# Patient Record
Sex: MALE | Race: WHITE | Employment: OTHER | URBAN - METROPOLITAN AREA
[De-identification: names, ages, dates, MRNs, and addresses within clinical notes are randomized per-mention and may not be internally consistent; named-entity substitution may affect disease eponyms.]

---

## 2017-02-08 ENCOUNTER — TELEPHONE (OUTPATIENT)
Dept: CARDIOLOGY CLINIC | Age: 67
End: 2017-02-08

## 2017-02-13 ENCOUNTER — TELEPHONE (OUTPATIENT)
Dept: CARDIOLOGY CLINIC | Age: 67
End: 2017-02-13

## 2017-02-13 DIAGNOSIS — I10 ESSENTIAL HYPERTENSION: ICD-10-CM

## 2017-02-13 RX ORDER — LISINOPRIL 10 MG/1
10 TABLET ORAL DAILY
Qty: 30 TABLET | Refills: 5 | Status: SHIPPED | OUTPATIENT
Start: 2017-02-13 | End: 2018-02-26 | Stop reason: SDUPTHER

## 2017-02-27 DIAGNOSIS — I48.91 ATRIAL FIBRILLATION, UNSPECIFIED TYPE (HCC): Primary | ICD-10-CM

## 2017-03-01 ENCOUNTER — TELEPHONE (OUTPATIENT)
Dept: CARDIOLOGY CLINIC | Age: 67
End: 2017-03-01

## 2017-03-01 ENCOUNTER — HOSPITAL ENCOUNTER (OUTPATIENT)
Dept: OTHER | Age: 67
Discharge: HOME | End: 2017-03-01
Attending: NURSE PRACTITIONER | Admitting: NURSE PRACTITIONER

## 2017-03-01 LAB
BASOPHILS ABSOLUTE: 0.1 K/UL (ref 0–0.2)
BASOPHILS RELATIVE PERCENT: 1 %
EOSINOPHILS ABSOLUTE: 0.1 K/UL (ref 0–0.6)
EOSINOPHILS RELATIVE PERCENT: 1.9 %
HCT VFR BLD CALC: 40.8 % (ref 40.5–52.5)
HEMOGLOBIN: 13.7 G/DL (ref 13.5–17.5)
LYMPHOCYTES ABSOLUTE: 2.2 K/UL (ref 1–5.1)
LYMPHOCYTES RELATIVE PERCENT: 36 %
MCH RBC QN AUTO: 29 PG (ref 26–34)
MCHC RBC AUTO-ENTMCNC: 33.5 G/DL (ref 31–36)
MCV RBC AUTO: 86.7 FL (ref 80–100)
MONOCYTES ABSOLUTE: 0.5 K/UL (ref 0–1.3)
MONOCYTES RELATIVE PERCENT: 8.9 %
NEUTROPHILS ABSOLUTE: 3.2 K/UL (ref 1.7–7.7)
NEUTROPHILS RELATIVE PERCENT: 52.2 %
PDW BLD-RTO: 13.1 % (ref 12.4–15.4)
PLATELET # BLD: 203 K/UL (ref 135–450)
PMV BLD AUTO: 8.2 FL (ref 5–10.5)
RBC # BLD: 4.71 M/UL (ref 4.2–5.9)
WBC # BLD: 6.1 K/UL (ref 4–11)

## 2017-04-10 ENCOUNTER — OFFICE VISIT (OUTPATIENT)
Dept: CARDIOLOGY CLINIC | Age: 67
End: 2017-04-10

## 2017-04-10 VITALS
DIASTOLIC BLOOD PRESSURE: 58 MMHG | WEIGHT: 203 LBS | HEART RATE: 52 BPM | BODY MASS INDEX: 27.5 KG/M2 | SYSTOLIC BLOOD PRESSURE: 108 MMHG | HEIGHT: 72 IN

## 2017-04-10 DIAGNOSIS — I47.1 PAROXYSMAL ATRIAL TACHYCARDIA (HCC): ICD-10-CM

## 2017-04-10 DIAGNOSIS — I48.0 PAROXYSMAL ATRIAL FIBRILLATION (HCC): Primary | ICD-10-CM

## 2017-04-10 PROCEDURE — 99214 OFFICE O/P EST MOD 30 MIN: CPT | Performed by: INTERNAL MEDICINE

## 2017-04-10 PROCEDURE — 93000 ELECTROCARDIOGRAM COMPLETE: CPT | Performed by: INTERNAL MEDICINE

## 2017-04-10 RX ORDER — WARFARIN SODIUM 5 MG/1
5 TABLET ORAL DAILY
Qty: 30 TABLET | Refills: 2 | Status: SHIPPED | OUTPATIENT
Start: 2017-04-10 | End: 2017-08-24 | Stop reason: SDUPTHER

## 2017-04-11 ENCOUNTER — TELEPHONE (OUTPATIENT)
Dept: PHARMACY | Facility: CLINIC | Age: 67
End: 2017-04-11

## 2017-04-20 ENCOUNTER — TELEPHONE (OUTPATIENT)
Dept: PHARMACY | Facility: CLINIC | Age: 67
End: 2017-04-20

## 2017-04-21 ENCOUNTER — TELEPHONE (OUTPATIENT)
Dept: PHARMACY | Facility: CLINIC | Age: 67
End: 2017-04-21

## 2017-05-22 ENCOUNTER — ANTI-COAG VISIT (OUTPATIENT)
Dept: PHARMACY | Facility: CLINIC | Age: 67
End: 2017-05-22

## 2017-05-22 DIAGNOSIS — I48.0 PAROXYSMAL ATRIAL FIBRILLATION (HCC): ICD-10-CM

## 2017-05-22 LAB — INR BLD: 2.5

## 2017-05-30 ENCOUNTER — ANTI-COAG VISIT (OUTPATIENT)
Dept: PHARMACY | Facility: CLINIC | Age: 67
End: 2017-05-30

## 2017-05-30 LAB — INR BLD: 1.7

## 2017-06-13 ENCOUNTER — ANTI-COAG VISIT (OUTPATIENT)
Dept: PHARMACY | Facility: CLINIC | Age: 67
End: 2017-06-13

## 2017-06-13 LAB — INR BLD: 1.6

## 2017-07-03 ENCOUNTER — ANTI-COAG VISIT (OUTPATIENT)
Dept: PHARMACY | Facility: CLINIC | Age: 67
End: 2017-07-03

## 2017-07-03 LAB — INR BLD: 1.6

## 2017-07-20 ENCOUNTER — ANTI-COAG VISIT (OUTPATIENT)
Dept: PHARMACY | Facility: CLINIC | Age: 67
End: 2017-07-20

## 2017-07-20 LAB — INR BLD: 3

## 2017-08-22 ENCOUNTER — ANTI-COAG VISIT (OUTPATIENT)
Dept: PHARMACY | Facility: CLINIC | Age: 67
End: 2017-08-22

## 2017-08-22 DIAGNOSIS — I48.0 PAROXYSMAL ATRIAL FIBRILLATION (HCC): ICD-10-CM

## 2017-08-22 LAB — INR BLD: 2.4

## 2017-09-19 ENCOUNTER — HOSPITAL ENCOUNTER (OUTPATIENT)
Dept: OTHER | Age: 67
Discharge: HOME | End: 2017-10-31
Attending: INTERNAL MEDICINE | Admitting: INTERNAL MEDICINE

## 2017-09-19 ENCOUNTER — ANTI-COAG VISIT (OUTPATIENT)
Dept: PHARMACY | Facility: CLINIC | Age: 67
End: 2017-09-19

## 2017-09-19 DIAGNOSIS — I48.0 PAROXYSMAL ATRIAL FIBRILLATION (HCC): ICD-10-CM

## 2017-09-19 LAB — INR BLD: 3.1

## 2017-10-17 ENCOUNTER — ANTI-COAG VISIT (OUTPATIENT)
Dept: PHARMACY | Facility: CLINIC | Age: 67
End: 2017-10-17

## 2017-10-17 LAB — INR BLD: 2.7

## 2017-11-01 ENCOUNTER — HOSPITAL ENCOUNTER (OUTPATIENT)
Dept: OTHER | Age: 67
Discharge: HOME | End: 2017-11-30
Attending: INTERNAL MEDICINE | Admitting: INTERNAL MEDICINE

## 2017-11-06 ENCOUNTER — HOSPITAL ENCOUNTER (OUTPATIENT)
Dept: OTHER | Age: 67
Discharge: HOME | End: 2017-11-06

## 2017-11-06 LAB
A/G RATIO: 1.2 (ref 1.1–2.2)
ALBUMIN SERPL-MCNC: 4.2 G/DL (ref 3.4–5)
ALP BLD-CCNC: 87 U/L (ref 40–129)
ALT SERPL-CCNC: 28 U/L (ref 10–40)
ANION GAP SERPL CALCULATED.3IONS-SCNC: 12 MMOL/L (ref 3–16)
AST SERPL-CCNC: 26 U/L (ref 15–37)
BILIRUB SERPL-MCNC: 0.4 MG/DL (ref 0–1)
BUN BLDV-MCNC: 24 MG/DL (ref 7–20)
CALCIUM SERPL-MCNC: 9.1 MG/DL (ref 8.3–10.6)
CHLORIDE BLD-SCNC: 104 MMOL/L (ref 99–110)
CO2: 25 MMOL/L (ref 21–32)
CREAT SERPL-MCNC: 1 MG/DL (ref 0.8–1.3)
GFR AFRICAN AMERICAN: >60
GFR NON-AFRICAN AMERICAN: >60
GLOBULIN: 3.4 G/DL
GLUCOSE BLD-MCNC: 143 MG/DL (ref 70–99)
POTASSIUM SERPL-SCNC: 4.7 MMOL/L (ref 3.5–5.1)
PRO-BNP: 119 PG/ML (ref 0–124)
SODIUM BLD-SCNC: 141 MMOL/L (ref 136–145)
TOTAL PROTEIN: 7.6 G/DL (ref 6.4–8.2)

## 2017-11-07 LAB — TSH REFLEX: 1.64 UIU/ML (ref 0.27–4.2)

## 2017-11-14 ENCOUNTER — ANTI-COAG VISIT (OUTPATIENT)
Dept: PHARMACY | Facility: CLINIC | Age: 67
End: 2017-11-14

## 2017-11-14 LAB — INR BLD: 2.3

## 2017-12-01 ENCOUNTER — HOSPITAL ENCOUNTER (OUTPATIENT)
Dept: OTHER | Age: 67
Discharge: HOME | End: 2017-12-31
Attending: INTERNAL MEDICINE | Admitting: INTERNAL MEDICINE

## 2017-12-26 ENCOUNTER — ANTI-COAG VISIT (OUTPATIENT)
Dept: PHARMACY | Facility: CLINIC | Age: 67
End: 2017-12-26

## 2017-12-26 LAB — INR BLD: 2.8

## 2017-12-26 NOTE — PROGRESS NOTES
Mr. Makeda Jackson is here for management of anticoagulation for A-fib. PMH also significant for Chronic back pain; DJD; Fibromyalgia; and Hypertension. He presents today w/out complaint. Pt verifies dosing regimen as listed above. Pt denies s/s bleeding/bruising/swelling/SOB. No BRBPR. No melena. Denies missed doses. Reviewed pt medication list  No changes in RX/OTCs/Herbal medications. Reviewed dietary concerns  Occasional EToH use. Denies tobacco use. Patient is getting lots of bills from the appointments. Will allow 6 weeks due to cost and since patient has been stable. Told him to look into NOAC or having MD manage warfarin--both may be cheaper options. INR 2.8 is within acceptable therapeutic range of 2-3. Recommend to continue 5  mg daily, except 2.5 mg Sun/Wed. Patient has 5 mg tablets. Will continue to monitor and check INR in 6 weeks, due to copay. Dosing reminder card given with phone number, appointment date and time.    Return to clinic: 2/6 @ 2:45pm

## 2018-01-01 ENCOUNTER — HOSPITAL ENCOUNTER (OUTPATIENT)
Dept: OTHER | Age: 68
Discharge: HOME | End: 2018-01-31
Attending: INTERNAL MEDICINE | Admitting: INTERNAL MEDICINE

## 2018-02-01 ENCOUNTER — HOSPITAL ENCOUNTER (OUTPATIENT)
Dept: OTHER | Age: 68
Discharge: HOME | End: 2018-02-28
Attending: INTERNAL MEDICINE | Admitting: INTERNAL MEDICINE

## 2018-02-13 ENCOUNTER — ANTI-COAG VISIT (OUTPATIENT)
Dept: PHARMACY | Facility: CLINIC | Age: 68
End: 2018-02-13

## 2018-02-13 LAB — INR BLD: 2.5

## 2018-02-13 NOTE — PROGRESS NOTES
Mr. Maria Del Rosario Bruno is here for management of anticoagulation for A-fib. PMH also significant for Chronic back pain; DJD; Fibromyalgia; and Hypertension. He presents today w/out complaint. Pt verifies dosing regimen as listed above. Pt denies s/s bleeding/bruising/swelling/SOB. No BRBPR. No melena. Denies missed doses. Reviewed pt medication list  No changes in RX/OTCs/Herbal medications. Reviewed dietary concerns  Occasional EToH use. Denies tobacco use. Patient is getting lots of bills from the appointments. Will allow 6 weeks due to cost and since patient has been stable. Told him to look into NOAC or having MD manage warfarin--both may be cheaper options. INR 2.5 is within acceptable therapeutic range of 2-3. Recommend to continue 5  mg daily, except 2.5 mg Sun/Wed. Patient has 5 mg tablets. Will continue to monitor and check INR in 6 weeks, due to copay. Dosing reminder card given with phone number, appointment date and time.    Return to clinic: 3/27 @ 2:45pm

## 2018-02-27 ENCOUNTER — TELEPHONE (OUTPATIENT)
Dept: CARDIOLOGY CLINIC | Age: 68
End: 2018-02-27

## 2018-03-01 ENCOUNTER — HOSPITAL ENCOUNTER (OUTPATIENT)
Dept: OTHER | Age: 68
Discharge: HOME | End: 2018-03-31
Attending: INTERNAL MEDICINE | Admitting: INTERNAL MEDICINE

## 2018-03-27 ENCOUNTER — ANTI-COAG VISIT (OUTPATIENT)
Dept: PHARMACY | Facility: CLINIC | Age: 68
End: 2018-03-27

## 2018-03-27 LAB — INR BLD: 2.7

## 2018-04-01 ENCOUNTER — HOSPITAL ENCOUNTER (OUTPATIENT)
Dept: OTHER | Age: 68
Discharge: HOME | End: 2018-04-30
Attending: INTERNAL MEDICINE | Admitting: INTERNAL MEDICINE

## 2018-04-16 ENCOUNTER — OFFICE VISIT (OUTPATIENT)
Dept: CARDIOLOGY CLINIC | Age: 68
End: 2018-04-16

## 2018-04-16 VITALS
WEIGHT: 218 LBS | OXYGEN SATURATION: 98 % | BODY MASS INDEX: 29.57 KG/M2 | HEART RATE: 55 BPM | SYSTOLIC BLOOD PRESSURE: 124 MMHG | DIASTOLIC BLOOD PRESSURE: 62 MMHG

## 2018-04-16 DIAGNOSIS — I10 ESSENTIAL HYPERTENSION: ICD-10-CM

## 2018-04-16 DIAGNOSIS — I48.0 PAROXYSMAL ATRIAL FIBRILLATION (HCC): Primary | ICD-10-CM

## 2018-04-16 DIAGNOSIS — R00.1 SINUS BRADYCARDIA: ICD-10-CM

## 2018-04-16 DIAGNOSIS — I44.0 FIRST DEGREE ATRIOVENTRICULAR BLOCK: ICD-10-CM

## 2018-04-16 PROCEDURE — 93000 ELECTROCARDIOGRAM COMPLETE: CPT | Performed by: NURSE PRACTITIONER

## 2018-04-16 PROCEDURE — 99214 OFFICE O/P EST MOD 30 MIN: CPT | Performed by: NURSE PRACTITIONER

## 2018-04-17 ENCOUNTER — HOSPITAL ENCOUNTER (OUTPATIENT)
Dept: OTHER | Age: 68
Discharge: HOME | End: 2018-04-17
Attending: NURSE PRACTITIONER | Admitting: NURSE PRACTITIONER

## 2018-04-17 DIAGNOSIS — I48.0 PAROXYSMAL ATRIAL FIBRILLATION (HCC): ICD-10-CM

## 2018-04-17 LAB
ANION GAP SERPL CALCULATED.3IONS-SCNC: 13 MMOL/L (ref 3–16)
BASOPHILS ABSOLUTE: 0.1 K/UL (ref 0–0.2)
BASOPHILS RELATIVE PERCENT: 1 %
BUN BLDV-MCNC: 19 MG/DL (ref 7–20)
CALCIUM SERPL-MCNC: 8.8 MG/DL (ref 8.3–10.6)
CHLORIDE BLD-SCNC: 101 MMOL/L (ref 99–110)
CO2: 26 MMOL/L (ref 21–32)
CREAT SERPL-MCNC: 0.9 MG/DL (ref 0.8–1.3)
EOSINOPHILS ABSOLUTE: 0.1 K/UL (ref 0–0.6)
EOSINOPHILS RELATIVE PERCENT: 1.7 %
GFR AFRICAN AMERICAN: >60
GFR NON-AFRICAN AMERICAN: >60
GLUCOSE BLD-MCNC: 142 MG/DL (ref 70–99)
HCT VFR BLD CALC: 41.6 % (ref 40.5–52.5)
HEMOGLOBIN: 14.1 G/DL (ref 13.5–17.5)
LYMPHOCYTES ABSOLUTE: 2 K/UL (ref 1–5.1)
LYMPHOCYTES RELATIVE PERCENT: 32.5 %
MCH RBC QN AUTO: 29.8 PG (ref 26–34)
MCHC RBC AUTO-ENTMCNC: 33.9 G/DL (ref 31–36)
MCV RBC AUTO: 88.1 FL (ref 80–100)
MONOCYTES ABSOLUTE: 0.4 K/UL (ref 0–1.3)
MONOCYTES RELATIVE PERCENT: 6.8 %
NEUTROPHILS ABSOLUTE: 3.6 K/UL (ref 1.7–7.7)
NEUTROPHILS RELATIVE PERCENT: 58 %
PDW BLD-RTO: 13.4 % (ref 12.4–15.4)
PLATELET # BLD: 232 K/UL (ref 135–450)
PMV BLD AUTO: 8.7 FL (ref 5–10.5)
POTASSIUM SERPL-SCNC: 4.3 MMOL/L (ref 3.5–5.1)
RBC # BLD: 4.72 M/UL (ref 4.2–5.9)
SODIUM BLD-SCNC: 140 MMOL/L (ref 136–145)
WBC # BLD: 6.2 K/UL (ref 4–11)

## 2018-04-20 RX ORDER — FUROSEMIDE 20 MG/1
20 TABLET ORAL DAILY
Qty: 30 TABLET | Refills: 5 | Status: SHIPPED | OUTPATIENT
Start: 2018-04-20 | End: 2020-10-22 | Stop reason: ALTCHOICE

## 2018-05-01 ENCOUNTER — HOSPITAL ENCOUNTER (OUTPATIENT)
Dept: OTHER | Age: 68
Discharge: HOME | End: 2018-05-31
Attending: INTERNAL MEDICINE | Admitting: INTERNAL MEDICINE

## 2018-05-15 ENCOUNTER — ANTI-COAG VISIT (OUTPATIENT)
Dept: PHARMACY | Facility: CLINIC | Age: 68
End: 2018-05-15

## 2018-05-15 DIAGNOSIS — I48.0 PAROXYSMAL ATRIAL FIBRILLATION (HCC): ICD-10-CM

## 2018-05-15 LAB — INR BLD: 2.4

## 2018-06-01 ENCOUNTER — HOSPITAL ENCOUNTER (OUTPATIENT)
Dept: OTHER | Age: 68
Discharge: HOME | End: 2018-06-30
Attending: INTERNAL MEDICINE | Admitting: INTERNAL MEDICINE

## 2018-06-08 RX ORDER — WARFARIN SODIUM 5 MG/1
TABLET ORAL
Qty: 30 TABLET | Refills: 0 | Status: SHIPPED | OUTPATIENT
Start: 2018-06-08 | End: 2018-07-20 | Stop reason: SDUPTHER

## 2018-06-28 ENCOUNTER — ANTI-COAG VISIT (OUTPATIENT)
Dept: PHARMACY | Facility: CLINIC | Age: 68
End: 2018-06-28

## 2018-06-28 DIAGNOSIS — I48.0 PAROXYSMAL ATRIAL FIBRILLATION (HCC): ICD-10-CM

## 2018-06-28 LAB — INR BLD: 3.4

## 2018-07-01 ENCOUNTER — HOSPITAL ENCOUNTER (OUTPATIENT)
Dept: OTHER | Age: 68
Discharge: HOME | End: 2018-07-01
Attending: INTERNAL MEDICINE | Admitting: INTERNAL MEDICINE

## 2018-08-16 ENCOUNTER — TELEPHONE (OUTPATIENT)
Dept: PHARMACY | Age: 68
End: 2018-08-16

## 2018-08-21 ENCOUNTER — TELEPHONE (OUTPATIENT)
Dept: CARDIOLOGY CLINIC | Age: 68
End: 2018-08-21

## 2018-08-21 NOTE — TELEPHONE ENCOUNTER
Pt called asking if he can change from Warfarin to Eliquis. Pt can be reached at [phone]. If he can switch pt prefers to use [id number] - Jess Tsai, [employer] [address] [phone] - [phone].

## 2018-08-22 NOTE — TELEPHONE ENCOUNTER
Spoke with the patient and gave him these instructions. He will get checked at [facility] and then we can proceed with new RX for Eliquis.

## 2018-08-23 ENCOUNTER — ANTI-COAG VISIT (OUTPATIENT)
Dept: PHARMACY | Age: 68
End: 2018-08-23
Payer: MEDICARE

## 2018-08-23 ENCOUNTER — TELEPHONE (OUTPATIENT)
Dept: CARDIOLOGY CLINIC | Age: 68
End: 2018-08-23

## 2018-08-23 DIAGNOSIS — I48.0 PAROXYSMAL ATRIAL FIBRILLATION (HCC): Primary | ICD-10-CM

## 2018-08-23 DIAGNOSIS — I48.0 PAROXYSMAL ATRIAL FIBRILLATION (HCC): ICD-10-CM

## 2018-08-23 LAB — INTERNATIONAL NORMALIZATION RATIO, POC: 1.5

## 2018-08-23 PROCEDURE — 99211 OFF/OP EST MAY X REQ PHY/QHP: CPT | Performed by: PHARMACIST

## 2018-08-23 PROCEDURE — 85610 PROTHROMBIN TIME: CPT | Performed by: PHARMACIST

## 2018-08-23 NOTE — TELEPHONE ENCOUNTER
Willy Leger reports INR was 1.5 today. Pt was waiting for INR below 2.0 to switch to Eliquis. Pt would like to make that switch and  new med today.

## 2018-08-23 NOTE — TELEPHONE ENCOUNTER
Spoke to Josue Fairchild at the coumadin clinic and pt. Informed Eliquis has been pended for sign off by the provider.  YOKASTA

## 2018-08-24 ENCOUNTER — TELEPHONE (OUTPATIENT)
Dept: CARDIOLOGY CLINIC | Age: 68
End: 2018-08-24

## 2018-08-24 DIAGNOSIS — I48.0 PAROXYSMAL ATRIAL FIBRILLATION (HCC): ICD-10-CM

## 2018-08-24 NOTE — TELEPHONE ENCOUNTER
This was sent to [facility] on 8/23 and they confirmed receipt:    -E-Prescribing Status: Receipt confirmed by pharmacy (8/23/2018  3:53 PM EDT)

## 2019-01-23 ENCOUNTER — APPOINTMENT (OUTPATIENT)
Dept: GENERAL RADIOLOGY | Age: 69
End: 2019-01-23
Payer: MEDICARE

## 2019-01-23 ENCOUNTER — APPOINTMENT (OUTPATIENT)
Dept: CT IMAGING | Age: 69
End: 2019-01-23
Payer: MEDICARE

## 2019-01-23 ENCOUNTER — HOSPITAL ENCOUNTER (EMERGENCY)
Age: 69
Discharge: HOME | End: 2019-01-23
Payer: MEDICARE

## 2019-01-23 VITALS
HEIGHT: 72 IN | OXYGEN SATURATION: 95 % | DIASTOLIC BLOOD PRESSURE: 70 MMHG | TEMPERATURE: 98.4 F | WEIGHT: 210 LBS | BODY MASS INDEX: 28.44 KG/M2 | SYSTOLIC BLOOD PRESSURE: 158 MMHG | RESPIRATION RATE: 14 BRPM | HEART RATE: 66 BPM

## 2019-01-23 DIAGNOSIS — S80.11XA CONTUSION OF RIGHT LOWER EXTREMITY, INITIAL ENCOUNTER: ICD-10-CM

## 2019-01-23 DIAGNOSIS — M54.50 LUMBAR PAIN: ICD-10-CM
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
## 2019-01-23 PROCEDURE — 73502 X-RAY EXAM HIP UNI 2-3 VIEWS: CPT

## 2019-01-23 PROCEDURE — 99284 EMERGENCY DEPT VISIT MOD MDM: CPT

## 2019-01-23 PROCEDURE — 72100 X-RAY EXAM L-S SPINE 2/3 VWS: CPT

## 2019-01-23 PROCEDURE — 73590 X-RAY EXAM OF LOWER LEG: CPT

## 2019-01-23 PROCEDURE — 70450 CT HEAD/BRAIN W/O DYE: CPT

## 2019-01-23 RX ORDER — METHOCARBAMOL 500 MG/1
500 TABLET, FILM COATED ORAL 3 TIMES DAILY
Qty: 10 TABLET | Refills: 0 | Status: SHIPPED | OUTPATIENT
Start: 2019-01-23 | End: 2019-01-27

## 2019-01-23 ASSESSMENT — PAIN DESCRIPTION - ORIENTATION: ORIENTATION: RIGHT

## 2019-01-23 ASSESSMENT — PAIN DESCRIPTION - DESCRIPTORS: DESCRIPTORS: THROBBING

## 2019-01-23 ASSESSMENT — ENCOUNTER SYMPTOMS
VOMITING: 0
BACK PAIN: 1
SHORTNESS OF BREATH: 0
ABDOMINAL PAIN: 0

## 2019-01-23 ASSESSMENT — PAIN DESCRIPTION - LOCATION: LOCATION: HIP;LEG

## 2019-01-23 ASSESSMENT — PAIN SCALES - GENERAL: PAINLEVEL_OUTOF10: 5

## 2019-01-23 ASSESSMENT — PAIN DESCRIPTION - PAIN TYPE: TYPE: ACUTE PAIN

## 2019-03-07 DIAGNOSIS — I48.0 PAROXYSMAL ATRIAL FIBRILLATION (HCC): Primary | ICD-10-CM

## 2019-03-07 DIAGNOSIS — I10 ESSENTIAL HYPERTENSION: ICD-10-CM

## 2019-03-11 RX ORDER — LISINOPRIL 10 MG/1
10 TABLET ORAL DAILY
Qty: 15 TABLET | Refills: 0 | Status: SHIPPED | OUTPATIENT
Start: 2019-03-11 | End: 2019-04-17 | Stop reason: DRUGHIGH

## 2019-04-15 ENCOUNTER — HOSPITAL ENCOUNTER (OUTPATIENT)
Age: 69
Discharge: HOME | End: 2019-04-15
Payer: MEDICARE

## 2019-04-15 DIAGNOSIS — Z79.899 MEDICATION MANAGEMENT: ICD-10-CM

## 2019-04-15 DIAGNOSIS — Z79.899 MEDICATION MANAGEMENT: Primary | ICD-10-CM

## 2019-04-15 LAB
ANION GAP SERPL CALCULATED.3IONS-SCNC: 10 MMOL/L (ref 3–16)
BUN BLDV-MCNC: 13 MG/DL (ref 7–20)
CALCIUM SERPL-MCNC: 8.8 MG/DL (ref 8.3–10.6)
CHLORIDE BLD-SCNC: 107 MMOL/L (ref 99–110)
CO2: 26 MMOL/L (ref 21–32)
CREAT SERPL-MCNC: 0.9 MG/DL (ref 0.8–1.3)
GFR AFRICAN AMERICAN: >60
GFR NON-AFRICAN AMERICAN: >60
GLUCOSE BLD-MCNC: 127 MG/DL (ref 70–99)
HCT VFR BLD CALC: 41 % (ref 40.5–52.5)
HEMOGLOBIN: 13.9 G/DL (ref 13.5–17.5)
MCH RBC QN AUTO: 29.6 PG (ref 26–34)
MCHC RBC AUTO-ENTMCNC: 33.7 G/DL (ref 31–36)
MCV RBC AUTO: 87.8 FL (ref 80–100)
PDW BLD-RTO: 13.6 % (ref 12.4–15.4)
PLATELET # BLD: 208 K/UL (ref 135–450)
PMV BLD AUTO: 8.1 FL (ref 5–10.5)
POTASSIUM SERPL-SCNC: 4 MMOL/L (ref 3.5–5.1)
RBC # BLD: 4.68 M/UL (ref 4.2–5.9)
SODIUM BLD-SCNC: 143 MMOL/L (ref 136–145)
WBC # BLD: 5.2 K/UL (ref 4–11)

## 2019-04-15 PROCEDURE — 36415 COLL VENOUS BLD VENIPUNCTURE: CPT

## 2019-04-15 PROCEDURE — 85027 COMPLETE CBC AUTOMATED: CPT

## 2019-04-15 PROCEDURE — 80048 BASIC METABOLIC PNL TOTAL CA: CPT

## 2019-04-16 ENCOUNTER — TELEPHONE (OUTPATIENT)
Dept: CARDIOLOGY CLINIC | Age: 69
End: 2019-04-16

## 2019-04-16 NOTE — TELEPHONE ENCOUNTER
----- Message from JEWEL Samuel CNP sent at 4/16/2019  8:58 AM EDT -----  Please inform the patient that his labs are normal.

## 2019-04-17 ENCOUNTER — OFFICE VISIT (OUTPATIENT)
Dept: CARDIOLOGY CLINIC | Age: 69
End: 2019-04-17
Payer: MEDICARE

## 2019-04-17 VITALS
BODY MASS INDEX: 29.53 KG/M2 | HEIGHT: 72 IN | SYSTOLIC BLOOD PRESSURE: 132 MMHG | OXYGEN SATURATION: 96 % | WEIGHT: 218 LBS | HEART RATE: 54 BPM | DIASTOLIC BLOOD PRESSURE: 64 MMHG

## 2019-04-17 DIAGNOSIS — I48.0 PAROXYSMAL ATRIAL FIBRILLATION (HCC): Primary | ICD-10-CM

## 2019-04-17 DIAGNOSIS — I44.0 FIRST DEGREE ATRIOVENTRICULAR BLOCK: ICD-10-CM

## 2019-04-17 DIAGNOSIS — R00.1 SINUS BRADYCARDIA: ICD-10-CM

## 2019-04-17 DIAGNOSIS — I10 ESSENTIAL HYPERTENSION: ICD-10-CM

## 2019-04-17 PROCEDURE — 93000 ELECTROCARDIOGRAM COMPLETE: CPT | Performed by: NURSE PRACTITIONER

## 2019-04-17 PROCEDURE — 99214 OFFICE O/P EST MOD 30 MIN: CPT | Performed by: NURSE PRACTITIONER

## 2019-04-17 RX ORDER — LISINOPRIL 10 MG/1
5 TABLET ORAL DAILY
Qty: 15 TABLET | Refills: 0 | Status: SHIPPED
Start: 2019-04-17 | End: 2019-06-24

## 2019-04-17 NOTE — PROGRESS NOTES
[facility]   Electrophysiology  Note            Date:  April 17, 2019  Patient name: Sánchez Felix  YOB: 1950    Primary Care physician: No primary care provider on file. HISTORY OF PRESENT ILLNESS: Sánchez Felix is a 77 y.o. male with a history of lone versus paroxysmal atrial fibrillation, sinus bradycardia, first degree AV block, HTN, and chronic back pain. He was admitted in 12/2015 with chest pressure. Was in AF and was started on Eliquis. A stress test and echo were normal in 12/2015. He has remained in sinus rhythm at subsequent visits. Admitted to taking someone else's diuretic due to swelling in 2018. Today he is being seen for atrial fibrillation. His EKG shows sinus bradycardia with a first degree AV block with a HR of 54. He quit taking Eliquis due to cost. Is not taking Lasix due to working long hours/frequent urination. Complains of unchanged lower extremity swelling. Denies chest pain, palpitations, shortness of breath, and dizziness. Past Medical History:   has a past medical history of Abnormal ultrasound of carotid artery, Back pain, Chronic back pain, DJD (degenerative joint disease), Fibromyalgia, Hypertension, Kidney stones, and Paroxysmal atrial tachycardia ([facility] [address] Past Surgical History:   has a past surgical history that includes back surgery; Upper gastrointestinal endoscopy (12/16/2011); Upper gastrointestinal endoscopy (12/16/11); and Lithotripsy. Home Medications:    Prior to Admission medications    Medication Sig Start Date End Date Taking? Authorizing Provider   lisinopril (PRINIVIL;ZESTRIL) 10 MG tablet Take 0.5 tablets by mouth daily 4/17/19  Yes JEWEL Mcallister - CNP   Buprenorphine HCl-Naloxone HCl (SUBOXONE SL) Place under the tongue 2 times daily Indications: Patient unaware of home dose.    Yes Historical Provider, MD   apixaban (ELIQUIS) 5 MG TABS tablet Take 1 tablet by mouth 2 times daily 8/23/18   JEWEL Mcallister - CNP   furosemide (LASIX) 20 MG tablet Take 1 tablet by mouth daily 4/20/18   Carolyn Alvarez, APRN - CNP       Allergies:  Amitriptyline; Citalopram hydrobromide; Naproxen; Other; Paroxetine; Pcn [penicillins]; and Penicillins    Social History:   reports that he quit smoking about 10 years ago. His smoking use included cigarettes. He has never used smokeless tobacco. He reports that he does not drink alcohol or use drugs. Family History: family history includes Heart Attack (age of onset: 61) in his mother; Heart Attack (age of onset: 80) in his father. Review of Systems   Constitutional: Negative. HENT: Negative. Eyes: Negative. Respiratory: Negative. Cardiovascular: see HPI  Gastrointestinal: Negative. Genitourinary: Negative. Musculoskeletal: + chronic back pain  Skin: Negative. Neurological: Negative. Hematological: Negative. Psychiatric/Behavioral: Negative     Physical Examination:    Vital signs:   /64   Pulse 54   Ht 6' (1.829 m)   Wt 218 lb (98.9 kg)   SpO2 96%   BMI 29.57 kg/m²      Constitutional and General Appearance: alert, cooperative, no distress and appears stated age  HEENT: PERRL, no cervical lymphadenopathy. No masses palpable. Normal oral mucosa  Respiratory:  · Normal excursion and expansion without use of accessory muscles  · Resp Auscultation: Normal breath sounds without dullness or wheezing  Cardiovascular:  · The apical impulse is not displaced  · Heart tones are crisp and normal. Regular S1 and S2.  · Jugular venous pulsation Normal  · The carotid upstroke is normal in amplitude and contour without delay or bruit  · Peripheral pulses are symmetrical and full  Abdomen:  · No masses or tenderness  · Bowel sounds present  Extremities:  ·  No cyanosis or clubbing  ·  1+ lower extremity swelling  · Skin: Warm and dry  Neurological:  · Alert and oriented.   · Moves all extremities well  · No abnormalities of mood, affect, memory, mentation, or

## 2019-06-21 DIAGNOSIS — I10 ESSENTIAL HYPERTENSION: ICD-10-CM

## 2019-06-24 RX ORDER — LISINOPRIL 5 MG/1
5 TABLET ORAL DAILY
Qty: 30 TABLET | Refills: 11 | Status: SHIPPED | OUTPATIENT
Start: 2019-06-24 | End: 2020-10-22 | Stop reason: ALTCHOICE

## 2019-06-24 NOTE — TELEPHONE ENCOUNTER
4/17/19 NPBB  Plan:   1. Continue lisinopril and Lasix  2. Patient with symptomatic lone versus paroxysmal atrial fibrillation. No known recurrence since 2015. Discussed annual stroke risk of 2.2%. He can't afford DOAC and stated he can't afford to have Coumadin monitored. Recommended 30 day event monitor. Patient is unwilling to wear monitor due to cost and occupation. At the end of the visit, he stated he will try to get Eliquis and will ask for samples if unable to afford. 3. Conduction disease discussed  4.  Follow up in 6 months

## 2019-06-24 NOTE — TELEPHONE ENCOUNTER
Patient notified that a lower dose was called into the pharmacy and he wouldn't need to cut the pill in half.

## 2019-06-24 NOTE — TELEPHONE ENCOUNTER
Please clarify dose. Last OV stated he was taking 0.5 tab of the 10mg. If correct, will send in 5mg tabs.

## 2019-10-21 ENCOUNTER — OFFICE VISIT (OUTPATIENT)
Dept: CARDIOLOGY CLINIC | Age: 69
End: 2019-10-21
Payer: MEDICARE

## 2019-10-21 VITALS
HEIGHT: 72 IN | WEIGHT: 213 LBS | HEART RATE: 54 BPM | OXYGEN SATURATION: 96 % | BODY MASS INDEX: 28.85 KG/M2 | SYSTOLIC BLOOD PRESSURE: 136 MMHG | DIASTOLIC BLOOD PRESSURE: 72 MMHG

## 2019-10-21 DIAGNOSIS — I48.0 PAROXYSMAL ATRIAL FIBRILLATION (HCC): Primary | ICD-10-CM

## 2019-10-21 DIAGNOSIS — I10 ESSENTIAL HYPERTENSION: ICD-10-CM

## 2019-10-21 DIAGNOSIS — I44.0 FIRST DEGREE ATRIOVENTRICULAR BLOCK: ICD-10-CM

## 2019-10-21 PROCEDURE — 99214 OFFICE O/P EST MOD 30 MIN: CPT | Performed by: NURSE PRACTITIONER

## 2019-10-21 PROCEDURE — 93000 ELECTROCARDIOGRAM COMPLETE: CPT | Performed by: NURSE PRACTITIONER

## 2020-03-24 RX ORDER — APIXABAN 5 MG/1
TABLET, FILM COATED ORAL
Qty: 180 TABLET | Refills: 0 | Status: SHIPPED | OUTPATIENT
Start: 2020-03-24 | End: 2021-01-25

## 2020-10-14 ENCOUNTER — TELEPHONE (OUTPATIENT)
Dept: CARDIOLOGY CLINIC | Age: 70
End: 2020-10-14

## 2020-10-21 NOTE — PROGRESS NOTES
/   [facility]   Electrophysiology  Note            Date:  October 22, 2020  Patient name: Lolly Yost  YOB: 1950    Primary Care physician: No primary care provider on file. HISTORY OF PRESENT ILLNESS: Lolly Yost is a 65 y.o. male with a history of lone versus paroxysmal atrial fibrillation, sinus bradycardia, first degree AV block, HTN, and chronic back pain. He was admitted in 12/2015 with chest pressure. Was in AF and was started on Eliquis. Stress test and echo were normal in 12/2015. He has remained in sinus rhythm at subsequent visits. Admitted to taking someone else's diuretic due to swelling in 2018. At his visit in 4/2019, he had stopped Eliquis due to cost and declined Coumadin and event monitor. Today he is being seen for atrial fibrillation. EKG shows sinus with a first degree AV block and HR of 61. He complains of rare palpitations and chronic back pain. Denies chest pain, shortness of breath, and dizziness. Stopped taking all medications 6 months ago due to lack of follow up. Past Medical History:   has a past medical history of Abnormal ultrasound of carotid artery, Back pain, Chronic back pain, DJD (degenerative joint disease), Fibromyalgia, Hypertension, Kidney stones, and Paroxysmal atrial tachycardia ([facility] [address] Past Surgical History:   has a past surgical history that includes back surgery; Upper gastrointestinal endoscopy (12/16/2011); Upper gastrointestinal endoscopy (12/16/11); and Lithotripsy. Home Medications:    Prior to Admission medications    Medication Sig Start Date End Date Taking? Authorizing Provider   ELIQUIS 5 MG TABS tablet TAKE 1 TABLET BY MOUTH TWICE DAILY 3/24/20  Yes JEWEL Rodriguez - CNP   Buprenorphine HCl-Naloxone HCl (SUBOXONE SL) Place under the tongue 2 times daily Indications: Patient unaware of home dose.    Yes Historical Provider, MD   lisinopril (PRINIVIL;ZESTRIL) 5 MG tablet Take 1 tablet by mouth daily  Patient not taking: Reported on 10/22/2020 6/24/19   JEWEL Goodwin CNP   furosemide (LASIX) 20 MG tablet Take 1 tablet by mouth daily  Patient not taking: Reported on 10/22/2020 4/20/18   JEWEL Goodwin CNP       Allergies:  Amitriptyline; Citalopram hydrobromide; Naproxen; Other; Paroxetine; Pcn [penicillins]; and Penicillins    Social History:   reports that he quit smoking about 11 years ago. His smoking use included cigarettes. He has never used smokeless tobacco. He reports that he does not drink alcohol or use drugs. Family History: family history includes Heart Attack (age of onset: 61) in his mother; Heart Attack (age of onset: 80) in his father. Review of Systems   Constitutional: Negative. HENT: Negative. Eyes: Negative. Respiratory: Negative. Cardiovascular: see HPI  Gastrointestinal: Negative. Genitourinary: Negative. Musculoskeletal: + chronic back pain  Skin: Negative. Neurological: Negative. Hematological: Negative. Psychiatric/Behavioral: Negative     Physical Examination:    Vital signs:   /65   Pulse 66   Ht 6' 1\" (1.854 m)   Wt 211 lb (95.7 kg)   SpO2 98%   BMI 27.84 kg/m²      Constitutional and General Appearance: alert, cooperative, no distress and appears stated age  HEENT: PERRL, no cervical lymphadenopathy. No masses palpable.  Normal oral mucosa  Respiratory:  · Normal excursion and expansion without use of accessory muscles  · Resp Auscultation: Normal breath sounds without dullness or wheezing  Cardiovascular:  · The apical impulse is not displaced  · Heart tones are crisp and normal. Regular S1 and S2.  · Jugular venous pulsation Normal  · The carotid upstroke is normal in amplitude and contour without delay or bruit  · Peripheral pulses are symmetrical and full  Abdomen:  · No masses or tenderness  · Bowel sounds present  Extremities:  ·  No cyanosis or clubbing  ·  Trace lower extremity swelling   · Skin: Warm and

## 2020-10-22 ENCOUNTER — OFFICE VISIT (OUTPATIENT)
Dept: CARDIOLOGY CLINIC | Age: 70
End: 2020-10-22
Payer: MEDICARE

## 2020-10-22 VITALS
HEART RATE: 66 BPM | WEIGHT: 211 LBS | DIASTOLIC BLOOD PRESSURE: 65 MMHG | HEIGHT: 73 IN | SYSTOLIC BLOOD PRESSURE: 128 MMHG | BODY MASS INDEX: 27.96 KG/M2 | OXYGEN SATURATION: 98 %

## 2020-10-22 PROCEDURE — 93000 ELECTROCARDIOGRAM COMPLETE: CPT | Performed by: NURSE PRACTITIONER

## 2020-10-22 PROCEDURE — 99214 OFFICE O/P EST MOD 30 MIN: CPT | Performed by: NURSE PRACTITIONER

## 2020-10-22 NOTE — PATIENT INSTRUCTIONS
Blood tests for Eliquis are due   When blood tests are complete, call me and I will send a refill in [phone]  Follow up in one year

## 2021-01-23 ENCOUNTER — APPOINTMENT (OUTPATIENT)
Dept: GENERAL RADIOLOGY | Age: 71
End: 2021-01-23
Payer: MEDICARE

## 2021-01-23 ENCOUNTER — HOSPITAL ENCOUNTER (OUTPATIENT)
Age: 71
Setting detail: OBSERVATION
Discharge: HOME | End: 2021-01-25
Attending: EMERGENCY MEDICINE | Admitting: INTERNAL MEDICINE
Payer: MEDICARE

## 2021-01-23 DIAGNOSIS — R11.2 INTRACTABLE NAUSEA AND VOMITING: Primary | ICD-10-CM

## 2021-01-23 LAB
A/G RATIO: 1.3 (ref 1.1–2.2)
ALBUMIN SERPL-MCNC: 4.6 G/DL (ref 3.4–5)
ALP BLD-CCNC: 136 U/L (ref 40–129)
ALT SERPL-CCNC: 24 U/L (ref 10–40)
ANION GAP SERPL CALCULATED.3IONS-SCNC: 16 MMOL/L (ref 3–16)
AST SERPL-CCNC: 22 U/L (ref 15–37)
BASE EXCESS VENOUS: 0.2 MMOL/L (ref -3–3)
BASOPHILS ABSOLUTE: 0 K/UL (ref 0–0.2)
BASOPHILS RELATIVE PERCENT: 0.2 %
BILIRUB SERPL-MCNC: 1.2 MG/DL (ref 0–1)
BUN BLDV-MCNC: 20 MG/DL (ref 7–20)
CALCIUM SERPL-MCNC: 9.6 MG/DL (ref 8.3–10.6)
CARBOXYHEMOGLOBIN: 1.9 % (ref 0–1.5)
CHLORIDE BLD-SCNC: 93 MMOL/L (ref 99–110)
CO2: 24 MMOL/L (ref 21–32)
CREAT SERPL-MCNC: 1.1 MG/DL (ref 0.8–1.3)
EKG ATRIAL RATE: 58 BPM
EKG DIAGNOSIS: NORMAL
EKG P AXIS: -1 DEGREES
EKG P-R INTERVAL: 206 MS
EKG Q-T INTERVAL: 452 MS
EKG QRS DURATION: 84 MS
EKG QTC CALCULATION (BAZETT): 443 MS
EKG R AXIS: -61 DEGREES
EKG T AXIS: 57 DEGREES
EKG VENTRICULAR RATE: 58 BPM
EOSINOPHILS ABSOLUTE: 0 K/UL (ref 0–0.6)
EOSINOPHILS RELATIVE PERCENT: 0 %
GFR AFRICAN AMERICAN: >60
GFR NON-AFRICAN AMERICAN: >60
GLOBULIN: 3.5 G/DL
GLUCOSE BLD-MCNC: 204 MG/DL (ref 70–99)
GLUCOSE BLD-MCNC: 262 MG/DL (ref 70–99)
HCO3 VENOUS: 25.3 MMOL/L (ref 23–29)
HCT VFR BLD CALC: 48.6 % (ref 40.5–52.5)
HEMOGLOBIN: 16.5 G/DL (ref 13.5–17.5)
LIPASE: 14 U/L (ref 13–60)
LYMPHOCYTES ABSOLUTE: 0.9 K/UL (ref 1–5.1)
LYMPHOCYTES RELATIVE PERCENT: 7.1 %
MCH RBC QN AUTO: 29.5 PG (ref 26–34)
MCHC RBC AUTO-ENTMCNC: 33.9 G/DL (ref 31–36)
MCV RBC AUTO: 87 FL (ref 80–100)
METHEMOGLOBIN VENOUS: 0.3 %
MONOCYTES ABSOLUTE: 0.6 K/UL (ref 0–1.3)
MONOCYTES RELATIVE PERCENT: 4.7 %
NEUTROPHILS ABSOLUTE: 11.4 K/UL (ref 1.7–7.7)
NEUTROPHILS RELATIVE PERCENT: 88 %
O2 CONTENT, VEN: 11 VOL %
O2 SAT, VEN: 39 %
O2 THERAPY: ABNORMAL
PCO2, VEN: 42.1 MMHG (ref 40–50)
PDW BLD-RTO: 12.6 % (ref 12.4–15.4)
PERFORMED ON: ABNORMAL
PH VENOUS: 7.4 (ref 7.35–7.45)
PLATELET # BLD: 292 K/UL (ref 135–450)
PMV BLD AUTO: 8.8 FL (ref 5–10.5)
PO2, VEN: 22.5 MMHG (ref 25–40)
POTASSIUM REFLEX MAGNESIUM: 3.8 MMOL/L (ref 3.5–5.1)
PRO-BNP: 887 PG/ML (ref 0–124)
PROCALCITONIN: 0.06 NG/ML (ref 0–0.15)
RBC # BLD: 5.59 M/UL (ref 4.2–5.9)
SARS-COV-2, NAAT: NOT DETECTED
SODIUM BLD-SCNC: 133 MMOL/L (ref 136–145)
TCO2 CALC VENOUS: 27 MMOL/L
TOTAL PROTEIN: 8.1 G/DL (ref 6.4–8.2)
TROPONIN: <0.01 NG/ML
WBC # BLD: 13 K/UL (ref 4–11)

## 2021-01-23 PROCEDURE — 83690 ASSAY OF LIPASE: CPT

## 2021-01-23 PROCEDURE — U0003 INFECTIOUS AGENT DETECTION BY NUCLEIC ACID (DNA OR RNA); SEVERE ACUTE RESPIRATORY SYNDROME CORONAVIRUS 2 (SARS-COV-2) (CORONAVIRUS DISEASE [COVID-19]), AMPLIFIED PROBE TECHNIQUE, MAKING USE OF HIGH THROUGHPUT TECHNOLOGIES AS DESCRIBED BY CMS-2020-01-R: HCPCS

## 2021-01-23 PROCEDURE — 96375 TX/PRO/DX INJ NEW DRUG ADDON: CPT

## 2021-01-23 PROCEDURE — 80053 COMPREHEN METABOLIC PANEL: CPT

## 2021-01-23 PROCEDURE — 96374 THER/PROPH/DIAG INJ IV PUSH: CPT

## 2021-01-23 PROCEDURE — 1200000000 HC SEMI PRIVATE

## 2021-01-23 PROCEDURE — G0378 HOSPITAL OBSERVATION PER HR: HCPCS

## 2021-01-23 PROCEDURE — 99283 EMERGENCY DEPT VISIT LOW MDM: CPT

## 2021-01-23 PROCEDURE — U0002 COVID-19 LAB TEST NON-CDC: HCPCS

## 2021-01-23 PROCEDURE — 6360000002 HC RX W HCPCS: Performed by: PHYSICIAN ASSISTANT

## 2021-01-23 PROCEDURE — 82803 BLOOD GASES ANY COMBINATION: CPT

## 2021-01-23 PROCEDURE — 93005 ELECTROCARDIOGRAM TRACING: CPT | Performed by: EMERGENCY MEDICINE

## 2021-01-23 PROCEDURE — 84145 PROCALCITONIN (PCT): CPT

## 2021-01-23 PROCEDURE — 2580000003 HC RX 258: Performed by: INTERNAL MEDICINE

## 2021-01-23 PROCEDURE — 2580000003 HC RX 258: Performed by: PHYSICIAN ASSISTANT

## 2021-01-23 PROCEDURE — 6370000000 HC RX 637 (ALT 250 FOR IP): Performed by: INTERNAL MEDICINE

## 2021-01-23 PROCEDURE — 83880 ASSAY OF NATRIURETIC PEPTIDE: CPT

## 2021-01-23 PROCEDURE — 93010 ELECTROCARDIOGRAM REPORT: CPT | Performed by: INTERNAL MEDICINE

## 2021-01-23 PROCEDURE — 84484 ASSAY OF TROPONIN QUANT: CPT

## 2021-01-23 PROCEDURE — 36415 COLL VENOUS BLD VENIPUNCTURE: CPT

## 2021-01-23 PROCEDURE — 83036 HEMOGLOBIN GLYCOSYLATED A1C: CPT

## 2021-01-23 PROCEDURE — 71045 X-RAY EXAM CHEST 1 VIEW: CPT

## 2021-01-23 PROCEDURE — 96376 TX/PRO/DX INJ SAME DRUG ADON: CPT

## 2021-01-23 PROCEDURE — 85025 COMPLETE CBC W/AUTO DIFF WBC: CPT

## 2021-01-23 RX ORDER — 0.9 % SODIUM CHLORIDE 0.9 %
1000 INTRAVENOUS SOLUTION INTRAVENOUS ONCE
Status: COMPLETED | OUTPATIENT
Start: 2021-01-23 | End: 2021-01-23

## 2021-01-23 RX ORDER — DIPHENHYDRAMINE HYDROCHLORIDE 50 MG/ML
12.5 INJECTION INTRAMUSCULAR; INTRAVENOUS ONCE
Status: COMPLETED | OUTPATIENT
Start: 2021-01-23 | End: 2021-01-23

## 2021-01-23 RX ORDER — PROCHLORPERAZINE EDISYLATE 5 MG/ML
10 INJECTION INTRAMUSCULAR; INTRAVENOUS EVERY 6 HOURS PRN
Status: DISCONTINUED | OUTPATIENT
Start: 2021-01-23 | End: 2021-01-23

## 2021-01-23 RX ORDER — ONDANSETRON 2 MG/ML
4 INJECTION INTRAMUSCULAR; INTRAVENOUS EVERY 6 HOURS PRN
Status: DISCONTINUED | OUTPATIENT
Start: 2021-01-23 | End: 2021-01-25 | Stop reason: HOSPADM

## 2021-01-23 RX ORDER — SODIUM CHLORIDE 0.9 % (FLUSH) 0.9 %
10 SYRINGE (ML) INJECTION PRN
Status: DISCONTINUED | OUTPATIENT
Start: 2021-01-23 | End: 2021-01-25 | Stop reason: HOSPADM

## 2021-01-23 RX ORDER — NICOTINE POLACRILEX 4 MG
15 LOZENGE BUCCAL PRN
Status: DISCONTINUED | OUTPATIENT
Start: 2021-01-23 | End: 2021-01-25 | Stop reason: HOSPADM

## 2021-01-23 RX ORDER — DEXTROSE MONOHYDRATE 25 G/50ML
12.5 INJECTION, SOLUTION INTRAVENOUS PRN
Status: DISCONTINUED | OUTPATIENT
Start: 2021-01-23 | End: 2021-01-25 | Stop reason: HOSPADM

## 2021-01-23 RX ORDER — PROMETHAZINE HYDROCHLORIDE 25 MG/1
12.5 TABLET ORAL EVERY 6 HOURS PRN
Status: DISCONTINUED | OUTPATIENT
Start: 2021-01-23 | End: 2021-01-25 | Stop reason: HOSPADM

## 2021-01-23 RX ORDER — ACETAMINOPHEN 650 MG/1
650 SUPPOSITORY RECTAL EVERY 6 HOURS PRN
Status: DISCONTINUED | OUTPATIENT
Start: 2021-01-23 | End: 2021-01-25 | Stop reason: HOSPADM

## 2021-01-23 RX ORDER — DIPHENHYDRAMINE HYDROCHLORIDE 50 MG/ML
12.5 INJECTION INTRAMUSCULAR; INTRAVENOUS ONCE
Status: DISCONTINUED | OUTPATIENT
Start: 2021-01-23 | End: 2021-01-23

## 2021-01-23 RX ORDER — SODIUM CHLORIDE 9 MG/ML
INJECTION, SOLUTION INTRAVENOUS CONTINUOUS
Status: DISCONTINUED | OUTPATIENT
Start: 2021-01-23 | End: 2021-01-25 | Stop reason: HOSPADM

## 2021-01-23 RX ORDER — SODIUM CHLORIDE 0.9 % (FLUSH) 0.9 %
10 SYRINGE (ML) INJECTION EVERY 12 HOURS SCHEDULED
Status: DISCONTINUED | OUTPATIENT
Start: 2021-01-23 | End: 2021-01-25 | Stop reason: HOSPADM

## 2021-01-23 RX ORDER — PROCHLORPERAZINE EDISYLATE 5 MG/ML
10 INJECTION INTRAMUSCULAR; INTRAVENOUS ONCE
Status: COMPLETED | OUTPATIENT
Start: 2021-01-23 | End: 2021-01-23

## 2021-01-23 RX ORDER — ACETAMINOPHEN 325 MG/1
650 TABLET ORAL EVERY 6 HOURS PRN
Status: DISCONTINUED | OUTPATIENT
Start: 2021-01-23 | End: 2021-01-25 | Stop reason: HOSPADM

## 2021-01-23 RX ORDER — DEXTROSE MONOHYDRATE 50 MG/ML
100 INJECTION, SOLUTION INTRAVENOUS PRN
Status: DISCONTINUED | OUTPATIENT
Start: 2021-01-23 | End: 2021-01-25 | Stop reason: HOSPADM

## 2021-01-23 RX ORDER — POLYETHYLENE GLYCOL 3350 17 G/17G
17 POWDER, FOR SOLUTION ORAL DAILY PRN
Status: DISCONTINUED | OUTPATIENT
Start: 2021-01-23 | End: 2021-01-25 | Stop reason: HOSPADM

## 2021-01-23 RX ORDER — ONDANSETRON 2 MG/ML
4 INJECTION INTRAMUSCULAR; INTRAVENOUS ONCE
Status: COMPLETED | OUTPATIENT
Start: 2021-01-23 | End: 2021-01-23

## 2021-01-23 RX ADMIN — SODIUM CHLORIDE: 9 INJECTION, SOLUTION INTRAVENOUS at 21:07

## 2021-01-23 RX ADMIN — SODIUM CHLORIDE 1000 ML: 9 INJECTION, SOLUTION INTRAVENOUS at 11:50

## 2021-01-23 RX ADMIN — DIPHENHYDRAMINE HYDROCHLORIDE 12.5 MG: 50 INJECTION, SOLUTION INTRAMUSCULAR; INTRAVENOUS at 15:16

## 2021-01-23 RX ADMIN — ONDANSETRON HYDROCHLORIDE 4 MG: 2 INJECTION, SOLUTION INTRAMUSCULAR; INTRAVENOUS at 11:48

## 2021-01-23 RX ADMIN — PROCHLORPERAZINE EDISYLATE 10 MG: 5 INJECTION INTRAMUSCULAR; INTRAVENOUS at 15:17

## 2021-01-23 RX ADMIN — ONDANSETRON HYDROCHLORIDE 4 MG: 2 INJECTION, SOLUTION INTRAMUSCULAR; INTRAVENOUS at 13:51

## 2021-01-23 RX ADMIN — INSULIN LISPRO 1 UNITS: 100 INJECTION, SOLUTION INTRAVENOUS; SUBCUTANEOUS at 21:08

## 2021-01-23 RX ADMIN — PROMETHAZINE HYDROCHLORIDE 12.5 MG: 25 TABLET ORAL at 21:07

## 2021-01-23 RX ADMIN — Medication 10 ML: at 21:07

## 2021-01-23 RX ADMIN — APIXABAN 5 MG: 5 TABLET, FILM COATED ORAL at 21:07

## 2021-01-23 ASSESSMENT — ENCOUNTER SYMPTOMS
SHORTNESS OF BREATH: 1
NAUSEA: 1
VOMITING: 1

## 2021-01-23 ASSESSMENT — PAIN - FUNCTIONAL ASSESSMENT: PAIN_FUNCTIONAL_ASSESSMENT: 0-10

## 2021-01-23 NOTE — ED NOTES
Ambulated the patient in the room pts SpO2 remained 97% on room air pt did have labored breathing with ambulation .      Leno Werner RN  01/23/21 8199

## 2021-01-23 NOTE — ED NOTES
Pt states that he is still not feeling well.  Resting with eyes closed     Grzegorz Ruiz RN  01/23/21 [id number]

## 2021-01-23 NOTE — ED PROVIDER NOTES
[address] ED  EMERGENCY DEPARTMENT ENCOUNTER        Pt Name: Mendel Maltese  MRN: [id number]  [place] 1950  Date of evaluation: 1/23/2021  Provider: Edd Najera PA-C  PCP: No primary care provider on file. I have seen and evaluated this patient with my supervising physician Caren Marcos MD.    [facility]       Chief Complaint   Patient presents with    Shortness of Breath     since friday. productive whte/brown/black cough. tightness in chest    Nausea     vomiting/nausea for the past 1.5 days       HISTORY OF PRESENT ILLNESS   (Location, Timing/Onset, Context/Setting, Quality, Duration, Modifying Factors, Severity, Associated Signs and Symptoms)  Note limiting factors. Mendel Maltese is a 65 y.o. male with a past medical history of hypertension, history of A. Fib on Eliquis, fibromyalgia brought in today by private vehicle for complaints of a productive cough with brown/white/black sputum. He reports feeling short of breath and chest tightness. He also reports nonbilious nonbloody nausea and vomiting over the past day and a half. He has been exposed to COVID-19. Onset of symptoms over the past 2 days. Duration symptoms have been persistent since onset. He states he cannot even keep any water down at this time. Context includes concern for possible COVID-19. No aggravating complaints. No alleviating complaints. He has not tried anything at home for symptomatic relief. Nothing seems to make symptoms better or worse at this time. He otherwise denies any other concerns. Nursing Notes were all reviewed and agreed with or any disagreements were addressed in the HPI. REVIEW OF SYSTEMS    (2-9 systems for level 4, 10 or more for level 5)     Review of Systems   Constitutional: Negative. HENT: Negative. Respiratory: Positive for shortness of breath. Cardiovascular: Positive for chest pain. Gastrointestinal: Positive for nausea and vomiting. Genitourinary: Negative. Musculoskeletal: Negative. Skin: Negative. Neurological: Negative. Positives and Pertinent negatives as per HPI. Except as noted above in the ROS, all other systems were reviewed and negative. PAST MEDICAL HISTORY     Past Medical History:   Diagnosis Date    Abnormal ultrasound of carotid artery     CTA was normal    Back pain     epidual injectiion for back       Chronic back pain     MRI  Normal    DJD (degenerative joint disease)     Rt. Hip    Fibromyalgia     Post Traumatic/Myofascial Pain    Hypertension     Kidney stones         Paroxysmal atrial tachycardia ([facility] [address]          SURGICAL HISTORY     Past Surgical History:   Procedure Laterality Date    BACK SURGERY      LITHOTRIPSY      UPPER GASTROINTESTINAL ENDOSCOPY  2011    UPPER GASTROINTESTINAL ENDOSCOPY  11         CURRENTMEDICATIONS       Previous Medications    ELIQUIS 5 MG TABS TABLET    TAKE 1 TABLET BY MOUTH TWICE DAILY         ALLERGIES     Amitriptyline, Citalopram hydrobromide, Naproxen, Other, Paroxetine, Pcn [penicillins], and Penicillins    FAMILYHISTORY       Family History   Problem Relation Age of Onset    Heart Attack Father 80    Heart Attack Mother 61          SOCIAL HISTORY       Social History     Tobacco Use    Smoking status: Former Smoker     Types: Cigarettes     Quit date: 2008     Years since quittin.1    Smokeless tobacco: Never Used   Substance Use Topics    Alcohol use: No    Drug use: No       SCREENINGS             PHYSICAL EXAM    (up to 7 for level 4, 8 or more for level 5)     ED Triage Vitals [21 1134]   BP Temp Temp Source Pulse Resp SpO2 Height Weight   (!) 167/99 98.1 °F (36.7 °C) Oral 68 20 100 % 6' (1.829 m) 220 lb (99.8 kg)       Physical Exam  Vitals signs and nursing note reviewed. Constitutional:       General: He is awake. He is not in acute distress. Appearance: He is ill-appearing. He is not toxic-appearing or diaphoretic. HENT:      Head: Normocephalic and atraumatic. Nose: Nose normal.   Eyes:      General:         Right eye: No discharge. Left eye: No discharge. Neck:      Musculoskeletal: Normal range of motion and neck supple. Cardiovascular:      Rate and Rhythm: Normal rate and regular rhythm. Pulses:           Radial pulses are 2+ on the right side and 2+ on the left side. Heart sounds: Normal heart sounds. No murmur. No gallop. Pulmonary:      Effort: Pulmonary effort is normal. No respiratory distress. Breath sounds: Normal breath sounds. No decreased breath sounds, wheezing, rhonchi or rales. Chest:      Chest wall: No tenderness. Abdominal:      General: Abdomen is flat. Bowel sounds are normal.      Palpations: Abdomen is soft. Tenderness: There is no abdominal tenderness. There is no guarding or rebound. Musculoskeletal: Normal range of motion. General: No deformity. Right lower leg: No edema. Left lower leg: No edema. Skin:     General: Skin is warm and dry. Neurological:      General: No focal deficit present. Mental Status: He is alert and oriented to person, place, and time. GCS: GCS eye subscore is 4. GCS verbal subscore is 5. GCS motor subscore is 6. Psychiatric:         Behavior: Behavior normal. Behavior is cooperative.          DIAGNOSTIC RESULTS   LABS:    Labs Reviewed   CBC WITH AUTO DIFFERENTIAL - Abnormal; Notable for the following components:       Result Value    WBC 13.0 (*)     Neutrophils Absolute 11.4 (*)     Lymphocytes Absolute 0.9 (*)     All other components within normal limits    Narrative:     Performed at:  [facility]) - [place] [address],  [place], [facility]   Phone [phone]   COMPREHENSIVE METABOLIC PANEL W/ REFLEX TO MG FOR LOW K - Abnormal; Notable for the following components: Sodium 133 (*)     Chloride 93 (*)     Glucose 262 (*)     Total Bilirubin 1.2 (*)     Alkaline Phosphatase 136 (*)     All other components within normal limits    Narrative:     Performed at:  [facility]  [address],  [place], [place]   Phone [phone]   BRAIN NATRIURETIC PEPTIDE - Abnormal; Notable for the following components:    Pro- (*)     All other components within normal limits    Narrative:     Performed at:  [facility]  [address],  [place], [place]   Phone [phone]   BLOOD GAS, VENOUS - Abnormal; Notable for the following components:    pO2, Jose 22.5 (*)     Carboxyhemoglobin 1.9 (*)     All other components within normal limits    Narrative:     Performed at:  [facility]  [address],  [place], [place]   Phone [phone]   TROPONIN    Narrative:     Performed at:  [facility]  [address],  [place], [employer]   Phone [phone]   LIPASE    Narrative:     Performed at:  [facility]  [address],  [place], [place] [employer]   Phone [phone]   PROCALCITONIN    Narrative:     Performed at:  [facility]  [address],  [place], [place]   Phone [phone]    Narrative:     Performed at:  [facility] - [facility]  [address],  [place], [place] [employer]   Phone [phone]       All other labs were within normal range or not returned as of this dictation. EKG: All EKG's are interpreted by the Emergency Department Physician in the absence of a cardiologist.  Please see their note for interpretation of EKG.       RADIOLOGY: Non-plain film images such as CT, Ultrasound and MRI are read by the radiologist. Plain radiographic images are visualized and preliminarily interpreted by the ED Provider with the below findings:        Interpretation per the Radiologist below, if available at the time of this note:    XR CHEST PORTABLE   Final Result   Questionable small left pleural effusion with mild ground-glass attenuation   at the left base. Underlying edema or developing pneumonitis may be   considered clinically. No results found. PROCEDURES   Unless otherwise noted below, none     Procedures    CRITICAL CARE TIME   N/A    CONSULTS:  IP CONSULT TO HOSPITALIST      EMERGENCY DEPARTMENT COURSE and DIFFERENTIAL DIAGNOSIS/MDM:   Vitals:    Vitals:    01/23/21 1301 01/23/21 1400 01/23/21 1510 01/23/21 1634   BP: (!) 164/66 (!) 168/75 (!) 171/77 (!) 167/81   Pulse: 73 59 68 66   Resp: 18 17 17 20   Temp:       TempSrc:       SpO2: 100% 100% 99% 100%   Weight:       Height:           Patient was given the following medications:  Medications   ondansetron (ZOFRAN) injection 4 mg (4 mg Intravenous Given 1/23/21 1148)   0.9 % sodium chloride bolus (0 mLs Intravenous Stopped 1/23/21 1333)   ondansetron (ZOFRAN) injection 4 mg (4 mg Intravenous Given 1/23/21 1351)   diphenhydrAMINE (BENADRYL) injection 12.5 mg (12.5 mg Intravenous Given 1/23/21 1516)   prochlorperazine (COMPAZINE) injection 10 mg (10 mg Intravenous Given 1/23/21 1517)           Patient brought in today by private vehicle for complaints of shortness of breath as well as chest tightness and nausea and vomiting over the past 2 days. On exam patient appears ill, afebrile alert and oriented breathing on room air satting at 100%. No acute respiratory distress. Patient seen by myself as well as my attending Dr. Donna Miles. Upon arrival patient was actively vomiting, patient given Zofran and fluids. EKG reviewed by my attending please see note for dictation. Chest x-ray reveals questionable small left pleural effusion with mild groundglass attenuation at the left base. Underlying edema and developing pneumonitis may be considered clinically. CBC reveals a leukocytosis of 13. Hemoglobin of 16.5. VBG unremarkable. Patient ambulated here in the ED and did not desat with ambulation. No acute electrolyte abnormalities. Kidney function unremarkable. Lipase of 14. BNP unremarkable. Troponin of less than 0.01. Procalcitonin of 0.06. Rapid COVID-19 test is negative. PCR Covid is pending. Plan at this time will be to p.o. challenge. Unable to tolerate p.o. challenge. We did give him another round of Zofran as well as Compazine and Benadryl and he still was unable to tolerate p.o. Plan will be to admit to the hospitalist.  My attending spoke to the hospitalist and patient was admitted. Patient stable at time of admission. FINAL IMPRESSION      1. Intractable nausea and vomiting          DISPOSITION/PLAN   DISPOSITION        PATIENT REFERREDTO:  No follow-up provider specified. DISCHARGE MEDICATIONS:  New Prescriptions    No medications on file       DISCONTINUED MEDICATIONS:  Discontinued Medications    BUPRENORPHINE HCL-NALOXONE HCL (SUBOXONE SL)    Place under the tongue 2 times daily Indications: Patient unaware of home dose.               (Please note that portions of this note were completed with a voice recognition program.  Efforts were made to edit the dictations but occasionally words are mis-transcribed.)    Josie Rosales PA-C (electronically signed)            Josie Rosales PA-C  01/23/21 6187

## 2021-01-23 NOTE — ED PROVIDER NOTES
[address] [facility] ED  [address] [place] [address]  Dept: [phone]  Loc: [phone]    Open Note Time:  1:14 PM EST  Chief Complaint   Patient presents with    Shortness of Breath     since friday. productive whte/brown/black cough. tightness in chest    Nausea     vomiting/nausea for the past 1.5 days       I independently performed a history and physical on Georgette Stewart. This is a very pleasant 65 y.o. male  who was evaluated in the emergency department for vomiting and in the setting of a COVID-19 infection    EKG  The Ekg interpreted by me shows  sinus bradycardia, rate=58 with a rate of 58  Axis is   Left axis deviation  QTc is  within an acceptable range  Intervals and Durations are unremarkable. ST Segments: no acute change and normal  Delta waves, Brugada Syndrome, and Short VA are not present. Prior EKG to compare with was available. No significant changes compared to prior EKG from October 22, 2020, of note patient had a heart rate of 61 and previous EKG    Unless otherwise stated in this report or unable to obtain because of the patient's clinical or mental status as evidenced by the medical record, this patient's positive and negative responses for review of systems, constitutional, psych, eyes, ENT, cardiovascular, respiratory, gastrointestinal, neurological, genitourinary, musculoskeletal, integument systems and systems related to the presenting problem are either stated in the preceding paragraph or were not pertinent or were negative for the symptoms and/or complaints related to the medical problem. I wore goggles, surgical mask, N95 mask and gloves when I evaluated the patient. Focused exam:  Body mass index is 29.84 kg/m². The physical exam reveals an alert and oriented patient who does not appear to be confused, ill-appearing, no abnormal heart sounds, no abnormal lung sounds, speaking comfortably in full sentences, skin is diaphoretic, mild diffuse abdominal pain    Brief ED course/MDM:     Procedures/interventions/images ordered for this visit  Orders Placed This Encounter   Procedures    XR CHEST PORTABLE    CBC auto differential    Comprehensive Metabolic Panel w/ Reflex to MG    Troponin    Brain Natriuretic Peptide    Blood gas, venous    COVID-19    COVID-19    COVID-19    COVID-19    Lipase    Procalcitonin    COVID-19    Ambulate with pulse ox    EKG 12 Lead       Medications ordered for this visit  Orders Placed This Encounter   Medications    ondansetron (ZOFRAN) injection 4 mg    0.9 % sodium chloride bolus       ED course notes for this visit       Patient seen and evaluated in room 13/13     RADIOLOGY  X-RAYS:  I have reviewed radiologic plain film image(s). ALL OTHER NON-PLAIN FILM IMAGES SUCH AS CT, ULTRASOUND AND MRI HAVE BEEN READ BY THE RADIOLOGIST. XR CHEST PORTABLE   Final Result   Questionable small left pleural effusion with mild ground-glass attenuation   at the left base. Underlying edema or developing pneumonitis may be   considered clinically. I spoke with Dr. Arminda Carcamo. We thoroughly discussed the history, physical exam, laboratory and imaging studies, as well as, emergency department course. Based upon that discussion, we've decided to admit Fco Lynch for further observation and evaluation of Keli Chopra [place]'s abdominal pain. As I have deemed necessary from their history, physical and studies, I have considered and evaluated Fco Lynch for the following diagnoses:  ACUTE APPENDICITIS, BOWEL OBSTRUCTION, CHOLECYSTITIS, DIVERTICULITIS, INCARCERATED HERNIA, PANCREATITIS, or PERFORATED BOWEL or ULCER.       FINAL IMPRESSION 1. Intractable nausea and vomiting        Vitals:  Blood pressure (!) 171/77, pulse 68, temperature 98.1 °F (36.7 °C), temperature source Oral, resp. rate 17, height 6' (1.829 m), weight 220 lb (99.8 kg), SpO2 99 %. Disposition  Patient understands that they are going to be admitted to the hospital.  There was shared decision making between myself as well as the patient and/or their surrogate and we are in agreement with admission. There is an opportunity for questions and all questions answered to the best of my ability and to the satisfaction of the patient and/or patient's family. Pt is in stable condition upon Admit to med/surg floor. All diagnostic, treatment, and disposition decisions were made by myself in conjunction with the MEL/resident. For further details of the patient's emergency department visit, please see MEL/resident documentation. Initial history and physical exam information was obtained by the MEL/resident, also dictated a record of this visit. I independently examined and evaluated this patient and participated in the diagnostic, treatment and disposition decisions. The note was completed using Dragon voice recognition transcription. Every effort was made to ensure accuracy; however, inadvertent transcription errors may be present despite my best efforts to edit errors.     MD Jem Gonsalez MD  01/23/21 [address] [place] [address] Avenue, MD  01/23/21 1453

## 2021-01-24 LAB
ANION GAP SERPL CALCULATED.3IONS-SCNC: 10 MMOL/L (ref 3–16)
BUN BLDV-MCNC: 21 MG/DL (ref 7–20)
CALCIUM SERPL-MCNC: 8.8 MG/DL (ref 8.3–10.6)
CHLORIDE BLD-SCNC: 100 MMOL/L (ref 99–110)
CO2: 23 MMOL/L (ref 21–32)
CREAT SERPL-MCNC: 0.9 MG/DL (ref 0.8–1.3)
ESTIMATED AVERAGE GLUCOSE: 185.8 MG/DL
GFR AFRICAN AMERICAN: >60
GFR NON-AFRICAN AMERICAN: >60
GLUCOSE BLD-MCNC: 136 MG/DL (ref 70–99)
GLUCOSE BLD-MCNC: 145 MG/DL (ref 70–99)
GLUCOSE BLD-MCNC: 152 MG/DL (ref 70–99)
GLUCOSE BLD-MCNC: 189 MG/DL (ref 70–99)
GLUCOSE BLD-MCNC: 241 MG/DL (ref 70–99)
HBA1C MFR BLD: 8.1 %
PERFORMED ON: ABNORMAL
POTASSIUM REFLEX MAGNESIUM: 4.1 MMOL/L (ref 3.5–5.1)
SARS-COV-2: NOT DETECTED
SODIUM BLD-SCNC: 133 MMOL/L (ref 136–145)

## 2021-01-24 PROCEDURE — 2580000003 HC RX 258: Performed by: INTERNAL MEDICINE

## 2021-01-24 PROCEDURE — 96376 TX/PRO/DX INJ SAME DRUG ADON: CPT

## 2021-01-24 PROCEDURE — 6360000002 HC RX W HCPCS: Performed by: INTERNAL MEDICINE

## 2021-01-24 PROCEDURE — 80048 BASIC METABOLIC PNL TOTAL CA: CPT

## 2021-01-24 PROCEDURE — 6370000000 HC RX 637 (ALT 250 FOR IP): Performed by: INTERNAL MEDICINE

## 2021-01-24 PROCEDURE — 1200000000 HC SEMI PRIVATE

## 2021-01-24 PROCEDURE — 99222 1ST HOSP IP/OBS MODERATE 55: CPT | Performed by: INTERNAL MEDICINE

## 2021-01-24 PROCEDURE — 36415 COLL VENOUS BLD VENIPUNCTURE: CPT

## 2021-01-24 PROCEDURE — G0378 HOSPITAL OBSERVATION PER HR: HCPCS

## 2021-01-24 RX ORDER — HYDRALAZINE HYDROCHLORIDE 20 MG/ML
5 INJECTION INTRAMUSCULAR; INTRAVENOUS EVERY 6 HOURS PRN
Status: DISCONTINUED | OUTPATIENT
Start: 2021-01-24 | End: 2021-01-25 | Stop reason: HOSPADM

## 2021-01-24 RX ADMIN — ONDANSETRON HYDROCHLORIDE 4 MG: 2 INJECTION, SOLUTION INTRAMUSCULAR; INTRAVENOUS at 22:59

## 2021-01-24 RX ADMIN — Medication 10 ML: at 22:59

## 2021-01-24 RX ADMIN — Medication 10 ML: at 08:35

## 2021-01-24 RX ADMIN — SODIUM CHLORIDE: 9 INJECTION, SOLUTION INTRAVENOUS at 22:58

## 2021-01-24 RX ADMIN — ONDANSETRON HYDROCHLORIDE 4 MG: 2 INJECTION, SOLUTION INTRAMUSCULAR; INTRAVENOUS at 12:02

## 2021-01-24 RX ADMIN — METFORMIN HYDROCHLORIDE 500 MG: 500 TABLET ORAL at 16:39

## 2021-01-24 RX ADMIN — PROMETHAZINE HYDROCHLORIDE 12.5 MG: 25 TABLET ORAL at 02:11

## 2021-01-24 RX ADMIN — APIXABAN 5 MG: 5 TABLET, FILM COATED ORAL at 08:36

## 2021-01-24 RX ADMIN — PROMETHAZINE HYDROCHLORIDE 12.5 MG: 25 TABLET ORAL at 16:39

## 2021-01-24 RX ADMIN — PROMETHAZINE HYDROCHLORIDE 12.5 MG: 25 TABLET ORAL at 08:35

## 2021-01-24 RX ADMIN — APIXABAN 5 MG: 5 TABLET, FILM COATED ORAL at 22:59

## 2021-01-24 ASSESSMENT — PAIN SCALES - GENERAL: PAINLEVEL_OUTOF10: 0

## 2021-01-24 NOTE — PROGRESS NOTES
Pt slightly febrile; hypertensive. Pt is alert and oriented x 4 with no history of falls. Assessment completed as charted. Bed is in lowest position with 2/4 bed rails raised, wheels locked and call light within reach - patient  verbalizes understanding to call out for assistance. No further requests at this time. Will continue to monitor.      Vitals:    01/23/21 1906   BP: (!) 185/73   Pulse: 72   Resp: 18   Temp: 100.4 °F (38 °C)   SpO2: 98%

## 2021-01-24 NOTE — CARE COORDINATION
Case Management Assessment  Initial Evaluation      Patient Name: Tiburcio Garcia  YOB: 1950  Diagnosis: Nausea & vomiting [R11.2]  Date / Time: 1/23/2021 11:19 AM    Admission status/Date:1/23/2021 inpt  Chart Reviewed: Yes      Patient Interviewed: Yes   Family Interviewed:  No      Hospitalization in the last 30 days:  No      Health Care Decision Maker :     (CM - must 1st enter selection under Navigator - emergency contact- Health Care Decision Maker Relationship and pick relationship)   Who do you trust or have selected to make healthcare decisions for you      Met with: pt  Interview conducted  (bedside/phone):telephone    Current PCP:   [facility] on DC instructions and pt is aware    Financial  BCBS Medicare    ADLS  Support Systems/Care Needs:    Transportation: self    Meal Preparation: self    Housing  Living Arrangements: lives in [address] [facility]  Steps: 3  Intent for return to present living arrangements: Yes  Identified Issues:     [address] [place] [address] with [address] : No Agency:(Services)     Passport/Waiver : No  :                      Phone Number:    Passport/Waiver Services:           Durable Medical Equiptment   DME Provider: none  Equipment: none  Walker___Cane___RTS___ BSC___Shower Chair___Hospital Bed___W/C____Other________  02 at ____Liter(s)---wears(frequency)_______ HHN ___ CPAP___ BiPap___   N/A____      Home O2 Use :  No    If No for home O2---if presently on O2 during hospitalization:  No  if yes CM to follow for potential DC O2 need    Community Service Affiliation  Dialysis:  No    · Agency:  · Location:  · Dialysis Schedule:  · Phone:   · Fax:     Other Community Services: (ex:PT/OT,Mental Health,Wound Clinic, [address] [place] [address] Alberto Leonard)

## 2021-01-24 NOTE — H&P
Hospital Medicine History & Physical      PCP: No primary care provider on file. Date of Admission: 1/23/2021    Date of Service: Pt seen/examined on 1/24/2021     Chief Complaint:    Chief Complaint   Patient presents with    Shortness of Breath     since friday. productive whte/brown/black cough. tightness in chest    Nausea     vomiting/nausea for the past 1.5 days         History Of Present Illness: The patient is a 65 y.o. male with a PMH of HTN, Chronic Pain Syndrome and hx of PAF on Eliquis who presented to [facility] ED with complaint of  Nausea and vomiting since 3 days. Reports sudden onset of symptoms and progressively worse. No diarrhea or abd pain. No fever or chills. Unsure if he ate something bad that made him worse. No hx of PUD of GERD  Does not take any meds except for Eliquis. Does not have PCP  Denies any black stools or blood in vomiting. No recent weight loss or weight gain . No sob or recent exposure to covid    Workup showed hyperglycemia , severe emesis. Failed food challenge in er and was recommended for admission       Past Medical History:        Diagnosis Date    Abnormal ultrasound of carotid artery     CTA was normal    Back pain     epidual injectiion for back  1997     Chronic back pain     MRI 1998 Normal    DJD (degenerative joint disease)     Rt. Hip    Fibromyalgia     Post Traumatic/Myofascial Pain    Hypertension     Kidney stones     2002    Paroxysmal atrial tachycardia [facility])        Past Surgical History:        Procedure Laterality Date    BACK SURGERY      LITHOTRIPSY      UPPER GASTROINTESTINAL ENDOSCOPY  12/16/2011    UPPER GASTROINTESTINAL ENDOSCOPY  12/16/11       Medications Prior to Admission:    Prior to Admission medications    Medication Sig Start Date End Date Taking?  Authorizing Provider   ELIQUIS 5 MG TABS tablet TAKE 1 TABLET BY MOUTH TWICE DAILY 3/24/20  Yes JEWEL Barba - CNP LIPASE 14.0   BILITOT 1.2*   ALKPHOS 136*     CARDIAC ENZYMES  Recent Labs     01/23/21  1140   TROPONINI <0.01       U/A:    Lab Results   Component Value Date    COLORU YELLOW 12/15/2011    WBCUA None seen 12/15/2011    RBCUA 3-5 12/15/2011    MUCUS 2+ 12/15/2011    CLARITYU CLEAR 12/15/2011    SPECGRAV >=1.030 12/15/2011    LEUKOCYTESUR NEGATIVE 12/15/2011    BLOODU MODERATE 12/15/2011    GLUCOSEU NEGATIVE 12/15/2011    AMORPHOUS 2+ 12/15/2011         EKG:  I have reviewed the EKG with the following interpretation:   1/23/2021  Sinus bradycardia rate of 58  Left anterior fascicular block  Abnormal ECG  When compared with ECG of 09-DEC-2015 07:48,  Sinus rhythm has replaced Atrial fibrillation    RADIOLOGY    XR CHEST PORTABLE   Final Result   Questionable small left pleural effusion with mild ground-glass attenuation   at the left base. Underlying edema or developing pneumonitis may be   considered clinically. ASSESSMENT/PLAN:    Nausea and Vomiting    - etiology of emesis unclear - either viral infection or food poisoning  - improved quickly with supportive care. No imaging done in ER  - he does not need any imaging today as he is improved.  Can consider CT abd if recurs in future  - given IVF and antiemetics    - covid neg  - resume diet and dc home today     Hyperglycemia  - likely new onset DM  -  on admission, not in DKA  - SSI for now, A1c pending  - start on oral meds at dc  - need glucometer and supplies at dc  - need to establish new PCP        Leukocytosis  - mild 13  - likely reactive  - mgmt as above    HTN  - uncontrolled  - does not appear to be taking any medications  - add PRN Hydralazine  - need outpt monitoring    PAF  - NSR on admission  - cont Eliquis    Chronic Back Pain  - cont Suboxone    DVT Prophylaxis: Eliquis  Diet: DIET CLEAR LIQUID;  Code Status: Full Code      Radha Boykin MD 1/24/2021 11:57 AM

## 2021-01-24 NOTE — PLAN OF CARE
Problem: Infection:  Goal: Will remain free from infection  Description: Will remain free from infection  Outcome: Ongoing     Problem: Daily Care:  Goal: Daily care needs are met  Description: Daily care needs are met  Outcome: Ongoing     Problem: Isolation Precautions - Risk of Spread of Infection  Goal: Prevent transmission of infection  Outcome: Ongoing     Problem: Nutrition Deficits  Goal: Optimize nutritional status  Outcome: Ongoing

## 2021-01-24 NOTE — PROGRESS NOTES
Looked into Pt chart per Primary nurse request to find out about COVID status. Looks to have a PCR pending, Rapid was negative.

## 2021-01-25 VITALS
WEIGHT: 201.5 LBS | DIASTOLIC BLOOD PRESSURE: 71 MMHG | RESPIRATION RATE: 16 BRPM | SYSTOLIC BLOOD PRESSURE: 145 MMHG | OXYGEN SATURATION: 95 % | HEIGHT: 72 IN | TEMPERATURE: 98.8 F | HEART RATE: 54 BPM | BODY MASS INDEX: 27.29 KG/M2

## 2021-01-25 DIAGNOSIS — I48.0 PAROXYSMAL ATRIAL FIBRILLATION (HCC): ICD-10-CM

## 2021-01-25 LAB
GLUCOSE BLD-MCNC: 131 MG/DL (ref 70–99)
GLUCOSE BLD-MCNC: 137 MG/DL (ref 70–99)
PERFORMED ON: ABNORMAL
PERFORMED ON: ABNORMAL

## 2021-01-25 PROCEDURE — G0378 HOSPITAL OBSERVATION PER HR: HCPCS

## 2021-01-25 PROCEDURE — 96376 TX/PRO/DX INJ SAME DRUG ADON: CPT

## 2021-01-25 PROCEDURE — 99238 HOSP IP/OBS DSCHRG MGMT 30/<: CPT | Performed by: INTERNAL MEDICINE

## 2021-01-25 PROCEDURE — 6360000002 HC RX W HCPCS: Performed by: INTERNAL MEDICINE

## 2021-01-25 PROCEDURE — 6370000000 HC RX 637 (ALT 250 FOR IP): Performed by: INTERNAL MEDICINE

## 2021-01-25 PROCEDURE — 2580000003 HC RX 258: Performed by: INTERNAL MEDICINE

## 2021-01-25 RX ORDER — ONDANSETRON 4 MG/1
4 TABLET, ORALLY DISINTEGRATING ORAL EVERY 8 HOURS PRN
Qty: 12 TABLET | Refills: 0 | Status: SHIPPED | OUTPATIENT
Start: 2021-01-25 | End: 2021-01-26 | Stop reason: SDUPTHER

## 2021-01-25 RX ORDER — APIXABAN 5 MG/1
TABLET, FILM COATED ORAL
Qty: 180 TABLET | Refills: 1 | Status: SHIPPED | OUTPATIENT
Start: 2021-01-25 | End: 2021-05-14 | Stop reason: SDUPTHER

## 2021-01-25 RX ADMIN — ONDANSETRON HYDROCHLORIDE 4 MG: 2 INJECTION, SOLUTION INTRAMUSCULAR; INTRAVENOUS at 05:26

## 2021-01-25 RX ADMIN — APIXABAN 5 MG: 5 TABLET, FILM COATED ORAL at 08:27

## 2021-01-25 RX ADMIN — SODIUM CHLORIDE: 9 INJECTION, SOLUTION INTRAVENOUS at 08:27

## 2021-01-25 RX ADMIN — METFORMIN HYDROCHLORIDE 500 MG: 500 TABLET ORAL at 08:27

## 2021-01-25 RX ADMIN — ONDANSETRON HYDROCHLORIDE 4 MG: 2 INJECTION, SOLUTION INTRAMUSCULAR; INTRAVENOUS at 12:00

## 2021-01-25 NOTE — PROGRESS NOTES
Shift assessment complete. See flow sheet. Patient complains of continuing nausea. Zofran given with scheduled medications. Insulin held, POCT 136. Respirations easy and even. Pt states no additional needs. Call light and bedside table in reach. Will follow.

## 2021-01-25 NOTE — DISCHARGE SUMMARY
Name:  Coy Payne  Room:  6384/7337-09  MRN:    [id number]    Discharge Summary      This discharge summary is in conjunction with a complete physical exam done on the day of discharge. Discharging Physician: Dr. Fiona Oliveraves: 1/23/2021  Discharge:  1/25/2021    HPI:  The patient is a 65 y.o. male with a PMH of HTN, Chronic Pain Syndrome and hx of PAF on Eliquis who presented to [facility] ED with complaint of  Nausea and vomiting since 3 days. Reports sudden onset of symptoms and progressively worse. No diarrhea or abd pain. No fever or chills. Unsure if he ate something bad that made him worse. No hx of PUD of GERD  Does not take any meds except for Eliquis. Does not have PCP  Denies any black stools or blood in vomiting. No recent weight loss or weight gain . No sob or recent exposure to covid     Workup showed hyperglycemia , severe emesis. Failed food challenge in er and was recommended for admission      Diagnoses this Admission and Hospital Course   Nausea and Vomiting     - etiology of emesis unclear - either viral infection or food poisoning  - improved quickly with supportive care. No imaging done in ER  - he does not need any imaging today as he is improved.  Can consider CT abd if recurs in future  - given IVF and antiemetics    - covid neg  - resumed diet and dc home today      Hyperglycemia  - likely new onset DM  -  on admission, not in DKA  - SSI for now, A1c 8.1  - started on oral meds at dc  - need glucometer and supplies at dc  - need to establish new PCP     Leukocytosis  - mild 13  - likely reactive  - mgmt as above     HTN  - uncontrolled  - does not appear to be taking any medications  - added PRN Hydralazine  - need outpt monitoring     PAF  - NSR on admission  - cont'd Eliquis     Chronic Back Pain  - cont'd Suboxone     DVT Prophylaxis: Eliquis    Procedures (Please Review Full Report for Details)  N/A    Consults    N/A      Physical Exam at Discharge: BP (!) 145/71   Pulse 54   Temp 98.8 °F (37.1 °C) (Oral)   Resp 16   Ht 6' (1.829 m)   Wt 201 lb 8 oz (91.4 kg)   SpO2 95%   BMI 27.33 kg/m²     General: elderly male,  Awake, alert and oriented. Appears to be not in any distress  Mucous Membranes:  Pink , anicteric  Neck: No JVD, no carotid bruit, no thyromegaly  Chest:  Clear to auscultation bilaterally, no added sounds  Cardiovascular:  RRR S1S2 heard, no murmurs or gallops  Abdomen:  Soft, undistended, non tender, no organomegaly, BS present  Extremities: No edema or cyanosis. Distal pulses well felt  Neurological : grossly normal    CBC:   Recent Labs     01/23/21  1140   WBC 13.0*   HGB 16.5   HCT 48.6   MCV 87.0        BMP:   Recent Labs     01/23/21  1140 01/24/21  0600   * 133*   K 3.8 4.1   CL 93* 100   CO2 24 23   BUN 20 21*   CREATININE 1.1 0.9     LIVER PROFILE:   Recent Labs     01/23/21  1140   AST 22   ALT 24   LIPASE 14.0   BILITOT 1.2*   ALKPHOS 136*       CARDIAC ENZYMES  Recent Labs     01/23/21  1140   TROPONINI <0.01       CULTURES  COVID: not detected    RADIOLOGY  XR CHEST PORTABLE   Final Result   Questionable small left pleural effusion with mild ground-glass attenuation   at the left base. Underlying edema or developing pneumonitis may be   considered clinically.                Discharge Medications     Medication List      START taking these medications    metFORMIN 500 MG tablet  Commonly known as: GLUCOPHAGE  Take 1 tablet by mouth 2 times daily (with meals)     ondansetron 4 MG disintegrating tablet  Commonly known as: Zofran ODT  Take 1 tablet by mouth every 8 hours as needed for Nausea or Vomiting        CONTINUE taking these medications    Eliquis 5 MG Tabs tablet  Generic drug: apixaban  TAKE 1 TABLET BY MOUTH TWICE DAILY        STOP taking these medications    SUBOXONE SL           Where to Get Your Medications These medications were sent to [facility], [address], [address]    Phone: [phone]   · metFORMIN 500 MG tablet  · ondansetron 4 MG disintegrating tablet           Discharged in stable condition to home. Follow Up: Follow up with PCP. JOSE Demarco.

## 2021-01-25 NOTE — CARE COORDINATION
INTERDISCIPLINARY PLAN OF CARE CONFERENCE    Date/Time: 2021 10:17 AM  Completed by: Molly Calabrese, Case Management      Patient Name:  Zoe Ingram  YOB: 1950  Admitting Diagnosis: Nausea & vomiting [R11.2]     Admit Date/Time:  2021 11:19 AM    Chart reviewed. Interdisciplinary team contacted or reviewed plan related to patient progress and discharge plans. Disciplines included Case Management, Nursing, and Dietitian. Current Status: IP 2021  PT/OT recommendation for discharge plan of care: NA    Expected D/C Disposition:  Home  Confirmed plan with patient     Discharge Plan Comments:  IPTA. Plans to return home. Denies needs excepts states that he will need a refill on his eliquis sent to [facility] in [facility]. Home O2 in place on admit: No 95% RA      DISCHARGE ORDER  Date/Time 2021 11:30 AM  Completed by: Molly Calabrese Case Management    Patient Name: Zoe Ingram      : 1950  Admitting Diagnosis: Nausea & vomiting [R11.2]      Admit order Date and Status: IP 2021  (verify MD's last order for status of admission)      Noted discharge order. If applicable PT/OT recommendation at Discharge: NA  DME recommendation by PT/OT: NA  Confirmed discharge plan   With patient (A&O) will drive self home. Discharge Plan: DC home. No DCP needs. RX e-scribed to [facility] in [facility]. Reviewed chart. Role of discharge planner explained and patient verbalized understanding. Discharge order is noted. Has Home O2 in place on admit:  No  Informed of need to bring portable home O2 tank on day of discharge for nursing to connect prior to leaving:   Not Indicated  Verbalized agreement/Understanding:   Not Indicated  Pt is being d/c'd to home today. Pt's O2 sats are 95% on RA. Discharge timeout done with [facility]. All discharge needs and concerns addressed.

## 2021-01-25 NOTE — PROGRESS NOTES
Prescriptions and discharge instructions given. Pt verbalized understanding/ denies questions/ needs at this time. IV catheter removed with no complications. Transport called to transport pt to vehicle for discharge home.

## 2021-01-25 NOTE — PROGRESS NOTES
Report given to [facility], RN.   Care transferred at this time

## 2021-01-25 NOTE — PROGRESS NOTES
Pt a/o. Am assessment completed see flow sheet. Pt denies any pain or other needs at this time. Sitting up eating breakfast, states nausea \"comes and goes. \" Call light within reach. Will continue to monitor.

## 2021-01-26 ENCOUNTER — OFFICE VISIT (OUTPATIENT)
Dept: INTERNAL MEDICINE CLINIC | Age: 71
End: 2021-01-26

## 2021-01-26 VITALS
TEMPERATURE: 96.7 F | OXYGEN SATURATION: 99 % | HEART RATE: 60 BPM | HEIGHT: 72 IN | SYSTOLIC BLOOD PRESSURE: 167 MMHG | DIASTOLIC BLOOD PRESSURE: 80 MMHG | WEIGHT: 198 LBS | BODY MASS INDEX: 26.82 KG/M2

## 2021-01-26 DIAGNOSIS — R10.13 DYSPEPSIA: Primary | ICD-10-CM

## 2021-01-26 DIAGNOSIS — K43.9 VENTRAL HERNIA WITHOUT OBSTRUCTION OR GANGRENE: ICD-10-CM

## 2021-01-26 DIAGNOSIS — R11.2 INTRACTABLE VOMITING WITH NAUSEA, UNSPECIFIED VOMITING TYPE: ICD-10-CM

## 2021-01-26 DIAGNOSIS — I48.0 PAROXYSMAL ATRIAL FIBRILLATION (HCC): ICD-10-CM

## 2021-01-26 DIAGNOSIS — I10 ESSENTIAL HYPERTENSION: ICD-10-CM

## 2021-01-26 PROCEDURE — 99204 OFFICE O/P NEW MOD 45 MIN: CPT | Performed by: INTERNAL MEDICINE

## 2021-01-26 RX ORDER — GLUCOSAMINE HCL/CHONDROITIN SU 500-400 MG
1 CAPSULE ORAL
COMMUNITY
End: 2021-01-26 | Stop reason: SDUPTHER

## 2021-01-26 RX ORDER — LANCETS
1 EACH MISCELLANEOUS ONCE
Qty: 100 EACH | Refills: 1 | Status: SHIPPED | OUTPATIENT
Start: 2021-01-26 | End: 2022-11-04 | Stop reason: ALTCHOICE

## 2021-01-26 RX ORDER — ONDANSETRON 4 MG/1
4 TABLET, ORALLY DISINTEGRATING ORAL EVERY 8 HOURS PRN
Qty: 30 TABLET | Refills: 1 | Status: SHIPPED | OUTPATIENT
Start: 2021-01-26 | End: 2021-03-26 | Stop reason: ALTCHOICE

## 2021-01-26 RX ORDER — PEN NEEDLE, DIABETIC 31 GX5/16"
1 NEEDLE, DISPOSABLE MISCELLANEOUS ONCE
COMMUNITY
Start: 2021-01-26 | End: 2021-01-26 | Stop reason: SDUPTHER

## 2021-01-26 RX ORDER — LISINOPRIL 20 MG/1
20 TABLET ORAL DAILY
Qty: 30 TABLET | Refills: 5 | Status: SHIPPED | OUTPATIENT
Start: 2021-01-26 | End: 2021-05-14 | Stop reason: SDUPTHER

## 2021-01-26 RX ORDER — LANCETS
1 EACH MISCELLANEOUS ONCE
COMMUNITY
Start: 2021-01-26 | End: 2021-01-26 | Stop reason: SDUPTHER

## 2021-01-26 RX ORDER — GLUCOSAMINE HCL/CHONDROITIN SU 500-400 MG
1 CAPSULE ORAL ONCE
Qty: 50 STRIP | Refills: 1 | Status: SHIPPED | OUTPATIENT
Start: 2021-01-26 | End: 2021-02-02 | Stop reason: DRUGHIGH

## 2021-01-26 RX ORDER — PEN NEEDLE, DIABETIC 31 GX5/16"
1 NEEDLE, DISPOSABLE MISCELLANEOUS ONCE
Qty: 100 EACH | Refills: 1 | Status: SHIPPED | OUTPATIENT
Start: 2021-01-26 | End: 2022-11-04 | Stop reason: ALTCHOICE

## 2021-01-26 RX ORDER — PANTOPRAZOLE SODIUM 40 MG/1
40 TABLET, DELAYED RELEASE ORAL
Qty: 30 TABLET | Refills: 5 | Status: SHIPPED | OUTPATIENT
Start: 2021-01-26 | End: 2021-05-14 | Stop reason: SDUPTHER

## 2021-01-26 NOTE — LETTER
[address] [place] [address]  Phone: [phone]  Fax: [phone]    Guru Musa MD           January 29, 2021     Patient: Caroline Boggs   YOB: 1950   Date of Visit: 1/26/2021       To Whom It May Concern: It is my medical opinion that Caroline Boggs May return to work with no restrictions at this time. If you have any questions or concerns, please don't hesitate to call.     Sincerely,        Guru Musa MD DISPLAY PLAN FREE TEXT

## 2021-01-26 NOTE — PROGRESS NOTES
SUBJECTIVE:   New patient, hospital follow up. Hospitalized 1/23 - 1/25 for three day history of nausea and vomiting. Etiology of emesis unclear. Diagnosed with diabetes with hemoglobin A1c 8.1%. Prescribed metformin at discharge, took one this morning with subsequent dyspepsia. History of paroxysmal atrial fibrillation for which he has been on Eliquis (prescribed by [facility] cardiology). Sinus rhythm in the hospital.  He has been on Suboxone for chronic back pain and has been off for a while and now off, last taken four days ago. Plans to continue off Suboxone. He was not seen by gastroenterology. No primary care physician for several years. Only seen by cardiology (last seen 10/22/20 by Mimi Grajeda). MEDICATIONS:  ondansetron (ZOFRAN ODT) 4 MG tablet Take 1 tablet by mouth every 8 hours as needed    metFORMIN (GLUCOPHAGE) 500 MG tablet Take 1 tablet by mouth 2 times daily (with meals)   ELIQUIS 5 MG TABS tablet TAKE 1 TABLET BY MOUTH TWICE DAILY         Lab Results   Component Value Date    LABA1C 8.1 01/23/2021     Lab Results   Component Value Date    WBC 13.0 (H) 01/23/2021    HGB 16.5 01/23/2021     01/23/2021    MCV 87.0 01/23/2021     Lab Results   Component Value Date     (L) 01/24/2021    K 4.1 01/24/2021     01/24/2021    CO2 23 01/24/2021    BUN 21 (H) 01/24/2021    CREATININE 0.9 01/24/2021    GLUCOSE 189 (H) 01/24/2021       OBJECTIVE:    BP (!) 167/80   Pulse 60   Temp 96.7 °F (35.9 °C)   Ht 6' (1.829 m)   Wt 198 lb (89.8 kg)   SpO2 99%   BMI 26.85 kg/m²   HEENT:  Oropharynx clear, no lymphadenopathy,   LUNGS:  Clear and without wheezes  HEART:  Irregular rhythm, no appreciable murmur  ABD:  Benign, active bowel sounds, ventral hernia noted, easily reducible  EXT:  No edema, pulses palpable  NEURO:  No focal neurologic deficits noted. ASSESSMENT / PLAN:   1.  Dyspepsia with subsequent nausea with vomiting:  START taking pantoprazole (Protonix) 40 mg tablet every morning. 2. Type 2 Diabetes:   Continue taking metformin but cut in HALF and take 250 mg (half tablet) twice per day with food. Check morning (fasting) glucose level daily and write down and bring to clinic. 3. Paroxysmal atrial fibrillation:   Continue taking apixaban (Eliquis) 5 mg twice per day. 4. Hypertension:  Blood pressure is elevated today at 167/80 (goal less than 130/80). START back on lisinopril 10 mg (half 20 mg tablet) every morning. 5. Ventral abdominal hernia:  We may refer to general surgery for evaluation.         Follow-up in two weeks

## 2021-02-02 ENCOUNTER — TELEPHONE (OUTPATIENT)
Dept: INTERNAL MEDICINE CLINIC | Age: 71
End: 2021-02-02

## 2021-02-02 RX ORDER — GLUCOSAMINE HCL/CHONDROITIN SU 500-400 MG
1 CAPSULE ORAL ONCE
COMMUNITY
Start: 2021-01-26 | End: 2021-02-02 | Stop reason: SDUPTHER

## 2021-02-02 RX ORDER — GLUCOSAMINE HCL/CHONDROITIN SU 500-400 MG
1 CAPSULE ORAL ONCE
Qty: 100 STRIP | Refills: 1 | Status: SHIPPED | OUTPATIENT
Start: 2021-02-02 | End: 2022-11-04 | Stop reason: ALTCHOICE

## 2021-02-02 RX ORDER — GLUCOSAMINE HCL/CHONDROITIN SU 500-400 MG
1 CAPSULE ORAL ONCE
Qty: 1 STRIP | Refills: 0 | Status: SHIPPED | OUTPATIENT
Start: 2021-02-02 | End: 2021-02-02

## 2021-02-02 NOTE — TELEPHONE ENCOUNTER
ANTONI   Pharm called to state Pt was needing more test strips then ordered due to testing 3 times a day. I did approve for the strips to be increased to 100 per box with 1 refill and test once a day only which I also called and explained to pt why he only needed to test in the morning before his first meal . Pt understood.

## 2021-02-09 ENCOUNTER — OFFICE VISIT (OUTPATIENT)
Dept: INTERNAL MEDICINE CLINIC | Age: 71
End: 2021-02-09

## 2021-02-09 VITALS
HEIGHT: 72 IN | WEIGHT: 200 LBS | SYSTOLIC BLOOD PRESSURE: 132 MMHG | HEART RATE: 72 BPM | OXYGEN SATURATION: 97 % | DIASTOLIC BLOOD PRESSURE: 70 MMHG | BODY MASS INDEX: 27.09 KG/M2

## 2021-02-09 DIAGNOSIS — I48.0 PAROXYSMAL ATRIAL FIBRILLATION (HCC): ICD-10-CM

## 2021-02-09 DIAGNOSIS — K43.9 VENTRAL HERNIA WITHOUT OBSTRUCTION OR GANGRENE: Primary | ICD-10-CM

## 2021-02-09 DIAGNOSIS — R10.13 DYSPEPSIA: ICD-10-CM

## 2021-02-09 DIAGNOSIS — I10 ESSENTIAL HYPERTENSION: ICD-10-CM

## 2021-02-09 DIAGNOSIS — E11.9 TYPE 2 DIABETES MELLITUS WITHOUT COMPLICATION, WITHOUT LONG-TERM CURRENT USE OF INSULIN (HCC): ICD-10-CM

## 2021-02-09 PROCEDURE — 3052F HG A1C>EQUAL 8.0%<EQUAL 9.0%: CPT | Performed by: INTERNAL MEDICINE

## 2021-02-09 PROCEDURE — 99214 OFFICE O/P EST MOD 30 MIN: CPT | Performed by: INTERNAL MEDICINE

## 2021-02-09 NOTE — PATIENT INSTRUCTIONS
1. Type 2 Diabetes:  Continue taking metformin 500 mg (whole tablet) twice per day with food. Morning (fasting) glucose levels are much improved. We will recheck the hemoglobin A1c in three months. 2. Paroxysmal atrial fibrillation:   Continue taking apixaban (Eliquis) 5 mg twice per day. Rate controlled due to first degree AV block. Last echocardiogram was in December 2015 and   3. Hypertension:  Blood pressure decreased from 167/80 to 132/70 (goal less than 130/80). Continue taking lisinopril 10 mg (half 20 mg tablet) every morning. 4. Dyspepsia with subsequent nausea with vomiting:  Continue taking pantoprazole (Protonix) 40 mg tablet every morning. 5. Ventral abdominal hernia:  We may refer to general surgery for evaluation.    6. Opiate use disorder:  Weaning buprenorphine-naloxone (Suboxone) as prescribed       Follow-up in six weeks or as needed

## 2021-02-09 NOTE — PROGRESS NOTES
SUBJECTIVE:   Follow up from 1/26 (new patient) for diabetes and hypertension. Chest \"tightness\" for past two days and feels like he has a cold. No cough, fever, or wheezing. History of present illness:  Hospitalized 1/23 - 1/25 (2020) for three day history of nausea and vomiting. Etiology of emesis unclear. Diagnosed with diabetes with hemoglobin A1c 8.1%. Prescribed metformin at discharge, took one this morning with subsequent dyspepsia. History of paroxysmal atrial fibrillation for which he has been on Eliquis (prescribed by [facility] cardiology). Sinus rhythm in the hospital.  He has been on Suboxone for chronic back pain and has been off for a while and now off, last taken four days ago. Plans to continue off Suboxone. He was not seen by gastroenterology. No primary care physician for several years. Only seen by cardiology (last seen 10/22/20 by Alan Curtis). He was prescribed opiate for back pain in 2005 years ago which resulted in opiate use disorder.      MEDICATIONS:  Buprenorphine HCl-Naloxone HCl (SUBOXONE SL) Place under the tongue (take as directed)   pantoprazole (PROTONIX) 40 MG tablet Take 1 tablet by mouth every morning (before breakfast)   lisinopril (PRINIVIL;ZESTRIL) 20 MG tablet Taking 10 mg (half tablet) mouth daily    metFORMIN (GLUCOPHAGE) 500 MG tablet Take 1 tablet by mouth 2 times daily (with meals)   ELIQUIS 5 MG TABS tablet TAKE 1 TABLET BY MOUTH TWICE DAILY       Lab Results   Component Value Date    LABA1C 8.1 01/23/2021     Lab Results   Component Value Date    WBC 13.0 (H) 01/23/2021    HGB 16.5 01/23/2021     01/23/2021    MCV 87.0 01/23/2021     Lab Results   Component Value Date     (L) 01/24/2021    K 4.1 01/24/2021     01/24/2021    CO2 23 01/24/2021    BUN 21 (H) 01/24/2021    CREATININE 0.9 01/24/2021    GLUCOSE 189 (H) 01/24/2021       OBJECTIVE: /70   Pulse 72   Ht 6' (1.829 m)   Wt 200 lb (90.7 kg)   SpO2 97%   BMI 27.12 kg/m²   HEENT:  Oropharynx clear, no lymphadenopathy,   LUNGS:  Clear and without wheezes  HEART:  Regular rhythm, no appreciable murmur  ABD:  Benign, active bowel sounds  EXT:  No edema, pulses palpable  NEURO:  No focal neurologic deficits noted. ASSESSMENT / PLAN:   1. Type 2 Diabetes (HgbA1c 8.1%): Continue taking metformin 500 mg (whole tablet) twice per day with food. Morning (fasting) glucose levels are much improved. We will recheck the hemoglobin A1c in three months. 2. Paroxysmal atrial fibrillation:   Continue taking apixaban (Eliquis) 5 mg twice per day. Rate controlled due to first degree AV block. Last echocardiogram was in December 2015 and   3. Hypertension:  Blood pressure decreased from 167/80 to 132/70 (goal less than 130/80). Continue taking lisinopril 10 mg (half 20 mg tablet) every morning. 4. Dyspepsia with subsequent nausea with vomiting:  Continue taking pantoprazole (Protonix) 40 mg tablet every morning. 5. Ventral abdominal hernia:  We may refer to general surgery for evaluation.    6. Opiate use disorder:  Weaning buprenorphine-naloxone (Suboxone) as prescribed       Follow-up in six weeks or as needed

## 2021-03-26 ENCOUNTER — OFFICE VISIT (OUTPATIENT)
Dept: INTERNAL MEDICINE CLINIC | Age: 71
End: 2021-03-26

## 2021-03-26 VITALS
BODY MASS INDEX: 37.89 KG/M2 | WEIGHT: 193 LBS | TEMPERATURE: 97.1 F | HEIGHT: 60 IN | DIASTOLIC BLOOD PRESSURE: 71 MMHG | OXYGEN SATURATION: 97 % | HEART RATE: 65 BPM | SYSTOLIC BLOOD PRESSURE: 121 MMHG

## 2021-03-26 DIAGNOSIS — I48.0 PAROXYSMAL ATRIAL FIBRILLATION (HCC): ICD-10-CM

## 2021-03-26 DIAGNOSIS — K58.0 IRRITABLE BOWEL SYNDROME WITH DIARRHEA: ICD-10-CM

## 2021-03-26 DIAGNOSIS — E11.9 TYPE 2 DIABETES MELLITUS WITHOUT COMPLICATION, WITHOUT LONG-TERM CURRENT USE OF INSULIN (HCC): ICD-10-CM

## 2021-03-26 DIAGNOSIS — R10.13 DYSPEPSIA: ICD-10-CM

## 2021-03-26 DIAGNOSIS — K43.9 VENTRAL HERNIA WITHOUT OBSTRUCTION OR GANGRENE: Primary | ICD-10-CM

## 2021-03-26 PROCEDURE — 99214 OFFICE O/P EST MOD 30 MIN: CPT | Performed by: INTERNAL MEDICINE

## 2021-03-26 PROCEDURE — 3052F HG A1C>EQUAL 8.0%<EQUAL 9.0%: CPT | Performed by: INTERNAL MEDICINE

## 2021-03-26 RX ORDER — DICYCLOMINE HYDROCHLORIDE 10 MG/1
10 CAPSULE ORAL 2 TIMES DAILY
Qty: 60 CAPSULE | Refills: 2 | Status: SHIPPED | OUTPATIENT
Start: 2021-03-26 | End: 2021-07-06

## 2021-03-26 NOTE — PROGRESS NOTES
SUBJECTIVE:  Follow up from 1/26 for diabetes. GI issues with metformin and stopped taking three weeks ago with morning glucose levels above 200. Improved when he restarted metformin but notes gastric spasms with diarrhea when he takes metformin. GERD improved with pantoprazole. Chief Complaint   Patient presents with    Diabetes     Pt states he is having some GI issues with the Metformin, Fasting BS are running under or around 120's    Hypertension     taking medication as directed    Gastroesophageal Reflux     taking medication as directed     History of present illness:  Hospitalized 1/23 - 1/25 for three day history of nausea and vomiting. Etiology of emesis unclear. Diagnosed with diabetes with hemoglobin A1c 8.1%. Prescribed metformin at discharge, took one this morning with subsequent dyspepsia. History of paroxysmal atrial fibrillation for which he has been on Eliquis (prescribed by [address] [place] [address] cardiology). Sinus rhythm in the hospital.  He has been on Suboxone for chronic back pain and has been off for a while and now off, last taken four days ago. Plans to continue off Suboxone. He was not seen by gastroenterology. No primary care physician for several years. Only seen by cardiology (last seen 10/22/20 by Sayda Orellana).       MEDICATIONS:  Buprenorphine HCl-Naloxone HCl (SUBOXONE SL) Place under the tongue   pantoprazole (PROTONIX) 40 MG tablet Take 1 tablet by mouth every morning (before breakfast)   lisinopril (PRINIVIL;ZESTRIL) 20 MG tablet Take 1 tablet by mouth daily (Patient taking differently: Take 10 mg by mouth daily )   metFORMIN (GLUCOPHAGE) 500 MG tablet Take 1 tablet by mouth 2 times daily (with meals)   ELIQUIS 5 MG TABS tablet TAKE 1 TABLET BY MOUTH TWICE DAILY         Lab Results   Component Value Date    LABA1C 8.1 01/23/2021     Lab Results   Component Value Date    WBC 13.0 (H) 01/23/2021    HGB 16.5 01/23/2021     01/23/2021    MCV 87.0 01/23/2021     Lab Results   Component Value Date     (L) 01/24/2021    K 4.1 01/24/2021     01/24/2021    CO2 23 01/24/2021    BUN 21 (H) 01/24/2021    CREATININE 0.9 01/24/2021    GLUCOSE 189 (H) 01/24/2021       OBJECTIVE:    /71   Pulse 65   Temp 97.1 °F (36.2 °C)   Ht (!) 6\" (0.152 m)   Wt 193 lb (87.5 kg)   SpO2 97%   BMI 3769.27 kg/m²   HEENT:  Oropharynx clear, no lymphadenopathy,   LUNGS:  Clear and without wheezes  HEART:  Regular rhythm, no appreciable murmur  ABD:  Benign, active bowel sounds  EXT:  No edema, pulses palpable  NEURO:  No focal neurologic deficits noted. ASSESSMENT / PLAN:   1. Dyspepsia with subsequent nausea with vomiting:  Continue taking pantoprazole (Protonix) 40 mg tablet every morning. 2. Type 2 Diabetes:   Continue taking metformin 500 mg (whole tablet) twice per with food. START taking dicyclomine (Bentyl) 10 mg capsule with the metformin twice per day to avoid or reduce gastric spasms. Check morning (fasting) glucose level daily and write down and bring to clinic with goal less than 140. We will check the hemoglobin A1c (three month average blood sugar) after three months on regular dose of metformin. Hemoglobin A1c was 8.1% (Jan 23) with goal less than 7.0%. 3. Paroxysmal atrial fibrillation:   Continue taking apixaban (Eliquis) 5 mg twice per day. 4. Hypertension:  Blood pressure is good today today at 121/71 (goal less than 130/80). Continue taking lisinopril 10 mg (half 20 mg tablet) every morning. 5. Ventral abdominal hernia:  Refer to general surgery for evaluation. Follow-up in six weeks, review morning glucose levels. Possible pre-op for hernia repair.

## 2021-03-26 NOTE — PATIENT INSTRUCTIONS
1. Dyspepsia with subsequent nausea with vomiting:  Continue taking pantoprazole (Protonix) 40 mg tablet every morning. 2. Type 2 Diabetes:   Continue taking metformin 500 mg (whole tablet) twice per with food. START taking dicyclomine (Bentyl) 10 mg capsule with the metformin twice per day to avoid or reduce gastric spasms. Check morning (fasting) glucose level daily and write down and bring to clinic with goal less than 140. We will check the hemoglobin A1c (three month average blood sugar) after three months on regular dose of metformin. Hemoglobin A1c was 8.1% (Jan 23) with goal less than 7.0%. 3. Paroxysmal atrial fibrillation:   Continue taking apixaban (Eliquis) 5 mg twice per day. 4. Hypertension:  Blood pressure is good today today at 121/71 (goal less than 130/80). Continue taking lisinopril 10 mg (half 20 mg tablet) every morning. 5. Ventral abdominal hernia:  Refer to general surgery for evaluation. Follow-up in six weeks, review morning glucose levels. Possible pre-op for hernia repair.

## 2021-04-07 ENCOUNTER — INITIAL CONSULT (OUTPATIENT)
Dept: SURGERY | Age: 71
End: 2021-04-07
Payer: MEDICARE

## 2021-04-07 VITALS
WEIGHT: 193.2 LBS | BODY MASS INDEX: 26.17 KG/M2 | TEMPERATURE: 97.8 F | HEART RATE: 66 BPM | HEIGHT: 72 IN | DIASTOLIC BLOOD PRESSURE: 75 MMHG | SYSTOLIC BLOOD PRESSURE: 133 MMHG

## 2021-04-07 DIAGNOSIS — M62.08 DIASTASIS RECTI: ICD-10-CM

## 2021-04-07 DIAGNOSIS — K43.9 VENTRAL HERNIA WITHOUT OBSTRUCTION OR GANGRENE: Primary | ICD-10-CM

## 2021-04-07 PROCEDURE — 99203 OFFICE O/P NEW LOW 30 MIN: CPT | Performed by: SURGERY

## 2021-04-07 RX ORDER — SODIUM CHLORIDE 0.9 % (FLUSH) 0.9 %
5-40 SYRINGE (ML) INJECTION EVERY 12 HOURS SCHEDULED
Status: CANCELLED | OUTPATIENT
Start: 2021-04-07

## 2021-04-07 RX ORDER — HEPARIN SODIUM 5000 [USP'U]/ML
5000 INJECTION, SOLUTION INTRAVENOUS; SUBCUTANEOUS ONCE
Status: CANCELLED | OUTPATIENT
Start: 2021-04-07

## 2021-04-07 RX ORDER — SODIUM CHLORIDE 0.9 % (FLUSH) 0.9 %
5-40 SYRINGE (ML) INJECTION PRN
Status: CANCELLED | OUTPATIENT
Start: 2021-04-07

## 2021-04-07 RX ORDER — SODIUM CHLORIDE 9 MG/ML
25 INJECTION, SOLUTION INTRAVENOUS PRN
Status: CANCELLED | OUTPATIENT
Start: 2021-04-07

## 2021-04-08 DIAGNOSIS — Z01.818 PRE-OP TESTING: Primary | ICD-10-CM

## 2021-04-08 PROBLEM — K43.9 VENTRAL HERNIA: Status: ACTIVE | Noted: 2021-04-08

## 2021-04-12 ENCOUNTER — TELEPHONE (OUTPATIENT)
Dept: SURGERY | Age: 71
End: 2021-04-12

## 2021-04-12 NOTE — TELEPHONE ENCOUNTER
We discussed [facility] papers and he wants to be off for full 6 weeks for surgery.

## 2021-04-14 ENCOUNTER — HOSPITAL ENCOUNTER (OUTPATIENT)
Age: 71
Discharge: HOME | End: 2021-04-14
Payer: MEDICARE

## 2021-04-15 ENCOUNTER — HOSPITAL ENCOUNTER (OUTPATIENT)
Age: 71
Discharge: HOME | End: 2021-04-15
Payer: MEDICARE

## 2021-04-15 PROCEDURE — U0005 INFEC AGEN DETEC AMPLI PROBE: HCPCS

## 2021-04-15 PROCEDURE — U0003 INFECTIOUS AGENT DETECTION BY NUCLEIC ACID (DNA OR RNA); SEVERE ACUTE RESPIRATORY SYNDROME CORONAVIRUS 2 (SARS-COV-2) (CORONAVIRUS DISEASE [COVID-19]), AMPLIFIED PROBE TECHNIQUE, MAKING USE OF HIGH THROUGHPUT TECHNOLOGIES AS DESCRIBED BY CMS-2020-01-R: HCPCS

## 2021-04-15 NOTE — PROGRESS NOTES
1.  Do not eat or drink anything after 12 midnight prior to surgery. This includes no water, chewing gum or mints. 2.  Take the following pills with a small sip of water on the morning of surgery . 3. Aspirin, Ibuprofen, Advil, Naproxen, Vitamin E and other Anti-inflammatory products should be stopped for 5 days before surgery or as directed by your physician. 4.  Check with your doctor regarding stopping Plavix, Coumadin, Lovenox, Fragmin or other blood thinners. 5.  Do not smoke and do not drink alcoholic beverages 24 hours prior to surgery. This includes NA Beer. 6.  You may brush your teeth and gargle the morning of surgery. DO NOT SWALLOW WATER.  7.  You MUST make arrangements for a responsible adult to take you home after your surgery. You will not be allowed to leave alone or drive yourself home. It is strongly suggested someone stay with you the first 24 hours. Your surgery will be cancelled if you do not have a ride home. 8.  A parent/legal guardian must accompany a child scheduled for surgery and plan to stay at the hospital until the child is discharged. Please do not bring other children with you. 9.  Please wear simple, loose fitting clothing to the hospital.  Reford Monica not bring valuables ( money, credit cards, checkbooks, etc.)  Do not wear any makeup (including no eye makeup) or nail polish on your fingers or toes. 10.  Do not wear any jewelry or piercing on the day of surgery. All body piercing jewelry must be removed. 11.  If you have dentures, they will be removed before going to the OR; we will provide you a container. If you wear contact lenses or glasses, they will be removed; please bring a case for them. 12.  Please see your family doctor/pediatrician for a history & physical and/or concerning medications. Bring any test results/reports from your physician's office the day of surgery. 15.  Remember to bring Blood Bank Bracelet to the hospital on the day of surgery.   14.  If you have a Living Will and Durable Power of  for Healthcare, please bring in a copy. 13.  Notify your Surgeon if you develop any illness between now and surgery time; cough, cold, fever, sore throat, nausea, vomiting, etc.  Please notify your surgeon if you experience dizziness, shortness of breath or blurred vision between now and the time of your surgery. 16.  DO NOT shave your operative site 96 hours (4 days) prior to surgery. For face and neck surgery, men may use an electric razor 48 hours (2 days) prior to surgery. 17. Shower the night before surgery and the morning of surgery with  an antibacterial soap   or  Chlorhexidine gluconate (for total joint replacement). To provide excellent care, visitors will be limited to two in a room at any given time. Please no children under the age of 15 in the surgical department.

## 2021-04-16 ENCOUNTER — ANESTHESIA EVENT (OUTPATIENT)
Dept: OPERATING ROOM | Age: 71
End: 2021-04-16
Payer: MEDICARE

## 2021-04-16 LAB — SARS-COV-2, PCR: NOT DETECTED

## 2021-04-18 NOTE — PROGRESS NOTES
Fasting blood sugars 1 kit 0    ELIQUIS 5 MG TABS tablet TAKE 1 TABLET BY MOUTH TWICE DAILY 180 tablet 1    blood glucose monitor strips 1 strip by Other route once for 1 dose Test one time a day before morning meal 100 strip 1    Lancets Thin MISC 1 Units by Does not apply route once for 1 dose Use one lancet to test morning blood sugars 100 each 1    Alcohol Swabs (ALCOHOL PREP) PADS 1 Units by Does not apply route once for 1 dose Use to prep finger before blood sugar testing 100 each 1     No current facility-administered medications on file prior to visit.         Allergies   Allergen Reactions    Amitriptyline     Citalopram Hydrobromide     Naproxen     Other      No SSRI's    Paroxetine     Pcn [Penicillins]     Penicillins        Social History     Socioeconomic History    Marital status:      Spouse name: Not on file    Number of children: Not on file    Years of education: Not on file    Highest education level: Not on file   Occupational History    Not on file   Social Needs    Financial resource strain: Not on file    Food insecurity     Worry: Not on file     Inability: Not on file    Transportation needs     Medical: Not on file     Non-medical: Not on file   Tobacco Use    Smoking status: Former Smoker     Types: Cigarettes     Quit date: 2008     Years since quittin.3    Smokeless tobacco: Never Used   Substance and Sexual Activity    Alcohol use: No    Drug use: No    Sexual activity: Not Currently   Lifestyle    Physical activity     Days per week: Not on file     Minutes per session: Not on file    Stress: Not on file   Relationships    Social connections     Talks on phone: Not on file     Gets together: Not on file     Attends [employer]ism service: Not on file     Active member of club or organization: Not on file     Attends meetings of clubs or organizations: Not on file     Relationship status: Not on file    Intimate partner violence     Fear of current or ex partner: Not on file     Emotionally abused: Not on file     Physically abused: Not on file     Forced sexual activity: Not on file   Other Topics Concern    Not on file   Social History Narrative    ** Merged History Encounter **            Family History   Problem Relation Age of Onset    Heart Attack Father 80    Heart Attack Mother 61       ROS: He reports no complaints related to the eyes, ears , nose throat or mouth. He denies weight loss. No chest pain. No SOB. No urinary complaints. No musculoskeletal complaints. No skin rashes. No neurologic deficits. No bleeding tendencies. GI complaints include periumbilical pain and bulge. Physical Exam:  Vitals:    04/07/21 1509   BP: 133/75   Pulse: 66   Temp: 97.8 °F (36.6 °C)   196#    General:  Comfortable. No distress. Eyes:  No scleral icterus  Ears:  Normal  Nose:  Normal  Mouth:  Mucous membranes moist  Respiratory: Lungs CTA. No accessory muscle use. Heart:  Regular rhythm  Abdomen:  Soft. Non distended. Diastasis recti upper abdomen. Ventral hernia periumbilical area. Mild tenderness. Musculoskeletal:  No abnormal movements. ROM extremities normal.  Skin:  No rashes. Neurologic:  No focal deficits. Psychiatric:  AAA. O x 3.    Radiographic studies:  None        ASSESSMENT:  1. Ventral hernia without obstruction or gangrene    2. Diastasis recti            PLAN:  The diagnosis and recommended procedure were explained. Questions answered. Prepare for hernia surgery. He understands the upper abdomen bulge will not change. Greater than 50% visit spent counseling about diagnosis, treatment plan and expected post operative course.         [address] [facility]

## 2021-04-20 NOTE — PROGRESS NOTES
Spoke with patient aware of time change tomorrow aware his arrival time is 0600 on 4/21/2021. Keyla Solis

## 2021-04-21 ENCOUNTER — ANESTHESIA (OUTPATIENT)
Dept: OPERATING ROOM | Age: 71
End: 2021-04-21
Payer: MEDICARE

## 2021-04-21 ENCOUNTER — HOSPITAL ENCOUNTER (OUTPATIENT)
Age: 71
Setting detail: OUTPATIENT SURGERY
Discharge: HOME | End: 2021-04-21
Attending: SURGERY | Admitting: SURGERY
Payer: MEDICARE

## 2021-04-21 VITALS
HEIGHT: 72 IN | OXYGEN SATURATION: 93 % | WEIGHT: 196 LBS | TEMPERATURE: 98.2 F | SYSTOLIC BLOOD PRESSURE: 130 MMHG | DIASTOLIC BLOOD PRESSURE: 65 MMHG | HEART RATE: 56 BPM | RESPIRATION RATE: 10 BRPM | BODY MASS INDEX: 26.55 KG/M2

## 2021-04-21 VITALS
TEMPERATURE: 96.3 F | OXYGEN SATURATION: 100 % | SYSTOLIC BLOOD PRESSURE: 162 MMHG | RESPIRATION RATE: 20 BRPM | DIASTOLIC BLOOD PRESSURE: 86 MMHG

## 2021-04-21 DIAGNOSIS — K43.9 VENTRAL HERNIA WITHOUT OBSTRUCTION OR GANGRENE: Primary | ICD-10-CM

## 2021-04-21 LAB
ANION GAP SERPL CALCULATED.3IONS-SCNC: 9 MMOL/L (ref 3–16)
BUN BLDV-MCNC: 13 MG/DL (ref 7–20)
CALCIUM SERPL-MCNC: 9.1 MG/DL (ref 8.3–10.6)
CHLORIDE BLD-SCNC: 102 MMOL/L (ref 99–110)
CO2: 26 MMOL/L (ref 21–32)
CREAT SERPL-MCNC: 0.8 MG/DL (ref 0.8–1.3)
GFR AFRICAN AMERICAN: >60
GFR NON-AFRICAN AMERICAN: >60
GLUCOSE BLD-MCNC: 103 MG/DL (ref 70–99)
GLUCOSE BLD-MCNC: 105 MG/DL (ref 70–99)
HCT VFR BLD CALC: 40 % (ref 40.5–52.5)
HEMOGLOBIN: 13.7 G/DL (ref 13.5–17.5)
MCH RBC QN AUTO: 30.1 PG (ref 26–34)
MCHC RBC AUTO-ENTMCNC: 34.2 G/DL (ref 31–36)
MCV RBC AUTO: 88.1 FL (ref 80–100)
PDW BLD-RTO: 13.4 % (ref 12.4–15.4)
PERFORMED ON: ABNORMAL
PLATELET # BLD: 192 K/UL (ref 135–450)
PMV BLD AUTO: 8.3 FL (ref 5–10.5)
POTASSIUM REFLEX MAGNESIUM: 3.9 MMOL/L (ref 3.5–5.1)
POTASSIUM SERPL-SCNC: 3.9 MMOL/L (ref 3.5–5.1)
RBC # BLD: 4.54 M/UL (ref 4.2–5.9)
SODIUM BLD-SCNC: 137 MMOL/L (ref 136–145)
WBC # BLD: 4.8 K/UL (ref 4–11)

## 2021-04-21 PROCEDURE — 6360000002 HC RX W HCPCS: Performed by: SURGERY

## 2021-04-21 PROCEDURE — 7100000010 HC PHASE II RECOVERY - FIRST 15 MIN: Performed by: SURGERY

## 2021-04-21 PROCEDURE — 2580000003 HC RX 258: Performed by: ANESTHESIOLOGY

## 2021-04-21 PROCEDURE — C1713 ANCHOR/SCREW BN/BN,TIS/BN: HCPCS | Performed by: SURGERY

## 2021-04-21 PROCEDURE — 3600000004 HC SURGERY LEVEL 4 BASE: Performed by: SURGERY

## 2021-04-21 PROCEDURE — 6360000002 HC RX W HCPCS: Performed by: ANESTHESIOLOGY

## 2021-04-21 PROCEDURE — 80048 BASIC METABOLIC PNL TOTAL CA: CPT

## 2021-04-21 PROCEDURE — 6370000000 HC RX 637 (ALT 250 FOR IP): Performed by: ANESTHESIOLOGY

## 2021-04-21 PROCEDURE — 85027 COMPLETE CBC AUTOMATED: CPT

## 2021-04-21 PROCEDURE — 3700000001 HC ADD 15 MINUTES (ANESTHESIA): Performed by: SURGERY

## 2021-04-21 PROCEDURE — C1781 MESH (IMPLANTABLE): HCPCS | Performed by: SURGERY

## 2021-04-21 PROCEDURE — 7100000011 HC PHASE II RECOVERY - ADDTL 15 MIN: Performed by: SURGERY

## 2021-04-21 PROCEDURE — 2500000003 HC RX 250 WO HCPCS: Performed by: ANESTHESIOLOGY

## 2021-04-21 PROCEDURE — 3700000000 HC ANESTHESIA ATTENDED CARE: Performed by: SURGERY

## 2021-04-21 PROCEDURE — 2709999900 HC NON-CHARGEABLE SUPPLY: Performed by: SURGERY

## 2021-04-21 PROCEDURE — 7100000000 HC PACU RECOVERY - FIRST 15 MIN: Performed by: SURGERY

## 2021-04-21 PROCEDURE — 2500000003 HC RX 250 WO HCPCS: Performed by: NURSE ANESTHETIST, CERTIFIED REGISTERED

## 2021-04-21 PROCEDURE — 2500000003 HC RX 250 WO HCPCS: Performed by: SURGERY

## 2021-04-21 PROCEDURE — 36415 COLL VENOUS BLD VENIPUNCTURE: CPT

## 2021-04-21 PROCEDURE — 6360000002 HC RX W HCPCS: Performed by: NURSE ANESTHETIST, CERTIFIED REGISTERED

## 2021-04-21 PROCEDURE — 2720000010 HC SURG SUPPLY STERILE: Performed by: SURGERY

## 2021-04-21 PROCEDURE — 7100000001 HC PACU RECOVERY - ADDTL 15 MIN: Performed by: SURGERY

## 2021-04-21 PROCEDURE — 49652 PR LAP, VENTRAL HERNIA REPAIR,REDUCIBLE: CPT | Performed by: SURGERY

## 2021-04-21 PROCEDURE — 3600000014 HC SURGERY LEVEL 4 ADDTL 15MIN: Performed by: SURGERY

## 2021-04-21 DEVICE — MESH HERN DIA4.5IN CIR W/ ECHO PS POS SYS VENTRALIGHT ST: Type: IMPLANTABLE DEVICE | Site: ABDOMEN | Status: FUNCTIONAL

## 2021-04-21 DEVICE — SYSTEM PERM FIX L37CM 15 FAST CAPSUR: Type: IMPLANTABLE DEVICE | Site: ABDOMEN | Status: FUNCTIONAL

## 2021-04-21 DEVICE — DEVICE FIX L37CM PEEK SMOOTH CANN 30 ABSRB FAST FOR LAP: Type: IMPLANTABLE DEVICE | Site: ABDOMEN | Status: FUNCTIONAL

## 2021-04-21 RX ORDER — OXYCODONE HYDROCHLORIDE AND ACETAMINOPHEN 5; 325 MG/1; MG/1
2 TABLET ORAL PRN
Status: COMPLETED | OUTPATIENT
Start: 2021-04-21 | End: 2021-04-21

## 2021-04-21 RX ORDER — TRAMADOL HYDROCHLORIDE 50 MG/1
50 TABLET ORAL EVERY 8 HOURS PRN
Qty: 20 TABLET | Refills: 0 | Status: SHIPPED | OUTPATIENT
Start: 2021-04-21 | End: 2021-04-26

## 2021-04-21 RX ORDER — OXYCODONE HYDROCHLORIDE AND ACETAMINOPHEN 5; 325 MG/1; MG/1
1 TABLET ORAL PRN
Status: COMPLETED | OUTPATIENT
Start: 2021-04-21 | End: 2021-04-21

## 2021-04-21 RX ORDER — PROPOFOL 10 MG/ML
INJECTION, EMULSION INTRAVENOUS PRN
Status: DISCONTINUED | OUTPATIENT
Start: 2021-04-21 | End: 2021-04-21 | Stop reason: SDUPTHER

## 2021-04-21 RX ORDER — SODIUM CHLORIDE 0.9 % (FLUSH) 0.9 %
5-40 SYRINGE (ML) INJECTION PRN
Status: DISCONTINUED | OUTPATIENT
Start: 2021-04-21 | End: 2021-04-21 | Stop reason: HOSPADM

## 2021-04-21 RX ORDER — FENTANYL CITRATE 50 UG/ML
INJECTION, SOLUTION INTRAMUSCULAR; INTRAVENOUS PRN
Status: DISCONTINUED | OUTPATIENT
Start: 2021-04-21 | End: 2021-04-21 | Stop reason: SDUPTHER

## 2021-04-21 RX ORDER — MORPHINE SULFATE 10 MG/ML
1 INJECTION, SOLUTION INTRAMUSCULAR; INTRAVENOUS EVERY 5 MIN PRN
Status: DISCONTINUED | OUTPATIENT
Start: 2021-04-21 | End: 2021-04-21 | Stop reason: HOSPADM

## 2021-04-21 RX ORDER — MEPERIDINE HYDROCHLORIDE 25 MG/ML
12.5 INJECTION INTRAMUSCULAR; INTRAVENOUS; SUBCUTANEOUS EVERY 5 MIN PRN
Status: DISCONTINUED | OUTPATIENT
Start: 2021-04-21 | End: 2021-04-21 | Stop reason: HOSPADM

## 2021-04-21 RX ORDER — SODIUM CHLORIDE 9 MG/ML
25 INJECTION, SOLUTION INTRAVENOUS PRN
Status: DISCONTINUED | OUTPATIENT
Start: 2021-04-21 | End: 2021-04-21 | Stop reason: HOSPADM

## 2021-04-21 RX ORDER — SODIUM CHLORIDE 0.9 % (FLUSH) 0.9 %
10 SYRINGE (ML) INJECTION PRN
Status: DISCONTINUED | OUTPATIENT
Start: 2021-04-21 | End: 2021-04-21 | Stop reason: HOSPADM

## 2021-04-21 RX ORDER — KETOROLAC TROMETHAMINE 30 MG/ML
30 INJECTION, SOLUTION INTRAMUSCULAR; INTRAVENOUS ONCE
Status: COMPLETED | OUTPATIENT
Start: 2021-04-21 | End: 2021-04-21

## 2021-04-21 RX ORDER — SODIUM CHLORIDE 0.9 % (FLUSH) 0.9 %
10 SYRINGE (ML) INJECTION EVERY 12 HOURS SCHEDULED
Status: DISCONTINUED | OUTPATIENT
Start: 2021-04-21 | End: 2021-04-21 | Stop reason: HOSPADM

## 2021-04-21 RX ORDER — MORPHINE SULFATE 10 MG/ML
2 INJECTION, SOLUTION INTRAMUSCULAR; INTRAVENOUS EVERY 5 MIN PRN
Status: DISCONTINUED | OUTPATIENT
Start: 2021-04-21 | End: 2021-04-21 | Stop reason: HOSPADM

## 2021-04-21 RX ORDER — LIDOCAINE HYDROCHLORIDE 20 MG/ML
INJECTION, SOLUTION INFILTRATION; PERINEURAL PRN
Status: DISCONTINUED | OUTPATIENT
Start: 2021-04-21 | End: 2021-04-21 | Stop reason: SDUPTHER

## 2021-04-21 RX ORDER — HEPARIN SODIUM 5000 [USP'U]/ML
5000 INJECTION, SOLUTION INTRAVENOUS; SUBCUTANEOUS ONCE
Status: COMPLETED | OUTPATIENT
Start: 2021-04-21 | End: 2021-04-21

## 2021-04-21 RX ORDER — ROCURONIUM BROMIDE 10 MG/ML
INJECTION, SOLUTION INTRAVENOUS PRN
Status: DISCONTINUED | OUTPATIENT
Start: 2021-04-21 | End: 2021-04-21 | Stop reason: SDUPTHER

## 2021-04-21 RX ORDER — ONDANSETRON 2 MG/ML
4 INJECTION INTRAMUSCULAR; INTRAVENOUS
Status: DISCONTINUED | OUTPATIENT
Start: 2021-04-21 | End: 2021-04-21 | Stop reason: HOSPADM

## 2021-04-21 RX ORDER — BUPIVACAINE HYDROCHLORIDE 5 MG/ML
INJECTION, SOLUTION EPIDURAL; INTRACAUDAL PRN
Status: DISCONTINUED | OUTPATIENT
Start: 2021-04-21 | End: 2021-04-21 | Stop reason: ALTCHOICE

## 2021-04-21 RX ORDER — ONDANSETRON 2 MG/ML
INJECTION INTRAMUSCULAR; INTRAVENOUS PRN
Status: DISCONTINUED | OUTPATIENT
Start: 2021-04-21 | End: 2021-04-21 | Stop reason: SDUPTHER

## 2021-04-21 RX ORDER — SODIUM CHLORIDE 0.9 % (FLUSH) 0.9 %
5-40 SYRINGE (ML) INJECTION EVERY 12 HOURS SCHEDULED
Status: DISCONTINUED | OUTPATIENT
Start: 2021-04-21 | End: 2021-04-21 | Stop reason: HOSPADM

## 2021-04-21 RX ORDER — SODIUM CHLORIDE, SODIUM LACTATE, POTASSIUM CHLORIDE, CALCIUM CHLORIDE 600; 310; 30; 20 MG/100ML; MG/100ML; MG/100ML; MG/100ML
INJECTION, SOLUTION INTRAVENOUS CONTINUOUS
Status: DISCONTINUED | OUTPATIENT
Start: 2021-04-21 | End: 2021-04-21 | Stop reason: HOSPADM

## 2021-04-21 RX ADMIN — FAMOTIDINE 20 MG: 10 INJECTION, SOLUTION INTRAVENOUS at 07:01

## 2021-04-21 RX ADMIN — OXYCODONE HYDROCHLORIDE AND ACETAMINOPHEN 1 TABLET: 5; 325 TABLET ORAL at 09:53

## 2021-04-21 RX ADMIN — HEPARIN SODIUM 5000 UNITS: 5000 INJECTION INTRAVENOUS; SUBCUTANEOUS at 07:10

## 2021-04-21 RX ADMIN — KETOROLAC TROMETHAMINE 30 MG: 30 INJECTION, SOLUTION INTRAMUSCULAR at 09:29

## 2021-04-21 RX ADMIN — ROCURONIUM BROMIDE 50 MG: 10 INJECTION, SOLUTION INTRAVENOUS at 08:06

## 2021-04-21 RX ADMIN — ONDANSETRON 4 MG: 2 INJECTION, SOLUTION INTRAMUSCULAR; INTRAVENOUS at 08:06

## 2021-04-21 RX ADMIN — FENTANYL CITRATE 100 MCG: 50 INJECTION INTRAMUSCULAR; INTRAVENOUS at 08:06

## 2021-04-21 RX ADMIN — HYDROMORPHONE HYDROCHLORIDE 0.5 MG: 1 INJECTION, SOLUTION INTRAMUSCULAR; INTRAVENOUS; SUBCUTANEOUS at 08:58

## 2021-04-21 RX ADMIN — SODIUM CHLORIDE, POTASSIUM CHLORIDE, SODIUM LACTATE AND CALCIUM CHLORIDE: 600; 310; 30; 20 INJECTION, SOLUTION INTRAVENOUS at 08:02

## 2021-04-21 RX ADMIN — SODIUM CHLORIDE, POTASSIUM CHLORIDE, SODIUM LACTATE AND CALCIUM CHLORIDE: 600; 310; 30; 20 INJECTION, SOLUTION INTRAVENOUS at 07:02

## 2021-04-21 RX ADMIN — PROPOFOL 200 MG: 10 INJECTION, EMULSION INTRAVENOUS at 08:06

## 2021-04-21 RX ADMIN — Medication 1500 MG: at 07:35

## 2021-04-21 RX ADMIN — HYDROMORPHONE HYDROCHLORIDE 0.5 MG: 1 INJECTION, SOLUTION INTRAMUSCULAR; INTRAVENOUS; SUBCUTANEOUS at 09:13

## 2021-04-21 RX ADMIN — SUGAMMADEX 200 MG: 100 INJECTION, SOLUTION INTRAVENOUS at 08:32

## 2021-04-21 RX ADMIN — LIDOCAINE HYDROCHLORIDE 100 MG: 20 INJECTION, SOLUTION INFILTRATION; PERINEURAL at 08:06

## 2021-04-21 ASSESSMENT — PULMONARY FUNCTION TESTS
PIF_VALUE: 0
PIF_VALUE: 15
PIF_VALUE: 20
PIF_VALUE: 23
PIF_VALUE: 22
PIF_VALUE: 1
PIF_VALUE: 22
PIF_VALUE: 4
PIF_VALUE: 15
PIF_VALUE: 21
PIF_VALUE: 2
PIF_VALUE: 14
PIF_VALUE: 14
PIF_VALUE: 22
PIF_VALUE: 22
PIF_VALUE: 4
PIF_VALUE: 0
PIF_VALUE: 2
PIF_VALUE: 24
PIF_VALUE: 16
PIF_VALUE: 0
PIF_VALUE: 15
PIF_VALUE: 23
PIF_VALUE: 1
PIF_VALUE: 22
PIF_VALUE: 2
PIF_VALUE: 22
PIF_VALUE: 15

## 2021-04-21 ASSESSMENT — PAIN SCALES - GENERAL
PAINLEVEL_OUTOF10: 9
PAINLEVEL_OUTOF10: 6
PAINLEVEL_OUTOF10: 7

## 2021-04-21 ASSESSMENT — PAIN DESCRIPTION - LOCATION
LOCATION: ABDOMEN
LOCATION: ABDOMEN

## 2021-04-21 ASSESSMENT — LIFESTYLE VARIABLES: SMOKING_STATUS: 0

## 2021-04-21 ASSESSMENT — PAIN DESCRIPTION - PAIN TYPE
TYPE: SURGICAL PAIN
TYPE: SURGICAL PAIN

## 2021-04-21 ASSESSMENT — PAIN - FUNCTIONAL ASSESSMENT
PAIN_FUNCTIONAL_ASSESSMENT: 0-10

## 2021-04-21 ASSESSMENT — ENCOUNTER SYMPTOMS: SHORTNESS OF BREATH: 0

## 2021-04-21 NOTE — PROGRESS NOTES
Arrived from OR awakens easily and respirations unlabored. Abdomen soft and dressings times five dry and intact. Report received from Gilson Santoyo RN. Ice pack applied to abdomen and vy hugger applied.

## 2021-04-21 NOTE — BRIEF OP NOTE
Brief Postoperative Note      Patient: Yovani Ligth  YOB: 1950  MRN: [id number]    Date of Procedure: 4/21/2021    Pre-Op Diagnosis: VENTRAL HERNIA    Post-Op Diagnosis: Same       Procedure(s):  LAPAROSCOPIC VENTRAL HERNIA REPAIR WITH MESH    Surgeon(s):  Cris Bell MD    Assistant:  Surgical Assistant: Jim Saleh    Anesthesia: General    Estimated Blood Loss (mL): Minimal    Complications: None    Specimens:   * No specimens in log *    Implants:  Implant Name Type Inv. Item Serial No.  Lot No. LRB No. Used Action   MESH HERMELINDO DIA4. 5IN CIR W/ ECHO PS POS SYS VENTRALIGHT ST  MESH HERMELINDO DIA4. 5IN CIR W/ ECHO PS POS SYS VENTRALIGHT ST  BARD DAVOL-WD [id number] N/A 1 Implanted   DEVICE FIX L37CM PEEK SMOOTH TEAGAN 30 ABSRB FAST FOR LAP  DEVICE FIX L37CM PEEK SMOOTH TEAGAN 30 ABSRB FAST FOR LAP  BARD DAV[employer]-WD [id number] N/A 1 Implanted   SYSTEM PERM FIX L37CM 15 FAST CAPSUR  SYSTEM PERM FIX L37CM 15 FAST CAPSUR  BARD DAVOL-WD [id number] N/A 1 Implanted         Drains: * No LDAs found *    Findings: As above    Electronically signed by Herminia Scott MD on 4/21/2021 at 8:46 AM

## 2021-04-21 NOTE — ANESTHESIA PRE PROCEDURE
Department of Anesthesiology  Preprocedure Note       Name:  Dioni Grand   Age:  65 y.o.  :  1950                                          MRN:  [id number]         Date:  2021      Surgeon: Taylor Liriano):  Haley Boo MD    Procedure: Procedure(s):  LAPAROSCOPIC VENTRAL HERNIA REPAIR WITH MESH    Medications prior to admission:   Prior to Admission medications    Medication Sig Start Date End Date Taking? Authorizing Provider   metFORMIN (GLUCOPHAGE) 500 MG tablet Take 1 tablet by mouth 2 times daily (with meals) 3/26/21  Yes Babara Romberg, MD   dicyclomine (BENTYL) 10 MG capsule Take 1 capsule by mouth 2 times daily With Metformin 3/26/21  Yes Babara Romberg, MD   Buprenorphine HCl-Naloxone HCl (SUBOXONE SL) Place under the tongue   Yes Historical Provider, MD   pantoprazole (PROTONIX) 40 MG tablet Take 1 tablet by mouth every morning (before breakfast) 21  Yes Babara Romberg, MD   lisinopril (PRINIVIL;ZESTRIL) 20 MG tablet Take 1 tablet by mouth daily  Patient taking differently: Take 10 mg by mouth daily  21  Yes Babara Romberg, MD   Blood Glucose Monitoring Suppl (D-CARE GLUCOMETER) w/Device KIT 1 kit by Does not apply route every morning (before breakfast) Test blood sugars before any food intake in the morning.  Fasting blood sugars 21  Yes Babara Romberg, MD   blood glucose monitor strips 1 strip by Other route once for 1 dose Test one time a day before morning meal 21  Babara Romberg, MD   Lancets Thin MISC 1 Units by Does not apply route once for 1 dose Use one lancet to test morning blood sugars 21  Babara Romberg, MD   Alcohol Swabs (ALCOHOL PREP) PADS 1 Units by Does not apply route once for 1 dose Use to prep finger before blood sugar testing 21  Babara Romberg, MD   ELIQUIS 5 MG TABS tablet TAKE 1 TABLET BY MOUTH TWICE DAILY 21   Sharon Avila, APRN - CNP       Current medications: Current Facility-Administered Medications   Medication Dose Route Frequency Provider Last Rate Last Admin    lactated ringers infusion   Intravenous Continuous Solo Villegas MD        sodium chloride flush 0.9 % injection 10 mL  10 mL Intravenous 2 times per day Solo Villegas MD        sodium chloride flush 0.9 % injection 10 mL  10 mL Intravenous PRN Solo Villegas MD        0.9 % sodium chloride infusion  25 mL Intravenous PRN Solo Villegas MD        famotidine (PEPCID) injection 20 mg  20 mg Intravenous Once Solo Villegas MD        0.9 % sodium chloride infusion  25 mL Intravenous PRN Karen Lee MD        sodium chloride flush 0.9 % injection 5-40 mL  5-40 mL Intravenous 2 times per day Karen Lee MD        sodium chloride flush 0.9 % injection 5-40 mL  5-40 mL Intravenous PRN Karen Lee MD        heparin (porcine) injection 5,000 Units  5,000 Units Subcutaneous Once Karen Lee MD        vancomycin 1500 mg in dextrose 5% 300 mL IVPB  1,500 mg Intravenous Once Karen Lee MD           Allergies:     Allergies   Allergen Reactions    Amitriptyline     Citalopram Hydrobromide     Naproxen     Other      No SSRI's    Paroxetine     Pcn [Penicillins]     Penicillins        Problem List:    Patient Active Problem List   Diagnosis Code    Fibromyalgia M79.7    Chronic back pain M54.9, G89.29    DJD (degenerative joint disease) M19.90    ED (erectile dysfunction) N52.9    Duodenal ulcer K26.9    Intractable nausea and vomiting R11.2    Esophageal candidiasis (HCC) B37.81    Lumbago M54.5    PAF (paroxysmal atrial fibrillation) ([facility]) I48.0    Pleuritic chest pain R07.81    Essential hypertension I10    First degree atrioventricular block I44.0    Sinus bradycardia R00.1    Elevated BP without diagnosis of hypertension R03.0    Hyperglycemia R73.9    Type 2 diabetes mellitus without 01/23/2021    RDW 12.6 01/23/2021     01/23/2021       CMP:   Lab Results   Component Value Date     01/24/2021    K 4.1 01/24/2021     01/24/2021    CO2 23 01/24/2021    BUN 21 01/24/2021    CREATININE 0.9 01/24/2021    GFRAA >60 01/24/2021    GFRAA >60 02/12/2012    AGRATIO 1.3 01/23/2021    LABGLOM >60 01/24/2021    GLUCOSE 189 01/24/2021    PROT 8.1 01/23/2021    PROT 7.0 02/12/2012    CALCIUM 8.8 01/24/2021    BILITOT 1.2 01/23/2021    ALKPHOS 136 01/23/2021    AST 22 01/23/2021    ALT 24 01/23/2021       POC Tests:   Recent Labs     04/21/21  0650   POCGLU 103*       Coags:   Lab Results   Component Value Date    PROTIME 11.7 12/07/2015    INR 1.5 08/23/2018    INR 3.4 06/28/2018    APTT 25.2 02/12/2012       HCG (If Applicable): No results found for: PREGTESTUR, PREGSERUM, HCG, HCGQUANT     ABGs: No results found for: PHART, PO2ART, DGW9WZK, NOV4ZZA, BEART, N7ZAIVLB     Type & Screen (If Applicable):  No results found for: LABABO, LABRH    Drug/Infectious Status (If Applicable):  No results found for: HIV, HEPCAB    COVID-19 Screening (If Applicable):   Lab Results   Component Value Date    COVID19 Not Detected 04/15/2021           Anesthesia Evaluation  Patient summary reviewed no history of anesthetic complications:   Airway: Mallampati: II  TM distance: >3 FB   Neck ROM: limited  Mouth opening: > = 3 FB Dental:    (+) edentulous      Pulmonary:Negative Pulmonary ROS breath sounds clear to auscultation      (-) COPD, asthma, shortness of breath, recent URI, sleep apnea and not a current smoker          Patient did not smoke on day of surgery.                  Cardiovascular:    (+) hypertension:, dysrhythmias: atrial fibrillation,     (-) pacemaker, past MI, CAD, CABG/stent,  angina and  CHF    ECG reviewed  Rhythm: regular  Rate: normal           Beta Blocker:  Not on Beta Blocker         Neuro/Psych:   (+) neuromuscular disease (fibromyalgia):,    (-) seizures, TIA and CVA GI/Hepatic/Renal:   (+) PUD, renal disease: kidney stones,      (-) liver disease, bowel prep and no morbid obesity       Endo/Other:    (+) DiabetesType II DM, , blood dyscrasia (off eliquis 4 d): arthritis:., no malignancy/cancer. (-) hypothyroidism, hyperthyroidism, no malignancy/cancer               Abdominal:           Vascular: negative vascular ROS. Anesthesia Plan      general     ASA 3       Induction: intravenous. MIPS: Postoperative opioids intended and Prophylactic antiemetics administered. Anesthetic plan and risks discussed with patient. Plan discussed with CRNA. This pre-anesthesia assessment may be used as a history and physical.    DOS STAFF ADDENDUM:    Pt seen and examined, chart reviewed (including anesthesia, drug and allergy history). No interval changes to history and physical examination. Anesthetic plan, risks, benefits, alternatives, and personnel involved discussed with patient. Patient verbalized an understanding and agrees to proceed.       Hudson Ruvalcaba MD  April 21, 2021  7:03 AM      Hudson Ruvalcaba MD   4/21/2021

## 2021-04-21 NOTE — PROGRESS NOTES
Detailed verbal discharge instructions given to patients friend via telephone. Verbalized understanding.   Patient sipping coffee

## 2021-04-21 NOTE — PROGRESS NOTES
Patient is now a Phase II patient. Abdomen soft and dressings times five dry and intact. Abdominal binder intact. States pain is easing 6/10. Sipping hot tea

## 2021-04-21 NOTE — ANESTHESIA POSTPROCEDURE EVALUATION
Department of Anesthesiology  Postprocedure Note    Patient: Shubham Quesada  MRN: [id number]  YOB: 1950  Date of evaluation: 4/21/2021  Time:  10:21 AM     Procedure Summary     Date: 04/21/21 Room / Location: [place] [id number] / [facility]    Anesthesia Start: [address] Anesthesia Stop: 4170    Procedure: [address] (N/A ) Diagnosis: (VENTRAL HERNIA)    Surgeons: Lizzy Villavicencio MD Responsible Provider: Severo Espinosa MD    Anesthesia Type: general ASA Status: 3          Anesthesia Type: general    Jennifer Phase I: Jennifer Score: 10    Jennifer Phase II: Jennifer Score: 10    Last vitals: Reviewed and per EMR flowsheets.      Vitals:    04/21/21 0917 04/21/21 0932 04/21/21 0942 04/21/21 0946   BP: (!) 145/75 135/66 135/66 (!) 141/66   Pulse: 59 50 58 52   Resp: 11 15 14 10   Temp:   98.2 °F (36.8 °C)    TempSrc:   Temporal    SpO2: 94% 96% 98% 93%   Weight:       Height:         Anesthesia Post Evaluation    Patient location during evaluation: bedside  Patient participation: complete - patient participated  Level of consciousness: awake and alert  Airway patency: patent  Nausea & Vomiting: no nausea  Complications: no  Cardiovascular status: hemodynamically stable  Respiratory status: acceptable  Hydration status: euvolemic

## 2021-04-21 NOTE — H&P
[facility]      The H&P was reviewed, the patient was examined, and no change has occurred in the patient's condition since the H&P was completed. The indications for the procedure were reviewed, and any questions were answered. I updated the progress note from 4/7/2021 which is the H&P.     Vitals:    04/21/21 0641   BP: 131/68   Pulse: 50   Resp: 16   Temp: 98.2 °F (36.8 °C)   SpO2: 97%

## 2021-04-22 NOTE — OP NOTE
tacks were placed in the midportion of the mesh. The balloon was deflated and removed. We had excellent affixation and  overlap. I deflated the abdomen and removed the trocars. The fascia at  the 12 mm port site was reapproximated with 0 Vicryl suture. Local  anesthetic was infiltrated. 4-0 Vicryl was used to reapproximate the  skin at all the incisions. Benzoin and Steri-Strip dressing were  placed. DISPOSITION:  The patient tolerated procedure without any acute  complication.         Nessa Espinal MD    D: 04/21/2021 18:04:27       T: 04/21/2021 18:11:49     [email or web address]  Job#: [id number]     Doc#: [id number]    CC:

## 2021-05-05 ENCOUNTER — OFFICE VISIT (OUTPATIENT)
Dept: SURGERY | Age: 71
End: 2021-05-05

## 2021-05-05 VITALS
DIASTOLIC BLOOD PRESSURE: 74 MMHG | HEART RATE: 69 BPM | TEMPERATURE: 98.2 F | SYSTOLIC BLOOD PRESSURE: 133 MMHG | BODY MASS INDEX: 25.73 KG/M2 | HEIGHT: 72 IN | WEIGHT: 190 LBS

## 2021-05-05 DIAGNOSIS — Z09 SURGICAL FOLLOW-UP CARE: Primary | ICD-10-CM

## 2021-05-05 PROCEDURE — 99024 POSTOP FOLLOW-UP VISIT: CPT | Performed by: SURGERY

## 2021-05-14 ENCOUNTER — OFFICE VISIT (OUTPATIENT)
Dept: INTERNAL MEDICINE CLINIC | Age: 71
End: 2021-05-14

## 2021-05-14 VITALS
SYSTOLIC BLOOD PRESSURE: 108 MMHG | BODY MASS INDEX: 26.28 KG/M2 | HEIGHT: 72 IN | DIASTOLIC BLOOD PRESSURE: 62 MMHG | WEIGHT: 194 LBS | OXYGEN SATURATION: 98 % | HEART RATE: 52 BPM

## 2021-05-14 DIAGNOSIS — I48.0 PAROXYSMAL ATRIAL FIBRILLATION (HCC): ICD-10-CM

## 2021-05-14 DIAGNOSIS — K58.0 IRRITABLE BOWEL SYNDROME WITH DIARRHEA: ICD-10-CM

## 2021-05-14 DIAGNOSIS — R10.13 DYSPEPSIA: ICD-10-CM

## 2021-05-14 DIAGNOSIS — I10 ESSENTIAL HYPERTENSION: ICD-10-CM

## 2021-05-14 DIAGNOSIS — E11.9 TYPE 2 DIABETES MELLITUS WITHOUT COMPLICATION, WITHOUT LONG-TERM CURRENT USE OF INSULIN (HCC): Primary | ICD-10-CM

## 2021-05-14 PROCEDURE — 99214 OFFICE O/P EST MOD 30 MIN: CPT | Performed by: INTERNAL MEDICINE

## 2021-05-14 PROCEDURE — 3052F HG A1C>EQUAL 8.0%<EQUAL 9.0%: CPT | Performed by: INTERNAL MEDICINE

## 2021-05-14 RX ORDER — PANTOPRAZOLE SODIUM 40 MG/1
40 TABLET, DELAYED RELEASE ORAL
Qty: 30 TABLET | Refills: 5 | Status: SHIPPED | OUTPATIENT
Start: 2021-05-14 | End: 2022-01-28 | Stop reason: SDUPTHER

## 2021-05-14 RX ORDER — LISINOPRIL 20 MG/1
10 TABLET ORAL DAILY
Qty: 30 TABLET | Refills: 5 | Status: SHIPPED | OUTPATIENT
Start: 2021-05-14 | End: 2022-10-18 | Stop reason: SDUPTHER

## 2021-05-14 NOTE — PROGRESS NOTES
SUBJECTIVE:  Follow up from 3/26, s/p ventral hernia repair (4/12, Dr. Marie Decker). Still has some lower abdominal pain, worse after bowel movement or urination. Metformin induced diarrhea has resolved. Morning glucose levels mostly below 120. Watching diet better.       History of present illness:  Hospitalized 1/23 - 1/25 for three day history of nausea and vomiting.  Etiology of emesis unclear.  Diagnosed with diabetes with hemoglobin A1c 8.1%.  Prescribed metformin at discharge, took one this morning with subsequent dyspepsia.  History of paroxysmal atrial fibrillation for which he has been on Eliquis (prescribed by [facility] cardiology).  Sinus rhythm in the hospital. [facility] has been on Suboxone for chronic back pain and has been off for a while and now off, last taken four days ago. Harsha Sat to continue off Ramírez Davidjolie was not seen by gastroenterology. [facility]. Monisha Mcbride seen by cardiology (last seen 10/22/20 by Bo Casanova).      MEDICATIONS:  metFORMIN (GLUCOPHAGE) 500 MG tablet Take 1 tablet by mouth 2 times daily (with meals)   dicyclomine (BENTYL) 10 MG capsule Take 1 capsule by mouth 2 times daily With Metformin   Buprenorphine HCl-Naloxone HCl (SUBOXONE SL) Place under the tongue   pantoprazole (PROTONIX) 40 MG tablet Take 1 tablet by mouth every morning (before breakfast)   lisinopril (PRINIVIL;ZESTRIL) 20 MG tablet Take 0.5 tablet by mouth daily    ELIQUIS 5 MG TABS tablet TAKE 1 TABLET BY MOUTH TWICE DAILY       Lab Results   Component Value Date    LABA1C 8.1 01/23/2021     Lab Results   Component Value Date    WBC 4.8 04/21/2021    HGB 13.7 04/21/2021     04/21/2021    MCV 88.1 04/21/2021     Lab Results   Component Value Date     04/21/2021    K 3.9 04/21/2021    K 3.9 04/21/2021     04/21/2021    CO2 26 04/21/2021    BUN 13 04/21/2021    CREATININE 0.8 04/21/2021    GLUCOSE 105 (H) 04/21/2021       OBJECTIVE:    /62   Pulse 52   Ht 6' (1.829 m)   Wt 194 lb (88 kg)   SpO2 98%   BMI 26.31 kg/m²   HEENT:  Oropharynx clear, no lymphadenopathy,   LUNGS:  Clear and without wheezes  HEART:  Regular rhythm, no appreciable murmur  ABD:  Benign, active bowel sounds  EXT:  No edema, pulses palpable  NEURO:  No focal neurologic deficits noted. ASSESSMENT / PLAN:   1. Dyspepsia with subsequent nausea with vomiting:  Continue taking pantoprazole (Protonix) 40 mg tablet every morning before breakfast.    2. Type 2 Diabetes:   Continue taking metformin 500 mg twice per with food and dicyclomine (Bentyl) 10 mg capsule with the metformin. 3. Paroxysmal atrial fibrillation:   Continue taking apixaban (Eliquis) 5 mg twice per day.      4. Hypertension:  Blood pressure is good today today at 108/62 (goal less than 130/80).  Continue taking lisinopril 10 mg (half 20 mg tablet) every morning.    5. Ventral abdominal hernia repair:  Take dicyclomine (Bentyl) 10 mg in the morning and another 10 mg at bedtime. 6. Medication for Opioid Use Disorder:  Continues on current dose of Suboxone.         Follow-up in two weeks after seen by General Surgery (Dr. Aleksandra Stearns)

## 2021-05-14 NOTE — PATIENT INSTRUCTIONS
1. Dyspepsia with subsequent nausea with vomiting:  Continue taking pantoprazole (Protonix) 40 mg tablet every morning.    2. Type 2 Diabetes:   Continue taking metformin 500 mg twice per with food and dicyclomine (Bentyl) 10 mg capsule with the metformin. 3. Paroxysmal atrial fibrillation:   Continue taking apixaban (Eliquis) 5 mg twice per day.      4. Hypertension:  Blood pressure is good today today at 108/62 (goal less than 130/80).  Continue taking lisinopril 10 mg (half 20 mg tablet) every morning.    5. Ventral abdominal hernia repair:  Take dicyclomine (Bentyl) 10 mg in the morning and another 10 mg at bedtime.     6. Medication for Opioid Use Disorder:        Follow-up in two weeks after seen by General Surgery (Dr. Mary Lynch)

## 2021-06-25 ENCOUNTER — OFFICE VISIT (OUTPATIENT)
Dept: INTERNAL MEDICINE CLINIC | Age: 71
End: 2021-06-25

## 2021-06-25 VITALS
WEIGHT: 183 LBS | HEART RATE: 56 BPM | OXYGEN SATURATION: 99 % | TEMPERATURE: 98.1 F | SYSTOLIC BLOOD PRESSURE: 137 MMHG | BODY MASS INDEX: 24.79 KG/M2 | HEIGHT: 72 IN | DIASTOLIC BLOOD PRESSURE: 69 MMHG

## 2021-06-25 DIAGNOSIS — E11.9 TYPE 2 DIABETES MELLITUS WITHOUT COMPLICATION, WITHOUT LONG-TERM CURRENT USE OF INSULIN (HCC): Primary | ICD-10-CM

## 2021-06-25 DIAGNOSIS — Z00.00 PHYSICAL EXAM: ICD-10-CM

## 2021-06-25 DIAGNOSIS — I48.0 PAROXYSMAL ATRIAL FIBRILLATION (HCC): ICD-10-CM

## 2021-06-25 DIAGNOSIS — I10 ESSENTIAL HYPERTENSION: ICD-10-CM

## 2021-06-25 DIAGNOSIS — R10.13 DYSPEPSIA: ICD-10-CM

## 2021-06-25 PROCEDURE — 99214 OFFICE O/P EST MOD 30 MIN: CPT | Performed by: INTERNAL MEDICINE

## 2021-06-25 PROCEDURE — 3052F HG A1C>EQUAL 8.0%<EQUAL 9.0%: CPT | Performed by: INTERNAL MEDICINE

## 2021-06-25 NOTE — PROGRESS NOTES
SUBJECTIVE:  Follow up from 5/14 for diabetes, hypertension, and atrial fibrillation. Morning glucose levels 110 or lower. History of present illness:  Hospitalized 1/23 - 1/25 for three day history of nausea and vomiting.  Etiology of emesis unclear.  Diagnosed with diabetes with hemoglobin A1c 8.1%.  Prescribed metformin at discharge, took one this morning with subsequent dyspepsia.  History of paroxysmal atrial fibrillation for which he has been on Eliquis (prescribed by [id number] [facility] cardiology).  Sinus rhythm in the hospital. Lien Lai has been on Suboxone for chronic back pain and has been off for a while and now off, last taken four days ago. Jonathan Hillman to continue off Tova Arvizu was not seen by gastroenterology. Nicole Mojica primary care physician for several years. Michelle Franklin seen by cardiology (last seen 10/22/20 by August Alvarez).    Ventral hernia repair (4/12, Dr. Margot Abdi). Still has some lower abdominal pain, worse after bowel movement or urination.  Metformin induced diarrhea has resolved    MEDICATIONS:  lisinopril (PRINIVIL;ZESTRIL) 20 MG tablet Take 0.5 tablets by mouth daily   apixaban (ELIQUIS) 5 MG TABS tablet Take 1 tablet by mouth 2 times daily   pantoprazole (PROTONIX) 40 MG tablet Take 1 tablet by mouth every morning (before breakfast)   metFORMIN (GLUCOPHAGE) 500 MG tablet Take 1 tablet by mouth 2 times daily (with meals)   dicyclomine (BENTYL) 10 MG capsule Take 1 capsule by mouth 2 times daily With Metformin   Buprenorphine HCl-Naloxone HCl (SUBOXONE SL) Place under the tongue       Lab Results   Component Value Date    LABA1C 8.1 01/23/2021     Lab Results   Component Value Date    WBC 4.8 04/21/2021    HGB 13.7 04/21/2021     04/21/2021    MCV 88.1 04/21/2021     Lab Results   Component Value Date     04/21/2021    K 3.9 04/21/2021    K 3.9 04/21/2021     04/21/2021    CO2 26 04/21/2021    BUN 13 04/21/2021    CREATININE 0.8 04/21/2021    GLUCOSE 105 (H) 04/21/2021       OBJECTIVE: /69   Pulse 56   Temp 98.1 °F (36.7 °C)   Ht 6' (1.829 m)   Wt 183 lb (83 kg)   SpO2 99%   BMI 24.82 kg/m²   HEENT:  Oropharynx clear, no lymphadenopathy,   LUNGS:  Clear and without wheezes  HEART:  Regular rhythm, no appreciable murmur  ABD:  Benign, active bowel sounds  EXT:  No edema, pulses palpable  NEURO:  No focal neurologic deficits noted. ASSESSMENT / PLAN:   1. Type 2 Diabetes:   Continue taking metformin 500 mg twice per with food and dicyclomine (Bentyl) 10 mg capsule with the metformin.  We will check hemoglobin A1c and compare to 8.1% (Jan 2021) with goal less than 7.0%. 2. Paroxysmal atrial fibrillation:   Continue taking apixaban (Eliquis) 5 mg twice per day.    Heart rate is 56 today. 3. Hypertension:  Blood pressure is reasonable today at 137/69 (goal less than 130/80) before medication.  Continue taking lisinopril 10 mg (half 20 mg tablet) every morning.  We will check a urine microalbumin leel. 4. Dyspepsia with subsequent nausea with vomiting:  Continue taking pantoprazole (Protonix) 40 mg tablet every morning before breakfast.    5. Nocturia (frequent urination at night): We will check a PSA (prostate test) today. 6. Medication for Opioid Use Disorder:  Continues on current dose of Suboxone.         Follow-up in six weeks, will call with lab results  LABS:  Hemoglobin A1c, PSA, Urine microalbumin   Screening:  Plan for a colonoscopy screening

## 2021-06-25 NOTE — PATIENT INSTRUCTIONS
1. Type 2 Diabetes:   Continue taking metformin 500 mg twice per with food and dicyclomine (Bentyl) 10 mg capsule with the metformin.  We will check hemoglobin A1c and compare to 8.1% (Jan 2021) with goal less than 7.0%. 2. Paroxysmal atrial fibrillation:   Continue taking apixaban (Eliquis) 5 mg twice per day.    Heart rate is 56 today. 3. Hypertension:  Blood pressure is reasonable today at 137/69 (goal less than 130/80) before medication.  Continue taking lisinopril 10 mg (half 20 mg tablet) every morning.  We will check a urine microalbumin leel. 4. Dyspepsia with subsequent nausea with vomiting:  Continue taking pantoprazole (Protonix) 40 mg tablet every morning before breakfast.    5. Nocturia (frequent urination at night): We will check a PSA (prostate test) today. 6. Medication for Opioid Use Disorder:  Continues on current dose of Suboxone. Follow-up in six weeks, will call with lab results  LABS:  Hemoglobin A1c, PSA, Urine microalbumin   Screening:   We will plan for a colonoscopy screening

## 2021-07-06 RX ORDER — DICYCLOMINE HYDROCHLORIDE 10 MG/1
CAPSULE ORAL
Qty: 60 CAPSULE | Refills: 2 | Status: SHIPPED | OUTPATIENT
Start: 2021-07-06 | End: 2022-01-28 | Stop reason: SDUPTHER

## 2021-07-07 ENCOUNTER — HOSPITAL ENCOUNTER (OUTPATIENT)
Age: 71
Discharge: HOME | End: 2021-07-07
Payer: MEDICARE

## 2021-07-07 DIAGNOSIS — E11.9 TYPE 2 DIABETES MELLITUS WITHOUT COMPLICATION, WITHOUT LONG-TERM CURRENT USE OF INSULIN (HCC): ICD-10-CM

## 2021-07-07 DIAGNOSIS — Z00.00 PHYSICAL EXAM: ICD-10-CM

## 2021-07-07 PROCEDURE — G0103 PSA SCREENING: HCPCS

## 2021-07-07 PROCEDURE — 83036 HEMOGLOBIN GLYCOSYLATED A1C: CPT

## 2021-07-07 PROCEDURE — 36415 COLL VENOUS BLD VENIPUNCTURE: CPT

## 2021-07-07 PROCEDURE — 82570 ASSAY OF URINE CREATININE: CPT

## 2021-07-07 PROCEDURE — 82043 UR ALBUMIN QUANTITATIVE: CPT

## 2021-07-07 PROCEDURE — 84153 ASSAY OF PSA TOTAL: CPT

## 2021-07-08 LAB
CREATININE URINE: 132.4 MG/DL (ref 39–259)
ESTIMATED AVERAGE GLUCOSE: 125.5 MG/DL
HBA1C MFR BLD: 6 %
MICROALBUMIN UR-MCNC: 2.2 MG/DL
MICROALBUMIN/CREAT UR-RTO: 16.6 MG/G (ref 0–30)
PROSTATE SPECIFIC ANTIGEN: 0.35 NG/ML (ref 0–4)

## 2021-08-27 ENCOUNTER — OFFICE VISIT (OUTPATIENT)
Dept: INTERNAL MEDICINE CLINIC | Age: 71
End: 2021-08-27

## 2021-08-27 VITALS
WEIGHT: 181 LBS | HEART RATE: 55 BPM | SYSTOLIC BLOOD PRESSURE: 129 MMHG | DIASTOLIC BLOOD PRESSURE: 65 MMHG | HEIGHT: 72 IN | TEMPERATURE: 97.8 F | OXYGEN SATURATION: 100 % | BODY MASS INDEX: 24.52 KG/M2

## 2021-08-27 DIAGNOSIS — I48.0 PAROXYSMAL ATRIAL FIBRILLATION (HCC): ICD-10-CM

## 2021-08-27 DIAGNOSIS — I10 ESSENTIAL HYPERTENSION: ICD-10-CM

## 2021-08-27 DIAGNOSIS — R10.13 DYSPEPSIA: ICD-10-CM

## 2021-08-27 DIAGNOSIS — E11.9 TYPE 2 DIABETES MELLITUS WITHOUT COMPLICATION, WITHOUT LONG-TERM CURRENT USE OF INSULIN (HCC): Primary | ICD-10-CM

## 2021-08-27 DIAGNOSIS — F11.21 OPIOID USE DISORDER, MODERATE, IN SUSTAINED REMISSION (HCC): ICD-10-CM

## 2021-08-27 PROCEDURE — 99214 OFFICE O/P EST MOD 30 MIN: CPT | Performed by: INTERNAL MEDICINE

## 2021-08-27 NOTE — PATIENT INSTRUCTIONS
1. Type 2 Diabetes:   Continue taking metformin 500 mg twice per with food and dicyclomine (Bentyl) 10 mg capsule with the metformin.   Hemoglobin has decreased from 8.1% (Jan 2021) 6.0% (goal less than 6.5%). 2. Paroxysmal atrial fibrillation:   Continue taking apixaban (Eliquis) 5 mg twice per day.    Heart rate is 55 today. 3. Hypertension:  Blood pressure is reasonable today at 129/65 (goal less than 130/80) before medication.  Continue taking lisinopril 10 mg (half 20 mg tablet) every morning.   To evaluated kidney function with diabetes we check urine microalbumin which was slightly elevated at 2.2 (goal less than 2.0) and a normal ratio of microalbumin to creatinine of 16.6 (goal less than 20). 4. Dyspepsia with subsequent nausea with vomiting:  Continue taking pantoprazole (Protonix) 40 mg tablet every morning before breakfast.    5. Nocturia (frequent urination at night):  PSA (prostate test) is NORMAL at 0.35 ng/mL (normal less than 4.0).     6. Medication for Opioid Use Disorder and back pain:  Continues on current dose of Suboxone as prescribed by pain management.         Follow-up as needed or three months (by the end of the year)  Colon cancer screening recommended with kit Declines

## 2021-08-27 NOTE — PROGRESS NOTES
SUBJECTIVE:   Follow up from 6/25 for diabetes, hypertension, and atrial fibrillation. Morning glucose levels 110-120 per patient. Gastric reflux much better on pantoprazole. Recent chiropractic manipulation for right lower extremity sciatica with some improvement. History of present illness:  Hospitalized 1/23 - 1/25 for three day history of nausea and vomiting.  Etiology of emesis unclear.  Diagnosed with diabetes with hemoglobin A1c 8.1%.  Prescribed metformin at discharge, took one this morning with subsequent dyspepsia.  History of paroxysmal atrial fibrillation for which he has been on Eliquis (prescribed by [facility] cardiology).  Sinus rhythm in the hospital. Carly Kaufman has been on Suboxone for chronic back pain and has been off for a while and now off, last taken four days ago. Kirill Bearden to continue off Alonmike Laguerre was not seen by gastroenterology. Laura Delgado primary care physician for several years. Roxanne Carlos Alberto seen by cardiology (last seen 10/22/20 by Jermaine Stewart).    Ventral hernia repair (4/12, Dr. Melinda Gerard has some lower abdominal pain, worse after bowel movement or urination.  Metformin induced diarrhea has resolved    MEDICATIONS:  dicyclomine (BENTYL) 10 MG capsule TAKE ONE CAPSULE BY MOUTH TWICE DAILY WITH   lisinopril (PRINIVIL;ZESTRIL) 20 MG tablet Take 0.5 tablets by mouth daily   apixaban (ELIQUIS) 5 MG TABS tablet Take 1 tablet by mouth 2 times daily   pantoprazole (PROTONIX) 40 MG tablet Take 1 tablet by mouth every morning (before breakfast)   metFORMIN (GLUCOPHAGE) 500 MG tablet Take 1 tablet by mouth 2 times daily (with meals)   Buprenorphine HCl-Naloxone HCl (SUBOXONE SL) Place under the tongue       Lab Results   Component Value Date    LABA1C 6.0 07/07/2021     Lab Results   Component Value Date    WBC 4.8 04/21/2021    HGB 13.7 04/21/2021     04/21/2021    MCV 88.1 04/21/2021     Lab Results   Component Value Date     04/21/2021    K 3.9 04/21/2021    K 3.9 04/21/2021

## 2021-10-13 NOTE — PROGRESS NOTES
[facility]   Electrophysiology  Note            Date:  October 14, 2021  Patient name: Tiffanie Ramires  YOB: 1950    Primary Care physician: Miriam Peters MD     HISTORY OF PRESENT ILLNESS: Tiffanie Ramires is a 66 y.o. male with a history of lone versus paroxysmal atrial fibrillation, sinus bradycardia, first degree AV block, HTN, DM, and chronic back pain. He was admitted in 12/2015 with chest pressure. Was in AF and was started on Eliquis. Stress test and echo were normal in 12/2015. He has remained in sinus rhythm at subsequent visits. Admitted to taking someone else's diuretic due to swelling in 2018. At his visit in 4/2019, he had stopped Eliquis due to cost and declined Coumadin and event monitor. Today he is being seen for atrial fibrillation. EKG shows sinus jerson with a first degree AV block and HR of 47. He complains of occasional palpitations but denies chest pain, shortness of breath, and dizziness. Feels tired. 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 Past Medical History:   has a past medical history of Abnormal ultrasound of carotid artery, Back pain, Chronic back pain, Diabetes mellitus ([address] DJD (degenerative joint disease), Fibromyalgia, Hypertension, Kidney stones, and Paroxysmal atrial tachycardia ([address] Past Surgical History:   has a past surgical history that includes back surgery; Upper gastrointestinal endoscopy (12/16/2011); Upper gastrointestinal endoscopy (12/16/11); Lithotripsy; other surgical history (04/21/2021); and ventral hernia repair (N/A, 4/21/2021). Home Medications:    Prior to Admission medications    Medication Sig Start Date End Date Taking?  Authorizing Provider   dicyclomine (BENTYL) 10 MG capsule TAKE ONE CAPSULE BY MOUTH TWICE DAILY WITH METFORMIN 7/6/21  Yes Selene Rainey MD   lisinopril (PRINIVIL;ZESTRIL) 20 MG tablet Take 0.5 tablets by mouth daily 5/14/21  Yes Zahra Brennan MD   apixaban (ELIQUIS) 5 MG TABS tablet Take 1 tablet by mouth 2 times daily 5/14/21  Yes Kateryna Hazel MD   pantoprazole (PROTONIX) 40 MG tablet Take 1 tablet by mouth every morning (before breakfast) 5/14/21  Yes Kateryna Hazel MD   metFORMIN (GLUCOPHAGE) 500 MG tablet Take 1 tablet by mouth 2 times daily (with meals) 3/26/21  Yes Kateryna Hazel MD   blood glucose monitor strips 1 strip by Other route once for 1 dose Test one time a day before morning meal 2/2/21 10/14/21 Yes Kateryna Hazel MD   Buprenorphine HCl-Naloxone HCl (SUBOXONE SL) Place under the tongue   Yes Historical Provider, MD   Blood Glucose Monitoring Suppl (D-CARE GLUCOMETER) w/Device KIT 1 kit by Does not apply route every morning (before breakfast) Test blood sugars before any food intake in the morning. Fasting blood sugars 1/26/21  Yes Kateryna Hazel MD   Lancets Thin MISC 1 Units by Does not apply route once for 1 dose Use one lancet to test morning blood sugars 1/26/21 10/14/21 Yes Kateryna Hazel MD   Alcohol Swabs (ALCOHOL PREP) PADS 1 Units by Does not apply route once for 1 dose Use to prep finger before blood sugar testing 1/26/21 10/14/21 Yes Kateryna Hazel MD       Allergies:  Amitriptyline, Citalopram hydrobromide, Naproxen, Other, Paroxetine, Pcn [penicillins], and Penicillins    Social History:   reports that he quit smoking about 12 years ago. His smoking use included cigarettes. He has never used smokeless tobacco. He reports that he does not drink alcohol and does not use drugs. Family History: family history includes Heart Attack (age of onset: 61) in his mother; Heart Attack (age of onset: 80) in his father. Review of Systems   Constitutional: Negative. HENT: Negative. Eyes: Negative. Respiratory: Negative. Cardiovascular: see HPI  Gastrointestinal: Negative. Genitourinary: Negative. Musculoskeletal: + chronic back pain   Skin: Negative. Neurological: Negative. Hematological: Negative. Psychiatric/Behavioral: Negative     Physical Examination:    Vital signs:   /60   Pulse 54   Ht 6' (1.829 m)   Wt 193 lb 8 oz (87.8 kg)   SpO2 99%   BMI 26.24 kg/m²      Constitutional and General Appearance: alert, cooperative, no distress and appears stated age  HEENT: PERRL, no cervical lymphadenopathy. No masses palpable. Normal oral mucosa  Respiratory:  · Normal excursion and expansion without use of accessory muscles  · Resp Auscultation: Normal breath sounds without dullness or wheezing  Cardiovascular:  · The apical impulse is not displaced  · Heart tones are crisp and normal. Regular S1 and S2.  · Jugular venous pulsation Normal  · The carotid upstroke is normal in amplitude and contour without delay or bruit  · Peripheral pulses are symmetrical and full  Abdomen:  · No masses or tenderness  · Bowel sounds present  Extremities:  ·  No cyanosis or clubbing  ·  No lower extremity swelling   · Skin: Warm and dry  Neurological:  · Alert and oriented. · Moves all extremities well  · No abnormalities of mood, affect, memory, mentation, or behavior are noted      DATA:    ECG 10/14/2021:  SB with 1st degree AVB HR 47    ECHO 12/18/15:   Left ventricle size is normal.   There is moderate concentric left ventricular hypertrophy. Ejection fraction is visually estimated to be 55-60 %. No regional wall motion abnormalities are noted. Aortic valve appears mildly sclerotic but opens adequately. There is mild tricuspid regurgitation. Systolic pulmonary artery pressure (SPAP) is normal and estimated at 15 mmHg   (RA pressure 3 mmHg). No obvious masses, thrombi, or vegetations are noted. NM: 12/08/15  IMPRESSION: 1. No pharmcologically induced reversible perfusion defects to suggest ischemia. 2. Ejection fraction of 68%, normal. 3. No focal wall motion abnormality. Assessment:   1.  Symptomatic lone versus paroxysmal atrial fibrillation: stable   -no known recurrence since 12/2015   -[id number] score 3 (age, DM, and HTN)   2. HTN: controlled   3. Lower extremity swelling: chronic, stable  4. Sinus bradycardia: asymptomatic, stable    -patient is not on AV node blocking medications   5. First degree AV block: stable  6. Chronic back pain: on Suboxone  7. History of noncompliance: improved    Plan:   1. Continue Eliquis and lisinopril  2. Annual BMP and CBC (due 4/2022)  3. Use pulse oximeter to monitor heart rate at home and call if consistently less than 40 at rest  4. Follow up in 6 months with MD    Patient has asymptomatic sinus bradycardia that appears to be progressing. Will recommend plain GXT if he develops symptoms. He will monitor HR at home as recommended and follow up in 6 months unless indicated sooner.      MDM: JEWEL Rios-CNP  [facility]  [phone]

## 2021-10-14 ENCOUNTER — OFFICE VISIT (OUTPATIENT)
Dept: CARDIOLOGY CLINIC | Age: 71
End: 2021-10-14
Payer: MEDICARE

## 2021-10-14 VITALS
HEART RATE: 54 BPM | DIASTOLIC BLOOD PRESSURE: 60 MMHG | WEIGHT: 193.5 LBS | SYSTOLIC BLOOD PRESSURE: 102 MMHG | OXYGEN SATURATION: 99 % | BODY MASS INDEX: 26.21 KG/M2 | HEIGHT: 72 IN

## 2021-10-14 DIAGNOSIS — I44.0 FIRST DEGREE ATRIOVENTRICULAR BLOCK: ICD-10-CM

## 2021-10-14 DIAGNOSIS — R00.1 SINUS BRADYCARDIA: ICD-10-CM

## 2021-10-14 DIAGNOSIS — I10 ESSENTIAL HYPERTENSION: ICD-10-CM

## 2021-10-14 DIAGNOSIS — I48.0 PAF (PAROXYSMAL ATRIAL FIBRILLATION) (HCC): Primary | ICD-10-CM

## 2021-10-14 PROCEDURE — 93000 ELECTROCARDIOGRAM COMPLETE: CPT | Performed by: NURSE PRACTITIONER

## 2021-10-14 PROCEDURE — 99214 OFFICE O/P EST MOD 30 MIN: CPT | Performed by: NURSE PRACTITIONER

## 2021-10-14 NOTE — LETTER
Mao He  1950      [facility]   Electrophysiology  Note            Date:  October 14, 2021  Patient name: Mao He  YOB: 1950    Primary Care physician: Lucretia Gamble MD     HISTORY OF PRESENT ILLNESS: Mao He is a 66 y.o. male with a history of lone versus paroxysmal atrial fibrillation, sinus bradycardia, first degree AV block, HTN, DM, and chronic back pain. He was admitted in 12/2015 with chest pressure. Was in AF and was started on Eliquis. Stress test and echo were normal in 12/2015. He has remained in sinus rhythm at subsequent visits. Admitted to taking someone else's diuretic due to swelling in 2018. At his visit in 4/2019, he had stopped Eliquis due to cost and declined Coumadin and event monitor. Today he is being seen for atrial fibrillation. EKG shows sinus jerson with a first degree AV block and HR of 47. He complains of occasional palpitations but denies chest pain, shortness of breath, and dizziness. Feels tired. Works 10 hours per day in [employer]. Past Medical History:   has a past medical history of Abnormal ultrasound of carotid artery, Back pain, Chronic back pain, Diabetes mellitus ([address] DJD (degenerative joint disease), Fibromyalgia, Hypertension, Kidney stones, and Paroxysmal atrial tachycardia ([address] Past Surgical History:   has a past surgical history that includes back surgery; Upper gastrointestinal endoscopy (12/16/2011); Upper gastrointestinal endoscopy (12/16/11); Lithotripsy; other surgical history (04/21/2021); and ventral hernia repair (N/A, 4/21/2021). Home Medications:    Prior to Admission medications    Medication Sig Start Date End Date Taking?  Authorizing Provider   dicyclomine (BENTYL) 10 MG capsule TAKE ONE CAPSULE BY MOUTH TWICE DAILY WITH METFORMIN 7/6/21  Yes Estevan Mcelroy MD   lisinopril (PRINIVIL;ZESTRIL) 20 MG tablet Take 0.5 tablets by mouth daily 5/14/21  Yes Marly Vazquez MD   apixaban (ELIQUIS) 5 MG TABS tablet Take 1 tablet by mouth 2 times daily 5/14/21  Yes Nevin Garcia MD   pantoprazole (PROTONIX) 40 MG tablet Take 1 tablet by mouth every morning (before breakfast) 5/14/21  Yes Nevin Garcia MD   metFORMIN (GLUCOPHAGE) 500 MG tablet Take 1 tablet by mouth 2 times daily (with meals) 3/26/21  Yes Nevin Garcia MD   blood glucose monitor strips 1 strip by Other route once for 1 dose Test one time a day before morning meal 2/2/21 10/14/21 Yes Nevin Garcia MD   Buprenorphine HCl-Naloxone HCl (SUBOXONE SL) Place under the tongue   Yes Historical Provider, MD   Blood Glucose Monitoring Suppl ([employer] GLUCOMETER) w/Device KIT 1 kit by Does not apply route every morning (before breakfast) Test blood sugars before any food intake in the morning. Fasting blood sugars 1/26/21  Yes Nevin Garcia MD   Lancets Thin MISC 1 Units by Does not apply route once for 1 dose Use one lancet to test morning blood sugars 1/26/21 10/14/21 Yes Nevin Garcia MD   Alcohol Swabs (ALCOHOL PREP) PADS 1 Units by Does not apply route once for 1 dose Use to prep finger before blood sugar testing 1/26/21 10/14/21 Yes Nevin Garcia MD       Allergies:  Amitriptyline, Citalopram hydrobromide, Naproxen, Other, Paroxetine, Pcn [penicillins], and Penicillins    Social History:   reports that he quit smoking about 12 years ago. His smoking use included cigarettes. He has never used smokeless tobacco. He reports that he does not drink alcohol and does not use drugs. Family History: family history includes Heart Attack (age of onset: 61) in his mother; Heart Attack (age of onset: 80) in his father. Review of Systems   Constitutional: Negative. HENT: Negative. Eyes: Negative. Respiratory: Negative. Cardiovascular: see HPI  Gastrointestinal: Negative. Genitourinary: Negative. Musculoskeletal: + chronic back pain   Skin: Negative.     Neurological: Negative. Hematological: Negative. Psychiatric/Behavioral: Negative     Physical Examination:    Vital signs:   /60   Pulse 54   Ht 6' (1.829 m)   Wt 193 lb 8 oz (87.8 kg)   SpO2 99%   BMI 26.24 kg/m²      Constitutional and General Appearance: alert, cooperative, no distress and appears stated age  HEENT: PERRL, no cervical lymphadenopathy. No masses palpable. Normal oral mucosa  Respiratory:  · Normal excursion and expansion without use of accessory muscles  · Resp Auscultation: Normal breath sounds without dullness or wheezing  Cardiovascular:  · The apical impulse is not displaced  · Heart tones are crisp and normal. Regular S1 and S2.  · Jugular venous pulsation Normal  · The carotid upstroke is normal in amplitude and contour without delay or bruit  · Peripheral pulses are symmetrical and full  Abdomen:  · No masses or tenderness  · Bowel sounds present  Extremities:  ·  No cyanosis or clubbing  ·  No lower extremity swelling   · Skin: Warm and dry  Neurological:  · Alert and oriented. · Moves all extremities well  · No abnormalities of mood, affect, memory, mentation, or behavior are noted      DATA:    ECG 10/14/2021:  SB with 1st degree AVB HR 47    ECHO 12/18/15:   Left ventricle size is normal.   There is moderate concentric left ventricular hypertrophy. Ejection fraction is visually estimated to be 55-60 %. No regional wall motion abnormalities are noted. Aortic valve appears mildly sclerotic but opens adequately. There is mild tricuspid regurgitation. Systolic pulmonary artery pressure (SPAP) is normal and estimated at 15 mmHg   (RA pressure 3 mmHg). No obvious masses, thrombi, or vegetations are noted. NM: 12/08/15  IMPRESSION: 1. No pharmcologically induced reversible perfusion defects to suggest ischemia. 2. Ejection fraction of 68%, normal. 3. No focal wall motion abnormality. Assessment:   1.  Symptomatic lone versus paroxysmal atrial fibrillation: stable   -no known recurrence since 12/2015   -[id number] score 3 (age, DM, and HTN)   2. HTN: controlled   3. Lower extremity swelling: chronic, stable  4. Sinus bradycardia: asymptomatic, stable    -patient is not on AV node blocking medications   5. First degree AV block: stable  6. Chronic back pain: on Suboxone  7. History of noncompliance: improved    Plan:   1. Continue Eliquis and lisinopril  2. Annual BMP and CBC (due 4/2022)  3. Use pulse oximeter to monitor heart rate at home and call if consistently less than 40 at rest  4. Follow up in 6 months with MD    Patient has asymptomatic sinus bradycardia that appears to be progressing. Will recommend plain GXT if he develops symptoms. He will monitor HR at home as recommended and follow up in 6 months unless indicated sooner.      MDM: JEWEL Ramírez-CNP  [address]  [phone]

## 2022-01-28 ENCOUNTER — OFFICE VISIT (OUTPATIENT)
Dept: INTERNAL MEDICINE CLINIC | Age: 72
End: 2022-01-28

## 2022-01-28 VITALS
SYSTOLIC BLOOD PRESSURE: 126 MMHG | HEART RATE: 80 BPM | OXYGEN SATURATION: 98 % | TEMPERATURE: 96.9 F | DIASTOLIC BLOOD PRESSURE: 77 MMHG | HEIGHT: 72 IN | WEIGHT: 188 LBS | BODY MASS INDEX: 25.47 KG/M2

## 2022-01-28 DIAGNOSIS — R10.13 DYSPEPSIA: ICD-10-CM

## 2022-01-28 DIAGNOSIS — K58.0 IRRITABLE BOWEL SYNDROME WITH DIARRHEA: ICD-10-CM

## 2022-01-28 DIAGNOSIS — I48.0 PAROXYSMAL ATRIAL FIBRILLATION (HCC): ICD-10-CM

## 2022-01-28 DIAGNOSIS — E11.9 TYPE 2 DIABETES MELLITUS WITHOUT COMPLICATION, WITHOUT LONG-TERM CURRENT USE OF INSULIN (HCC): Primary | ICD-10-CM

## 2022-01-28 DIAGNOSIS — F11.21 OPIOID USE DISORDER, MODERATE, IN SUSTAINED REMISSION (HCC): ICD-10-CM

## 2022-01-28 DIAGNOSIS — I10 ESSENTIAL HYPERTENSION: ICD-10-CM

## 2022-01-28 PROCEDURE — 99214 OFFICE O/P EST MOD 30 MIN: CPT | Performed by: INTERNAL MEDICINE

## 2022-01-28 RX ORDER — DICYCLOMINE HYDROCHLORIDE 10 MG/1
CAPSULE ORAL
Qty: 60 CAPSULE | Refills: 5 | Status: ON HOLD | OUTPATIENT
Start: 2022-01-28 | End: 2022-09-17 | Stop reason: SDUPTHER

## 2022-01-28 RX ORDER — PANTOPRAZOLE SODIUM 40 MG/1
40 TABLET, DELAYED RELEASE ORAL
Qty: 30 TABLET | Refills: 5 | Status: SHIPPED | OUTPATIENT
Start: 2022-01-28 | End: 2022-06-15 | Stop reason: SDUPTHER

## 2022-01-28 NOTE — PATIENT INSTRUCTIONS
1. Type 2 Diabetes:  We will check hemoglobin A1c today. Continue taking metformin 500 mg ONCE per day in the morning with dicyclomine (Bentyl) 10 mg capsule with the metformin.     2. Paroxysmal atrial fibrillation:   Continue taking apixaban (Eliquis) 5 mg twice per day.    Heart rate is 80 today.     3. Hypertension:  Blood pressure is reasonable today at 126/77 (goal less than 130/80) before medication.  Continue taking lisinopril 10 mg (half 20 mg tablet) every morning.  Check metabolic panel today. 4. Dyspepsia with subsequent nausea with vomiting:  Continue taking pantoprazole (Protonix) 40 mg tablet every morning before breakfast.    5. Medication for Opioid Use Disorder and back pain:  Continues on current dose of Suboxone as prescribed by pain management.         Follow-up in four months  LABS: CBC, CMP, lipid panel, and hemoglobin A1c

## 2022-01-28 NOTE — PROGRESS NOTES
SUBJECTIVE:   Follow up from 8/27/21 for diabetes, hypertension and atrial fibrillation. Left leg and hip pain. Baseline atrial fibrillation and gastric reflux. Morning glucose levels 110-120 per patient. Gastric reflux much better on pantoprazole. Recent chiropractic manipulation for right lower extremity sciatica with some improvement.      History of present illness:  Hospitalized 1/23 - 1/25 / 2021 for three day history of nausea and vomiting.  Etiology of emesis unclear.  Diagnosed with diabetes with hemoglobin A1c 8.1%.  Prescribed metformin at discharge, took one this morning with subsequent dyspepsia.  History of paroxysmal atrial fibrillation for which he has been on Eliquis (prescribed by [id number] [facility] cardiology).  Sinus rhythm in the hospital. [facility] has been on Suboxone for chronic back pain and has been off for a while and now off, last taken four days ago. Chandana Bras to continue off Julia Orestes was not seen by gastroenterology. Magy Olsen primary care physician for several years. Jocelin Miramontes seen by cardiology (last seen 10/22/20 by Perfecto Reynolds).    Ventral hernia repair (4/12, Dr. Meir Phillips has some lower abdominal pain, worse after bowel movement or urination. Metformin induced diarrhea has resolved    MEDICATIONS:  metFORMIN (GLUCOPHAGE) 500 MG tablet Take tablet by mouth once per day.     dicyclomine (BENTYL) 10 MG capsule Taking 1 capsule with metformin   lisinopril (PRINIVIL;ZESTRIL) 20 MG tablet Take 0.5 tablets by mouth daily   apixaban (ELIQUIS) 5 MG TABS tablet Take 1 tablet by mouth 2 times daily   pantoprazole (PROTONIX) 40 MG tablet Take 1 tablet by mouth every morning (before breakfast)   Buprenorphine HCl-Naloxone HCl (Suboxone) Place under the tongue daily     Hemoglobin A1c (7/7/21) 6.0%      OBJECTIVE:    /77   Pulse 80   Temp 96.9 °F (36.1 °C)   Ht 6' (1.829 m)   Wt 188 lb (85.3 kg)   SpO2 98%   BMI 25.50 kg/m²   HEENT:  Oropharynx clear, no lymphadenopathy,   LUNGS:  Clear and without wheezes  HEART:  Regular rhythm, no appreciable murmur  ABD:  Benign, active bowel sounds  EXT:  No edema, pulses palpable  NEURO:  No focal neurologic deficits noted. ASSESSMENT / PLAN:   1. Type 2 Diabetes:  We will check hemoglobin A1c today. Continue taking metformin 500 mg ONCE per day in the morning with dicyclomine (Bentyl) 10 mg capsule with the metformin.     2. Paroxysmal atrial fibrillation:   Continue taking apixaban (Eliquis) 5 mg twice per day.    Heart rate is 80 today.     3. Hypertension:  Blood pressure is reasonable today at 126/77 (goal less than 130/80) before medication.  Continue taking lisinopril 10 mg (half 20 mg tablet) every morning.  Check metabolic panel today. 4. Dyspepsia with subsequent nausea with vomiting:  Continue taking pantoprazole (Protonix) 40 mg tablet every morning before breakfast.    5. Medication for Opioid Use Disorder and back pain:  Continues on current dose of Suboxone as prescribed by pain management.         Follow-up in four months  LABS: CBC, CMP, lipid panel, and hemoglobin A1c

## 2022-01-28 NOTE — PROGRESS NOTES
Pt states he is having pain and locking in his left hip when he lifts his leg. Otherwise patient thinks he's been feeling very good.

## 2022-02-01 ENCOUNTER — TELEPHONE (OUTPATIENT)
Dept: INTERNAL MEDICINE CLINIC | Age: 72
End: 2022-02-01

## 2022-02-01 NOTE — TELEPHONE ENCOUNTER
Called patient to schedule his 4 month follow up from [address] [place] [address]. KATIE on .

## 2022-06-10 ENCOUNTER — HOSPITAL ENCOUNTER (OUTPATIENT)
Age: 72
Discharge: HOME | End: 2022-06-10
Payer: MEDICARE

## 2022-06-10 DIAGNOSIS — E11.9 TYPE 2 DIABETES MELLITUS WITHOUT COMPLICATION, WITHOUT LONG-TERM CURRENT USE OF INSULIN (HCC): ICD-10-CM

## 2022-06-10 LAB
A/G RATIO: 1.4 (ref 1.1–2.2)
ALBUMIN SERPL-MCNC: 4.2 G/DL (ref 3.4–5)
ALP BLD-CCNC: 112 U/L (ref 40–129)
ALT SERPL-CCNC: 18 U/L (ref 10–40)
ANION GAP SERPL CALCULATED.3IONS-SCNC: 12 MMOL/L (ref 3–16)
AST SERPL-CCNC: 17 U/L (ref 15–37)
BASOPHILS ABSOLUTE: 0.1 K/UL (ref 0–0.2)
BASOPHILS RELATIVE PERCENT: 1.1 %
BILIRUB SERPL-MCNC: 0.4 MG/DL (ref 0–1)
BUN BLDV-MCNC: 17 MG/DL (ref 7–20)
CALCIUM SERPL-MCNC: 9 MG/DL (ref 8.3–10.6)
CHLORIDE BLD-SCNC: 107 MMOL/L (ref 99–110)
CO2: 23 MMOL/L (ref 21–32)
CREAT SERPL-MCNC: 1 MG/DL (ref 0.8–1.3)
EOSINOPHILS ABSOLUTE: 0.1 K/UL (ref 0–0.6)
EOSINOPHILS RELATIVE PERCENT: 2.1 %
GFR AFRICAN AMERICAN: >60
GFR NON-AFRICAN AMERICAN: >60
GLUCOSE BLD-MCNC: 180 MG/DL (ref 70–99)
HCT VFR BLD CALC: 38.1 % (ref 40.5–52.5)
HEMOGLOBIN: 13.4 G/DL (ref 13.5–17.5)
LYMPHOCYTES ABSOLUTE: 1.3 K/UL (ref 1–5.1)
LYMPHOCYTES RELATIVE PERCENT: 24.6 %
MCH RBC QN AUTO: 31.2 PG (ref 26–34)
MCHC RBC AUTO-ENTMCNC: 35.2 G/DL (ref 31–36)
MCV RBC AUTO: 88.5 FL (ref 80–100)
MONOCYTES ABSOLUTE: 0.4 K/UL (ref 0–1.3)
MONOCYTES RELATIVE PERCENT: 8.2 %
NEUTROPHILS ABSOLUTE: 3.4 K/UL (ref 1.7–7.7)
NEUTROPHILS RELATIVE PERCENT: 64 %
PDW BLD-RTO: 13.7 % (ref 12.4–15.4)
PLATELET # BLD: 206 K/UL (ref 135–450)
PMV BLD AUTO: 7.8 FL (ref 5–10.5)
POTASSIUM SERPL-SCNC: 4.8 MMOL/L (ref 3.5–5.1)
RBC # BLD: 4.3 M/UL (ref 4.2–5.9)
SODIUM BLD-SCNC: 142 MMOL/L (ref 136–145)
TOTAL PROTEIN: 7.3 G/DL (ref 6.4–8.2)
WBC # BLD: 5.4 K/UL (ref 4–11)

## 2022-06-10 PROCEDURE — 80053 COMPREHEN METABOLIC PANEL: CPT

## 2022-06-10 PROCEDURE — 85025 COMPLETE CBC W/AUTO DIFF WBC: CPT

## 2022-06-10 PROCEDURE — 83036 HEMOGLOBIN GLYCOSYLATED A1C: CPT

## 2022-06-10 PROCEDURE — 36415 COLL VENOUS BLD VENIPUNCTURE: CPT

## 2022-06-11 LAB
ESTIMATED AVERAGE GLUCOSE: 111.2 MG/DL
HBA1C MFR BLD: 5.5 %

## 2022-06-15 ENCOUNTER — OFFICE VISIT (OUTPATIENT)
Dept: INTERNAL MEDICINE CLINIC | Age: 72
End: 2022-06-15

## 2022-06-15 VITALS
HEIGHT: 72 IN | BODY MASS INDEX: 24.65 KG/M2 | HEART RATE: 50 BPM | TEMPERATURE: 97.5 F | DIASTOLIC BLOOD PRESSURE: 62 MMHG | WEIGHT: 182 LBS | OXYGEN SATURATION: 99 % | SYSTOLIC BLOOD PRESSURE: 130 MMHG

## 2022-06-15 DIAGNOSIS — I10 ESSENTIAL HYPERTENSION: ICD-10-CM

## 2022-06-15 DIAGNOSIS — K21.9 GASTRIC REFLUX: ICD-10-CM

## 2022-06-15 DIAGNOSIS — E11.9 TYPE 2 DIABETES MELLITUS WITHOUT COMPLICATION, WITHOUT LONG-TERM CURRENT USE OF INSULIN (HCC): Primary | ICD-10-CM

## 2022-06-15 DIAGNOSIS — I48.0 PAROXYSMAL ATRIAL FIBRILLATION (HCC): ICD-10-CM

## 2022-06-15 DIAGNOSIS — F11.21 OPIOID USE DISORDER, MODERATE, IN SUSTAINED REMISSION (HCC): ICD-10-CM

## 2022-06-15 PROCEDURE — 3044F HG A1C LEVEL LT 7.0%: CPT | Performed by: INTERNAL MEDICINE

## 2022-06-15 PROCEDURE — 99214 OFFICE O/P EST MOD 30 MIN: CPT | Performed by: INTERNAL MEDICINE

## 2022-06-15 PROCEDURE — 1123F ACP DISCUSS/DSCN MKR DOCD: CPT | Performed by: INTERNAL MEDICINE

## 2022-06-15 RX ORDER — PANTOPRAZOLE SODIUM 40 MG/1
40 TABLET, DELAYED RELEASE ORAL
Qty: 30 TABLET | Refills: 5 | Status: SHIPPED | OUTPATIENT
Start: 2022-06-15

## 2022-06-15 RX ORDER — DICYCLOMINE HYDROCHLORIDE 10 MG/1
CAPSULE ORAL
Qty: 60 CAPSULE | Refills: 5 | Status: CANCELLED | OUTPATIENT
Start: 2022-06-15

## 2022-06-15 NOTE — PATIENT INSTRUCTIONS
1. Type 2 Diabetes:  Hemoglobin A1c is 5.5% (goal less than 6.5%). You can STOP taking metformin and dicyclomine. Check morning (fasting) glucose levels with goal of less than 140.    2. Paroxysmal atrial fibrillation:   Continue taking apixaban (Eliquis) 5 mg twice per day.    Heart rate is 50 today.     3. Hypertension:  Blood pressure is reasonable today at 130/62 (goal less than 130/80) before medication.  Continue taking lisinopril 10 mg (half 20 mg tablet) every morning.  Check metabolic panel today.    4. Dyspepsia with subsequent nausea with vomiting:  Continue taking pantoprazole (Protonix) 40 mg tablet every morning before breakfast.    5. Medication for Opioid Use Disorder and back pain:  Continues on current dose of Suboxone as prescribed by pain management.        Follow-up in three months

## 2022-06-15 NOTE — PROGRESS NOTES
SUBJECTIVE:  Follow up from 1/28 for diabetes, hypertension and atrial fibrillation. Baseline atrial fibrillation and gastric reflux.  Morning glucose levels 110-120 per patient.   Gastric reflux much better on pantoprazole.      History of present illness:  Hospitalized 1/23 - 1/25 / 2021 for three day history of nausea and vomiting.  Etiology of emesis unclear.  Diagnosed with diabetes with hemoglobin A1c 8.1%.  Prescribed metformin at discharge, took one this morning with subsequent dyspepsia.  History of paroxysmal atrial fibrillation for which he has been on Eliquis (prescribed by [facility]).  Sinus rhythm in the hospital. Morelia Iglesias has been on Suboxone for chronic back pain and has been off for a while and now off, last taken four days ago. Greg  to continue off Vinay Cola was not seen by gastroenterology. Carlos Martinez primary care physician for several years. Connie Odell seen by cardiology (last seen 10/22/20 by Ed Galan).    Ventral hernia repair (4/12, Dr. Gabbi Dotyer has some lower abdominal pain, worse after bowel movement or urination.  Metformin induced diarrhea has resolved      MEDICATIONS:  pantoprazole (PROTONIX) 40 MG tablet Take 1 tablet by mouth every morning (before breakfast)   dicyclomine (BENTYL) 10 MG capsule TAKE ONE CAPSULE BY MOUTH TWICE DAILY WITH METFORMIN   lisinopril (PRINIVIL;ZESTRIL) 20 MG tablet Take 0.5 tablets by mouth daily   Buprenorphine HCl-Naloxone HCl (SUBOXONE SL) Place under the tongue   apixaban (ELIQUIS) 5 MG TABS tablet Take 1 tablet by mouth 2 times daily   metFORMIN (GLUCOPHAGE) 500 MG tablet TAKE 1 TABLET BY MOUTH TWICE DAILY WITH MEALS (Patient not taking: Reported on 6/15/2022)       Hemoglobin A1c (7/7/21) 6.0%), (6/10/22) 5.5%     LABS: 06/10/2022  Sodium 142    Potassium 4.8    Chloride 107    CO2 23    Anion Gap 12    Glucose 180 (H)    BUN 17    CREATININE 1.0    eGFR >60      Calcium 9.0    Total Protein 7.3    Albumin 4.2    Total Bilirubin 0.4 Alkaline Phosphatase 112    ALT 18    AST 17      WBC 5.4    Hemoglobin 13.4 (L)   MCV 88.5    Platelets 335          OBJECTIVE:    /62   Pulse 50   Temp 97.5 °F (36.4 °C)   Ht 6' (1.829 m)   Wt 182 lb (82.6 kg)   SpO2 99%   BMI 24.68 kg/m²   HEENT:  Oropharynx clear, no lymphadenopathy,   LUNGS:  Clear and without wheezes  HEART:  Regular rhythm, no appreciable murmur  ABD:  Benign, active bowel sounds  EXT:  No edema, pulses palpable  NEURO:  No focal neurologic deficits noted. ASSESSMENT / PLAN:   1. Type 2 Diabetes:  Hemoglobin A1c is 5.5% (goal less than 6.5%). You can STOP taking metformin and dicyclomine. Check morning (fasting) glucose levels with goal of less than 140.    2. Paroxysmal atrial fibrillation:   Continue taking apixaban (Eliquis) 5 mg twice per day.    Heart rate is 50 today.     3. Hypertension:  Blood pressure is reasonable today at 130/62 (goal less than 130/80) before medication.  Continue taking lisinopril 10 mg (half 20 mg tablet) every morning.  Check metabolic panel today.    4. Dyspepsia with subsequent nausea with vomiting:  Continue taking pantoprazole (Protonix) 40 mg tablet every morning before breakfast.    5. Medication for Opioid Use Disorder and back pain:  Continues on current dose of Suboxone as prescribed by pain management.        Follow-up in three months

## 2022-07-13 ENCOUNTER — TELEPHONE (OUTPATIENT)
Dept: INTERNAL MEDICINE CLINIC | Age: 72
End: 2022-07-13

## 2022-07-13 RX ORDER — LISINOPRIL 20 MG/1
10 TABLET ORAL DAILY
Qty: 30 TABLET | Refills: 5 | Status: CANCELLED | OUTPATIENT
Start: 2022-07-13

## 2022-09-14 ENCOUNTER — APPOINTMENT (OUTPATIENT)
Dept: CT IMAGING | Age: 72
DRG: 897 | End: 2022-09-14
Payer: MEDICARE

## 2022-09-14 ENCOUNTER — HOSPITAL ENCOUNTER (INPATIENT)
Age: 72
LOS: 3 days | Discharge: HOME | DRG: 897 | End: 2022-09-17
Attending: STUDENT IN AN ORGANIZED HEALTH CARE EDUCATION/TRAINING PROGRAM | Admitting: INTERNAL MEDICINE
Payer: MEDICARE

## 2022-09-14 ENCOUNTER — APPOINTMENT (OUTPATIENT)
Dept: GENERAL RADIOLOGY | Age: 72
DRG: 897 | End: 2022-09-14
Payer: MEDICARE

## 2022-09-14 DIAGNOSIS — N17.9 AKI (ACUTE KIDNEY INJURY) (HCC): ICD-10-CM

## 2022-09-14 DIAGNOSIS — E87.20 LACTIC ACIDOSIS: ICD-10-CM

## 2022-09-14 DIAGNOSIS — R65.10 SIRS (SYSTEMIC INFLAMMATORY RESPONSE SYNDROME) (HCC): ICD-10-CM

## 2022-09-14 DIAGNOSIS — R11.2 INTRACTABLE VOMITING WITH NAUSEA, UNSPECIFIED VOMITING TYPE: Primary | ICD-10-CM

## 2022-09-14 LAB
A/G RATIO: 1.4 (ref 1.1–2.2)
ALBUMIN SERPL-MCNC: 5.1 G/DL (ref 3.4–5)
ALP BLD-CCNC: 116 U/L (ref 40–129)
ALT SERPL-CCNC: 42 U/L (ref 10–40)
ANION GAP SERPL CALCULATED.3IONS-SCNC: 21 MMOL/L (ref 3–16)
AST SERPL-CCNC: 113 U/L (ref 15–37)
BACTERIA: ABNORMAL /HPF
BASE EXCESS VENOUS: 2.3 MMOL/L (ref -3–3)
BASOPHILS ABSOLUTE: 0 K/UL (ref 0–0.2)
BASOPHILS RELATIVE PERCENT: 0.1 %
BILIRUB SERPL-MCNC: 2.8 MG/DL (ref 0–1)
BILIRUBIN URINE: NEGATIVE
BLOOD, URINE: ABNORMAL
BUN BLDV-MCNC: 44 MG/DL (ref 7–20)
CALCIUM SERPL-MCNC: 10 MG/DL (ref 8.3–10.6)
CARBOXYHEMOGLOBIN: 2.3 % (ref 0–1.5)
CHLORIDE BLD-SCNC: 87 MMOL/L (ref 99–110)
CLARITY: CLEAR
CO2: 23 MMOL/L (ref 21–32)
COLOR: YELLOW
CREAT SERPL-MCNC: 1.4 MG/DL (ref 0.8–1.3)
EKG ATRIAL RATE: 81 BPM
EKG DIAGNOSIS: NORMAL
EKG Q-T INTERVAL: 426 MS
EKG QRS DURATION: 96 MS
EKG QTC CALCULATION (BAZETT): 509 MS
EKG R AXIS: -53 DEGREES
EKG T AXIS: 66 DEGREES
EKG VENTRICULAR RATE: 86 BPM
EOSINOPHILS ABSOLUTE: 0 K/UL (ref 0–0.6)
EOSINOPHILS RELATIVE PERCENT: 0 %
GFR AFRICAN AMERICAN: 60
GFR NON-AFRICAN AMERICAN: 50
GLUCOSE BLD-MCNC: 136 MG/DL (ref 70–99)
GLUCOSE URINE: NEGATIVE MG/DL
HCO3 VENOUS: 22.1 MMOL/L (ref 23–29)
HCT VFR BLD CALC: 44.5 % (ref 40.5–52.5)
HEMOGLOBIN: 15.7 G/DL (ref 13.5–17.5)
INFLUENZA A: NOT DETECTED
INFLUENZA B: NOT DETECTED
KETONES, URINE: 15 MG/DL
LACTIC ACID, SEPSIS: 3.2 MMOL/L (ref 0.4–1.9)
LACTIC ACID, SEPSIS: 4 MMOL/L (ref 0.4–1.9)
LEUKOCYTE ESTERASE, URINE: NEGATIVE
LIPASE: 22 U/L (ref 13–60)
LYMPHOCYTES ABSOLUTE: 1.9 K/UL (ref 1–5.1)
LYMPHOCYTES RELATIVE PERCENT: 13.8 %
MAGNESIUM: 1.6 MG/DL (ref 1.8–2.4)
MCH RBC QN AUTO: 29.8 PG (ref 26–34)
MCHC RBC AUTO-ENTMCNC: 35.2 G/DL (ref 31–36)
MCV RBC AUTO: 84.6 FL (ref 80–100)
METHEMOGLOBIN VENOUS: 0.3 %
MICROSCOPIC EXAMINATION: YES
MONOCYTES ABSOLUTE: 1 K/UL (ref 0–1.3)
MONOCYTES RELATIVE PERCENT: 7.6 %
NEUTROPHILS ABSOLUTE: 10.7 K/UL (ref 1.7–7.7)
NEUTROPHILS RELATIVE PERCENT: 78.5 %
NITRITE, URINE: NEGATIVE
O2 CONTENT, VEN: 16 VOL %
O2 SAT, VEN: 76 %
O2 THERAPY: ABNORMAL
PCO2, VEN: 23.9 MMHG (ref 40–50)
PDW BLD-RTO: 12.8 % (ref 12.4–15.4)
PH UA: 6.5 (ref 5–8)
PH VENOUS: 7.58 (ref 7.35–7.45)
PLATELET # BLD: 245 K/UL (ref 135–450)
PMV BLD AUTO: 8.1 FL (ref 5–10.5)
PO2, VEN: 33.2 MMHG (ref 25–40)
POTASSIUM REFLEX MAGNESIUM: 3.6 MMOL/L (ref 3.5–5.1)
PROTEIN UA: 30 MG/DL
RBC # BLD: 5.26 M/UL (ref 4.2–5.9)
RBC UA: ABNORMAL /HPF (ref 0–4)
SARS-COV-2 RNA, RT PCR: NOT DETECTED
SODIUM BLD-SCNC: 131 MMOL/L (ref 136–145)
SPECIFIC GRAVITY UA: 1.01 (ref 1–1.03)
TCO2 CALC VENOUS: 23 MMOL/L
TOTAL PROTEIN: 8.7 G/DL (ref 6.4–8.2)
TROPONIN: <0.01 NG/ML
URINE REFLEX TO CULTURE: ABNORMAL
URINE TYPE: ABNORMAL
UROBILINOGEN, URINE: 1 E.U./DL
WBC # BLD: 13.6 K/UL (ref 4–11)
WBC UA: ABNORMAL /HPF (ref 0–5)

## 2022-09-14 PROCEDURE — 80053 COMPREHEN METABOLIC PANEL: CPT

## 2022-09-14 PROCEDURE — 6360000002 HC RX W HCPCS: Performed by: PHYSICIAN ASSISTANT

## 2022-09-14 PROCEDURE — 2580000003 HC RX 258: Performed by: INTERNAL MEDICINE

## 2022-09-14 PROCEDURE — 6360000002 HC RX W HCPCS: Performed by: STUDENT IN AN ORGANIZED HEALTH CARE EDUCATION/TRAINING PROGRAM

## 2022-09-14 PROCEDURE — 93005 ELECTROCARDIOGRAM TRACING: CPT | Performed by: STUDENT IN AN ORGANIZED HEALTH CARE EDUCATION/TRAINING PROGRAM

## 2022-09-14 PROCEDURE — 83735 ASSAY OF MAGNESIUM: CPT

## 2022-09-14 PROCEDURE — 83690 ASSAY OF LIPASE: CPT

## 2022-09-14 PROCEDURE — 96375 TX/PRO/DX INJ NEW DRUG ADDON: CPT

## 2022-09-14 PROCEDURE — 6360000002 HC RX W HCPCS: Performed by: INTERNAL MEDICINE

## 2022-09-14 PROCEDURE — 82803 BLOOD GASES ANY COMBINATION: CPT

## 2022-09-14 PROCEDURE — 6360000004 HC RX CONTRAST MEDICATION: Performed by: PHYSICIAN ASSISTANT

## 2022-09-14 PROCEDURE — 99285 EMERGENCY DEPT VISIT HI MDM: CPT

## 2022-09-14 PROCEDURE — 87040 BLOOD CULTURE FOR BACTERIA: CPT

## 2022-09-14 PROCEDURE — 85025 COMPLETE CBC W/AUTO DIFF WBC: CPT

## 2022-09-14 PROCEDURE — 81001 URINALYSIS AUTO W/SCOPE: CPT

## 2022-09-14 PROCEDURE — 83605 ASSAY OF LACTIC ACID: CPT

## 2022-09-14 PROCEDURE — 2580000003 HC RX 258: Performed by: STUDENT IN AN ORGANIZED HEALTH CARE EDUCATION/TRAINING PROGRAM

## 2022-09-14 PROCEDURE — 96366 THER/PROPH/DIAG IV INF ADDON: CPT

## 2022-09-14 PROCEDURE — 1200000000 HC SEMI PRIVATE

## 2022-09-14 PROCEDURE — 84484 ASSAY OF TROPONIN QUANT: CPT

## 2022-09-14 PROCEDURE — 87636 SARSCOV2 & INF A&B AMP PRB: CPT

## 2022-09-14 PROCEDURE — 74177 CT ABD & PELVIS W/CONTRAST: CPT

## 2022-09-14 PROCEDURE — 96365 THER/PROPH/DIAG IV INF INIT: CPT

## 2022-09-14 PROCEDURE — 71045 X-RAY EXAM CHEST 1 VIEW: CPT

## 2022-09-14 PROCEDURE — 83036 HEMOGLOBIN GLYCOSYLATED A1C: CPT

## 2022-09-14 PROCEDURE — 36415 COLL VENOUS BLD VENIPUNCTURE: CPT

## 2022-09-14 PROCEDURE — 2580000003 HC RX 258: Performed by: PHYSICIAN ASSISTANT

## 2022-09-14 RX ORDER — SODIUM CHLORIDE 9 MG/ML
INJECTION, SOLUTION INTRAVENOUS PRN
Status: DISCONTINUED | OUTPATIENT
Start: 2022-09-14 | End: 2022-09-17 | Stop reason: HOSPADM

## 2022-09-14 RX ORDER — ONDANSETRON 2 MG/ML
4 INJECTION INTRAMUSCULAR; INTRAVENOUS ONCE
Status: COMPLETED | OUTPATIENT
Start: 2022-09-14 | End: 2022-09-14

## 2022-09-14 RX ORDER — POTASSIUM CHLORIDE 7.45 MG/ML
10 INJECTION INTRAVENOUS PRN
Status: DISCONTINUED | OUTPATIENT
Start: 2022-09-14 | End: 2022-09-17 | Stop reason: HOSPADM

## 2022-09-14 RX ORDER — DEXTROSE MONOHYDRATE 100 MG/ML
INJECTION, SOLUTION INTRAVENOUS CONTINUOUS PRN
Status: DISCONTINUED | OUTPATIENT
Start: 2022-09-14 | End: 2022-09-17 | Stop reason: HOSPADM

## 2022-09-14 RX ORDER — ACETAMINOPHEN 650 MG/1
650 SUPPOSITORY RECTAL EVERY 6 HOURS PRN
Status: DISCONTINUED | OUTPATIENT
Start: 2022-09-14 | End: 2022-09-17 | Stop reason: HOSPADM

## 2022-09-14 RX ORDER — SODIUM CHLORIDE 9 MG/ML
INJECTION, SOLUTION INTRAVENOUS CONTINUOUS
Status: DISCONTINUED | OUTPATIENT
Start: 2022-09-14 | End: 2022-09-16 | Stop reason: SDUPTHER

## 2022-09-14 RX ORDER — 0.9 % SODIUM CHLORIDE 0.9 %
1000 INTRAVENOUS SOLUTION INTRAVENOUS ONCE
Status: COMPLETED | OUTPATIENT
Start: 2022-09-14 | End: 2022-09-14

## 2022-09-14 RX ORDER — MAGNESIUM SULFATE 1 G/100ML
1000 INJECTION INTRAVENOUS PRN
Status: DISCONTINUED | OUTPATIENT
Start: 2022-09-14 | End: 2022-09-17 | Stop reason: HOSPADM

## 2022-09-14 RX ORDER — PROCHLORPERAZINE EDISYLATE 5 MG/ML
10 INJECTION INTRAMUSCULAR; INTRAVENOUS EVERY 6 HOURS PRN
Status: DISCONTINUED | OUTPATIENT
Start: 2022-09-14 | End: 2022-09-17 | Stop reason: HOSPADM

## 2022-09-14 RX ORDER — SODIUM CHLORIDE, SODIUM LACTATE, POTASSIUM CHLORIDE, AND CALCIUM CHLORIDE .6; .31; .03; .02 G/100ML; G/100ML; G/100ML; G/100ML
1721 INJECTION, SOLUTION INTRAVENOUS ONCE
Status: COMPLETED | OUTPATIENT
Start: 2022-09-14 | End: 2022-09-14

## 2022-09-14 RX ORDER — INSULIN LISPRO 100 [IU]/ML
0-4 INJECTION, SOLUTION INTRAVENOUS; SUBCUTANEOUS EVERY 4 HOURS
Status: DISCONTINUED | OUTPATIENT
Start: 2022-09-15 | End: 2022-09-17 | Stop reason: HOSPADM

## 2022-09-14 RX ORDER — SODIUM CHLORIDE 0.9 % (FLUSH) 0.9 %
5-40 SYRINGE (ML) INJECTION PRN
Status: DISCONTINUED | OUTPATIENT
Start: 2022-09-14 | End: 2022-09-17 | Stop reason: HOSPADM

## 2022-09-14 RX ORDER — MORPHINE SULFATE 2 MG/ML
2 INJECTION, SOLUTION INTRAMUSCULAR; INTRAVENOUS
Status: DISCONTINUED | OUTPATIENT
Start: 2022-09-14 | End: 2022-09-15

## 2022-09-14 RX ORDER — PANTOPRAZOLE SODIUM 40 MG/10ML
40 INJECTION, POWDER, LYOPHILIZED, FOR SOLUTION INTRAVENOUS DAILY
Status: DISCONTINUED | OUTPATIENT
Start: 2022-09-15 | End: 2022-09-17

## 2022-09-14 RX ORDER — METOCLOPRAMIDE HYDROCHLORIDE 5 MG/ML
5 INJECTION INTRAMUSCULAR; INTRAVENOUS ONCE
Status: COMPLETED | OUTPATIENT
Start: 2022-09-14 | End: 2022-09-14

## 2022-09-14 RX ORDER — SODIUM CHLORIDE 0.9 % (FLUSH) 0.9 %
5-40 SYRINGE (ML) INJECTION EVERY 12 HOURS SCHEDULED
Status: DISCONTINUED | OUTPATIENT
Start: 2022-09-14 | End: 2022-09-17 | Stop reason: HOSPADM

## 2022-09-14 RX ORDER — ACETAMINOPHEN 325 MG/1
650 TABLET ORAL EVERY 6 HOURS PRN
Status: DISCONTINUED | OUTPATIENT
Start: 2022-09-14 | End: 2022-09-17 | Stop reason: HOSPADM

## 2022-09-14 RX ORDER — POLYETHYLENE GLYCOL 3350 17 G/17G
17 POWDER, FOR SOLUTION ORAL DAILY PRN
Status: DISCONTINUED | OUTPATIENT
Start: 2022-09-14 | End: 2022-09-17 | Stop reason: HOSPADM

## 2022-09-14 RX ADMIN — SODIUM CHLORIDE, POTASSIUM CHLORIDE, SODIUM LACTATE AND CALCIUM CHLORIDE 1721 ML: 600; 310; 30; 20 INJECTION, SOLUTION INTRAVENOUS at 17:23

## 2022-09-14 RX ADMIN — SODIUM CHLORIDE 1000 ML: 9 INJECTION, SOLUTION INTRAVENOUS at 16:30

## 2022-09-14 RX ADMIN — IOPAMIDOL 75 ML: 755 INJECTION, SOLUTION INTRAVENOUS at 17:47

## 2022-09-14 RX ADMIN — MEROPENEM 1000 MG: 1 INJECTION, POWDER, FOR SOLUTION INTRAVENOUS at 17:22

## 2022-09-14 RX ADMIN — SODIUM CHLORIDE: 9 INJECTION, SOLUTION INTRAVENOUS at 23:27

## 2022-09-14 RX ADMIN — METOCLOPRAMIDE HYDROCHLORIDE 5 MG: 5 INJECTION INTRAMUSCULAR; INTRAVENOUS at 18:59

## 2022-09-14 RX ADMIN — PROCHLORPERAZINE EDISYLATE 10 MG: 5 INJECTION INTRAMUSCULAR; INTRAVENOUS at 23:24

## 2022-09-14 RX ADMIN — Medication 10 ML: at 23:31

## 2022-09-14 RX ADMIN — ONDANSETRON HYDROCHLORIDE 4 MG: 2 INJECTION, SOLUTION INTRAMUSCULAR; INTRAVENOUS at 16:31

## 2022-09-14 ASSESSMENT — ENCOUNTER SYMPTOMS
ABDOMINAL PAIN: 1
VOMITING: 1
COUGH: 1
SHORTNESS OF BREATH: 1
NAUSEA: 1
DIARRHEA: 0

## 2022-09-14 ASSESSMENT — PAIN - FUNCTIONAL ASSESSMENT: PAIN_FUNCTIONAL_ASSESSMENT: NONE - DENIES PAIN

## 2022-09-14 NOTE — ED NOTES
1927 perfect serve sent to Dr. Gregory Mo completed with call back from DR. Whitmore speaking with Hannah Andersen  09/14/22 1954

## 2022-09-14 NOTE — ED PROVIDER NOTES
I independently examined and evaluated Magda Sahu. I personally saw the patient and performed a substantive portion of the visit including all aspects of the medical decision making. I am the primary physician of record. In brief, Magda Sahu is a 74 y.o. male with a past medical history of intractable nausea and vomiting, fibromyalgia, duodenal ulcer, paroxysmal atrial fibrillation on Eliquis, hypertension, diabetes, who presents to the ED complaining of abdominal pain and vomiting. Patient reports abdominal pain for 2 days, lower quadrants, aching/cramping, moderate to severe. No palliative or provocative factors. He reports multiple episodes of nonbloody nonbilious emesis. He denies diarrhea or constipation but states he has not had a bowel movement in 3 to 4 days which is atypical for him. He denies dysuria, fever. REVIEW OF SYSTEMS  All systems reviewed, pertinent positives per HPI otherwise noted to be negative. Focused exam revealed   PHYSICAL EXAM  BP (!) 178/94   Pulse 71   Temp 98.4 °F (36.9 °C) (Oral)   Resp 19   Ht 6' (1.829 m)   Wt 200 lb (90.7 kg)   SpO2 100%   BMI 27.12 kg/m²    GENERAL APPEARANCE: Awake and alert. Cooperative. HENT: Normocephalic. Atraumatic. Mucous membranes are moist  NECK: Supple. Full range of motion of the neck without stiffness or pain. EYES: PERRL. EOM's grossly intact. HEART/CHEST: RRR. No murmurs. Chest wall is not tender to palpation. LUNGS: Respirations unlabored. CTAB. Good air exchange. Speaking comfortably in full sentences. ABDOMEN: Diffuse tenderness. Soft. Non-distended. No masses. No organomegaly. No guarding or rebound. MUSCULOSKELETAL: No extremity edema. Compartments soft. No deformity. No tenderness in the extremities. All extremities neurovascularly intact. SKIN: Warm and dry. No acute rashes. NEUROLOGICAL: Alert and oriented. No gross facial drooping. Strength 5/5, sensation intact.    PSYCHIATRIC: Normal mood and affect. ED course / MDM:   Overall patient presenting for abdominal pain and vomiting. Physical exam remarkable for diffuse abdominal tenderness. I personally saw the patient and performed a substantive portion of the visit including all aspects of the medical decision making. Differential diagnosis includes but is not limited to: Pancreatitis, DKA, cholecystitis, small bowel obstruction, colitis, gastroenteritis, hepatitis      Workup showed:    Imaging:  CT ABDOMEN PELVIS W IV CONTRAST Additional Contrast? None   Final Result   1. No acute findings in the abdomen or pelvis. 2. Punctate bilateral intrarenal calculi. 3. Small hiatal hernia. 4. Hepatic and bilateral renal cysts. XR CHEST PORTABLE   Final Result   No radiographic evidence of an acute cardiopulmonary process. ECG:  The Ekg interpreted by me shows  junctional rhythm with a rate of 86  Axis is   Left axis deviation  QTc is  prolonged to 509  Intervals and Durations are unremarkable. ST Segments: nonspecific changes  change from prior EKG dated 10/14/21, patient now is having PVCs       ED Course as of 09/14/22 2003   Wed Sep 14, 2022   1644 Is meeting sepsis criteria due to leukocytosis to 13.6 and tachypnea to 22. Patient's lactate significantly elevated at 4. Patient given larger fluid bolus. Suspect abdominal source given patient reporting abdominal pain and vomiting. He has an allergy to penicillins, he is not sure exactly how severe this allergy is but states that in the past he has passed out related to it. He has never received cephalosporins in our system. Will proceed with meropenem. [ER]   1707 Leukocytosis 13.6. No anemia or thrombocytopenia.  [ER]   1707 Blood gas shows alkalemia to 7.58, no hypercarbia [ER]   1707 Lipase within normal limits, low suspicion for pancreatitis [ER]   1708 Troponin within normal limits [ER]   1708 Hyponatremia 131, hypochloremia to 87, elevated anion gap to 21.  Only mildly elevated glucose at 136. Evidence of acute kidney injury with creatinine of 1.4. [ER]   1708 New transaminitis with AST of 113, ALT of 42 and bilirubin of 2.8. [ER]   1708 Chest x-ray shows no evidence of pneumonia, pneumothorax, pleural effusion, pulmonary [ER]   1739 COVID and flu swab negative [ER]   1740 Urinalysis shows moderate blood, no evidence of infection [ER]   1846 CT abd/pelvis: IMPRESSION:  1. No acute findings in the abdomen or pelvis. 2. Punctate bilateral intrarenal calculi. 3. Small hiatal hernia. 4. Hepatic and bilateral renal cysts. [ER]      ED Course User Index  [ER] Betty Brar MD     SEP-1 CORE MEASURE DATA      Sepsis Criteria   Severe Sepsis Criteria   Septic Shock Criteria     Must be confirmed or suspected to move forward with diagnosis of sepsis. Must meet 2:    [] Temperature > 100.9 F (38.3 C)        or < 96.8 F (36 C)  [] HR > 90  [x] RR > 20  [x] WBC > 12 or < 4 or 10% bands    AND:    [x] Infection Confirmed or        Suspected. OR:    [] Exclude from SEP-1 because:    [] No infection present or suspected  [] Does not have 2+ SIRS criteria but may have an incidental infection that requires treatment  [] May have sepsis, but does not meet criteria for severe sepsis or septic shock  [] Alternative explanation for abnormal labs and/or vitals (see MDM)  [] Viral etiology found or highly suspected (including COVID-19) without concomitant bacterial infection   Must meet 1:    [x] Lactate > 2       or   [] Signs of Organ Dysfunction:    - SBP < 90 or MAP < 65  - Altered mental status  - Creatinine > 2 or increased from      baseline  - Urine Output < 0.5 ml/kg/hr  - Bilirubin > 2  - INR > 1.5 (not anticoagulated)  - Platelets < 498,482  - Acute Respiratory Failure as     evidenced by new need for NIPPV     or mechanical ventilation        [] No criteria met for Severe Sepsis.    Must meet 1:    [] Lactate > 4        or   [] SBP < 90 or MAP < 65 for at least two readings in the first        hour after fluid bolus        administration      [] Vasopressors initiated (if hypotension persists after fluid resuscitation)                [x] No criteria met for Septic Shock. Patient Vitals for the past 6 hrs:   BP Temp Pulse Resp SpO2 Height Weight Weight Method Percent Weight Change   09/14/22 1511 (!) 150/77 -- 75 -- 99 % -- -- -- --   09/14/22 1515 (!) 150/77 98.4 °F (36.9 °C) 88 22 94 % 6' (1.829 m) 200 lb (90.7 kg) Stated 0   09/14/22 1618 (!) 178/94 -- 71 19 100 % -- -- -- --   09/14/22 1647 (!) 148/75 -- 70 21 98 % -- -- -- --   09/14/22 1702 138/88 -- 69 23 91 % -- -- -- --   09/14/22 1717 (!) 154/95 -- 73 23 100 % -- -- -- --   09/14/22 1830 105/70 -- 88 18 99 % -- -- -- --   09/14/22 1931 (!) 114/50 -- 99 -- 93 % -- -- -- --      Recent Labs     09/14/22  1618   WBC 13.6*   CREATININE 1.4*   BILITOT 2.8*            Sepsis Time Identified: 1644    Fluid Resuscitation Rational: at least 30mL/kg based on entered actual weight at time of triage      Infection Source: Abdominal    Repeat lactate level: pending    Reassessment Exam:   Not applicable. Patient does not have septic shock. On reassessment, patient continues to be nauseous. Due to prolonged QT, will give Reglan. Based on results of work-up, I am concerned for intractable vomiting with prolonged QT, mild YENY, patient meeting sirs criteria. At this time, do feel the patient requires admission for further work-up and management. Discussed the patient with hospital team.    I personally saw the patient and independently provided 20 minutes of non-concurrent critical care out of the total shared critical care time provided. CLINICAL IMPRESSION  1. Intractable vomiting with nausea, unspecified vomiting type    2. YENY (acute kidney injury) ([address]    3. Lactic acidosis    4.  SIRS (systemic inflammatory response syndrome) ([facility])        Blood pressure (!) 114/50, pulse 99, temperature 98.4 °F (36.9 °C), temperature source Oral, resp. rate 18, height 6' (1.829 m), weight 200 lb (90.7 kg), SpO2 93 %. Boni Frederick was admitted in stable condition. All diagnostic, treatment, and disposition decisions were made by myself in conjunction with the advanced practice provider. For all further details of the patient's emergency department visit, please see the advanced practice provider's documentation. Comment: Please note this report has been produced using speech recognition software and may contain errors related to that system including errors in grammar, punctuation, and spelling, as well as words and phrases that may be inappropriate. If there are any questions or concerns please feel free to contact the dictating provider for clarification.         Prashanth Ridley MD  09/14/22 2004

## 2022-09-14 NOTE — ED PROVIDER NOTES
[address]        Pt Name: Kirsten Quintana  MRN: [id number]  [place] 1950  Date of evaluation: 9/14/2022  Provider: Nicklas Lefort, PA-C  PCP: Guillermo Hough MD    Shared Visit or Autonomous Visit:  I have seen and evaluated this patient with my supervising physician Dr Chucho Ríos       Chief Complaint   Patient presents with    Emesis     Patient reported vomiting for 2 days, unable to keep medication down with shortness of breath. HISTORY OF PRESENT ILLNESS   (Location/Symptom, Timing/Onset, Context/Setting, Quality, Duration, Modifying Factors, Severity)  Note limiting factors. Kirsten Quintana is a 74 y.o. male presenting to the emergency department for evaluation of nausea vomiting lower abdominal pain unable to keep anything down states cannot keep his medications down also feels short of breath and with a cough symptoms for the past 2 days. Denies any chest pain. Has felt hot and cold no documented fevers. The history is provided by the patient. Nausea & Vomiting  Duration:  2 days  Timing:  Constant  Progression:  Unchanged  Chronicity:  New  Associated symptoms: abdominal pain, chills, cough and URI    Associated symptoms: no diarrhea and no fever    Risk factors: diabetes    Risk factors: no sick contacts      Nursing Notes were reviewed    REVIEW OF SYSTEMS    (2-9 systems for level 4, 10 or more for level 5)     Review of Systems   Constitutional:  Positive for chills and fatigue. Negative for fever. Respiratory:  Positive for cough and shortness of breath. Cardiovascular:  Negative for chest pain. Gastrointestinal:  Positive for abdominal pain, nausea and vomiting. Negative for diarrhea. Genitourinary:  Negative for dysuria. All other systems reviewed and are negative. Positives and Pertinent negatives as per HPI.        PAST MEDICAL HISTORY     Past Medical History:   Diagnosis Date Does not apply route once for 1 dose Use to prep finger before blood sugar testing  Qty: 100 each, Refills: 1      apixaban (ELIQUIS) 5 MG TABS tablet Take 1 tablet by mouth 2 times daily  Qty: 60 tablet, Refills: 0               ALLERGIES     Amitriptyline, Citalopram hydrobromide, Naproxen, Other, Paroxetine, Pcn [penicillins], and Penicillins    FAMILYHISTORY       Family History   Problem Relation Age of Onset    Heart Attack Father 80    Heart Attack Mother 61          SOCIAL HISTORY       Social History     Socioeconomic History    Marital status:    Tobacco Use    Smoking status: Former     Types: Cigarettes     Quit date: 2008     Years since quittin.7    Smokeless tobacco: Never   Substance and Sexual Activity    Alcohol use: No    Drug use: No    Sexual activity: Not Currently   Social History Narrative    ** Merged History Encounter **            SCREENINGS    Chase Coma Scale  Eye Opening: Spontaneous  Best Verbal Response: Oriented  Best Motor Response: Obeys commands  Ara Coma Scale Score: 15        PHYSICAL EXAM    (up to 7 for level 4, 8 or more for level 5)     ED Triage Vitals   BP Temp Temp Source Heart Rate Resp SpO2 Height Weight   22 1511 22 1515 22 1515 22 1511 22 1515 22 1511 22 1515 22 1515   (!) 150/77 98.4 °F (36.9 °C) Oral 75 22 99 % 6' (1.829 m) 200 lb (90.7 kg)       Physical Exam  Vitals and nursing note reviewed. Constitutional:       Appearance: He is well-developed. He is ill-appearing. HENT:      Head: Normocephalic and atraumatic. Mouth/Throat:      Mouth: Mucous membranes are moist.      Pharynx: Oropharynx is clear. No posterior oropharyngeal erythema. Eyes:      Conjunctiva/sclera: Conjunctivae normal.      Pupils: Pupils are equal, round, and reactive to light. Cardiovascular:      Rate and Rhythm: Normal rate and regular rhythm. Heart sounds: Normal heart sounds.    Pulmonary: Effort: Pulmonary effort is normal. No respiratory distress. Breath sounds: Normal breath sounds. No stridor. No wheezing or rales. Abdominal:      General: Bowel sounds are normal. There is no distension. Palpations: Abdomen is soft. Abdomen is not rigid. Tenderness: There is abdominal tenderness in the right lower quadrant and left lower quadrant. There is no guarding or rebound. Musculoskeletal:         General: Normal range of motion. Cervical back: Normal range of motion and neck supple. Skin:     General: Skin is warm and dry. Neurological:      Mental Status: He is alert and oriented to person, place, and time. GCS: GCS eye subscore is 4. GCS verbal subscore is 5. GCS motor subscore is 6. Cranial Nerves: No cranial nerve deficit. Sensory: No sensory deficit. Motor: No abnormal muscle tone. Coordination: Coordination normal.   Psychiatric:         Speech: Speech normal.         Behavior: Behavior normal. Behavior is cooperative. Thought Content:  Thought content normal.       DIAGNOSTIC RESULTS   LABS:    Labs Reviewed   CBC WITH AUTO DIFFERENTIAL - Abnormal; Notable for the following components:       Result Value    WBC 13.6 (*)     Neutrophils Absolute 10.7 (*)     All other components within normal limits   COMPREHENSIVE METABOLIC PANEL W/ REFLEX TO MG FOR LOW K - Abnormal; Notable for the following components:    Sodium 131 (*)     Chloride 87 (*)     Anion Gap 21 (*)     Glucose 136 (*)     BUN 44 (*)     Creatinine 1.4 (*)     GFR Non- 50 (*)     GFR  60 (*)     Total Protein 8.7 (*)     Albumin 5.1 (*)     Total Bilirubin 2.8 (*)     ALT 42 (*)      (*)     All other components within normal limits   LACTATE, SEPSIS - Abnormal; Notable for the following components:    Lactic Acid, Sepsis 4.0 (*)     All other components within normal limits    Narrative:     Sy Vazquez tel. [phone],  Chemistry results called to and read back by dr Felicia Vasquez, 09/14/2022 17:03,  by [address] - Abnormal; Notable for the following components:    Ketones, Urine 15 (*)     Blood, Urine MODERATE (*)     Protein, UA 30 (*)     All other components within normal limits   BLOOD GAS, VENOUS - Abnormal; Notable for the following components:    pH, Jose 7.583 (*)     pCO2, Jose 23.9 (*)     HCO3, Venous 22.1 (*)     Carboxyhemoglobin 2.3 (*)     All other components within normal limits   MICROSCOPIC URINALYSIS - Abnormal; Notable for the following components:    Bacteria, UA Rare (*)     All other components within normal limits   LACTATE, SEPSIS - Abnormal; Notable for the following components:    Lactic Acid, Sepsis 3.2 (*)     All other components within normal limits    Narrative:     ORDER WAS CANCELLED 09/14/2022 20:32, not drawn by ER.    MAGNESIUM - Abnormal; Notable for the following components:    Magnesium 1.60 (*)     All other components within normal limits   COVID-19 & INFLUENZA COMBO   CULTURE, BLOOD 1   CULTURE, BLOOD 2   TROPONIN   LIPASE   HEMOGLOBIN A1C   CBC WITH AUTO DIFFERENTIAL   COMPREHENSIVE METABOLIC PANEL W/ REFLEX TO MG FOR LOW K   POCT GLUCOSE   POCT GLUCOSE   POCT GLUCOSE   POCT GLUCOSE       Results for orders placed or performed during the hospital encounter of 09/14/22   COVID-19 & Influenza Combo    Specimen: Nasopharyngeal Swab   Result Value Ref Range    SARS-CoV-2 RNA, RT PCR NOT DETECTED NOT DETECTED    INFLUENZA A NOT DETECTED NOT DETECTED    INFLUENZA B NOT DETECTED NOT DETECTED   CBC with Auto Differential   Result Value Ref Range    WBC 13.6 (H) 4.0 - 11.0 K/uL    RBC 5.26 4.20 - 5.90 M/uL    Hemoglobin 15.7 13.5 - 17.5 g/dL    Hematocrit 44.5 40.5 - 52.5 %    MCV 84.6 80.0 - 100.0 fL    MCH 29.8 26.0 - 34.0 pg    MCHC 35.2 31.0 - 36.0 g/dL    RDW 12.8 12.4 - 15.4 %    Platelets 599 146 - 977 K/uL    MPV 8.1 5.0 - 10.5 fL    Neutrophils % 78.5 %    Lymphocytes % 13.8 %    Monocytes % 7.6 %    Eosinophils % 0.0 %    Basophils % 0.1 %    Neutrophils Absolute 10.7 (H) 1.7 - 7.7 K/uL    Lymphocytes Absolute 1.9 1.0 - 5.1 K/uL    Monocytes Absolute 1.0 0.0 - 1.3 K/uL    Eosinophils Absolute 0.0 0.0 - 0.6 K/uL    Basophils Absolute 0.0 0.0 - 0.2 K/uL   Comprehensive Metabolic Panel w/ Reflex to MG   Result Value Ref Range    Sodium 131 (L) 136 - 145 mmol/L    Potassium reflex Magnesium 3.6 3.5 - 5.1 mmol/L    Chloride 87 (L) 99 - 110 mmol/L    CO2 23 21 - 32 mmol/L    Anion Gap 21 (H) 3 - 16    Glucose 136 (H) 70 - 99 mg/dL    BUN 44 (H) 7 - 20 mg/dL    Creatinine 1.4 (H) 0.8 - 1.3 mg/dL    GFR Non-African American 50 (A) >60    GFR  60 (A) >60    Calcium 10.0 8.3 - 10.6 mg/dL    Total Protein 8.7 (H) 6.4 - 8.2 g/dL    Albumin 5.1 (H) 3.4 - 5.0 g/dL    Albumin/Globulin Ratio 1.4 1.1 - 2.2    Total Bilirubin 2.8 (H) 0.0 - 1.0 mg/dL    Alkaline Phosphatase 116 40 - 129 U/L    ALT 42 (H) 10 - 40 U/L     (H) 15 - 37 U/L   Lactate, Sepsis   Result Value Ref Range    Lactic Acid, Sepsis 4.0 (HH) 0.4 - 1.9 mmol/L   Troponin   Result Value Ref Range    Troponin <0.01 <0.01 ng/mL   Lipase   Result Value Ref Range    Lipase 22.0 13.0 - 60.0 U/L   Urinalysis with Reflex to Culture    Specimen: Urine, clean catch   Result Value Ref Range    Color, UA Yellow Straw/Yellow    Clarity, UA Clear Clear    Glucose, Ur Negative Negative mg/dL    Bilirubin Urine Negative Negative    Ketones, Urine 15 (A) Negative mg/dL    Specific Gravity, UA 1.015 1.005 - 1.030    Blood, Urine MODERATE (A) Negative    pH, UA 6.5 5.0 - 8.0    Protein, UA 30 (A) Negative mg/dL    Urobilinogen, Urine 1.0 <2.0 E.U./dL    Nitrite, Urine Negative Negative    Leukocyte Esterase, Urine Negative Negative    Microscopic Examination YES     Urine Type NotGiven     Urine Reflex to Culture Not Indicated    Blood gas, venous   Result Value Ref Range    pH, Jose 7.583 (H) 7.350 - 7.450    pCO2, Jose 23.9 (L) 40.0 - 50.0 mmHg    pO2, Jose 33.2 25.0 - 40.0 mmHg    HCO3, Venous 22.1 (L) 23.0 - 29.0 mmol/L    Base Excess, Jose 2.3 -3.0 - 3.0 mmol/L    O2 Sat, Jose 76 Not Established %    Carboxyhemoglobin 2.3 (H) 0.0 - 1.5 %    MetHgb, Jose 0.3 <1.5 %    TC02 (Calc), Jose 23 Not Established mmol/L    O2 Content, Jose 16 Not Established VOL %    O2 Therapy Unknown    Microscopic Urinalysis   Result Value Ref Range    WBC, UA 0-2 0 - 5 /HPF    RBC, UA 0-2 0 - 4 /HPF    Bacteria, UA Rare (A) None Seen /HPF   Lactate, Sepsis   Result Value Ref Range    Lactic Acid, Sepsis 3.2 (H) 0.4 - 1.9 mmol/L   Magnesium Lab   Result Value Ref Range    Magnesium 1.60 (L) 1.80 - 2.40 mg/dL   EKG 12 Lead   Result Value Ref Range    Ventricular Rate 86 BPM    Atrial Rate 81 BPM    QRS Duration 96 ms    Q-T Interval 426 ms    QTc Calculation (Bazett) 509 ms    R Axis -53 degrees    T Axis 66 degrees    Diagnosis       Baseline artifactNormal sinus rhythm with frequent Premature ventricular complexes , Fusion complexes , and PACLeft anterior fascicular blockAbnormal ECGWhen compared with ECG of 23-JAN-2021 11:53,Vent. rate has increased BY  28 BPMPremature ventricular complexes are now PresentConfirmed by Darwin Herring ([id number]) on 9/14/2022 6:12:37 PM         All other labs were not returned as of this dictation. EKG: All EKG's are interpreted by the Emergency Department Physician in the absence of a cardiologist.  Please see their note for interpretation of EKG. RADIOLOGY:   Non-plain film images such as CT, Ultrasound and MRI are read by the radiologist. Plain radiographic images are visualized andpreliminarily interpreted by the  ED Provider with the below findings:          Interpretation perthe Radiologist below, if available at the time of this note:    CT ABDOMEN PELVIS W IV CONTRAST Additional Contrast? None   Final Result   1. No acute findings in the abdomen or pelvis. 2. Punctate bilateral intrarenal calculi.    3. Small hiatal hernia. 4. Hepatic and bilateral renal cysts. XR CHEST PORTABLE   Final Result   No radiographic evidence of an acute cardiopulmonary process. CT ABDOMEN PELVIS W IV CONTRAST Additional Contrast? None    Result Date: 9/14/2022  EXAMINATION: CT OF THE ABDOMEN AND PELVIS WITH CONTRAST 9/14/2022 4:36 pm TECHNIQUE: CT of the abdomen and pelvis was performed with the administration of intravenous contrast. Multiplanar reformatted images are provided for review. Automated exposure control, iterative reconstruction, and/or weight based adjustment of the mA/kV was utilized to reduce the radiation dose to as low as reasonably achievable. COMPARISON: 12/15/2011. HISTORY: ORDERING SYSTEM PROVIDED HISTORY: lower abdominal pain, vomiting TECHNOLOGIST PROVIDED HISTORY: Additional Contrast?->None Reason for exam:->lower abdominal pain, vomiting Decision Support Exception - unselect if not a suspected or confirmed emergency medical condition->Emergency Medical Condition (MA) Reason for Exam: lower abd groin  pain FINDINGS: Lower Chest: The lung bases are clear other than a calcified granuloma in the right lower lobe. There is no pleural effusion. Organs: There is a 7 mm cyst in segment 4 of the liver inferiorly. The liver and gallbladder otherwise unremarkable. The spleen, adrenal glands and pancreas appear normal.  Bilateral renal cysts appear simple and are more prominent than before. The largest at the lower pole on the right measures up to 2.9 cm. The largest on the left are at the lower pole and measure up to 2.5 and 2.7 cm. There are punctate stones in each kidney. There is no ureteral stone or hydronephrosis. GI/Bowel: There is no bowel dilatation or bowel wall thickening. The appendix appears normal.  The stomach is unremarkable. There is a small hiatal hernia. There has been abdominal wall hernia repair in the interval. No recurrent hernia is noted. Pelvis:  The bladder and prostate gland are unremarkable. Peritoneum/Retroperitoneum: There are no enlarged lymph nodes. No fat stranding, free fluid, free air or focal fluid collection is identified. Bones/Soft Tissues: No fracture or destructive bone lesion is identified. 1. No acute findings in the abdomen or pelvis. 2. Punctate bilateral intrarenal calculi. 3. Small hiatal hernia. 4. Hepatic and bilateral renal cysts. XR CHEST PORTABLE    Result Date: 9/14/2022  EXAMINATION: ONE XRAY VIEW OF THE CHEST 9/14/2022 2:45 pm COMPARISON: 01/23/2021. HISTORY: ORDERING SYSTEM PROVIDED HISTORY: sob TECHNOLOGIST PROVIDED HISTORY: Reason for exam:->sob Reason for Exam: Emesis (Patient reported vomiting for 2 days, unable to keep medication down with shortness of breath.) FINDINGS: The lungs and costophrenic angles are clear. The cardiomediastinal silhouette and pulmonary vessels appear normal.     No radiographic evidence of an acute cardiopulmonary process. PROCEDURES   Unless otherwise noted below, none     Procedures    CRITICAL CARE TIME   Critical care provided for this patient of which 10 min were spend on critical care and decision making. 0 min spent on procedures. There was imminent failure of an organ system which required critical intervention to prevent clinically significant progression of life threatening deterioration of the patient's condition to the point of disability or death.       CONSULTS:  IP CONSULT TO HOSPITALIST      EMERGENCY DEPARTMENT COURSE and DIFFERENTIAL DIAGNOSIS/MDM:   Vitals:    Vitals:    09/14/22 2133 09/14/22 2202 09/14/22 2232 09/14/22 2300   BP: 107/62 (!) 157/84 118/60 (!) 156/83   Pulse: 90  96 90   Resp: 14  16 16   Temp:    97.7 °F (36.5 °C)   TempSrc:    Oral   SpO2:  98% 97% 99%   Weight:    181 lb 14.4 oz (82.5 kg)   Height:    6' (1.829 m)       Patient was given thefollowing medications:  Medications   apixaban (ELIQUIS) tablet 5 mg (5 mg Oral Not Given 9/14/22 2329)   pantoprazole (PROTONIX) injection 40 mg (has no administration in time range)   glucose chewable tablet 16 g (has no administration in time range)   dextrose bolus 10% 125 mL (has no administration in time range)     Or   dextrose bolus 10% 250 mL (has no administration in time range)   glucagon (rDNA) injection 1 mg (has no administration in time range)   dextrose 10 % infusion (has no administration in time range)   0.9 % sodium chloride infusion ( IntraVENous New Bag 9/14/22 2327)   sodium chloride flush 0.9 % injection 5-40 mL (10 mLs IntraVENous Given 9/14/22 2331)   sodium chloride flush 0.9 % injection 5-40 mL (has no administration in time range)   0.9 % sodium chloride infusion (has no administration in time range)   polyethylene glycol (GLYCOLAX) packet 17 g (has no administration in time range)   acetaminophen (TYLENOL) tablet 650 mg (has no administration in time range)     Or   acetaminophen (TYLENOL) suppository 650 mg (has no administration in time range)   prochlorperazine (COMPAZINE) injection 10 mg (10 mg IntraVENous Given 9/14/22 2324)   morphine (PF) injection 2 mg (has no administration in time range)   magnesium sulfate 1000 mg in dextrose 5% 100 mL IVPB (has no administration in time range)   potassium chloride 10 mEq/100 mL IVPB (Peripheral Line) (has no administration in time range)   insulin lispro (HUMALOG) injection vial 0-4 Units (has no administration in time range)   0.9 % sodium chloride bolus (0 mLs IntraVENous Stopped 9/14/22 1723)   ondansetron (ZOFRAN) injection 4 mg (4 mg IntraVENous Given 9/14/22 1631)   lactated ringers bolus (0 mLs IntraVENous Stopped 9/14/22 2105)   meropenem (MERREM) 1,000 mg in sodium chloride 0.9 % 100 mL IVPB (mini-bag) (0 mg IntraVENous Stopped 9/14/22 1857)   iopamidol (ISOVUE-370) 76 % injection 75 mL (75 mLs IntraVENous Given 9/14/22 1747)   metoclopramide (REGLAN) injection 5 mg (5 mg IntraVENous Given 9/14/22 1859)       Is this patient to be included in the SEP-1 Core Measure due to severe sepsis or septic shock? No   Exclusion criteria - the patient is NOT to be included for SEP-1 Core Measure due to: Infection is not suspected       ED course  Patient presented to the ER for evaluation of vomiting. Ill appearance. Tenderness across lower abdomen. Zofran and IV fluids ordered. Labs checked COVID-19 is negative. White count elevated 13.6. Sodium 131. Potassium 3.6. Chloride 87. Glucose 136. BUN elevated 44. Creatinine 1.4. ALT 42. . Lactic acid elevated at 4.0. Patient meets SIRS criteria, CT abdomen pelvis is pending empiric antibiotics were started for possible infection. troponin less than 0.01. Lipase 22. Urinalysis 0-2 WBC 0-2 RBC. CT scan abdomen pelvis resulted and no acute intra-abdominal finding. Patient given additional antiemetics. Plan for admission for intractable vomiting and YENY. FINAL IMPRESSION      1. Intractable vomiting with nausea, unspecified vomiting type    2. YENY (acute kidney injury) ([facility] [address]    3. Lactic acidosis    4. SIRS (systemic inflammatory response syndrome) ([facility] [address]          DISPOSITION/PLAN   DISPOSITION Admitted    PATIENT REFERREDTO:  No follow-up provider specified.     DISCHARGE MEDICATIONS:  Current Discharge Medication List          DISCONTINUED MEDICATIONS:  Current Discharge Medication List        STOP taking these medications       Buprenorphine HCl-Naloxone HCl (SUBOXONE SL) Comments:   Reason for Stopping:                      (Please note that portions ofthis note were completed with a voice recognition program.  Efforts were made to edit the dictations but occasionally words are mis-transcribed.)    Shaggy Timmons PA-C (electronically signed)            Dmitry Lazar PA-C  09/15/22 [id number]

## 2022-09-15 PROBLEM — R11.10 INTRACTABLE VOMITING: Status: ACTIVE | Noted: 2022-09-15

## 2022-09-15 PROBLEM — E87.20 LACTIC ACIDOSIS: Status: ACTIVE | Noted: 2022-09-15

## 2022-09-15 PROBLEM — R65.10 SIRS (SYSTEMIC INFLAMMATORY RESPONSE SYNDROME) (HCC): Status: ACTIVE | Noted: 2022-09-15

## 2022-09-15 LAB
A/G RATIO: 1.8 (ref 1.1–2.2)
ALBUMIN SERPL-MCNC: 4.3 G/DL (ref 3.4–5)
ALP BLD-CCNC: 94 U/L (ref 40–129)
ALT SERPL-CCNC: 44 U/L (ref 10–40)
ANION GAP SERPL CALCULATED.3IONS-SCNC: 18 MMOL/L (ref 3–16)
AST SERPL-CCNC: 102 U/L (ref 15–37)
BASOPHILS ABSOLUTE: 0 K/UL (ref 0–0.2)
BASOPHILS RELATIVE PERCENT: 0.3 %
BILIRUB SERPL-MCNC: 2.2 MG/DL (ref 0–1)
BUN BLDV-MCNC: 32 MG/DL (ref 7–20)
CALCIUM SERPL-MCNC: 8.8 MG/DL (ref 8.3–10.6)
CHLORIDE BLD-SCNC: 98 MMOL/L (ref 99–110)
CO2: 19 MMOL/L (ref 21–32)
CREAT SERPL-MCNC: 1 MG/DL (ref 0.8–1.3)
EOSINOPHILS ABSOLUTE: 0 K/UL (ref 0–0.6)
EOSINOPHILS RELATIVE PERCENT: 0 %
ESTIMATED AVERAGE GLUCOSE: 134.1 MG/DL
GFR AFRICAN AMERICAN: >60
GFR NON-AFRICAN AMERICAN: >60
GLUCOSE BLD-MCNC: 124 MG/DL (ref 70–99)
GLUCOSE BLD-MCNC: 131 MG/DL (ref 70–99)
GLUCOSE BLD-MCNC: 132 MG/DL (ref 70–99)
GLUCOSE BLD-MCNC: 136 MG/DL (ref 70–99)
GLUCOSE BLD-MCNC: 149 MG/DL (ref 70–99)
GLUCOSE BLD-MCNC: 150 MG/DL (ref 70–99)
HAV IGM SER IA-ACNC: NORMAL
HBA1C MFR BLD: 6.3 %
HCT VFR BLD CALC: 40.5 % (ref 40.5–52.5)
HEMOGLOBIN: 14 G/DL (ref 13.5–17.5)
HEPATITIS B CORE IGM ANTIBODY: NORMAL
HEPATITIS B SURFACE ANTIGEN INTERPRETATION: NORMAL
HEPATITIS C ANTIBODY INTERPRETATION: NORMAL
LACTIC ACID: 1.6 MMOL/L (ref 0.4–2)
LACTIC ACID: 1.6 MMOL/L (ref 0.4–2)
LYMPHOCYTES ABSOLUTE: 1 K/UL (ref 1–5.1)
LYMPHOCYTES RELATIVE PERCENT: 9.8 %
MAGNESIUM: 1.6 MG/DL (ref 1.8–2.4)
MCH RBC QN AUTO: 29.2 PG (ref 26–34)
MCHC RBC AUTO-ENTMCNC: 34.5 G/DL (ref 31–36)
MCV RBC AUTO: 84.5 FL (ref 80–100)
MONOCYTES ABSOLUTE: 0.9 K/UL (ref 0–1.3)
MONOCYTES RELATIVE PERCENT: 9 %
NEUTROPHILS ABSOLUTE: 8.2 K/UL (ref 1.7–7.7)
NEUTROPHILS RELATIVE PERCENT: 80.9 %
PDW BLD-RTO: 12.6 % (ref 12.4–15.4)
PERFORMED ON: ABNORMAL
PLATELET # BLD: 187 K/UL (ref 135–450)
PMV BLD AUTO: 8.7 FL (ref 5–10.5)
POTASSIUM REFLEX MAGNESIUM: 3.5 MMOL/L (ref 3.5–5.1)
RBC # BLD: 4.79 M/UL (ref 4.2–5.9)
SODIUM BLD-SCNC: 135 MMOL/L (ref 136–145)
TOTAL PROTEIN: 6.7 G/DL (ref 6.4–8.2)
WBC # BLD: 10.2 K/UL (ref 4–11)

## 2022-09-15 PROCEDURE — 99232 SBSQ HOSP IP/OBS MODERATE 35: CPT | Performed by: INTERNAL MEDICINE

## 2022-09-15 PROCEDURE — 2580000003 HC RX 258: Performed by: INTERNAL MEDICINE

## 2022-09-15 PROCEDURE — 83605 ASSAY OF LACTIC ACID: CPT

## 2022-09-15 PROCEDURE — 6360000002 HC RX W HCPCS: Performed by: INTERNAL MEDICINE

## 2022-09-15 PROCEDURE — 6370000000 HC RX 637 (ALT 250 FOR IP): Performed by: INTERNAL MEDICINE

## 2022-09-15 PROCEDURE — 36415 COLL VENOUS BLD VENIPUNCTURE: CPT

## 2022-09-15 PROCEDURE — 1200000000 HC SEMI PRIVATE

## 2022-09-15 PROCEDURE — C9113 INJ PANTOPRAZOLE SODIUM, VIA: HCPCS | Performed by: INTERNAL MEDICINE

## 2022-09-15 PROCEDURE — 83735 ASSAY OF MAGNESIUM: CPT

## 2022-09-15 PROCEDURE — 80053 COMPREHEN METABOLIC PANEL: CPT

## 2022-09-15 PROCEDURE — 80074 ACUTE HEPATITIS PANEL: CPT

## 2022-09-15 PROCEDURE — 85025 COMPLETE CBC W/AUTO DIFF WBC: CPT

## 2022-09-15 PROCEDURE — 51798 US URINE CAPACITY MEASURE: CPT

## 2022-09-15 RX ORDER — LANOLIN ALCOHOL/MO/W.PET/CERES
400 CREAM (GRAM) TOPICAL 2 TIMES DAILY
Status: DISCONTINUED | OUTPATIENT
Start: 2022-09-15 | End: 2022-09-15

## 2022-09-15 RX ORDER — CLONIDINE HYDROCHLORIDE 0.1 MG/1
0.1 TABLET ORAL 2 TIMES DAILY
Status: DISCONTINUED | OUTPATIENT
Start: 2022-09-15 | End: 2022-09-17 | Stop reason: HOSPADM

## 2022-09-15 RX ORDER — LOPERAMIDE HYDROCHLORIDE 2 MG/1
2 CAPSULE ORAL 4 TIMES DAILY PRN
Status: DISCONTINUED | OUTPATIENT
Start: 2022-09-15 | End: 2022-09-17 | Stop reason: HOSPADM

## 2022-09-15 RX ORDER — DICYCLOMINE HYDROCHLORIDE 10 MG/1
10 CAPSULE ORAL
Status: DISCONTINUED | OUTPATIENT
Start: 2022-09-15 | End: 2022-09-17 | Stop reason: HOSPADM

## 2022-09-15 RX ORDER — POLYETHYLENE GLYCOL 3350 17 G/17G
17 POWDER, FOR SOLUTION ORAL DAILY
Status: DISCONTINUED | OUTPATIENT
Start: 2022-09-15 | End: 2022-09-17 | Stop reason: HOSPADM

## 2022-09-15 RX ADMIN — PANTOPRAZOLE SODIUM 40 MG: 40 INJECTION, POWDER, FOR SOLUTION INTRAVENOUS at 08:19

## 2022-09-15 RX ADMIN — ACETAMINOPHEN 650 MG: 325 TABLET ORAL at 21:12

## 2022-09-15 RX ADMIN — APIXABAN 5 MG: 5 TABLET, FILM COATED ORAL at 08:19

## 2022-09-15 RX ADMIN — PROCHLORPERAZINE EDISYLATE 10 MG: 5 INJECTION INTRAMUSCULAR; INTRAVENOUS at 06:14

## 2022-09-15 RX ADMIN — MAGNESIUM SULFATE HEPTAHYDRATE 1000 MG: 1 INJECTION, SOLUTION INTRAVENOUS at 08:39

## 2022-09-15 RX ADMIN — POLYETHYLENE GLYCOL (3350) 17 G: 17 POWDER, FOR SOLUTION ORAL at 08:39

## 2022-09-15 RX ADMIN — MORPHINE SULFATE 2 MG: 2 INJECTION, SOLUTION INTRAMUSCULAR; INTRAVENOUS at 08:29

## 2022-09-15 RX ADMIN — APIXABAN 5 MG: 5 TABLET, FILM COATED ORAL at 21:12

## 2022-09-15 RX ADMIN — MAGNESIUM SULFATE HEPTAHYDRATE 1000 MG: 1 INJECTION, SOLUTION INTRAVENOUS at 09:48

## 2022-09-15 RX ADMIN — CLONIDINE HYDROCHLORIDE 0.1 MG: 0.1 TABLET ORAL at 21:12

## 2022-09-15 RX ADMIN — CLONIDINE HYDROCHLORIDE 0.1 MG: 0.1 TABLET ORAL at 12:00

## 2022-09-15 RX ADMIN — SODIUM CHLORIDE: 9 INJECTION, SOLUTION INTRAVENOUS at 15:03

## 2022-09-15 RX ADMIN — Medication 10 ML: at 08:22

## 2022-09-15 RX ADMIN — DICYCLOMINE HYDROCHLORIDE 10 MG: 10 CAPSULE ORAL at 15:01

## 2022-09-15 ASSESSMENT — PAIN DESCRIPTION - PAIN TYPE: TYPE: ACUTE PAIN

## 2022-09-15 ASSESSMENT — PAIN SCALES - GENERAL
PAINLEVEL_OUTOF10: 4
PAINLEVEL_OUTOF10: 7
PAINLEVEL_OUTOF10: 0

## 2022-09-15 ASSESSMENT — PAIN DESCRIPTION - DESCRIPTORS: DESCRIPTORS: ACHING

## 2022-09-15 ASSESSMENT — PAIN DESCRIPTION - LOCATION
LOCATION: ABDOMEN
LOCATION: ABDOMEN

## 2022-09-15 ASSESSMENT — PAIN SCALES - WONG BAKER: WONGBAKER_NUMERICALRESPONSE: 0

## 2022-09-15 ASSESSMENT — PAIN DESCRIPTION - ORIENTATION: ORIENTATION: LOWER

## 2022-09-15 NOTE — PLAN OF CARE
Problem: Discharge Planning  Goal: Discharge to home or other facility with appropriate resources  Outcome: Progressing     Problem: Safety - Adult  Goal: Free from fall injury  Outcome: Progressing  Flowsheets (Taken 9/15/2022 0913)  Free From Fall Injury:   Instruct family/caregiver on patient safety   Based on caregiver fall risk screen, instruct family/caregiver to ask for assistance with transferring infant if caregiver noted to have fall risk factors     Problem: Pain  Goal: Verbalizes/displays adequate comfort level or baseline comfort level  Outcome: Progressing     Problem: ABCDS Injury Assessment  Goal: Absence of physical injury  Outcome: Progressing     Problem: Chronic Conditions and Co-morbidities  Goal: Patient's chronic conditions and co-morbidity symptoms are monitored and maintained or improved  Outcome: Progressing

## 2022-09-15 NOTE — PROGRESS NOTES
Consult has been called to Dr. Dorothy Kaufman on.  Spoke with 9/15/22. 11:21 AM    Bishop Sanchez  9/15/2022

## 2022-09-15 NOTE — PROGRESS NOTES
Pt called out asking for something to help with nausea. States that he feels like he is going to start dry heaves again. PRN compazine given at this time, see MAR.  Pt denies any other needs

## 2022-09-15 NOTE — PROGRESS NOTES
Shift assessment complete; see flow sheet. Scheduled medications administered; See MAR. IV infusing without difficulty. Pt denies pain, c/o nausea and dry heaves at this time. Compazine given see MAR. Pt denies any needs at this time.  Pt educated on use of call light and to call out with needs, verbalized understanding, bed in low locked position for pt safety

## 2022-09-15 NOTE — H&P
Hospital Medicine History & Physical      PCP: Christa Ybarra MD    Date of Service: Pt seen/examined on 9/14/22 and admitted on 9/14/22 to Inpatient    Chief Complaint   Patient presents with    Emesis     Patient reported vomiting for 2 days, unable to keep medication down with shortness of breath. History Of Present Illness: The patient is a 74 y.o. male with PMH below, presents with N/V, abd pain, inability to tolerate PO, SOB, cough, chest, congestion, fatigue. Pt reports Sx started 2 days ago. He has been unable to tolerate PO since the onset including being unable to tolerate his PO Rx. He reports crampy lower abd pain. Exac by throwing up, no pally factors. He ahs had associated SOB, fatigue, URI Sx. He is not requiring O2. Past Medical History:        Diagnosis Date    Abnormal ultrasound of carotid artery     CTA was normal    Back pain     epidual injectiion for back  1997     Chronic back pain     MRI 1998 Normal    Diabetes mellitus ([address]     DJD (degenerative joint disease)     Rt. Hip    Fibromyalgia     Post Traumatic/Myofascial Pain    Hypertension     Kidney stones     2002    Paroxysmal atrial tachycardia [facility])        Past Surgical History:        Procedure Laterality Date    BACK SURGERY      LITHOTRIPSY      OTHER SURGICAL HISTORY  04/21/2021    laparoscopic ventral hernia repair with mesh    UPPER GASTROINTESTINAL ENDOSCOPY  12/16/2011    UPPER GASTROINTESTINAL ENDOSCOPY  12/16/11    VENTRAL HERNIA REPAIR N/A 4/21/2021    LAPAROSCOPIC VENTRAL HERNIA REPAIR WITH MESH performed by Will Medina MD at [facility]       Medications Prior to Admission:    Prior to Admission medications    Medication Sig Start Date End Date Taking?  Authorizing Provider   pantoprazole (PROTONIX) 40 MG tablet Take 1 tablet by mouth every morning (before breakfast) 6/15/22   Yifan Castle MD   dicyclomine (BENTYL) 10 MG capsule TAKE ONE CAPSULE BY MOUTH TWICE DAILY WITH METFORMIN 1/28/22   Zahra Brennan MD   metFORMIN (GLUCOPHAGE) 500 MG tablet TAKE 1 TABLET BY MOUTH TWICE DAILY WITH MEALS  Patient not taking: No sig reported 1/7/22   Zahra Brennan MD   lisinopril (PRINIVIL;ZESTRIL) 20 MG tablet Take 0.5 tablets by mouth daily 5/14/21   Zahra Brennan MD   blood glucose monitor strips 1 strip by Other route once for 1 dose Test one time a day before morning meal 2/2/21 10/14/21  Zahra Brennan MD   Buprenorphine HCl-Naloxone HCl (SUBOXONE SL) Place under the tongue    Historical Provider, MD   Blood Glucose Monitoring Suppl (D-CARE GLUCOMETER) w/Device KIT 1 kit by Does not apply route every morning (before breakfast) Test blood sugars before any food intake in the morning. Fasting blood sugars 1/26/21   Zahra Brenann MD   Lancets Thin MISC 1 Units by Does not apply route once for 1 dose Use one lancet to test morning blood sugars 1/26/21 10/14/21  Zahra Brennan MD   Alcohol Swabs (ALCOHOL PREP) PADS 1 Units by Does not apply route once for 1 dose Use to prep finger before blood sugar testing 1/26/21 10/14/21  Zahra Brennan MD   apixaban Sherolyn Stare) 5 MG TABS tablet Take 1 tablet by mouth 2 times daily 12/9/15 1/28/23  Neisha Jones MD       Allergies:  Amitriptyline, Citalopram hydrobromide, Naproxen, Other, Paroxetine, Pcn [penicillins], and Penicillins    Social History:    TOBACCO:   reports that he quit smoking about 13 years ago. His smoking use included cigarettes. He has never used smokeless tobacco.  ETOH:   reports no history of alcohol use. Family History:  Reviewed in detail and negative for DM, Early CAD, Cancer (except as below).  Positive as follows:        Problem Relation Age of Onset    Heart Attack Father 80    Heart Attack Mother 61       REVIEW OF SYSTEMS:   Pertinent positives/negatives as follows: N/V, abd pain, inability to tolerate PO, SOB, cough, chest, congestion, fatigue, and as discussed in HPI, otherwise a complete ROS performed and all other systems are negative. PHYSICAL EXAM PERFORMED:  BP (!) 114/50   Pulse 99   Temp 98.4 °F (36.9 °C) (Oral)   Resp 18   Ht 6' (1.829 m)   Wt 200 lb (90.7 kg)   SpO2 93%   BMI 27.12 kg/m²   GEN:  A&Ox3, appears unwell. HEENT:  NC/AT,EOMI, dry MM, no erythema/exudates or visible masses. CVS:  Normal S1,S2. Tachy  RRR. Without M/G/R.   LUNG:   CTA-B. No wheezes, rales or rhonchi. ABD:  Soft, ND, lower ttp, BS+ x4. Without G/R.  EXT: 2+ pulses, no c/c/e. Brisk cap refill. PSY:  Thought process intact, affect appropriate. ROSA:  CN III-XII grossly intact. Moves all 4 spontaneously. Sensory grossly intact. SKIN: No rash or lesions on visible skin. Chart review shows recent radiographs:  CT ABDOMEN PELVIS W IV CONTRAST Additional Contrast? None    Result Date: 9/14/2022  EXAMINATION: CT OF THE ABDOMEN AND PELVIS WITH CONTRAST 9/14/2022 4:36 pm TECHNIQUE: CT of the abdomen and pelvis was performed with the administration of intravenous contrast. Multiplanar reformatted images are provided for review. Automated exposure control, iterative reconstruction, and/or weight based adjustment of the mA/kV was utilized to reduce the radiation dose to as low as reasonably achievable. COMPARISON: 12/15/2011. HISTORY: ORDERING SYSTEM PROVIDED HISTORY: lower abdominal pain, vomiting TECHNOLOGIST PROVIDED HISTORY: Additional Contrast?->None Reason for exam:->lower abdominal pain, vomiting Decision Support Exception - unselect if not a suspected or confirmed emergency medical condition->Emergency Medical Condition (MA) Reason for Exam: lower abd groin  pain FINDINGS: Lower Chest: The lung bases are clear other than a calcified granuloma in the right lower lobe. There is no pleural effusion. Organs: There is a 7 mm cyst in segment 4 of the liver inferiorly. The liver and gallbladder otherwise unremarkable.   The spleen, adrenal glands and pancreas appear normal. Bilateral renal cysts appear simple and are more prominent than before. The largest at the lower pole on the right measures up to 2.9 cm. The largest on the left are at the lower pole and measure up to 2.5 and 2.7 cm. There are punctate stones in each kidney. There is no ureteral stone or hydronephrosis. GI/Bowel: There is no bowel dilatation or bowel wall thickening. The appendix appears normal.  The stomach is unremarkable. There is a small hiatal hernia. There has been abdominal wall hernia repair in the interval. No recurrent hernia is noted. Pelvis: The bladder and prostate gland are unremarkable. Peritoneum/Retroperitoneum: There are no enlarged lymph nodes. No fat stranding, free fluid, free air or focal fluid collection is identified. Bones/Soft Tissues: No fracture or destructive bone lesion is identified. 1. No acute findings in the abdomen or pelvis. 2. Punctate bilateral intrarenal calculi. 3. Small hiatal hernia. 4. Hepatic and bilateral renal cysts. XR CHEST PORTABLE    Result Date: 9/14/2022  EXAMINATION: ONE XRAY VIEW OF THE CHEST 9/14/2022 2:45 pm COMPARISON: 01/23/2021. HISTORY: ORDERING SYSTEM PROVIDED HISTORY: sob TECHNOLOGIST PROVIDED HISTORY: Reason for exam:->sob Reason for Exam: Emesis (Patient reported vomiting for 2 days, unable to keep medication down with shortness of breath.) FINDINGS: The lungs and costophrenic angles are clear. The cardiomediastinal silhouette and pulmonary vessels appear normal.     No radiographic evidence of an acute cardiopulmonary process.        EKG:    EKG 12 Lead [[id number]]    Collected: 09/14/22 1546    Updated: 09/14/22 1812     Ventricular Rate 86 BPM    Atrial Rate 81 BPM    QRS Duration 96 ms    Q-T Interval 426 ms    QTc Calculation (Bazett) 509 ms    R Axis -53 degrees    T Axis 66 degrees    Diagnosis --    Baseline artifactNormal sinus rhythm with frequent Premature ventricular complexes , Fusion complexes , and PACLeft anterior fascicular blockAbnormal ECGWhen compared with ECG of 23-JAN-2021 11:53,Vent. rate has increased BY  28 BPMPremature ventricular complexes are now PresentConfirmed by Stephani Can ([id number]) on 9/14/2022 6:12:37 PM        CBC:  Recent Labs     09/14/22  1618   WBC 13.6*   HGB 15.7   HCT 44.5         RENAL  Recent Labs     09/14/22  1618   *   K 3.6   CL 87*   CO2 23   BUN 44*   CREATININE 1.4*   GLUCOSE 136*     Hemoglobin a1c:  Lab Results   Component Value Date    LABA1C 5.5 06/10/2022    LABA1C 6.0 07/07/2021    LABA1C 8.1 01/23/2021     LFT'S:  Recent Labs     09/14/22  1618   *   ALT 42*   BILITOT 2.8*   ALKPHOS 116     CARDIAC ENZYMES:   Recent Labs     09/14/22  1618   TROPONINI <0.01     Lab Results   Component Value Date    PROBNP 887 (H) 01/23/2021    PROBNP 119 11/06/2017     LACTIC ACID:  Recent Labs     09/14/22  1618   LACTSEPSIS 4.0*     U/A:  Recent Labs     09/14/22  1618   LEUKOCYTESUR Negative   BACTERIA Rare*   WBCUA 0-2   COLORU Yellow   RBCUA 0-2   CLARITYU Clear   SPECGRAV 1.015   BLOODU MODERATE*   GLUCOSEU Negative       VBG:  Recent Labs     09/14/22  1618   PHVEN 7.583*   PCL2HWB 23.9*   LJP3SNV 22.1*   PO2VEN 33.2   [id number] 76        PHYSICIAN CERTIFICATION  I certify that Tristin Kenny is expected to be hospitalized for 2 midnights based on the following assessment and plan:    ASSESSMENT/PLAN:  YENY, Cr 1.4, baseline 0.8-1.0. IVF and repeat in am.  Avoid nephrotoxins. SIRS (RR, WBC; lactic). No clear source. Given Fracisco Barley in ED. Will hold off on additional ABX at this time. N/V, unable to tolerate PO.  IVF, PRN antiemetics. Defer to DD for GI c/s if needed. Abd pain, PRN low dose morphine. CT a/p w/ con did not show acute pathology. DM2, hold oral Rx, chk A1c, add Low SSI q4h. Lactic acidosis, 4.0. IVF and repeats ordered. Elevated WBC, 13.6. Follow. Hx PAF, currently sinus, cont home Eliquis.    HTN, cont home regimen except for ACE-I 2/2 YENY.  GERD, PPI converted to IV for now. Chronic back pain, Suboxone listed on Med Rec but I nor pharmacist are able to confirm this Rx in last 2 years. Prolonged QTc, 509 ms, avoid QT prolonging agents as able. DVT Prophylaxis: Eliquis  Diet: clears, CC  Code Status: Full Code   PT/OT Eval Status: Will order if needed and as patient condition allows  Dispo - Admit to inpatient     Yady Hinojosa MD    Thank you Juanito Green MD for the opportunity to be involved in this patient's care. If you have any questions or concerns please feel free to contact me via the [employer] at [phone]. This chart was generated using the [employer] dictation system. I created this record but it may contain dictation errors given the limitations of this technology.

## 2022-09-15 NOTE — PROGRESS NOTES
Progress Note    Admit Date:  9/14/2022    74 y.o. male presents with N/V, abd pain, inability to tolerate PO, SOB, cough, chest, congestion, fatigue. Pt reports Sx started 2 days ago. He has been unable to tolerate PO since the onset including being unable to tolerate his PO Rx. He reports crampy lower abd pain. Exac by throwing up,   He ahs had associated SOB, fatigue, URI Sx. He is not requiring O2. Subjective:  Mr. [place] Batch still very nauseous today. Abd pain persists. Unable to tolerate diet. Objective:   Patient Vitals for the past 4 hrs:   BP Temp Temp src Pulse SpO2   09/15/22 0800 (!) 142/75 97.7 °F (36.5 °C) Oral 89 98 %          Intake/Output Summary (Last 24 hours) at 9/15/2022 0821  Last data filed at 9/15/2022 0732  Gross per 24 hour   Intake 1781 ml   Output 400 ml   Net 1381 ml       Physical Exam:    Gen: Moderate distress. Alert. Ill-appearing  Eyes: PERRL. No sclera icterus. No conjunctival injection. ENT: No discharge. Pharynx clear. Neck: No JVD. Trachea midline. Resp: No accessory muscle use. No crackles. No wheezes. No rhonchi. CV: Regular rate. Regular rhythm. No murmur. No rub. No edema. Capillary Refill: Brisk,< 3 seconds   Peripheral Pulses: +2 palpable, equal bilaterally   GI: Significant TTP in bilateral lower quadrants. Non-distended. Normal bowel sounds. Skin: Warm and dry. No nodule on exposed extremities. No rash on exposed extremities. M/S: No cyanosis. No joint deformity. No clubbing. Neuro: Awake. Grossly nonfocal    Psych: Oriented x 3. No anxiety or agitation. Lizbeth Shah MD have reviewed the chart on [facility] and personally interviewed and examined patient, reviewed the data (labs and imaging) and after discussion with my PA formulated the plan. Agree with note with the following edits. HPI:     I reviewed the patient's Past Medical History, Past Surgical History, Medications, and Allergies.        Ongoing nausea and abd pain         General:  elderly male Awake, alert and oriented. Appears to be not in any distress  Mucous Membranes:  Pink , anicteric  Neck: No JVD, no carotid bruit, no thyromegaly  Chest:  Clear to auscultation bilaterally, no added sounds  Cardiovascular:  RRR S1S2 heard, no murmurs or gallops  Abdomen:  Soft, undistended, non tender, no organomegaly, BS present  Extremities: No edema or cyanosis. Distal pulses well felt  Neurological : grossly normal            Scheduled Meds:   apixaban  5 mg Oral BID    pantoprazole  40 mg IntraVENous Daily    sodium chloride flush  5-40 mL IntraVENous 2 times per day    insulin lispro  0-4 Units SubCUTAneous Q4H       Continuous Infusions:   dextrose      sodium chloride 75 mL/hr at 09/14/22 2327    sodium chloride         PRN Meds:  glucose, dextrose bolus **OR** dextrose bolus, glucagon (rDNA), dextrose, sodium chloride flush, sodium chloride, polyethylene glycol, acetaminophen **OR** acetaminophen, prochlorperazine, morphine, magnesium sulfate, potassium chloride      Data:  CBC:   Recent Labs     09/14/22  1618 09/15/22  0521   WBC 13.6* 10.2   HGB 15.7 14.0   HCT 44.5 40.5   MCV 84.6 84.5    187     BMP:   Recent Labs     09/14/22  1618 09/15/22  0521   * 135*   K 3.6 3.5   CL 87* 98*   CO2 23 19*   BUN 44* 32*   CREATININE 1.4* 1.0     LIVER PROFILE:   Recent Labs     09/14/22 1618 09/15/22  0521   * 102*   ALT 42* 44*   LIPASE 22.0  --    BILITOT 2.8* 2.2*   ALKPHOS 116 94       CULTURES  Covid and flu not detected  Blood cx x2 pending     RADIOLOGY  CT ABDOMEN PELVIS W IV CONTRAST Additional Contrast? None   Final Result   1. No acute findings in the abdomen or pelvis. 2. Punctate bilateral intrarenal calculi. 3. Small hiatal hernia. 4. Hepatic and bilateral renal cysts. XR CHEST PORTABLE   Final Result   No radiographic evidence of an acute cardiopulmonary process.               Assessment/Plan:        #Nausea and vomiting   # lower abd pain   -unable to tolerate PO.    - etiology likely drug withdrawal. Ran out of suboxone 3 days ago and started symptoms  CT abd neg   -IVF, PRN antiemetics. Stop narcotics  - start on bentyl. Imodium, prn clonidine  - recommend out pt rehab follow up for narcotic dependancy    #Transaminitis   #Hyperbilirubinemia  -No overt TTP in RUQ, ct abd with no gall stones or obstruction  -Check hep panel  -GI cs  - wbc high on adm, but now normal       #YENY- pre renal   -Cr 1.4, baseline 0.8-1.0.    -IVF   -Cr improved this am    -Avoid nephrotoxins. #SIRS (RR, WBC; lactic). -No clear source. -Given Merrem in ED.    -Will hold off on additional ABX at this time. #Lactic acidosis  - sec to emesis   -4.0. IVF and repeats ordered.   -3.2 today    #Constipation  -? Contributing to above  -states he has not had BM in 3 days, he is passing gas  -no large stool burden on CT abd  -Glycolax PRN    #Difficulty urinating  -no dysuria until after hospital admission  -Patient states he has had slow urinary stream. With lower abd pain will screen for incomplete voiding. #DM2  -hold oral Rx  -chk A1c  -add Low SSI q4h. #Hx PAF  -Currently sinus  -Cont home Eliquis. #GERD  -PPI    #Chronic back pain  - pt reports chronic opiate abuse and recently been on suboxone from streets      #Prolonged Qtc  -509 ms  -avoid QT prolonging agents as able. DVT Prophylaxis: Eliquis  Diet: ADULT DIET;  Clear Liquid; 4 carb choices (60 gm/meal)  Code Status: Full Code    Baljeet Ghosh PA-C  9/15/2022 8:45 AM      Agree with above  Changes made to note    Yazmin Guardado MD,9/15/2022 11:43 AM

## 2022-09-15 NOTE — CARE COORDINATION
Case Management Assessment  Initial Evaluation      Patient Name: Kavita Santos  YOB: 1950  Diagnosis: Lactic acidosis [E87.2]  SIRS (systemic inflammatory response syndrome) ([facility] [R65.10]  YENY (acute kidney injury) ([facility] [N17.9]  Intractable vomiting with nausea, unspecified vomiting type [R11.2]  Date / Time: 9/14/2022  3:08 PM    Admission status/Date:inpt  Chart Reviewed: Yes      Patient Interviewed: Yes   Family Interviewed:  No      Hospitalization in the last 30 days:  No      Health Care Decision Maker :     (CM - must 1st enter selection under Navigator - emergency contact- Health Care Decision Maker Relationship and pick relationship)   Who do you trust or have selected to make healthcare decisions for you      Met with: pt at bedside  Interview conducted  (bedside/phone):    Current PCP:   Vidya Lilly MD      Financial  Commercial  Precert required for SNF : Y, N          3 night stay required - Y, N    ADLS  Support Systems/Care Needs: Friends/Neighbors  Transportation: self    Meal Preparation: self    Housing  Living Arrangements: home alone  Steps: 2  Intent for return to present living arrangements: Yes  Identified Issues: no    Home Care Information  Active with Home Health Care : No Agency:(Services)  Type of Home Care Services: None  Passport/Waiver : No  :                      Phone Number:    Passport/Waiver Services: no          Durable Medical Equiptment   DME Provider: giovanna  Equipment:   Walker___Cane___RTS___ BSC___Shower Chair___Hospital Bed___W/C____Other________  02 at ____Liter(s)---wears(frequency)_______ HHN ___ CPAP___ BiPap___   N/A____      Home O2 Use :  No    If No for home O2---if presently on O2 during hospitalization:  No  if yes CM to follow for potential DC O2 need  Informed of need for care provider to bring portable home O2 tank on day of discharge for nursing to connect prior to leaving:   Not Indicated  Verbalized agreement/Understanding:   Not Indicated    Community Service Affiliation  Dialysis:  No    Agency:  Location:  Dialysis Schedule:  Phone:   Fax: Other Community Services: (ex:PT/OT,Mental Health,Wound Clinic, Cardio/Pul [address])    DISCHARGE PLAN: Explained Case Management role/services. Reviewed chart. Met with the pt at bedside. Pt from home alone and plan return. Pt states IPTA and neighbor helps with transportation. Pt states does not have anyone to list for emergency contacts at this time.  following

## 2022-09-15 NOTE — PROGRESS NOTES
"Subjective:    Patient ID:  Amish Grossman is a 69 y.o. male who presents for follow-up of Pacemaker Check      Atrial Fibrillation   Symptoms include palpitations. Symptoms are negative for chest pain, dizziness, shortness of breath, syncope and weakness. Past medical history includes atrial fibrillation.      69 y.o. M  CAD/MI (1993)/PCI (with stent placement in 2000 and 2004), stable  HTN on meds  HL on meds  CKD, stable  hypothyroid on meds  RV thrombus hx; on coumadin  VT (12/2014), resulting in ICD shock    Actively doing forestry work. Hunting. Does get some SHOOK when walking fast.  No CP. Feeling better since "R" added at prior visit. Leg swelling abated.    We did NOE/DCCV on 3/16 in hopes of starting tikosyn, but QTc was too long for that. Instead we started amiodarone. Pt says he felt better for a week, then felt down again. However he thinks that may have been multifactorial and isn't completely sure. Says he feels better now than pre-DCCV, and he's back in AF. ICD shows that NSR lasted 10 days. We repeated DCCV on 4/15. He's maintained NSR since then. Feels very well.  Hx of A noise. P waves ~4. Device shows few AMS episodes, <10 s (no egrams).    We did gen change 6/7/18. This was c/b VT intraop, requiring external rescue to NSR. Also, a breach in the RV lead's insulation was repaired at that time.  Pt c/o occasional palps that are reproduced with pacing in device clinic; PVARP increased today to prevent PMT.  Good device function.    My interpretation of today's ECG is A-pace, V-sense.    Review of Systems   Constitution: Negative. Negative for weakness and malaise/fatigue.   HENT: Negative.  Negative for ear pain and tinnitus.    Eyes: Negative.  Negative for blurred vision.   Cardiovascular: Positive for palpitations. Negative for chest pain, claudication, cyanosis, dyspnea on exertion, irregular heartbeat, leg swelling, near-syncope, orthopnea, paroxysmal nocturnal dyspnea and syncope.        Chest " Patient is not able to demonstrated the ability to move from a reclining position to an upright position within the recliner.  however patient is alert, oriented and able to provide informed consent pain with associated left arm pain   Respiratory: Negative.  Negative for shortness of breath.    Endocrine: Negative.  Negative for polyuria.   Hematologic/Lymphatic: Negative.  Does not bruise/bleed easily.   Skin: Negative.  Negative for rash.   Musculoskeletal: Negative.  Negative for joint pain and muscle weakness.   Gastrointestinal: Negative.  Negative for abdominal pain and change in bowel habit.   Genitourinary: Negative.  Negative for frequency.   Neurological: Positive for light-headedness and tremors (worsening, bilat hands, on intention.). Negative for dizziness.   Psychiatric/Behavioral: Negative.  Negative for depression. The patient is not nervous/anxious.    Allergic/Immunologic: Negative for environmental allergies.        Objective:    Physical Exam   Constitutional: He is oriented to person, place, and time. He appears well-developed and well-nourished.   HENT:   Head: Normocephalic and atraumatic.   Eyes: Conjunctivae, EOM and lids are normal. Pupils are equal, round, and reactive to light. No scleral icterus.   Neck: Normal range of motion. Neck supple. No JVD present. No tracheal deviation present. No thyromegaly present.   Cardiovascular: Normal rate, regular rhythm, normal heart sounds and intact distal pulses.  No extrasystoles are present. PMI is not displaced. Exam reveals no gallop and no friction rub.   No murmur heard.  Pulses:       Radial pulses are 2+ on the right side, and 2+ on the left side.   Pulmonary/Chest: Effort normal and breath sounds normal. No accessory muscle usage. No tachypnea. No respiratory distress. He has no wheezes. He has no rales.   Abdominal: Soft. Bowel sounds are normal. He exhibits no distension. There is no hepatosplenomegaly. There is no tenderness.   Musculoskeletal: Normal range of motion. He exhibits no edema.   Neurological: He is alert and oriented to person, place, and time. He has normal reflexes. He exhibits normal muscle tone.   Skin: Skin is  warm and dry. No rash noted.   Psychiatric: He has a normal mood and affect. His behavior is normal.   Nursing note and vitals reviewed.        Assessment:       1. Old myocardial infarction    2. S/P PTCA (percutaneous transluminal coronary angioplasty)    3. HTN (hypertension), benign    4. Automatic implantable cardioverter-defibrillator in situ    5. Ventricular tachyarrhythmia    6. PVT (paroxysmal ventricular tachycardia)    7. AICD at end of battery life    8. Anticoagulated on Coumadin    9. Chronic systolic congestive heart failure     10. Persistent atrial fibrillation         Plan:       Continue amio, which is maintaining NSR well.  Continue a/c.  Increase coreg 12.5 bid -> 25 bid    Return in 1 year with echo and amio tests, or earlier prn.

## 2022-09-15 NOTE — ED NOTES
Dr Romaine Carlos had requested additional info on pt report of taking suboxane due to not being able to find pharmacy that it has been filled at. After checking with pt, pt states it is not being currently prescribed to him but he still has some left from when it was prescribed to him and only takes in PRN for pain.      Paola Bermudez RN  09/14/22 [id number]

## 2022-09-15 NOTE — PROGRESS NOTES
Handoff report and transfer of care given at bedside to [facility]. Patient in stable condition, denies needs/concerns at this time. Call light within reach.

## 2022-09-15 NOTE — CONSULTS
Gastroenterology Consult Note    Patient:   Bryon White   :    1950   Facility:   [address]  Referring/PCP: Sage Collier MD  Date:     9/15/2022  Consultant:   Yovani Forbes PA-C      Chief Complaint   Patient presents with    Emesis     Patient reported vomiting for 2 days, unable to keep medication down with shortness of breath. History of Present illness     74year old male with a history of DM, GERD, chronic back pain, and PAF presented to the ED with nausea and vomiting. He admits to nausea and vomiting for the last 3 days. His last episode of emesis was yesterday. He has associated lower abdominal pain and decreased appetite. He lost 10-20 lbs over the last month. His last BM was 3 days ago. He normally passes 1x BM daily. He denies hematemesis, dysphagia, heartburn, cramping, bloating, melena, and rectal bleeding. He stopped Suboxone 3-4 days ago. He ran out of his Suboxone prescription about one month ago, and was getting Suboxone from his friend. He uses marijuana daily to help him relax. He has never had a colonoscopy. He denies family history of colon cancer/polyps. His last EGD in 2011 showed candida esophagitis and duodenal ulcers. CT abd/pelvis showed small HH and hepatic/renal cysts. Past Medical History:   Diagnosis Date    Abnormal ultrasound of carotid artery     CTA was normal    Back pain     epidual injectiion for back       Chronic back pain     MRI  Normal    Diabetes mellitus ([facility] [address]     DJD (degenerative joint disease)     Rt.  Hip    Fibromyalgia     Post Traumatic/Myofascial Pain    Hypertension     Kidney stones         Paroxysmal A-fib [facility])      Past Surgical History:   Procedure Laterality Date    BACK SURGERY      LITHOTRIPSY      OTHER SURGICAL HISTORY  2021    laparoscopic ventral hernia repair with mesh    UPPER GASTROINTESTINAL ENDOSCOPY  2011    UPPER GASTROINTESTINAL ENDOSCOPY  11 glucagon (rDNA), dextrose, sodium chloride flush, sodium chloride, polyethylene glycol, acetaminophen **OR** acetaminophen, prochlorperazine, magnesium sulfate, potassium chloride  Allergies: Allergies   Allergen Reactions    Amitriptyline     Citalopram Hydrobromide     Naproxen     Other      No SSRI's    Paroxetine     Pcn [Penicillins]     Penicillins        ROS:   Constitutional: negative for chills, fevers and sweats. Positive for fatigue. Eyes: negative for cataracts, icterus and redness  Ears, nose, mouth, throat, and face: negative for epistaxis, hearing loss and sore throat  Respiratory: negative for hemoptysis and sputum. Positive for cough. Cardiovascular: negative for chest pain, and lower extremity edema. Positive for dyspnea. Gastrointestinal: as per HPI  Genitourinary:negative for dysuria, frequency and hematuria  Neurological: negative for coordination problems, dizziness and gait problems  Behavioral/Psych: negative for anxiety, depression and mood swings    Physical Exam   BP (!) 142/75   Pulse 89   Temp 97.7 °F (36.5 °C) (Oral)   Resp 18   Ht 6' (1.829 m)   Wt 181 lb 14.4 oz (82.5 kg)   SpO2 98%   BMI 24.67 kg/m²       General appearance: alert, appears stated age, cooperative, and fatigued  Head: Normocephalic, without obvious abnormality, atraumatic  Eyes: conjunctivae/corneas clear. PERRL, EOM's intact. Fundi benign.   Neck: supple, symmetrical, trachea midline  Lungs: clear to auscultation bilaterally  Heart: regular rate and rhythm, S1, S2 normal, no murmur, click, rub or gallop  Abdomen: normal findings: no masses palpable and symmetric and abnormal findings:  distended, hyperactive bowel sounds, and tenderness mild in the entire abdomen  Extremities: extremities normal, atraumatic, no cyanosis or edema    Lab and Imaging Review   Labs:  CBC:   Recent Labs     09/14/22  1618 09/15/22  0521   WBC 13.6* 10.2   HGB 15.7 14.0   HCT 44.5 40.5   MCV 84.6 84.5    187     BMP: Recent Labs     09/14/22  1618 09/15/22  0521   * 135*   K 3.6 3.5   CL 87* 98*   CO2 23 19*   BUN 44* 32*   CREATININE 1.4* 1.0     LIVER PROFILE:   Recent Labs     09/14/22  1618 09/15/22  0521   * 102*   ALT 42* 44*   LIPASE 22.0  --    PROT 8.7* 6.7   BILITOT 2.8* 2.2*   ALKPHOS 116 94     PT/INR: No results for input(s): INR in the last 72 hours. Invalid input(s): PT      IMAGING:  CT ABDOMEN PELVIS W IV CONTRAST Additional Contrast? None   Final Result   1. No acute findings in the abdomen or pelvis. 2. Punctate bilateral intrarenal calculi. 3. Small hiatal hernia. 4. Hepatic and bilateral renal cysts. XR CHEST PORTABLE   Final Result   No radiographic evidence of an acute cardiopulmonary process. Attending Supervising [address] [place] [address] Attestation Statement  The patient is a 74 y.o. male. I have performed a history and physical examination of the patient. I discussed the case with my physician assistant Loistine Spatz PA-C    I reviewed the patient's Past Medical History, Past Surgical History, Medications, and Allergies. Physical Exam:  Vitals:    09/15/22 0829 09/15/22 1309 09/15/22 1311 09/15/22 1315   BP:  (!) 85/45 (!) 74/49 (!) 93/56   Pulse:  72  75   Resp: 18 14     Temp:  97.3 °F (36.3 °C)     TempSrc:  Oral     SpO2:  91%     Weight:       Height:           Physical Examination: General appearance - chronically ill appearing  Mental status - alert, oriented to person, place, and time  Eyes - sclera anicteric  Neck - supple, no significant adenopathy  Chest - not examined  Heart - normal rate and regular rhythm  Abdomen - no rebound tenderness noted  Extremities - no pedal edema noted       Assessment:     74year old male with a history of DM, GERD, chronic back pain, and PAF admitted with nausea, vomiting, and lower abdominal pain concerning for PUD, Suboxone withdrawal, vs hyperemesis cannabis.       Plan:   Continue supportive care  Monitor and document output  Monitor and replenish electrolytes  Continue Pantoprazole 40 mg qAM  Continue Dicyclomine TID  Bowel regimen with Miralax daily  Await hepatitis panel results   Check RUQ US   Continue low fat diet as tolerated  Ambulate/Up in bed or chair TID   marijuana abstinence   Will follow  Will consider EGD if not improved        Danielle Kirkland PA-C  12:17 PM 9/15/2022                      68-year-old male with history of hypertension, ABDs, atrial fibrillation, chronic back pain status post surgery, degenerative disc disease, Nephrolithiasis, fibromyalgia, substance abuse admitted with nausea, vomiting and abdominal pain in the setting of recent discontinuation of Suboxone therapy. Differential includes hyperemesis cannabis, Suboxone withdrawal, gastritis and peptic ulcer disease    Continue supportive care. Continue PPI daily and dicyclomine 3 times daily. Check liver serologies including hepatitis panel. Check right upper quadrant ultrasound. Counseled marijuana abstinence. We will consider outpatient EGD if symptoms persist.    Robin Calero MD          [phone]  Shari Sa

## 2022-09-15 NOTE — PROGRESS NOTES
4 Eyes Skin Assessment     The patient is being assess for   Admission    I agree that 2 RN's have performed a thorough Head to Toe Skin Assessment on the patient. ALL assessment sites listed below have been assessed. Areas assessed for pressure by both nurses:   [x]   Head, Face, and Ears   [x]   Shoulders, Back, and Chest, Abdomen  [x]   Arms, Elbows, and Hands   [x]   Coccyx, Sacrum, and Ischium  [x]   Legs, Feet, and Heels    No skin concerns at this time    Skin Assessed Under all Medical Devices by both nurses:  N/A               All Mepilex Borders were peeled back and area peeked at by both nurses:  No: N/A  Please list where Mepilex Borders are located:  N/A             **SHARE this note so that the co-signing nurse is able to place an eSignature**    Co-signer eSignature: Electronically signed by Tiffany Brenner RN on 9/15/22 at 8:23 AM EDT    Does the Patient have Skin Breakdown related to pressure?   No          [place] Prevention initiated:  Yes   Wound Care Orders initiated:  No      [facility] nurse consulted for Pressure Injury (Stage 3,4, Unstageable, DTI, NWPT, Complex wounds)and New or Established Ostomies:  No      Primary Nurse eSignature: Electronically signed by Jan Blackman RN on 9/15/22 at 7:42 AM EDT

## 2022-09-15 NOTE — FLOWSHEET NOTE
09/15/22 0800   Vital Signs   Temp 97.7 °F (36.5 °C)   Temp Source Oral   Heart Rate 89   Heart Rate Source Monitor   BP (!) 142/75   MAP (Calculated) 97.33   Level of Consciousness 0   Pain Assessment   Pain Assessment 0-10   Pain Level 7   Pain Location Abdomen   Pain Orientation Lower   Pain Descriptors Aching   Pain Type Acute pain   Opioid-Induced Sedation   POSS Score 1   Oxygen Therapy   SpO2 98 %   O2 Device None (Room air)     Patient complains of nausea and pain level 7 on scale 0-10 upper abdominal. Complains of constipation x 3 days. Abdomen tender soft, VSS. Restless. Unable to lie still in bed. Medicated as ordered for pain, and constipation. Mediated for nausea per off going nurse. Will continue to monitor, bed in low position, call bell and bedside table within reach.

## 2022-09-15 NOTE — ED NOTES
Report given to [facility]. Hugh Barnes transporting pt to floor via cot.      Malcom Sorensen RN  09/14/22 [id number]

## 2022-09-15 NOTE — ACP (ADVANCE CARE PLANNING)
Advance Care Planning     General Advance Care Planning (ACP) Conversation    Date of Conversation: 9/14/2022  Conducted with: Patient with Decision Making Capacity    Healthcare Decision Maker:  Pt declines. No healthcare decision makers have been documented. Click here to complete [address] including selection of the Healthcare Decision Maker Relationship (ie \"Primary\")      Content/Action Overview:    Pt remains Full Code.     Kellie Alarcon RN

## 2022-09-16 ENCOUNTER — APPOINTMENT (OUTPATIENT)
Dept: ULTRASOUND IMAGING | Age: 72
DRG: 897 | End: 2022-09-16
Payer: MEDICARE

## 2022-09-16 LAB
A/G RATIO: 1.5 (ref 1.1–2.2)
ALBUMIN SERPL-MCNC: 3.8 G/DL (ref 3.4–5)
ALP BLD-CCNC: 88 U/L (ref 40–129)
ALT SERPL-CCNC: 38 U/L (ref 10–40)
ANION GAP SERPL CALCULATED.3IONS-SCNC: 12 MMOL/L (ref 3–16)
AST SERPL-CCNC: 59 U/L (ref 15–37)
BILIRUB SERPL-MCNC: 1.8 MG/DL (ref 0–1)
BUN BLDV-MCNC: 24 MG/DL (ref 7–20)
CALCIUM SERPL-MCNC: 8.5 MG/DL (ref 8.3–10.6)
CHLORIDE BLD-SCNC: 103 MMOL/L (ref 99–110)
CO2: 22 MMOL/L (ref 21–32)
CREAT SERPL-MCNC: 0.9 MG/DL (ref 0.8–1.3)
FERRITIN: 703.7 NG/ML (ref 30–400)
FOLATE: 10.21 NG/ML (ref 4.78–24.2)
GFR AFRICAN AMERICAN: >60
GFR NON-AFRICAN AMERICAN: >60
GLUCOSE BLD-MCNC: 111 MG/DL (ref 70–99)
GLUCOSE BLD-MCNC: 113 MG/DL (ref 70–99)
GLUCOSE BLD-MCNC: 116 MG/DL (ref 70–99)
GLUCOSE BLD-MCNC: 118 MG/DL (ref 70–99)
GLUCOSE BLD-MCNC: 119 MG/DL (ref 70–99)
GLUCOSE BLD-MCNC: 121 MG/DL (ref 70–99)
GLUCOSE BLD-MCNC: 131 MG/DL (ref 70–99)
IRON SATURATION: 52 % (ref 20–50)
IRON: 103 UG/DL (ref 59–158)
LACTIC ACID: 1.2 MMOL/L (ref 0.4–2)
PERFORMED ON: ABNORMAL
POTASSIUM REFLEX MAGNESIUM: 3.6 MMOL/L (ref 3.5–5.1)
SODIUM BLD-SCNC: 137 MMOL/L (ref 136–145)
TOTAL IRON BINDING CAPACITY: 197 UG/DL (ref 260–445)
TOTAL PROTEIN: 6.3 G/DL (ref 6.4–8.2)
VITAMIN B-12: 369 PG/ML (ref 211–911)

## 2022-09-16 PROCEDURE — 2580000003 HC RX 258: Performed by: INTERNAL MEDICINE

## 2022-09-16 PROCEDURE — 83540 ASSAY OF IRON: CPT

## 2022-09-16 PROCEDURE — 82746 ASSAY OF FOLIC ACID SERUM: CPT

## 2022-09-16 PROCEDURE — 6370000000 HC RX 637 (ALT 250 FOR IP): Performed by: INTERNAL MEDICINE

## 2022-09-16 PROCEDURE — 99232 SBSQ HOSP IP/OBS MODERATE 35: CPT | Performed by: INTERNAL MEDICINE

## 2022-09-16 PROCEDURE — 36415 COLL VENOUS BLD VENIPUNCTURE: CPT

## 2022-09-16 PROCEDURE — 6370000000 HC RX 637 (ALT 250 FOR IP): Performed by: PHYSICIAN ASSISTANT

## 2022-09-16 PROCEDURE — C9113 INJ PANTOPRAZOLE SODIUM, VIA: HCPCS | Performed by: INTERNAL MEDICINE

## 2022-09-16 PROCEDURE — 6360000002 HC RX W HCPCS: Performed by: INTERNAL MEDICINE

## 2022-09-16 PROCEDURE — 83550 IRON BINDING TEST: CPT

## 2022-09-16 PROCEDURE — 82607 VITAMIN B-12: CPT

## 2022-09-16 PROCEDURE — 82105 ALPHA-FETOPROTEIN SERUM: CPT

## 2022-09-16 PROCEDURE — 80053 COMPREHEN METABOLIC PANEL: CPT

## 2022-09-16 PROCEDURE — 82103 ALPHA-1-ANTITRYPSIN TOTAL: CPT

## 2022-09-16 PROCEDURE — 82104 ALPHA-1-ANTITRYPSIN PHENO: CPT

## 2022-09-16 PROCEDURE — 82728 ASSAY OF FERRITIN: CPT

## 2022-09-16 PROCEDURE — 1200000000 HC SEMI PRIVATE

## 2022-09-16 PROCEDURE — 86038 ANTINUCLEAR ANTIBODIES: CPT

## 2022-09-16 PROCEDURE — 76705 ECHO EXAM OF ABDOMEN: CPT

## 2022-09-16 PROCEDURE — 86015 ACTIN ANTIBODY EACH: CPT

## 2022-09-16 RX ORDER — CALCIUM CARBONATE 200(500)MG
500 TABLET,CHEWABLE ORAL 3 TIMES DAILY PRN
Status: DISCONTINUED | OUTPATIENT
Start: 2022-09-16 | End: 2022-09-17 | Stop reason: HOSPADM

## 2022-09-16 RX ORDER — SODIUM CHLORIDE 9 MG/ML
INJECTION, SOLUTION INTRAVENOUS CONTINUOUS
Status: DISCONTINUED | OUTPATIENT
Start: 2022-09-16 | End: 2022-09-17

## 2022-09-16 RX ORDER — BISACODYL 10 MG
10 SUPPOSITORY, RECTAL RECTAL DAILY PRN
Status: DISCONTINUED | OUTPATIENT
Start: 2022-09-16 | End: 2022-09-17 | Stop reason: HOSPADM

## 2022-09-16 RX ADMIN — Medication 10 ML: at 09:07

## 2022-09-16 RX ADMIN — DICYCLOMINE HYDROCHLORIDE 10 MG: 10 CAPSULE ORAL at 12:08

## 2022-09-16 RX ADMIN — CLONIDINE HYDROCHLORIDE 0.1 MG: 0.1 TABLET ORAL at 21:12

## 2022-09-16 RX ADMIN — ACETAMINOPHEN 650 MG: 325 TABLET ORAL at 21:12

## 2022-09-16 RX ADMIN — PANTOPRAZOLE SODIUM 40 MG: 40 INJECTION, POWDER, FOR SOLUTION INTRAVENOUS at 09:07

## 2022-09-16 RX ADMIN — PROCHLORPERAZINE EDISYLATE 10 MG: 5 INJECTION INTRAMUSCULAR; INTRAVENOUS at 06:03

## 2022-09-16 RX ADMIN — POLYETHYLENE GLYCOL (3350) 17 G: 17 POWDER, FOR SOLUTION ORAL at 09:07

## 2022-09-16 RX ADMIN — DICYCLOMINE HYDROCHLORIDE 10 MG: 10 CAPSULE ORAL at 16:55

## 2022-09-16 RX ADMIN — PROCHLORPERAZINE EDISYLATE 10 MG: 5 INJECTION INTRAMUSCULAR; INTRAVENOUS at 21:12

## 2022-09-16 RX ADMIN — Medication 10 ML: at 21:13

## 2022-09-16 RX ADMIN — CLONIDINE HYDROCHLORIDE 0.1 MG: 0.1 TABLET ORAL at 09:07

## 2022-09-16 RX ADMIN — APIXABAN 5 MG: 5 TABLET, FILM COATED ORAL at 09:07

## 2022-09-16 RX ADMIN — SODIUM CHLORIDE: 9 INJECTION, SOLUTION INTRAVENOUS at 21:16

## 2022-09-16 RX ADMIN — DICYCLOMINE HYDROCHLORIDE 10 MG: 10 CAPSULE ORAL at 06:03

## 2022-09-16 RX ADMIN — CALCIUM CARBONATE 500 MG: 500 TABLET, CHEWABLE ORAL at 21:12

## 2022-09-16 RX ADMIN — APIXABAN 5 MG: 5 TABLET, FILM COATED ORAL at 21:12

## 2022-09-16 RX ADMIN — SODIUM CHLORIDE: 9 INJECTION, SOLUTION INTRAVENOUS at 13:09

## 2022-09-16 RX ADMIN — POLYETHYLENE GLYCOL (3350) 17 G: 17 POWDER, FOR SOLUTION ORAL at 17:01

## 2022-09-16 ASSESSMENT — PAIN SCALES - GENERAL
PAINLEVEL_OUTOF10: 8
PAINLEVEL_OUTOF10: 4
PAINLEVEL_OUTOF10: 8

## 2022-09-16 ASSESSMENT — PAIN DESCRIPTION - ONSET: ONSET: ON-GOING

## 2022-09-16 ASSESSMENT — PAIN DESCRIPTION - FREQUENCY: FREQUENCY: INTERMITTENT

## 2022-09-16 ASSESSMENT — PAIN DESCRIPTION - PAIN TYPE: TYPE: ACUTE PAIN

## 2022-09-16 ASSESSMENT — PAIN - FUNCTIONAL ASSESSMENT
PAIN_FUNCTIONAL_ASSESSMENT: ACTIVITIES ARE NOT PREVENTED
PAIN_FUNCTIONAL_ASSESSMENT: ACTIVITIES ARE NOT PREVENTED

## 2022-09-16 ASSESSMENT — PAIN DESCRIPTION - ORIENTATION
ORIENTATION: LOWER
ORIENTATION: LOWER

## 2022-09-16 ASSESSMENT — PAIN DESCRIPTION - LOCATION
LOCATION: BACK

## 2022-09-16 ASSESSMENT — PAIN DESCRIPTION - DESCRIPTORS
DESCRIPTORS: DISCOMFORT
DESCRIPTORS: DISCOMFORT

## 2022-09-16 NOTE — PROGRESS NOTES
Handoff report and transfer of care given at bedside to [facility]. Patient in stable condition, denies needs/concerns at this time. Call light within reach.

## 2022-09-16 NOTE — PROGRESS NOTES
Progress Note    Admit Date:  9/14/2022    74 y.o. male presents with N/V, abd pain, inability to tolerate PO, SOB, cough, chest, congestion, fatigue. Pt reports Sx started 2 days ago. He has been unable to tolerate PO since the onset including being unable to tolerate his PO Rx. He reports crampy lower abd pain. Exac by throwing up,   He ahs had associated SOB, fatigue, URI Sx. He is not requiring O2. Subjective:  Mr. Gruber Lamp still very nauseous today. Abd pain persists. Unable to tolerate diet. 9/16  C/o back pain. Nausea and abd pain improving. Tolerating some liquids. Unable to tolerate solid PO. Objective:   Patient Vitals for the past 4 hrs:   BP Temp Temp src Pulse Resp   09/16/22 0900 119/64 97.5 °F (36.4 °C) Oral 72 16          Intake/Output Summary (Last 24 hours) at 9/16/2022 1022  Last data filed at 9/16/2022 0900  Gross per 24 hour   Intake 941.98 ml   Output 1325 ml   Net -383.02 ml       Physical Exam:    Gen: Moderate distress. Alert. Ill-appearing  Eyes: PERRL. No sclera icterus. No conjunctival injection. ENT: No discharge. Pharynx clear. Neck: No JVD. Trachea midline. Resp: No accessory muscle use. No crackles. No wheezes. No rhonchi. CV: Regular rate. Regular rhythm. No murmur. No rub. No edema. Capillary Refill: Brisk,< 3 seconds   Peripheral Pulses: +2 palpable, equal bilaterally   GI: Significant TTP in bilateral lower quadrants. Non-distended. Normal bowel sounds. Skin: Warm and dry. No nodule on exposed extremities. No rash on exposed extremities. M/S: No cyanosis. No joint deformity. No clubbing. Neuro: Awake. Grossly nonfocal    Psych: Oriented x 3. No anxiety or agitation. Gerardo Way MD have [facility] and personally interviewed and examined patient, reviewed the data (labs and imaging) and after discussion with my PA formulated the plan. Agree with note with the following edits.     HPI:     I reviewed the cholecystitis      Fatty liver      Suspected tiny right-sided renal calculus      RECOMMENDATIONS:   Unavailable         CT ABDOMEN PELVIS W IV CONTRAST Additional Contrast? None   Final Result   1. No acute findings in the abdomen or pelvis. 2. Punctate bilateral intrarenal calculi. 3. Small hiatal hernia. 4. Hepatic and bilateral renal cysts. XR CHEST PORTABLE   Final Result   No radiographic evidence of an acute cardiopulmonary process. Assessment/Plan:  #Nausea and vomiting   #Lower abd pain   -unable to tolerate PO.    - etiology likely drug withdrawal. Ran out of suboxone 3 days ago and started symptoms  CT abd neg   -IVF, PRN antiemetics. Stop narcotics  - started on bentyl. Imodium, prn clonidine  - recommend out pt rehab follow up for narcotic dependancy    #Transaminitis - improving  #Hyperbilirubinemia - improving  -No overt TTP in RUQ, ct abd with no gall stones or obstruction  -Check hep panel - negative   -GI cs  - wbc high on adm, but now normal   -RUQ US with fatty liver, no GB pathology    #YENY- pre renal   -Cr 1.4, baseline 0.8-1.0.    -IVF   -Cr improved this am    -Avoid nephrotoxins. #SIRS (RR, WBC; lactic). -No clear source. -Given Merrem in ED.    -Will hold off on additional ABX at this time. #Lactic acidosis  - sec to emesis   -4.0. IVF and repeats ordered.   -3.2 today    #Constipation  -? Contributing to above  -states he has not had BM in 3 days, he is passing gas  -no large stool burden on CT abd  -Glycolax PRN    #Difficulty urinating  -no dysuria until after hospital admission  -Patient states he has had slow urinary stream. With lower abd pain will screen for incomplete voiding.   -bladder scan performed after urination and was not retaining    #DM2  -hold oral Rx  -chk A1c  -add Low SSI q4h. #Hx PAF  -Currently sinus  -Cont home Eliquis.      #GERD  -PPI    #Chronic back pain  - pt reports chronic opiate abuse and recently been on suboxone

## 2022-09-16 NOTE — PROGRESS NOTES
PROGRESS NOTE  S:72 yrs Patient  admitted on 9/14/2022 with Lactic acidosis [E87.2]  SIRS (systemic inflammatory response syndrome) ([facility]) [R65.10]  YENY (acute kidney injury) ([facility] [address] [N17.9]  Intractable vomiting with nausea, unspecified vomiting type [R11.2] . Today he complains of nausea, abdominal distention, and decreased appetite. His last BM was 3 days ago. Exam:   Vitals:    09/16/22 1330   BP: 112/66   Pulse: 65   Resp: 16   Temp: 97.5 °F (36.4 °C)   SpO2: 100%      General appearance: alert, appears stated age, cooperative, and no distress  HEENT: Neck supple with midline trachea  Neck: supple, symmetrical, trachea midline  Lungs: clear to auscultation bilaterally  Heart: regular rate and rhythm, S1, S2 normal, no murmur, click, rub or gallop  Abdomen: normal findings: bowel sounds normal, no masses palpable, soft, non-tender, and symmetric and abnormal findings:  distended and obese  Extremities: extremities normal, atraumatic, no cyanosis or edema     Medications: Reviewed    Labs:  CBC:   Recent Labs     09/14/22  1618 09/15/22  0521   WBC 13.6* 10.2   HGB 15.7 14.0   HCT 44.5 40.5   MCV 84.6 84.5    187     BMP:   Recent Labs     09/14/22  1618 09/15/22  0521 09/16/22  0526   * 135* 137   K 3.6 3.5 3.6   CL 87* 98* 103   CO2 23 19* 22   BUN 44* 32* 24*   CREATININE 1.4* 1.0 0.9     LIVER PROFILE:   Recent Labs     09/14/22  1618 09/15/22  0521 09/16/22  0526   * 102* 59*   ALT 42* 44* 38   LIPASE 22.0  --   --    PROT 8.7* 6.7 6.3*   BILITOT 2.8* 2.2* 1.8*   ALKPHOS 116 94 88     PT/INR: No results for input(s): INR in the last 72 hours. Invalid input(s): PT      IMAGING:  US GALLBLADDER RUQ   Final Result   No cholelithiasis or cholecystitis      Fatty liver      Suspected tiny right-sided renal calculus      RECOMMENDATIONS:   Unavailable         CT ABDOMEN PELVIS W IV CONTRAST Additional Contrast? None   Final Result   1.  No acute findings in the abdomen or pelvis. 2. Punctate bilateral intrarenal calculi. 3. Small hiatal hernia. 4. Hepatic and bilateral renal cysts. XR CHEST PORTABLE   Final Result   No radiographic evidence of an acute cardiopulmonary process. Impression: 74year old male with a history of DM, GERD, chronic back pain, and PAF admitted with nausea, vomiting, and lower abdominal pain concerning for PUD, gastritis, Suboxone withdrawal, vs hyperemesis cannabis. RUQ US showed hepatic steatosis.       Recommendation:  Continue supportive care  Monitor and document output  Monitor and replenish electrolytes  Continue Pantoprazole 40 mg qAM  Continue Dicyclomine TID  Bowel regimen with Miralax daily  Check liver serologies  Continue low fat diet as tolerated  Ambulate/Up in bed or chair TID   marijuana abstinence   Will follow  Will consider outpatient EGD if not improved        Danielle Kirkland PA-C  2:42 PM 9/16/2022

## 2022-09-16 NOTE — PROGRESS NOTES
Shift assessment complete; see flow sheet. Scheduled medications administered; See MAR. IV infusing without difficulty. Pt c/o headache PRN tylenol given at this time. Pt denies any needs at this time.  Pt educated on use of call light and to call out with needs, verbalized understanding, bed in low locked position for pt safety

## 2022-09-16 NOTE — PROGRESS NOTES
Handoff report and transfer of care given at bedside to  Tosin Prieto RN. Patient in stable condition, denies needs/concerns at this time. Call light within reach. No s/s distress noted.

## 2022-09-16 NOTE — PLAN OF CARE
Problem: Discharge Planning  Goal: Discharge to home or other facility with appropriate resources  Outcome: Progressing     Problem: Safety - Adult  Goal: Free from fall injury  Outcome: Progressing     Problem: Pain  Goal: Verbalizes/displays adequate comfort level or baseline comfort level  Outcome: Progressing     Problem: ABCDS Injury Assessment  Goal: Absence of physical injury  Outcome: Progressing     Problem: Chronic Conditions and Co-morbidities  Goal: Patient's chronic conditions and co-morbidity symptoms are monitored and maintained or improved  Outcome: Progressing

## 2022-09-17 VITALS
OXYGEN SATURATION: 100 % | HEIGHT: 72 IN | HEART RATE: 77 BPM | TEMPERATURE: 97 F | WEIGHT: 181.9 LBS | DIASTOLIC BLOOD PRESSURE: 78 MMHG | RESPIRATION RATE: 16 BRPM | SYSTOLIC BLOOD PRESSURE: 119 MMHG | BODY MASS INDEX: 24.64 KG/M2

## 2022-09-17 PROBLEM — R10.9 ABDOMINAL PAIN: Status: ACTIVE | Noted: 2022-09-17

## 2022-09-17 PROBLEM — F19.10 POLYSUBSTANCE ABUSE (HCC): Status: ACTIVE | Noted: 2022-09-17

## 2022-09-17 LAB
A/G RATIO: 1.9 (ref 1.1–2.2)
ALBUMIN SERPL-MCNC: 3.9 G/DL (ref 3.4–5)
ALP BLD-CCNC: 85 U/L (ref 40–129)
ALT SERPL-CCNC: 34 U/L (ref 10–40)
ANION GAP SERPL CALCULATED.3IONS-SCNC: 13 MMOL/L (ref 3–16)
ANTI-NUCLEAR ANTIBODY (ANA): NEGATIVE
AST SERPL-CCNC: 38 U/L (ref 15–37)
BILIRUB SERPL-MCNC: 1.9 MG/DL (ref 0–1)
BUN BLDV-MCNC: 20 MG/DL (ref 7–20)
CALCIUM SERPL-MCNC: 8.4 MG/DL (ref 8.3–10.6)
CHLORIDE BLD-SCNC: 102 MMOL/L (ref 99–110)
CO2: 20 MMOL/L (ref 21–32)
CREAT SERPL-MCNC: 0.9 MG/DL (ref 0.8–1.3)
GFR AFRICAN AMERICAN: >60
GFR NON-AFRICAN AMERICAN: >60
GLUCOSE BLD-MCNC: 114 MG/DL (ref 70–99)
GLUCOSE BLD-MCNC: 114 MG/DL (ref 70–99)
GLUCOSE BLD-MCNC: 119 MG/DL (ref 70–99)
PERFORMED ON: ABNORMAL
PERFORMED ON: ABNORMAL
POTASSIUM REFLEX MAGNESIUM: 3.7 MMOL/L (ref 3.5–5.1)
SODIUM BLD-SCNC: 135 MMOL/L (ref 136–145)
TOTAL PROTEIN: 6 G/DL (ref 6.4–8.2)

## 2022-09-17 PROCEDURE — 6360000002 HC RX W HCPCS: Performed by: INTERNAL MEDICINE

## 2022-09-17 PROCEDURE — 36415 COLL VENOUS BLD VENIPUNCTURE: CPT

## 2022-09-17 PROCEDURE — 6370000000 HC RX 637 (ALT 250 FOR IP): Performed by: INTERNAL MEDICINE

## 2022-09-17 PROCEDURE — 6370000000 HC RX 637 (ALT 250 FOR IP): Performed by: PHYSICIAN ASSISTANT

## 2022-09-17 PROCEDURE — 80053 COMPREHEN METABOLIC PANEL: CPT

## 2022-09-17 PROCEDURE — 2580000003 HC RX 258: Performed by: INTERNAL MEDICINE

## 2022-09-17 PROCEDURE — 99238 HOSP IP/OBS DSCHRG MGMT 30/<: CPT | Performed by: INTERNAL MEDICINE

## 2022-09-17 RX ORDER — DICYCLOMINE HYDROCHLORIDE 10 MG/1
CAPSULE ORAL
Qty: 60 CAPSULE | Refills: 0 | Status: SHIPPED | OUTPATIENT
Start: 2022-09-17 | End: 2022-11-04 | Stop reason: SDUPTHER

## 2022-09-17 RX ORDER — PANTOPRAZOLE SODIUM 40 MG/1
40 TABLET, DELAYED RELEASE ORAL
Status: DISCONTINUED | OUTPATIENT
Start: 2022-09-18 | End: 2022-09-17 | Stop reason: HOSPADM

## 2022-09-17 RX ADMIN — DICYCLOMINE HYDROCHLORIDE 10 MG: 10 CAPSULE ORAL at 09:02

## 2022-09-17 RX ADMIN — CLONIDINE HYDROCHLORIDE 0.1 MG: 0.1 TABLET ORAL at 09:02

## 2022-09-17 RX ADMIN — ACETAMINOPHEN 650 MG: 325 TABLET ORAL at 03:09

## 2022-09-17 RX ADMIN — POLYETHYLENE GLYCOL (3350) 17 G: 17 POWDER, FOR SOLUTION ORAL at 09:02

## 2022-09-17 RX ADMIN — SODIUM CHLORIDE: 9 INJECTION, SOLUTION INTRAVENOUS at 04:42

## 2022-09-17 RX ADMIN — DICYCLOMINE HYDROCHLORIDE 10 MG: 10 CAPSULE ORAL at 04:38

## 2022-09-17 RX ADMIN — PROCHLORPERAZINE EDISYLATE 10 MG: 5 INJECTION INTRAMUSCULAR; INTRAVENOUS at 03:10

## 2022-09-17 RX ADMIN — CALCIUM CARBONATE 500 MG: 500 TABLET, CHEWABLE ORAL at 04:38

## 2022-09-17 RX ADMIN — APIXABAN 5 MG: 5 TABLET, FILM COATED ORAL at 09:02

## 2022-09-17 ASSESSMENT — PAIN DESCRIPTION - DESCRIPTORS
DESCRIPTORS: DISCOMFORT
DESCRIPTORS: DISCOMFORT

## 2022-09-17 ASSESSMENT — PAIN DESCRIPTION - ORIENTATION
ORIENTATION: LOWER
ORIENTATION: LOWER

## 2022-09-17 ASSESSMENT — PAIN SCALES - GENERAL
PAINLEVEL_OUTOF10: 4
PAINLEVEL_OUTOF10: 4

## 2022-09-17 ASSESSMENT — PAIN DESCRIPTION - PAIN TYPE
TYPE: ACUTE PAIN;CHRONIC PAIN
TYPE: ACUTE PAIN;CHRONIC PAIN

## 2022-09-17 ASSESSMENT — PAIN DESCRIPTION - LOCATION
LOCATION: BACK
LOCATION: BACK;ABDOMEN

## 2022-09-17 ASSESSMENT — PAIN SCALES - WONG BAKER
WONGBAKER_NUMERICALRESPONSE: 0
WONGBAKER_NUMERICALRESPONSE: 0

## 2022-09-17 ASSESSMENT — PAIN DESCRIPTION - FREQUENCY
FREQUENCY: INTERMITTENT
FREQUENCY: INTERMITTENT

## 2022-09-17 ASSESSMENT — PAIN DESCRIPTION - ONSET
ONSET: ON-GOING
ONSET: ON-GOING

## 2022-09-17 NOTE — DISCHARGE INSTRUCTIONS
Your information:  Name: Darrius Osullivan  : 1950    Your instructions: Follow up with family doctor in 2 weeks    What to do after you leave the hospital:    Recommended diet: diabetic diet    Recommended activity: activity as tolerated        The following personal items were collected during your admission and were returned to you:    Belongings  Dental Appliances: None  Vision - Corrective Lenses: Eyeglasses  Hearing Aid: None  Clothing: Shirt, Shorts, Footwear, Socks, Undergarments  Jewelry: None  Body Piercings Removed: N/A  Electronic Devices: None  Weapons (Notify Protective Services/Security): None  Other Valuables: At home  Home Medications: None  Valuables Given To: Patient  Provide Name(s) of Who Valuable(s) Were Given To: N/A  Responsible person(s) in the waiting room: N/A  Patient approves for provider to speak to responsible person post operatively: No    Information obtained by:  By signing below, I understand that if any problems occur once I leave the hospital I am to contact family doctor. I understand and acknowledge receipt of the instructions indicated above.

## 2022-09-17 NOTE — FLOWSHEET NOTE
09/16/22 2024   Vital Signs   Temp 99.4 °F (37.4 °C)   Temp Source Axillary   Heart Rate 71   Heart Rate Source Monitor   Resp 18   /76   BP Location Left upper arm   BP Method Automatic   MAP (Calculated) 92.67   Patient Position Sitting   Level of Consciousness 0   MEWS Score 1   Oxygen Therapy   SpO2 98 %   O2 Device None (Room air)   Assessment complete- see flowsheets. Pt resting in bed, PRN medication given per pt request within PRN order parameters- see eMAR and flowsheets for associated medication information. Call light within reach, pt aware to call for any needs or changes, further needs denied at this time. Will continue to monitor.   Lynsey Hallman RN

## 2022-09-17 NOTE — PLAN OF CARE
Problem: Safety - Adult  Goal: Free from fall injury  9/16/2022 2002 by Yao Lennon RN  Outcome: Progressing  9/16/2022 1001 by Kathryn Garcia RN  Outcome: Progressing     Problem: Pain  Goal: Verbalizes/displays adequate comfort level or baseline comfort level  9/16/2022 2002 by Yao Lennon RN  Outcome: Progressing  9/16/2022 1001 by Kathryn Garcia RN  Outcome: Progressing     Problem: ABCDS Injury Assessment  Goal: Absence of physical injury  9/16/2022 2002 by Yao Lennon RN  Outcome: Progressing  9/16/2022 1001 by Kathryn Garcia RN  Outcome: Progressing     Problem: Chronic Conditions and Co-morbidities  Goal: Patient's chronic conditions and co-morbidity symptoms are monitored and maintained or improved  9/16/2022 2002 by Yao Lennon RN  Outcome: Progressing  9/16/2022 1001 by Kathryn Garcia RN  Outcome: Progressing

## 2022-09-17 NOTE — PROGRESS NOTES
PROGRESS NOTE  S:72 yrs Patient  admitted on 9/14/2022 with Lactic acidosis [E87.2]  SIRS (systemic inflammatory response syndrome) ([facility]) [R65.10]  YENY (acute kidney injury) ([facility] [address] [N17.9]  Intractable vomiting with nausea, unspecified vomiting type [R11.2] . Feeling better today. Planning discharge    [facility]   [START ON 9/18/2022] pantoprazole  40 mg Oral QAM AC    dicyclomine  10 mg Oral TID AC    cloNIDine  0.1 mg Oral BID    polyethylene glycol  17 g Oral Daily    apixaban  5 mg Oral BID    sodium chloride flush  5-40 mL IntraVENous 2 times per day    insulin lispro  0-4 Units SubCUTAneous Q4H     Current Hospital IV Meds   dextrose      sodium chloride       Current Hospital PRN Meds  calcium carbonate, bisacodyl, loperamide, glucose, dextrose bolus **OR** dextrose bolus, glucagon (rDNA), dextrose, sodium chloride flush, sodium chloride, polyethylene glycol, acetaminophen **OR** acetaminophen, prochlorperazine, magnesium sulfate, potassium chloride    Exam:   Vitals:    09/17/22 0715   BP: 119/78   Pulse: 77   Resp: 16   Temp: 97 °F (36.1 °C)   SpO2: 100%     I/O last 3 completed shifts: In: 5053 [P.O.:840;  I.V.:4213]  Out: 1375 [Urine:1375]   General appearance: alert, appears stated age, and cooperative  HEENT: PERRLA  Neck: no adenopathy, no carotid bruit, no JVD, supple, symmetrical, trachea midline, and thyroid not enlarged, symmetric, no tenderness/mass/nodules  Lungs: clear to auscultation bilaterally  Heart: regular rate and rhythm, S1, S2 normal, no murmur, click, rub or gallop  Abdomen: soft, non-tender; bowel sounds normal; no masses,  no organomegaly  Extremities: extremities normal, atraumatic, no cyanosis or edema     Labs:  CBC:   Recent Labs     09/14/22  1618 09/15/22  0521   WBC 13.6* 10.2   HGB 15.7 14.0   HCT 44.5 40.5   MCV 84.6 84.5    187     BMP:   Recent Labs     09/15/22  0521 09/16/22  0526 09/17/22  0515   NA 135* 137 135*   K 3.5 3.6 3.7   CL 98* 103 102   CO2 19* 22 20*   BUN 32* 24* 20   CREATININE 1.0 0.9 0.9     LIVER PROFILE:   Recent Labs     09/14/22  1618 09/15/22  0521 09/16/22  0526 09/17/22  0515   * 102* 59* 38*   ALT 42* 44* 38 34   LIPASE 22.0  --   --   --    PROT 8.7* 6.7 6.3* 6.0*   BILITOT 2.8* 2.2* 1.8* 1.9*   ALKPHOS 116 94 88 85     PT/INR: No results for input(s): INR in the last 72 hours. Invalid input(s): PT    IMAGING:  US GALLBLADDER RUQ    Result Date: 9/16/2022  EXAMINATION: RIGHT UPPER QUADRANT ULTRASOUND 9/16/2022 7:27 am COMPARISON: None. HISTORY: ORDERING SYSTEM PROVIDED HISTORY: nasuea, vomiting, elevated LFTs TECHNOLOGIST PROVIDED HISTORY: Reason for exam:->nasuea, vomiting, elevated LFTs FINDINGS: Patient had difficulty with breath hold for the study. LIVER:  Increased echogenicity is seen throughout the liver. No intrahepatic ductal dilatation. No perihepatic fluid. BILIARY SYSTEM:  Gallbladder is unremarkable without evidence of pericholecystic fluid, wall thickening or stones. Negative sonographic Preciado's sign. Common bile duct is within normal limits measuring 3 mm. RIGHT KIDNEY: Cyst is seen in the right kidney measuring 3.4 cm. .  Punctate echogenic focus is seen in the right kidney, likely tiny nonobstructing renal calculi. PANCREAS:  Visualized portions of the pancreas are unremarkable. OTHER: No evidence of right upper quadrant ascites.      No cholelithiasis or cholecystitis Fatty liver Suspected tiny right-sided renal calculus RECOMMENDATIONS: [facility] Problems             Last Modified POA    * (Principal) YENY (acute kidney injury) ([facility] [address] 9/14/2022 Yes    Intractable vomiting 9/15/2022 Yes    SIRS (systemic inflammatory response syndrome) ([facility] [address] 9/15/2022 Yes    Lactic acidosis 9/15/2022 Yes    Polysubstance abuse ([facility] [address] 9/17/2022 Yes    Abdominal pain 9/17/2022 Yes      Impression:  74year old male with a history of DM, GERD, chronic back pain, and PAF admitted with nausea, vomiting, and lower abdominal pain concerning for PUD, gastritis, Suboxone withdrawal, vs hyperemesis cannabis. RUQ US showed hepatic steatosis.     Recommendation:  Planning discharge  Outpatient EGD as per Dr. Sánchez Hand,   12:35 PM 9/17/2022            [facility]    [address]      [address] [place] [address]     Phone: [phone]     Fax: [phone]

## 2022-09-17 NOTE — PROGRESS NOTES
Progress Note    Admit Date:  9/14/2022    74 y.o. male presents with N/V, abd pain, inability to tolerate PO, SOB, cough, chest, congestion, fatigue. Pt reports Sx started 2 days prior to admission. He has been unable to tolerate PO since the onset including being unable to tolerate his PO Rx. He reports crampy lower abd pain. Exac by throwing up,   He ahs had associated SOB, fatigue, URI Sx. He is not requiring O2. Subjective:  Mr. Kaylan Wilson is resting in bed. Stated he tolerated his food yesterday. Asking for breakfast.  Complains of chronic back pain. Objective:     Vitals:    09/16/22 1330 09/16/22 2024 09/16/22 2112 09/17/22 0147   BP: 112/66 126/76 126/76 113/74   Pulse: 65 71  75   Resp: 16 18 18   Temp: 97.5 °F (36.4 °C) 99.4 °F (37.4 °C)  98.3 °F (36.8 °C)   TempSrc: Oral Axillary  Axillary   SpO2: 100% 98%  98%   Weight:       Height:               Intake/Output Summary (Last 24 hours) at 9/17/2022 0833  Last data filed at 9/17/2022 0437  Gross per 24 hour   Intake 5053 ml   Output 975 ml   Net 4078 ml         Physical Exam:    Gen: Moderate distress. Alert. Ill-appearing  Eyes: PERRL. No sclera icterus. No conjunctival injection. ENT: No discharge. Pharynx clear. Neck: No JVD. Trachea midline. Resp: No accessory muscle use. No crackles. No wheezes. No rhonchi. CV: Regular rate. Regular rhythm. No murmur. No rub. No edema. Capillary Refill: Brisk,< 3 seconds   Peripheral Pulses: +2 palpable, equal bilaterally   GI: Non-tender TP. Non-distended. Normal bowel sounds. Skin: Warm and dry. No nodule on exposed extremities. No rash on exposed extremities. M/S: No cyanosis. No joint deformity. No clubbing. Neuro: Awake. Grossly nonfocal    Psych: Oriented x 3. No anxiety or agitation. SABINE Colmenares M.D.   have reviewed the chart on [facility] and personally interviewed and examined patient, reviewed the data (labs and imaging) and after discussion with my PA formulated the plan. Agree with note with the following edits. General:  elderly male Awake, alert and oriented. Appears to be not in any distress  Mucous Membranes:  Pink , anicteric  Neck: No JVD, no carotid bruit, no thyromegaly  Chest:  Clear to auscultation bilaterally, no added sounds  Cardiovascular:  RRR S1S2 heard, no murmurs or gallops  Abdomen:  Soft, undistended, non tender, no organomegaly, BS present  Extremities: No edema or cyanosis.  Distal pulses well felt  Neurological : grossly normal        Scheduled Meds:   dicyclomine  10 mg Oral TID AC    cloNIDine  0.1 mg Oral BID    polyethylene glycol  17 g Oral Daily    apixaban  5 mg Oral BID    pantoprazole  40 mg IntraVENous Daily    sodium chloride flush  5-40 mL IntraVENous 2 times per day    insulin lispro  0-4 Units SubCUTAneous Q4H       Continuous Infusions:   sodium chloride 100 mL/hr at 09/17/22 0442    dextrose      sodium chloride         PRN Meds:  calcium carbonate, bisacodyl, loperamide, glucose, dextrose bolus **OR** dextrose bolus, glucagon (rDNA), dextrose, sodium chloride flush, sodium chloride, polyethylene glycol, acetaminophen **OR** acetaminophen, prochlorperazine, magnesium sulfate, potassium chloride      Data:  CBC:   Recent Labs     09/14/22  1618 09/15/22  0521   WBC 13.6* 10.2   HGB 15.7 14.0   HCT 44.5 40.5   MCV 84.6 84.5    187       BMP:   Recent Labs     09/15/22  0521 09/16/22  0526 09/17/22  0515   * 137 135*   K 3.5 3.6 3.7   CL 98* 103 102   CO2 19* 22 20*   BUN 32* 24* 20   CREATININE 1.0 0.9 0.9       LIVER PROFILE:   Recent Labs     09/14/22  1618 09/15/22  0521 09/16/22  0526 09/17/22  0515   * 102* 59* 38*   ALT 42* 44* 38 34   LIPASE 22.0  --   --   --    BILITOT 2.8* 2.2* 1.8* 1.9*   ALKPHOS 116 94 88 85         CULTURES  Covid and flu not detected  Blood cx x2 NGTD     RADIOLOGY  US GALLBLADDER RUQ   Final Result   No cholelithiasis or cholecystitis      Fatty liver      Suspected tiny right-sided renal calculus      RECOMMENDATIONS:   Unavailable         CT ABDOMEN PELVIS W IV CONTRAST Additional Contrast? None   Final Result   1. No acute findings in the abdomen or pelvis. 2. Punctate bilateral intrarenal calculi. 3. Small hiatal hernia. 4. Hepatic and bilateral renal cysts. XR CHEST PORTABLE   Final Result   No radiographic evidence of an acute cardiopulmonary process. Assessment/Plan:  #Nausea and vomiting   #Lower abd pain   -unable to tolerate PO.  --now tolerating food   -etiology likely drug withdrawal. Ran out of suboxone 3 days ago and started symptoms  CT abd neg   - IVF, PRN antiemetics. Stop narcotics  - started on bentyl. Imodium, clonidine  - recommend out pt rehab follow up for narcotic dependency  Resources provided to patient  Tolerating diet today. - GI consulted, considering outpatient EGD if not improved     #Transaminitis - improving  #Hyperbilirubinemia - improving  -No overt TTP in RUQ, ct abd with no gall stones or obstruction  -Check hep panel - negative   -GI cs  -wbc high on adm, but now normal   -RUQ US with fatty liver, no GB pathology    #YENY- pre renal ---resolved   -Cr 1.4, baseline 0.8-1.0.    -IVF   -Avoid nephrotoxins. #SIRS (RR, WBC; lactic). -No clear source. -Given Merrem in ED.    -Will hold off on additional ABX at this time. #Constipation--resolved  -? Contributing to above  -states he has not had BM in 3 days, he is passing gas  -no large stool burden on CT abd  -Glycolax PRN  - denies any constipation today, last BM 9/17    #Difficulty urinating  -no dysuria until after hospital admission  -Patient states he has had slow urinary stream. With lower abd pain will screen for incomplete voiding.   -bladder scan performed after urination and was not retaining    #DM2  -hold oral Rx  -chk A1c  -add Low SSI q4h. #Hx PAF  -Currently sinus  -Cont home Eliquis.      #GERD  -PPI    #Chronic back pain  - pt reports chronic opiate abuse and recently been on suboxone from streets    #Prolonged Qtc  -509 ms  -avoid QT prolonging agents as able. DVT Prophylaxis: Eliquis  Diet: ADULT DIET; Regular; 4 carb choices (60 gm/meal)  Code Status: Full Code    Robyn Ernst, JEWEL - CNP   9/17/2022 8:33 AM      Discharge home. JOSE Demarco.

## 2022-09-17 NOTE — CARE COORDINATION
DISCHARGE ORDER  Date/Time 2022 3:32 PM  Completed by: Demario Baxter RN, Case Management    Patient Name: Samson Collazo      : 1950  Admitting Diagnosis: Lactic acidosis [E87.2]  SIRS (systemic inflammatory response syndrome) ([facility] [address] [R65.10]  YENY (acute kidney injury) ([facility] [address] [N17.9]  Intractable vomiting with nausea, unspecified vomiting type [R11.2]      Admit order Date and Status:2022  (verify MD's last order for status of admission)      Noted discharge order. If applicable PT/OT recommendation at Discharge: n/a  DME recommendation by PT/OT:n/a  Confirmed discharge plan  (pt): Yes  with whom______________pt_  If pt confirmed DC plan does family need to be contacted by CM No if yes who_____n/a_  Discharge Plan: Reviewed chart. Role of discharge planner explained and patient verbalized understanding. Pt is alert and oriented, Pt is from home [place] and is returning to home today. CM offered Home Care (HC/HHC) and pt declined. Discharge order is noted. Pt is being d/c'd to home today. Pt's O2 sats are 100% on RA. Self sufficient. No further discharge needs needed or noted. Date of Last IMM Given: 2022    Reviewed chart. Role of discharge planner explained and patient verbalized understanding. Discharge order is noted. Has Home O2 in place on admit:  No  Informed of need to bring portable home O2 tank on day of discharge for nursing to connect prior to leaving:   No  Verbalized agreement/Understanding:   No    Discharge timeout done with Sallie Rojas RN. All discharge needs and concerns addressed.

## 2022-09-17 NOTE — DISCHARGE SUMMARY
hospital admission  -Patient states he has had slow urinary stream. With lower abd pain will screen for incomplete voiding.   -bladder scan performed after urination and was not retaining     #DM2  -hold oral Rx  -chk A1c  -add Low SSI q4h. #Hx PAF  -Currently sinus  -Cont home Eliquis. #GERD  -PPI     #Chronic back pain  - pt reports chronic opiate abuse and recently been on suboxone from streets     #Prolonged Qtc  -509 ms  -avoid QT prolonging agents as able    Procedures (Please Review Full Report for Details)  N/a      Consults    GI      Physical Exam at Discharge:    /78   Pulse 77   Temp 97 °F (36.1 °C) (Oral)   Resp 16   Ht 6' (1.829 m)   Wt 181 lb 14.4 oz (82.5 kg)   SpO2 100%   BMI 24.67 kg/m²     See Progress Note day of discharge     CBC:   Recent Labs     09/14/22  1618 09/15/22  0521   WBC 13.6* 10.2   HGB 15.7 14.0   HCT 44.5 40.5   MCV 84.6 84.5    187     BMP:   Recent Labs     09/15/22  0521 09/16/22  0526 09/17/22  0515   * 137 135*   K 3.5 3.6 3.7   CL 98* 103 102   CO2 19* 22 20*   BUN 32* 24* 20   CREATININE 1.0 0.9 0.9     LIVER PROFILE:   Recent Labs     09/14/22  1618 09/15/22  0521 09/16/22  0526 09/17/22  0515   * 102* 59* 38*   ALT 42* 44* 38 34   LIPASE 22.0  --   --   --    BILITOT 2.8* 2.2* 1.8* 1.9*   ALKPHOS 116 94 88 85     PT/INR: No results for input(s): PROTIME, INR in the last 72 hours. APTT: No results for input(s): APTT in the last 72 hours.   UA:  Recent Labs     09/14/22 1618   COLORU Yellow   PHUR 6.5   WBCUA 0-2   RBCUA 0-2   BACTERIA Rare*   CLARITYU Clear   SPECGRAV 1.015   LEUKOCYTESUR Negative   UROBILINOGEN 1.0   BILIRUBINUR Negative   BLOODU MODERATE*   GLUCOSEU Negative         CULTURES  Covid and flu not detected  Blood cx x2 NGTD       RADIOLOGY  US GALLBLADDER RUQ   Final Result   No cholelithiasis or cholecystitis      Fatty liver      Suspected tiny right-sided renal calculus      RECOMMENDATIONS:   Unavailable CT ABDOMEN PELVIS W IV CONTRAST Additional Contrast? None   Final Result   1. No acute findings in the abdomen or pelvis. 2. Punctate bilateral intrarenal calculi. 3. Small hiatal hernia. 4. Hepatic and bilateral renal cysts. XR CHEST PORTABLE   Final Result   No radiographic evidence of an acute cardiopulmonary process. Discharge Medications     Medication List        CHANGE how you take these medications      dicyclomine 10 MG capsule  Commonly known as: BENTYL  TAKE ONE CAPSULE BY MOUTH TWICE DAILY prn  What changed: additional instructions            CONTINUE taking these medications      Alcohol Prep Pads  1 Units by Does not apply route once for 1 dose Use to prep finger before blood sugar testing     apixaban 5 MG Tabs tablet  Commonly known as: Eliquis  Take 1 tablet by mouth 2 times daily     blood glucose test strips  1 strip by Other route once for 1 dose Test one time a day before morning meal     D-Care Glucometer w/Device Kit  1 kit by Does not apply route every morning (before breakfast) Test blood sugars before any food intake in the morning. Fasting blood sugars     Lancets Thin Misc  1 Units by Does not apply route once for 1 dose Use one lancet to test morning blood sugars     lisinopril 20 MG tablet  Commonly known as: PRINIVIL;ZESTRIL  Take 0.5 tablets by mouth daily     pantoprazole 40 MG tablet  Commonly known as: PROTONIX  Take 1 tablet by mouth every morning (before breakfast)            STOP taking these medications      metFORMIN 500 MG tablet  Commonly known as: GLUCOPHAGE               Where to Get Your Medications        These medications were sent to [address] [phone] - F [phone]  [facility], [address] [facility] [address]      Phone: [phone]   dicyclomine 10 MG capsule           Discharged in stable condition to home    Follow Up: Follow up with PCP in 1 week    JOSE Demarco.

## 2022-09-18 LAB — CULTURE, BLOOD 2: NORMAL

## 2022-09-19 LAB
BLOOD CULTURE, ROUTINE: NORMAL
F-ACTIN AB IGG: 7 UNITS (ref 0–19)

## 2022-09-20 LAB — AFP: 1.4 UG/L

## 2022-09-21 LAB
ALPHA-1 ANTITRYPSIN PHENOTYPE: NORMAL
ALPHA-1 ANTITRYPSIN: 118 MG/DL (ref 90–200)

## 2022-09-23 ENCOUNTER — OFFICE VISIT (OUTPATIENT)
Dept: INTERNAL MEDICINE CLINIC | Age: 72
End: 2022-09-23

## 2022-09-23 VITALS
DIASTOLIC BLOOD PRESSURE: 68 MMHG | HEART RATE: 75 BPM | SYSTOLIC BLOOD PRESSURE: 138 MMHG | BODY MASS INDEX: 24.52 KG/M2 | OXYGEN SATURATION: 100 % | HEIGHT: 72 IN | WEIGHT: 181 LBS | TEMPERATURE: 95.7 F

## 2022-09-23 DIAGNOSIS — I10 ESSENTIAL HYPERTENSION: ICD-10-CM

## 2022-09-23 DIAGNOSIS — I48.0 PAROXYSMAL ATRIAL FIBRILLATION (HCC): ICD-10-CM

## 2022-09-23 DIAGNOSIS — K21.9 GASTRIC REFLUX: ICD-10-CM

## 2022-09-23 DIAGNOSIS — F51.01 PRIMARY INSOMNIA: ICD-10-CM

## 2022-09-23 DIAGNOSIS — E11.9 TYPE 2 DIABETES MELLITUS WITHOUT COMPLICATION, WITHOUT LONG-TERM CURRENT USE OF INSULIN (HCC): Primary | ICD-10-CM

## 2022-09-23 PROCEDURE — 3044F HG A1C LEVEL LT 7.0%: CPT | Performed by: INTERNAL MEDICINE

## 2022-09-23 PROCEDURE — 1124F ACP DISCUSS-NO DSCNMKR DOCD: CPT | Performed by: INTERNAL MEDICINE

## 2022-09-23 PROCEDURE — 99214 OFFICE O/P EST MOD 30 MIN: CPT | Performed by: INTERNAL MEDICINE

## 2022-09-23 RX ORDER — TRAZODONE HYDROCHLORIDE 50 MG/1
50 TABLET ORAL NIGHTLY
Qty: 30 TABLET | Refills: 5 | Status: SHIPPED | OUTPATIENT
Start: 2022-09-23 | End: 2022-10-14

## 2022-09-23 NOTE — PATIENT INSTRUCTIONS
Type 2 Diabetes:  Diet controlled. Hemoglobin A1c increased to 6.3% (goal less than 6.5%). Paroxysmal atrial fibrillation:   Continue taking apixaban (Eliquis) 5 mg twice per day. Heart rate is 75 today. Hypertension:  Blood pressure is borderline today at 138/68 (goal less than 130/80) before medication. Continue taking lisinopril 10 mg (half 20 mg tablet) every morning. Acute kidney injury has resolved with normal creatinine at 0.9 mmol/L. Dyspepsia with subsequent nausea with vomiting:  Continue taking pantoprazole (Protonix) 40 mg tablet every morning before breakfast.    Chronic back pain:  Take acetaminophen (Tylenol) 1000 mg (two 500 mg tablets) THREE times per day up to 3000 mg per day (six total 500 mg tablets). Insomnia:  START taking trazodone 50 mg every evneing at bedtime WITH melatonin 5 mg every evening.        Follow-up in 2-3 weeks

## 2022-09-23 NOTE — PROGRESS NOTES
SUBJECTIVE:  Hospital follow up, hospitalized (9/14 - 9/17) with acute kidney injury with elevated AST/ALT from pre-renal azotemia associated with dehydration from three days of nausea with vomiting. Ran out of Suboxone three days prior to admission. Had been tapering Suboxone for several months with the intent to be off all opiates but has rebound back pain that was being treated with buprenorphine. Insomnia worse, unable to sleep much at all. History diabetes, hypertension and atrial fibrillation on apixaban and gastric reflux on pantoprazole      History of present illness:  Hospitalized 1/23 - 1/25 / 2021 for three day history of nausea and vomiting. Etiology of emesis unclear. Diagnosed with diabetes with hemoglobin A1c 8.1%. Prescribed metformin at discharge, took one this morning with subsequent dyspepsia. History of paroxysmal atrial fibrillation for which he has been on Eliquis (prescribed by [facility] cardiology). Sinus rhythm in the hospital.  He has been on Suboxone for chronic back pain and has been off for a while and now off, last taken four days ago. Plans to continue off Suboxone. He was not seen by gastroenterology. No primary care physician for several years. Only seen by cardiology (last seen 10/22/20 by Alyssa Franco). Ventral hernia repair (4/12, Dr. Susan Navarro). Still has some lower abdominal pain, worse after bowel movement or urination.  Metformin induced diarrhea has resolved    MEDICATIONS:  dicyclomine (BENTYL) 10 MG capsule TAKE ONE CAPSULE BY MOUTH TWICE DAILY prn   pantoprazole (PROTONIX) 40 MG tablet Take 1 tablet by mouth every morning (before breakfast)   lisinopril (PRINIVIL;ZESTRIL) 20 MG tablet Take 0.5 tablets by mouth daily   apixaban (ELIQUIS) 5 MG TABS tablet Take 1 tablet by mouth 2 times daily       Hemoglobin A1c (7/7/21) 6.0%), (6/10/22) 5.5%, (9/14/22) 6.3%    Lab Results   Component Value Date    WBC 10.2 09/15/2022    HGB 14.0 09/15/2022     09/15/2022    MCV 84.5 09/15/2022     Lab Results   Component Value Date     (L) 09/17/2022    K 3.7 09/17/2022     09/17/2022    CO2 20 (L) 09/17/2022    BUN 20 09/17/2022    CREATININE 0.9 09/17/2022    GLUCOSE 114 (H) 09/17/2022         OBJECTIVE:    /68   Pulse 75   Temp (!) 95.7 °F (35.4 °C) (Temporal)   Ht 6' (1.829 m)   Wt 181 lb (82.1 kg)   SpO2 100%   BMI 24.55 kg/m²   HEENT:  Oropharynx clear, no lymphadenopathy,   LUNGS:  Clear and without wheezes  HEART:  Regular rhythm, no appreciable murmur  ABD:  Benign, active bowel sounds  EXT:  No edema, pulses palpable  NEURO:  No focal neurologic deficits noted. ASSESSMENT / PLAN:   Type 2 Diabetes:  Diet controlled. Hemoglobin A1c increased to 6.3% (goal less than 6.5%). Paroxysmal atrial fibrillation:   Continue taking apixaban (Eliquis) 5 mg twice per day. Heart rate is 75 today. Hypertension:  Blood pressure is borderline today at 138/68 (goal less than 130/80) before medication. Continue taking lisinopril 10 mg (half 20 mg tablet) every morning. Acute kidney injury has resolved with normal creatinine at 0.9 mmol/L. Dyspepsia with subsequent nausea with vomiting:  Continue taking pantoprazole (Protonix) 40 mg tablet every morning before breakfast.    Chronic back pain:  Take acetaminophen (Tylenol) 1000 mg (two 500 mg tablets) THREE times per day up to 3000 mg per day (six total 500 mg tablets). Insomnia:  START taking trazodone 50 mg every evneing at bedtime WITH melatonin 5 mg every evening.        Follow-up in 2-3 weeks

## 2022-09-23 NOTE — PROGRESS NOTES
Pt states he needs some prescription strength tylenol for his back pain since he is no longer on the suboxone. Pt's blood sugars in in the 113 area and he is off the Metformin.

## 2022-10-14 ENCOUNTER — OFFICE VISIT (OUTPATIENT)
Dept: INTERNAL MEDICINE CLINIC | Age: 72
End: 2022-10-14

## 2022-10-14 VITALS
BODY MASS INDEX: 23.49 KG/M2 | WEIGHT: 173.4 LBS | OXYGEN SATURATION: 97 % | TEMPERATURE: 96.6 F | HEART RATE: 83 BPM | SYSTOLIC BLOOD PRESSURE: 130 MMHG | HEIGHT: 72 IN | DIASTOLIC BLOOD PRESSURE: 60 MMHG

## 2022-10-14 DIAGNOSIS — E11.9 TYPE 2 DIABETES MELLITUS WITHOUT COMPLICATION, WITHOUT LONG-TERM CURRENT USE OF INSULIN (HCC): ICD-10-CM

## 2022-10-14 DIAGNOSIS — I48.0 PAROXYSMAL ATRIAL FIBRILLATION (HCC): ICD-10-CM

## 2022-10-14 DIAGNOSIS — J40 BRONCHITIS: Primary | ICD-10-CM

## 2022-10-14 DIAGNOSIS — F51.01 PRIMARY INSOMNIA: ICD-10-CM

## 2022-10-14 DIAGNOSIS — K21.9 GASTRIC REFLUX: ICD-10-CM

## 2022-10-14 DIAGNOSIS — I10 ESSENTIAL HYPERTENSION: ICD-10-CM

## 2022-10-14 PROCEDURE — 3044F HG A1C LEVEL LT 7.0%: CPT | Performed by: INTERNAL MEDICINE

## 2022-10-14 PROCEDURE — 99214 OFFICE O/P EST MOD 30 MIN: CPT | Performed by: INTERNAL MEDICINE

## 2022-10-14 PROCEDURE — G0008 ADMIN INFLUENZA VIRUS VAC: HCPCS | Performed by: INTERNAL MEDICINE

## 2022-10-14 PROCEDURE — 90686 IIV4 VACC NO PRSV 0.5 ML IM: CPT | Performed by: INTERNAL MEDICINE

## 2022-10-14 PROCEDURE — 1124F ACP DISCUSS-NO DSCNMKR DOCD: CPT | Performed by: INTERNAL MEDICINE

## 2022-10-14 RX ORDER — AZITHROMYCIN 250 MG/1
250 TABLET, FILM COATED ORAL SEE ADMIN INSTRUCTIONS
Qty: 6 TABLET | Refills: 0 | Status: SHIPPED | OUTPATIENT
Start: 2022-10-14 | End: 2022-10-19

## 2022-10-14 RX ORDER — TRAZODONE HYDROCHLORIDE 100 MG/1
100 TABLET ORAL NIGHTLY
Qty: 30 TABLET | Refills: 5 | Status: SHIPPED | OUTPATIENT
Start: 2022-10-14

## 2022-10-14 NOTE — PROGRESS NOTES
SUBJECTIVE:   Follow up from 9/23 for insomnia, started on trazodone 50 mg and melatonin 5 mg at bedtime, which don't seem to work. Dyspnea with productive cough with chest congestion over the past 1-2 weeks. Suboxone discontinued and chronic pain has returned. History diabetes, hypertension and atrial fibrillation on apixaban and gastric reflux on pantoprazole. History of present illness:  Hospitalized (9/14 - 9/17) with acute kidney injury with elevated AST/ALT from pre-renal azotemia associated with dehydration from three days of nausea with vomiting. Ran out of Suboxone three days prior to admission. Had been tapering Suboxone for several months with the intent to be off all opiates but has rebound back pain that was being treated with buprenorphine. Insomnia worse, unable to sleep much at all. Hospitalized Jan 2021 for nausea and vomiting. Etiology of emesis unclear. Diagnosed with diabetes with hemoglobin A1c 8.1%. Prescribed metformin at discharge, took one this morning with subsequent dyspepsia. History of paroxysmal atrial fibrillation for which he has been on Eliquis (prescribed by [facility] cardiology). Sinus rhythm in the hospital.  He has been on Suboxone for chronic back pain and has been off for a while and now off, last taken four days ago. Plans to continue off Suboxone. He was not seen by gastroenterology. No primary care physician for several years. Only seen by cardiology (last seen 10/22/20 by Vicki Hall). Ventral hernia repair (4/12, Dr. Vicky Villareal). Still has some lower abdominal pain, worse after bowel movement or urination.  Metformin induced diarrhea has resolved    MEDICATIONS:  traZODone (DESYREL) 50 MG tablet Take 1 tablet by mouth nightly   dicyclomine (BENTYL) 10 MG capsule TAKE ONE CAPSULE BY MOUTH TWICE DAILY prn   pantoprazole (PROTONIX) 40 MG tablet Take 1 tablet by mouth every morning (before breakfast)   lisinopril (PRINIVIL;ZESTRIL) 20 MG tablet Take 0.5 tablets by mouth daily   apixaban (ELIQUIS) 5 MG TABS tablet Take 1 tablet by mouth 2 times daily       Lab Results   Component Value Date    LABA1C 6.3 09/14/2022     Lab Results   Component Value Date    WBC 10.2 09/15/2022    HGB 14.0 09/15/2022     09/15/2022    MCV 84.5 09/15/2022     Lab Results   Component Value Date     (L) 09/17/2022    K 3.7 09/17/2022     09/17/2022    CO2 20 (L) 09/17/2022    BUN 20 09/17/2022    CREATININE 0.9 09/17/2022    GLUCOSE 114 (H) 09/17/2022       OBJECTIVE:    /60   Pulse 83   Temp (!) 96.6 °F (35.9 °C) (Temporal)   Ht 6' (1.829 m)   Wt 173 lb 6.4 oz (78.7 kg)   SpO2 97%   BMI 23.52 kg/m²   HEENT:  Oropharynx clear, no lymphadenopathy,   LUNGS:  Diffuse rhonchi, reasonable air movement  HEART:  Regular rhythm, no appreciable murmur  ABD:  Benign, active bowel sounds  EXT:  No edema, pulses palpable  NEURO:  No focal neurologic deficits noted. ASSESSMENT / PLAN:   Bronchitis:  Take azithromycin (Z-ashlyn) 500 mg (two tablets) today and then 250 mg (one tablet) daily to complete five days. Type 2 Diabetes:  Diet controlled. Hemoglobin A1c increased to 6.3% (goal less than 6.5%). Paroxysmal atrial fibrillation:   Continue taking apixaban (Eliquis) 5 mg twice per day. Heart rate is 83 today. Hypertension:  Blood pressure is reasonable today at 130/60 (goal less than 130/80) before medication. Continue taking lisinopril 10 mg (half 20 mg tablet) every morning. Acute kidney injury has resolved with normal creatinine at 0.9 mmol/L. Dyspepsia with subsequent nausea with vomiting:  Continue taking pantoprazole (Protonix) 40 mg tablet every morning before breakfast.    Chronic back pain:  Take acetaminophen (Tylenol) 1000 mg (two 500 mg tablets) THREE times per day up to 3000 mg per day (six total 500 mg tablets).     Insomnia:  Increase trazodone from 50 mg to 100 mg every evneing at bedtime and continue melatonin 5 mg every evening.        Follow-up in three weeks

## 2022-10-14 NOTE — PATIENT INSTRUCTIONS
Bronchitis:  Take azithromycin (Z-ashlyn) 500 mg (two tablets) today and then 250 mg (one tablet) daily to complete five days. Type 2 Diabetes:  Diet controlled. Hemoglobin A1c increased to 6.3% (goal less than 6.5%). Paroxysmal atrial fibrillation:   Continue taking apixaban (Eliquis) 5 mg twice per day. Heart rate is 83 today. Hypertension:  Blood pressure is reasonable today at 130/60 (goal less than 130/80) before medication. Continue taking lisinopril 10 mg (half 20 mg tablet) every morning. Acute kidney injury has resolved with normal creatinine at 0.9 mmol/L. Dyspepsia with subsequent nausea with vomiting:  Continue taking pantoprazole (Protonix) 40 mg tablet every morning before breakfast.    Chronic back pain:  Take acetaminophen (Tylenol) 1000 mg (two 500 mg tablets) THREE times per day up to 3000 mg per day (six total 500 mg tablets). Insomnia:  Increase trazodone from 50 mg to 100 mg every evneing at bedtime and continue melatonin 5 mg every evening.        Follow-up in three weeks

## 2022-10-18 RX ORDER — LISINOPRIL 20 MG/1
10 TABLET ORAL DAILY
Qty: 30 TABLET | Refills: 5 | Status: SHIPPED | OUTPATIENT
Start: 2022-10-18

## 2022-11-04 ENCOUNTER — OFFICE VISIT (OUTPATIENT)
Dept: INTERNAL MEDICINE CLINIC | Age: 72
End: 2022-11-04

## 2022-11-04 VITALS
SYSTOLIC BLOOD PRESSURE: 144 MMHG | WEIGHT: 179.6 LBS | HEIGHT: 72 IN | BODY MASS INDEX: 24.33 KG/M2 | DIASTOLIC BLOOD PRESSURE: 70 MMHG | OXYGEN SATURATION: 99 % | HEART RATE: 60 BPM

## 2022-11-04 DIAGNOSIS — F11.21 OPIOID USE DISORDER, MODERATE, IN SUSTAINED REMISSION (HCC): ICD-10-CM

## 2022-11-04 DIAGNOSIS — I48.0 PAROXYSMAL ATRIAL FIBRILLATION (HCC): Primary | ICD-10-CM

## 2022-11-04 DIAGNOSIS — F51.01 PRIMARY INSOMNIA: ICD-10-CM

## 2022-11-04 DIAGNOSIS — I10 ESSENTIAL HYPERTENSION: ICD-10-CM

## 2022-11-04 DIAGNOSIS — K21.9 GASTRIC REFLUX: ICD-10-CM

## 2022-11-04 PROCEDURE — 3074F SYST BP LT 130 MM HG: CPT | Performed by: INTERNAL MEDICINE

## 2022-11-04 PROCEDURE — 3078F DIAST BP <80 MM HG: CPT | Performed by: INTERNAL MEDICINE

## 2022-11-04 PROCEDURE — 99214 OFFICE O/P EST MOD 30 MIN: CPT | Performed by: INTERNAL MEDICINE

## 2022-11-04 PROCEDURE — 1124F ACP DISCUSS-NO DSCNMKR DOCD: CPT | Performed by: INTERNAL MEDICINE

## 2022-11-04 RX ORDER — VALPROIC ACID 250 MG/1
250 CAPSULE, LIQUID FILLED ORAL NIGHTLY
Qty: 30 CAPSULE | Refills: 1 | Status: SHIPPED | OUTPATIENT
Start: 2022-11-04

## 2022-11-04 RX ORDER — DICYCLOMINE HYDROCHLORIDE 10 MG/1
10 CAPSULE ORAL NIGHTLY
Qty: 30 CAPSULE | Refills: 5 | Status: SHIPPED | OUTPATIENT
Start: 2022-11-04

## 2022-11-04 NOTE — PATIENT INSTRUCTIONS
Paroxysmal atrial fibrillation:   Continue taking apixaban (Eliquis) 5 mg twice per day. Heart rate is 60 today. Hypertension:  Blood pressure is reasonable today at 144/70 (goal less than 130/80) before medication. Continue taking lisinopril and increased to 20 mg (whole tablet) every morning. Dyspepsia with subsequent nausea with vomiting:  Continue taking pantoprazole (Protonix) 40 mg tablet every morning before breakfast.    Chronic back pain:  Take acetaminophen (Tylenol) 1000 mg (two 500 mg tablets) THREE times per day up to 3000 mg per day (six total 500 mg tablets). Insomnia with serotonin depletion:  Stay off the trazodone. Try taking valproate (Depakene) 250 mg everey evening at bedtime. Continue taking melatonin 5 mg every evening. Irritable bowel syndrome:  Continue taking dicyclomine (Bentyl) 10 mg capsule every evening. Type 2 Diabetes:  Diet controlled. Hemoglobin A1c increased to 6.3% (goal less than 6.5%).          Follow-up in three weeks

## 2022-11-04 NOTE — PROGRESS NOTES
SUBJECTIVE:  Follow up from 10/14 for insomnia. Stopped trazodone 100 mg a week ago due to increase in insomnia. Suboxone discontinued and chronic pain has returned. History diabetes, hypertension and atrial fibrillation on apixaban and gastric reflux on pantoprazole. History of present illness:  Hospitalized (9/14 - 9/17) with acute kidney injury with elevated AST/ALT from pre-renal azotemia associated with dehydration from three days of nausea with vomiting. Ran out of Suboxone three days prior to admission. Had been tapering Suboxone for several months with the intent to be off all opiates but has rebound back pain that was being treated with buprenorphine. Insomnia worse, unable to sleep much at all. Hospitalized Jan 2021 for nausea and vomiting. Etiology of emesis unclear. Diagnosed with diabetes with hemoglobin A1c 8.1%. Prescribed metformin at discharge, took one this morning with subsequent dyspepsia. History of paroxysmal atrial fibrillation for which he has been on Eliquis (prescribed by [id number] [facility]). Sinus rhythm in the hospital.  He has been on Suboxone for chronic back pain and has been off for a while and now off, last taken four days ago. Plans to continue off Suboxone. He was not seen by gastroenterology. No primary care physician for several years. Only seen by cardiology (last seen 10/22/20 by Amber Aceves). Ventral hernia repair (4/12, Dr. Nadya Eduardo). Still has some lower abdominal pain, worse after bowel movement or urination.  Metformin induced diarrhea has resolved    MEDICATIONS:  lisinopril (PRINIVIL;ZESTRIL) 20 MG tablet Take 0.5 tablets by mouth daily   dicyclomine (BENTYL) 10 MG capsule TAKE ONE CAPSULE BY MOUTH TWICE DAILY prn   pantoprazole (PROTONIX) 40 MG tablet Take 1 tablet by mouth every morning (before breakfast)   apixaban (ELIQUIS) 5 MG TABS tablet Take 1 tablet by mouth 2 times daily   traZODone (DESYREL) 100 MG tablet Take 1 tablet by mouth nightly (not taking)         Lab Results   Component Value Date    LABA1C 6.3 09/14/2022     Lab Results   Component Value Date    WBC 10.2 09/15/2022    HGB 14.0 09/15/2022     09/15/2022    MCV 84.5 09/15/2022     Lab Results   Component Value Date     (L) 09/17/2022    K 3.7 09/17/2022     09/17/2022    CO2 20 (L) 09/17/2022    BUN 20 09/17/2022    CREATININE 0.9 09/17/2022    GLUCOSE 114 (H) 09/17/2022       OBJECTIVE:    BP (!) 144/70   Pulse 60   Ht 6' (1.829 m)   Wt 179 lb 9.6 oz (81.5 kg)   SpO2 99%   BMI 24.36 kg/m²   HEENT:  Oropharynx clear, no lymphadenopathy,   LUNGS:  Clear and without wheezes  HEART:  Irregular rhythm, no appreciable murmur  ABD:  Benign, active bowel sounds  EXT:  No edema, pulses palpable  NEURO:  No focal neurologic deficits noted. ASSESSMENT / PLAN:   Paroxysmal atrial fibrillation:   Continue taking apixaban (Eliquis) 5 mg twice per day. Heart rate is 60 today. Hypertension:  Blood pressure is reasonable today at 144/70 (goal less than 130/80) before medication. Continue taking lisinopril and increased to 20 mg (whole tablet) every morning. Dyspepsia with subsequent nausea with vomiting:  Continue taking pantoprazole (Protonix) 40 mg tablet every morning before breakfast.    Chronic back pain:  Take acetaminophen (Tylenol) 1000 mg (two 500 mg tablets) THREE times per day up to 3000 mg per day (six total 500 mg tablets). Insomnia with serotonin depletion:  Stay off the trazodone. Try taking valproate (Depakene) 250 mg everey evening at bedtime. Continue taking melatonin 5 mg every evening. Irritable bowel syndrome:  Continue taking dicyclomine (Bentyl) 10 mg capsule every evening. Type 2 Diabetes:  Diet controlled. Hemoglobin A1c increased to 6.3% (goal less than 6.5%).          Follow-up in three weeks

## 2022-11-29 ENCOUNTER — OFFICE VISIT (OUTPATIENT)
Dept: INTERNAL MEDICINE CLINIC | Age: 72
End: 2022-11-29

## 2022-11-29 VITALS
SYSTOLIC BLOOD PRESSURE: 128 MMHG | HEART RATE: 65 BPM | HEIGHT: 72 IN | WEIGHT: 185.4 LBS | BODY MASS INDEX: 25.11 KG/M2 | DIASTOLIC BLOOD PRESSURE: 74 MMHG | OXYGEN SATURATION: 99 % | TEMPERATURE: 98.5 F

## 2022-11-29 DIAGNOSIS — G89.29 CHRONIC BILATERAL LOW BACK PAIN WITHOUT SCIATICA: ICD-10-CM

## 2022-11-29 DIAGNOSIS — K21.9 GASTRIC REFLUX: ICD-10-CM

## 2022-11-29 DIAGNOSIS — R06.02 SOB (SHORTNESS OF BREATH): Primary | ICD-10-CM

## 2022-11-29 DIAGNOSIS — I48.0 PAROXYSMAL ATRIAL FIBRILLATION (HCC): ICD-10-CM

## 2022-11-29 DIAGNOSIS — M54.50 CHRONIC BILATERAL LOW BACK PAIN WITHOUT SCIATICA: ICD-10-CM

## 2022-11-29 DIAGNOSIS — F51.01 PRIMARY INSOMNIA: ICD-10-CM

## 2022-11-29 DIAGNOSIS — I10 ESSENTIAL HYPERTENSION: ICD-10-CM

## 2022-11-29 LAB
EXPIRATORY TIME: NORMAL
FEF 25-75% %PRED-PRE: NORMAL
FEF 25-75% PRED: NORMAL
FEF 25-75%-PRE: NORMAL
FEV1 %PRED-PRE: NORMAL
FEV1 PRED: NORMAL
FEV1/FVC %PRED-PRE: NORMAL
FEV1/FVC PRED: NORMAL
FEV1/FVC: NORMAL
FEV1: NORMAL
FVC %PRED-PRE: NORMAL
FVC PRED: NORMAL
FVC: NORMAL
PEF %PRED-PRE: NORMAL
PEF PRED: NORMAL
PEF-PRE: NORMAL

## 2022-11-29 PROCEDURE — 99214 OFFICE O/P EST MOD 30 MIN: CPT | Performed by: INTERNAL MEDICINE

## 2022-11-29 PROCEDURE — 3078F DIAST BP <80 MM HG: CPT | Performed by: INTERNAL MEDICINE

## 2022-11-29 PROCEDURE — 3074F SYST BP LT 130 MM HG: CPT | Performed by: INTERNAL MEDICINE

## 2022-11-29 PROCEDURE — 1124F ACP DISCUSS-NO DSCNMKR DOCD: CPT | Performed by: INTERNAL MEDICINE

## 2022-11-29 RX ORDER — PANTOPRAZOLE SODIUM 40 MG/1
40 TABLET, DELAYED RELEASE ORAL
Qty: 30 TABLET | Refills: 5 | Status: SHIPPED | OUTPATIENT
Start: 2022-11-29

## 2022-11-29 RX ORDER — VENLAFAXINE HYDROCHLORIDE 150 MG/1
150 CAPSULE, EXTENDED RELEASE ORAL DAILY
Qty: 30 CAPSULE | Refills: 3 | Status: SHIPPED | OUTPATIENT
Start: 2022-11-29

## 2022-11-29 NOTE — PROGRESS NOTES
Pt thinks he may have COPD due to smoking years ago, he also states he gets SOB or a sleep Apnea type episode that keeps him from being able to stay asleep.

## 2022-11-29 NOTE — PROGRESS NOTES
SUBJECTIVE:   Follow up from 11/4 for insomnia, no relief with valproate and stopped at 10 days. Falls asleep in 30-60 minutes and wakes up with difficulty getting back to sleep multiple times per night. Worse due to dyspnea while attempting to sleep. Concerned he may have COPD due to 120 pk/yr history smoking, quit 2003. Never diagnosed with COPD. Back pain is worse and would like referral to pain management. History of present illness:  Hospitalized (9/14 - 9/17) with acute kidney injury with elevated AST/ALT from pre-renal azotemia associated with dehydration from three days of nausea with vomiting. Ran out of Suboxone three days prior to admission. Had been tapering Suboxone for several months with the intent to be off all opiates but has rebound back pain that was being treated with buprenorphine. Insomnia worse, unable to sleep much at all. Hospitalized Jan 2021 for nausea and vomiting. Etiology of emesis unclear. Diagnosed with diabetes with hemoglobin A1c 8.1%. Prescribed metformin at discharge, took one this morning with subsequent dyspepsia. History of paroxysmal atrial fibrillation for which he has been on Eliquis (prescribed by [facility] cardiology). Sinus rhythm in the hospital.  He has been on Suboxone for chronic back pain and has been off for a while and now off, last taken four days ago. Plans to continue off Suboxone. He was not seen by gastroenterology. No primary care physician for several years. Only seen by cardiology (last seen 10/22/20 by Ant Oliveros). Ventral hernia repair (4/12, Dr. Dinah Alcantar). Still has some lower abdominal pain, worse after bowel movement or urination. Metformin induced diarrhea has resolved. Stopped trazodone 100 mg due to increase in insomnia. Chronic pain has returned after Suboxone discontinued. Marjorie Negrete History diabetes, hypertension and atrial fibrillation on apixaban and gastric reflux on pantoprazole.         MEDICATIONS:  dicyclomine (BENTYL) 10 MG capsule Take 1 capsule by mouth at bedtime   lisinopril (PRINIVIL;ZESTRIL) 20 MG tablet Take 0.5 tablets by mouth daily   pantoprazole (PROTONIX) 40 MG tablet Take 1 tablet by mouth every morning (before breakfast)   apixaban (ELIQUIS) 5 MG TABS tablet Take 1 tablet by mouth 2 times daily   valproic acid (DEPAKENE) 250 MG capsule Take 1 capsule by mouth at bedtime (not taking)     Hemoglobin A1c (9/14) 6.3%    LABS: 09/15/2022  WBC 10.2   HGB 14.0      MCV 84.5     LABS: 09/17/2022   (L)   K 3.7      CO2 20 (L)   BUN 20   CREATININE 0.9   GLUCOSE 114 (H)     Calcium 8.4    Total Protein 6.0 Low     Albumin 3.9    Albumin/Globulin Ratio 1.9    Total Bilirubin 1.9 High     Alkaline Phosphatase 85    ALT 34    AST 38 High      PFT (11/29) 2.14 (64%) / 3.72 (81%) = 0.58 c/w moderate obstruction    OBJECTIVE:    /74   Pulse 65   Temp 98.5 °F (36.9 °C) (Temporal)   Ht 6' (1.829 m)   Wt 185 lb 6.4 oz (84.1 kg)   SpO2 99%   BMI 25.14 kg/m²   HEENT:  Oropharynx clear, no lymphadenopathy,   LUNGS:  Clear and without wheezes  HEART:  Regular rhythm, no appreciable murmur  ABD:  Benign, active bowel sounds  EXT:  No edema, pulses palpable  NEURO:  No focal neurologic deficits noted. ASSESSMENT / PLAN:   Paroxysmal atrial fibrillation:   Continue taking apixaban (Eliquis) 5 mg twice per day. Heart rate is 60 today. Hypertension:  Blood pressure is reasonable today at 128/74 (goal less than 130/80) before medication. Continue taking lisinopril and 20 mg (whole tablet) every morning. Dyspepsia with subsequent nausea with vomiting:  Continue taking pantoprazole (Protonix) 40 mg tablet every morning before breakfast.    Chronic back pain:  Take acetaminophen (Tylenol) 1000 mg (two 500 mg tablets) THREE times per day up to 3000 mg per day (six total 500 mg tablets). Insomnia with serotonin depletion:  START taking venlafaxine (Effexor XR) 150 mg every morning.    Irritable bowel syndrome:  Continue taking dicyclomine (Bentyl) 10 mg capsule every evening. Type 2 Diabetes:  Diet controlled. Hemoglobin A1c increased to 6.3% (goal less than 6.5%). Lower back pain:  Referral to orthopedic spine PA.         Follow-up four weeks

## 2022-11-29 NOTE — PATIENT INSTRUCTIONS
Paroxysmal atrial fibrillation:   Continue taking apixaban (Eliquis) 5 mg twice per day. Heart rate is 60 today. Hypertension:  Blood pressure is reasonable today at 128/74 (goal less than 130/80) before medication. Continue taking lisinopril and 20 mg (whole tablet) every morning. Dyspepsia with subsequent nausea with vomiting:  Continue taking pantoprazole (Protonix) 40 mg tablet every morning before breakfast.    Chronic back pain:  Take acetaminophen (Tylenol) 1000 mg (two 500 mg tablets) THREE times per day up to 3000 mg per day (six total 500 mg tablets). Insomnia with serotonin depletion:  START taking venlafaxine (Effexor XR) 150 mg every morning. Irritable bowel syndrome:  Continue taking dicyclomine (Bentyl) 10 mg capsule every evening. Type 2 Diabetes:  Diet controlled. Hemoglobin A1c increased to 6.3% (goal less than 6.5%). Lower back pain:  Referral to spine.        Follow-up four weeks

## 2022-12-30 ENCOUNTER — OFFICE VISIT (OUTPATIENT)
Dept: INTERNAL MEDICINE CLINIC | Age: 72
End: 2022-12-30
Payer: MEDICARE

## 2022-12-30 VITALS
HEART RATE: 70 BPM | WEIGHT: 179.6 LBS | BODY MASS INDEX: 24.33 KG/M2 | HEIGHT: 72 IN | TEMPERATURE: 97.4 F | DIASTOLIC BLOOD PRESSURE: 60 MMHG | SYSTOLIC BLOOD PRESSURE: 130 MMHG | OXYGEN SATURATION: 98 %

## 2022-12-30 DIAGNOSIS — F51.01 PRIMARY INSOMNIA: ICD-10-CM

## 2022-12-30 DIAGNOSIS — K58.0 IRRITABLE BOWEL SYNDROME WITH DIARRHEA: ICD-10-CM

## 2022-12-30 DIAGNOSIS — E11.9 TYPE 2 DIABETES MELLITUS WITHOUT COMPLICATION, WITHOUT LONG-TERM CURRENT USE OF INSULIN (HCC): ICD-10-CM

## 2022-12-30 DIAGNOSIS — I10 ESSENTIAL HYPERTENSION: ICD-10-CM

## 2022-12-30 DIAGNOSIS — I48.0 PAROXYSMAL ATRIAL FIBRILLATION (HCC): Primary | ICD-10-CM

## 2022-12-30 DIAGNOSIS — K21.9 GASTRIC REFLUX: ICD-10-CM

## 2022-12-30 PROCEDURE — 3044F HG A1C LEVEL LT 7.0%: CPT | Performed by: INTERNAL MEDICINE

## 2022-12-30 PROCEDURE — 3078F DIAST BP <80 MM HG: CPT | Performed by: INTERNAL MEDICINE

## 2022-12-30 PROCEDURE — 1124F ACP DISCUSS-NO DSCNMKR DOCD: CPT | Performed by: INTERNAL MEDICINE

## 2022-12-30 PROCEDURE — 3075F SYST BP GE 130 - 139MM HG: CPT | Performed by: INTERNAL MEDICINE

## 2022-12-30 PROCEDURE — 99214 OFFICE O/P EST MOD 30 MIN: CPT | Performed by: INTERNAL MEDICINE

## 2022-12-30 RX ORDER — APIXABAN 5 MG/1
TABLET, FILM COATED ORAL
Qty: 60 TABLET | Refills: 0 | OUTPATIENT
Start: 2022-12-30

## 2022-12-30 RX ORDER — AMLODIPINE BESYLATE 5 MG/1
5 TABLET ORAL DAILY
Qty: 30 TABLET | Refills: 5 | Status: SHIPPED | OUTPATIENT
Start: 2022-12-30

## 2022-12-30 NOTE — PROGRESS NOTES
SUBJECTIVE:   Follow up from 11/29 for irritative \"dry\" cough for the past three weeks which is making sciatica worse and impacting sleep. He has been taking lisinopril for five years and recently increased the dose to 20 mg per day. He ran out of Eliquis today for atrial fibrillation, unable to afford. Changing insurance to [facility] from [employer] and is in a \"donut hole\" and unable to afford medications. Also ran out of pantoprazole today. Will be able to fill scripts on Monday, Jan 2. He stopped taking venlafaxine after 10 days because it caused confusion. History of present illness:  Hospitalized (9/14 - 9/17) with acute kidney injury with elevated AST/ALT from pre-renal azotemia associated with dehydration from three days of nausea with vomiting. Ran out of Suboxone three days prior to admission. Had been tapering Suboxone for several months with the intent to be off all opiates but has rebound back pain that was being treated with buprenorphine. Insomnia worse, unable to sleep much at all. Stopped trazodone 100 mg due to increase in insomnia. Chronic pain has returned after Suboxone discontinued. Shelly Ruffing History diabetes, hypertension and atrial fibrillation on apixaban and gastric reflux on pantoprazole. History of insomnia, no relief with valproate and stopped at 10 days. Falls asleep in 30-60 minutes and wakes up with difficulty getting back to sleep multiple times per night. Worse due to dyspnea while attempting to sleep. Concerned he may have COPD due to 120 pk/yr history smoking, quit 2003. Never diagnosed with COPD. Hospitalized Jan 2021 for nausea and vomiting. Etiology of emesis unclear. Diagnosed with diabetes with hemoglobin A1c 8.1%. Prescribed metformin at discharge, took one this morning with subsequent dyspepsia. History of paroxysmal atrial fibrillation for which he has been on Eliquis (prescribed by [facility] cardiology).   Sinus rhythm in the hospital.  He has been on Suboxone for chronic back pain and has been off for a while and now off, last taken four days ago. Plans to continue off Suboxone. He was not seen by gastroenterology. No primary care physician for several years. Only seen by cardiology (last seen 10/22/20 by Shayy Costello). Ventral hernia repair (4/12, Dr. Surendra Quan). Still has some lower abdominal pain, worse after bowel movement or urination. Metformin induced diarrhea has resolved. MEDICATIONS:  pantoprazole (PROTONIX) 40 MG tablet Take 1 tablet by mouth every morning (before breakfast)   venlafaxine (EFFEXOR XR) 150 MG extended release capsule Take 1 capsule by mouth daily   dicyclomine (BENTYL) 10 MG capsule Take 1 capsule by mouth at bedtime   apixaban (ELIQUIS) 5 MG TABS tablet Take 1 tablet by mouth 2 times daily     Hemoglobin A1c (9/14) 6.3%     LABS: 09/15/2022  WBC 10.2   HGB 14.0      MCV 84.5      LABS: 09/17/2022   (L)   K 3.7      CO2 20 (L)   BUN 20   CREATININE 0.9   GLUCOSE 114 (H)      Calcium 8.4    Total Protein 6.0 Low     Albumin 3.9    Albumin/Globulin Ratio 1.9    Total Bilirubin 1.9 High     Alkaline Phosphatase 85    ALT 34    AST 38 High       PFT (11/29) 2.14 (64%) / 3.72 (81%) = 0.58 c/w moderate obstruction        OBJECTIVE:    /60   Pulse 70   Temp 97.4 °F (36.3 °C)   Ht 6' (1.829 m)   Wt 179 lb 9.6 oz (81.5 kg)   SpO2 98%   BMI 24.36 kg/m²   HEENT:  Oropharynx clear, no lymphadenopathy,   LUNGS:  Clear and without wheezes  HEART:  Regular rhythm, no appreciable murmur  ABD:  Benign, active bowel sounds  EXT:  No edema, pulses palpable  NEURO:  No focal neurologic deficits noted. ASSESSMENT / PLAN:   Paroxysmal atrial fibrillation:   Continue taking apixaban (Eliquis) 5 mg twice per day. Heart rate is 70 today. Hypertension:  Blood pressure is reasonable today at 30/60 (goal less than 130/80) before medication.   STOP taking lisinopril which may be causing cough and START taking amlodipine (Norvasc) 5 mg every morning. Dyspepsia with subsequent nausea with vomiting:  Instead of pantoprazole (Protonix), take famotidine (Pepcid) 20 mg once or twice per day as needed. Chronic back pain:  Take acetaminophen (Tylenol) 1000 mg (two 500 mg tablets) THREE times per day up to 3000 mg per day (six total 500 mg tablets). Irritable bowel syndrome:  Continue taking dicyclomine (Bentyl) 10 mg capsule every evening. Type 2 Diabetes:  Diet controlled. Hemoglobin A1c increased to 6.3% (goal less than 6.5%). Lower back pain:  Referral to orthopedic spine PA.         Follow-up in three weeks

## 2022-12-30 NOTE — PATIENT INSTRUCTIONS
Paroxysmal atrial fibrillation:   Continue taking apixaban (Eliquis) 5 mg twice per day. Heart rate is 70 today. Hypertension:  Blood pressure is reasonable today at 30/60 (goal less than 130/80) before medication. STOP taking lisinopril which may be causing cough and START taking amlodipine (Norvasc) 5 mg every morning. Dyspepsia with subsequent nausea with vomiting:  Instead of pantoprazole (Protonix), take famotidine (Pepcid) 20 mg once or twice per day as needed. Chronic back pain:  Take acetaminophen (Tylenol) 1000 mg (two 500 mg tablets) THREE times per day up to 3000 mg per day (six total 500 mg tablets). Irritable bowel syndrome:  Continue taking dicyclomine (Bentyl) 10 mg capsule every evening. Type 2 Diabetes:  Diet controlled. Hemoglobin A1c increased to 6.3% (goal less than 6.5%). Lower back pain:  Referral to orthopedic spine PA.

## 2022-12-30 NOTE — PROGRESS NOTES
Pt has been going to the Chiropractor for Sciatic nerve pain   Pt states he can't afford his medication until his new insurance starts in a few days. He will be out of Lisinopril, Elequis and Protonix. ,

## 2023-01-03 ENCOUNTER — TELEPHONE (OUTPATIENT)
Dept: INTERNAL MEDICINE CLINIC | Age: 73
End: 2023-01-03

## 2023-01-03 NOTE — TELEPHONE ENCOUNTER
Pharmacy called asking for refill on Eliquis. States it was denied?      Med pending for your approval

## 2023-01-20 ENCOUNTER — OFFICE VISIT (OUTPATIENT)
Dept: INTERNAL MEDICINE CLINIC | Age: 73
End: 2023-01-20
Payer: MEDICARE

## 2023-01-20 VITALS
TEMPERATURE: 97.8 F | OXYGEN SATURATION: 98 % | DIASTOLIC BLOOD PRESSURE: 68 MMHG | BODY MASS INDEX: 25.6 KG/M2 | HEIGHT: 72 IN | HEART RATE: 75 BPM | SYSTOLIC BLOOD PRESSURE: 130 MMHG | WEIGHT: 189 LBS

## 2023-01-20 DIAGNOSIS — K21.9 GASTRIC REFLUX: ICD-10-CM

## 2023-01-20 DIAGNOSIS — I48.0 PAROXYSMAL ATRIAL FIBRILLATION (HCC): ICD-10-CM

## 2023-01-20 DIAGNOSIS — R05.3 CHRONIC COUGH: Primary | ICD-10-CM

## 2023-01-20 DIAGNOSIS — I10 ESSENTIAL HYPERTENSION: ICD-10-CM

## 2023-01-20 DIAGNOSIS — F51.01 PRIMARY INSOMNIA: ICD-10-CM

## 2023-01-20 PROCEDURE — 3075F SYST BP GE 130 - 139MM HG: CPT | Performed by: INTERNAL MEDICINE

## 2023-01-20 PROCEDURE — 99214 OFFICE O/P EST MOD 30 MIN: CPT | Performed by: INTERNAL MEDICINE

## 2023-01-20 PROCEDURE — 1124F ACP DISCUSS-NO DSCNMKR DOCD: CPT | Performed by: INTERNAL MEDICINE

## 2023-01-20 PROCEDURE — 3078F DIAST BP <80 MM HG: CPT | Performed by: INTERNAL MEDICINE

## 2023-01-20 RX ORDER — FLUTICASONE PROPIONATE AND SALMETEROL 100; 50 UG/1; UG/1
1 POWDER RESPIRATORY (INHALATION) EVERY 12 HOURS
Qty: 1 EACH | Refills: 5 | Status: SHIPPED | OUTPATIENT
Start: 2023-01-20

## 2023-01-20 NOTE — PATIENT INSTRUCTIONS
Chronic irritative cough:  Likely a \"cough variant\" asthma or bronchospasm. Spirometry shows moderate obstruction. START using an inhaler, specifically Advair (fluticasone-salmeterol) 100-50 one puff TWICE per day. Allergic rhinitis: This is causing post nasal drainage induced cough and sore throat, especially at night. Use Flonase (fluticasone) nasal spray, two sprays in each nostril every night at bedtime and use nasal saline (Ocean spray) before the Flonase at bedtime and several times during the day especially in the morning to keep nasal secretions from drying out. Paroxysmal atrial fibrillation:   Continue taking apixaban (Eliquis) 5 mg twice per day. Heart rate is 75 today. Hypertension:  Blood pressure is reasonable today at 130/68 (goal less than 130/80) before medication. Continue taking amlodipine (Norvasc) 5 mg every morning. Gastric reflux:  Continue taking famotidine (Pepcid) 20 mg once or twice per day as needed. Chronic back pain:  Take acetaminophen (Tylenol) 1000 mg (two 500 mg tablets) THREE times per day up to 3000 mg per day (six total 500 mg tablets). Irritable bowel syndrome:  Continue taking dicyclomine (Bentyl) 10 mg capsule every evening. Type 2 Diabetes:  Diet controlled. Hemoglobin A1c increased to 6.3% (goal less than 6.5%). Lower back pain:  Referral to orthopedic spine PA.         Follow-up in 3 weeks, check spirometry at that time

## 2023-01-20 NOTE — PROGRESS NOTES
SUBJECTIVE:   Follow up from 12/30/22,irritative \"dry\" cough resolved somewhat with changing lisinopril to amlodipine but still has cough. He changed from pantoprazole to famotidine with no change in cough. History of present illness:    Persistent irritative \"dry\" cough for the past three weeks which is making sciatica worse and impacting sleep. He has been taking lisinopril for five years and recently increased the dose to 20 mg per day. He ran out of Eliquis today for atrial fibrillation, unable to afford. Changing insurance to [facility] from [facility] and is in a \"donut hole\" and unable to afford medications. Also ran out of pantoprazole today. Will be able to fill scripts on Monday, Jan 2. He stopped taking venlafaxine after 10 days because it caused confusion. Hospitalized (9/14 - 9/17) with acute kidney injury with elevated AST/ALT from pre-renal azotemia associated with dehydration from three days of nausea with vomiting. Ran out of Suboxone three days prior to admission. Had been tapering Suboxone for several months with the intent to be off all opiates but has rebound back pain that was being treated with buprenorphine. Insomnia worse, unable to sleep much at all. Stopped trazodone 100 mg due to increase in insomnia. Chronic pain has returned after Suboxone discontinued. [place] History diabetes, hypertension and atrial fibrillation on apixaban and gastric reflux on pantoprazole. History of insomnia, no relief with valproate and stopped at 10 days. Falls asleep in 30-60 minutes and wakes up with difficulty getting back to sleep multiple times per night. Worse due to dyspnea while attempting to sleep. Concerned he may have COPD due to 120 pk/yr history smoking, quit 2003. Never diagnosed with COPD. Hospitalized Jan 2021 for nausea and vomiting. Etiology of emesis unclear. Diagnosed with diabetes with hemoglobin A1c 8.1%.   Prescribed metformin at discharge, took one this morning with subsequent dyspepsia. History of paroxysmal atrial fibrillation for which he has been on Eliquis (prescribed by [facility] cardiology). Sinus rhythm in the hospital.  He has been on Suboxone for chronic back pain and has been off for a while and now off, last taken four days ago. Plans to continue off Suboxone. He was not seen by gastroenterology. No primary care physician for several years. Only seen by cardiology (last seen 10/22/20 by Darlin Schofield). Ventral hernia repair (4/12, Dr. Gray). Still has some lower abdominal pain, worse after bowel movement or urination. Metformin induced diarrhea has resolved. MEDICATIONS:  apixaban (ELIQUIS) 5 MG TABS tablet Take 1 tablet by mouth 2 times daily   amLODIPine (NORVASC) 5 MG tablet Take 1 tablet by mouth daily   pantoprazole (PROTONIX) 40 MG tablet Take 1 tablet by mouth every morning    venlafaxine (EFFEXOR XR) 150 MG extended release capsule Take 1 capsule by mouth daily (not taking)   dicyclomine (BENTYL) 10 MG capsule Take 1 capsule by mouth at bedtime (not taking)     Hemoglobin A1c (9/14) 6.3%     LABS: 09/15/2022  WBC 10.2   HGB 14.0      MCV 84.5      LABS: 09/17/2022   (L)   K 3.7      CO2 20 (L)   BUN 20   CREATININE 0.9   GLUCOSE 114 (H)      Calcium 8.4    Total Protein 6.0 Low     Albumin 3.9    Albumin/Globulin Ratio 1.9    Total Bilirubin 1.9 High     Alkaline Phosphatase 85    ALT 34    AST 38 High        PFT (11/29) 2.14 (64%) / 3.72 (81%) = 0.58 c/w moderate obstruction    OBJECTIVE:    /68   Pulse 75   Temp 97.8 °F (36.6 °C) (Temporal)   Ht 6' (1.829 m)   Wt 189 lb (85.7 kg)   SpO2 98%   BMI 25.63 kg/m²   HEENT:  Oropharynx clear, no lymphadenopathy,   LUNGS:  Clear and without wheezes  HEART:  Regular rhythm, no appreciable murmur  ABD:  Benign, active bowel sounds  EXT:  No edema, pulses palpable  NEURO:  No focal neurologic deficits noted.     ASSESSMENT / PLAN:   Chronic irritative cough: Likely a \"cough variant\" asthma or bronchospasm. Spirometry shows moderate obstruction. START using an inhaler, specifically Advair (fluticasone-salmeterol) 100-50 one puff TWICE per day. Allergic rhinitis: This is causing post nasal drainage induced cough and sore throat, especially at night. Use Flonase (fluticasone) nasal spray, two sprays in each nostril every night at bedtime and use nasal saline (Ocean spray) before the Flonase at bedtime and several times during the day especially in the morning to keep nasal secretions from drying out. Paroxysmal atrial fibrillation:   Continue taking apixaban (Eliquis) 5 mg twice per day. Heart rate is 75 today. Hypertension:  Blood pressure is reasonable today at 130/68 (goal less than 130/80) before medication. Continue taking amlodipine (Norvasc) 5 mg every morning. Gastric reflux:  Continue taking famotidine (Pepcid) 20 mg once or twice per day as needed. Chronic back pain:  Take acetaminophen (Tylenol) 1000 mg (two 500 mg tablets) THREE times per day up to 3000 mg per day (six total 500 mg tablets). Irritable bowel syndrome:  Continue taking dicyclomine (Bentyl) 10 mg capsule every evening. Type 2 Diabetes:  Diet controlled. Hemoglobin A1c increased to 6.3% (goal less than 6.5%). Lower back pain:  Referral to orthopedic spine PA.         Follow-up in 3 weeks, check spirometry at that time

## 2023-02-10 ENCOUNTER — OFFICE VISIT (OUTPATIENT)
Dept: INTERNAL MEDICINE CLINIC | Age: 73
End: 2023-02-10
Payer: MEDICARE

## 2023-02-10 VITALS
DIASTOLIC BLOOD PRESSURE: 74 MMHG | TEMPERATURE: 95.6 F | OXYGEN SATURATION: 99 % | BODY MASS INDEX: 25.98 KG/M2 | HEART RATE: 68 BPM | WEIGHT: 191.8 LBS | SYSTOLIC BLOOD PRESSURE: 130 MMHG | HEIGHT: 72 IN

## 2023-02-10 DIAGNOSIS — I48.0 PAROXYSMAL ATRIAL FIBRILLATION (HCC): ICD-10-CM

## 2023-02-10 DIAGNOSIS — I10 ESSENTIAL HYPERTENSION: ICD-10-CM

## 2023-02-10 DIAGNOSIS — E11.9 TYPE 2 DIABETES MELLITUS WITHOUT COMPLICATION, WITHOUT LONG-TERM CURRENT USE OF INSULIN (HCC): Primary | ICD-10-CM

## 2023-02-10 DIAGNOSIS — K21.9 GASTRIC REFLUX: ICD-10-CM

## 2023-02-10 DIAGNOSIS — R05.3 CHRONIC COUGH: ICD-10-CM

## 2023-02-10 DIAGNOSIS — F51.01 PRIMARY INSOMNIA: ICD-10-CM

## 2023-02-10 PROCEDURE — 3078F DIAST BP <80 MM HG: CPT | Performed by: INTERNAL MEDICINE

## 2023-02-10 PROCEDURE — 3074F SYST BP LT 130 MM HG: CPT | Performed by: INTERNAL MEDICINE

## 2023-02-10 PROCEDURE — 99214 OFFICE O/P EST MOD 30 MIN: CPT | Performed by: INTERNAL MEDICINE

## 2023-02-10 PROCEDURE — 1124F ACP DISCUSS-NO DSCNMKR DOCD: CPT | Performed by: INTERNAL MEDICINE

## 2023-02-10 RX ORDER — FAMOTIDINE 20 MG/1
20 TABLET, FILM COATED ORAL 2 TIMES DAILY
COMMUNITY

## 2023-02-10 NOTE — PATIENT INSTRUCTIONS
Chronic irritative cough associated with bronchospasms and allergic rhinitis:  Spirometry shows moderate obstruction. Continue using the specifically Advair (fluticasone-salmeterol) 100-50 one puff TWICE per day. Allergic rhinitis:  Continue using Flonase (fluticasone) nasal spray at night with nasal saline. Paroxysmal atrial fibrillation:   Continue taking apixaban (Eliquis) 5 mg twice per day. Heart rate is 68 today. Hypertension:  Blood pressure is reasonable today at 130/74 (goal less than 130/80) before medication. Continue taking amlodipine (Norvasc) 5 mg every morning. Gastric reflux:  Continue taking famotidine (Pepcid) 20 mg once or twice per day as needed. Chronic back pain:  Take acetaminophen (Tylenol) 1000 mg (two 500 mg tablets) THREE times per day up to 3000 mg per day (six total 500 mg tablets). Type 2 Diabetes:  Diet controlled. Hemoglobin A1c increased to 6.3% (goal less than 6.5%) in September. We will recheck. Lower back pain:  Referral to orthopedic spine PA, appointment pending.        Follow-up in three months  LABS: HgbA1c

## 2023-02-10 NOTE — PROGRESS NOTES
SUBJECTIVE:   Follow up from 1/20 for nonproductive cough associated with bronchospasm. Improved with addition of fluticasone-salmeterol inhaler BID and by changing lisinopril to amlodipine but still has cough. He changed from pantoprazole to famotidine with no change in cough. Not sleeping well, early awakening and difficulty getting back to sleep. Taking melatonin with minimal improvement. History of insomnia, no relief with valproate and stopped at 10 days. Also no relief with trazodone. Falls asleep in 30-60 minutes and wakes up with difficulty getting back to sleep multiple times per night. Worse due to dyspnea while attempting to sleep. Concerned he may have COPD due to 120 pk/yr history smoking, quit 2003. Never diagnosed with COPD. History of present illness:    Persistent irritative \"dry\" cough for the past three weeks which is making sciatica worse and impacting sleep. He has been taking lisinopril for five years and recently increased the dose to 20 mg per day. He ran out of Eliquis today for atrial fibrillation, unable to afford. Changing insurance to [facility] from [employer] and is in a \"donut hole\" and unable to afford medications. Also ran out of pantoprazole today. Will be able to fill scripts on Monday, Jan 2. He stopped taking venlafaxine after 10 days because it caused confusion. Hospitalized (9/14 - 9/17) with acute kidney injury with elevated AST/ALT from pre-renal azotemia associated with dehydration from three days of nausea with vomiting. Ran out of Suboxone three days prior to admission. Had been tapering Suboxone for several months with the intent to be off all opiates but has rebound back pain that was being treated with buprenorphine. Insomnia worse, unable to sleep much at all. Stopped trazodone 100 mg due to increase in insomnia. Chronic pain has returned after Suboxone discontinued. Gisselle Alvarado   History diabetes, hypertension and atrial fibrillation on apixaban and gastric reflux on pantoprazole. Hospitalized Jan 2021 for nausea and vomiting. Etiology of emesis unclear. Diagnosed with diabetes with hemoglobin A1c 8.1%. Prescribed metformin at discharge, took one this morning with subsequent dyspepsia. History of paroxysmal atrial fibrillation for which he has been on Eliquis (prescribed by [id number] [facility] cardiology). Sinus rhythm in the hospital.  He has been on Suboxone for chronic back pain and has been off for a while and now off, last taken four days ago. Plans to continue off Suboxone. He was not seen by gastroenterology. No primary care physician for several years. Only seen by cardiology (last seen 10/22/20 by Nathaniel Goodpasture). Ventral hernia repair (4/12, Dr. Don Santana). Still has some lower abdominal pain, worse after bowel movement or urination. Metformin induced diarrhea has resolved.        MEDICATIONS:  famotidine (PEPCID) 20 MG tablet Take 20 mg by mouth 2 times daily   fluticasone-salmeterol (ADVAIR) 100-50 MCG/ACT Inhale 1 puff into the lungs 2 times daily   apixaban (ELIQUIS) 5 MG TABS tablet Take 1 tablet by mouth 2 times daily   amLODIPine (NORVASC) 5 MG tablet Take 1 tablet by mouth daily       Hemoglobin A1c (9/14) 6.3%     LABS: 09/15/2022  WBC 10.2   HGB 14.0      MCV 84.5      LABS: 09/17/2022   (L)   K 3.7      CO2 20 (L)   BUN 20   CREATININE 0.9   GLUCOSE 114 (H)      Calcium 8.4    Total Protein 6.0 (L)   Albumin 3.9    Albumin/Globulin Ratio 1.9    Total Bilirubin 1.9 (H)   Alkaline Phosphatase 85    ALT 34    AST 38 (H)         PFT (11/29) 2.14 (64%) / 3.72 (81%) = 0.58 c/w moderate obstruction      OBJECTIVE:    /74   Pulse 68   Temp (!) 95.6 °F (35.3 °C) (Temporal)   Ht 6' (1.829 m)   Wt 191 lb 12.8 oz (87 kg)   SpO2 99%   BMI 26.01 kg/m²   HEENT:  Oropharynx clear, no lymphadenopathy,   LUNGS:  Clear and without wheezes  HEART:  Regular rhythm, no appreciable murmur  ABD:  Benign, active bowel sounds  EXT: No edema, pulses palpable  NEURO:  No focal neurologic deficits noted. ASSESSMENT / PLAN:   Chronic irritative cough associated with bronchospasms and allergic rhinitis:  Spirometry shows moderate obstruction. Continue using the specifically Advair (fluticasone-salmeterol) 100-50 one puff TWICE per day. Allergic rhinitis:  Continue using Flonase (fluticasone) nasal spray at night with nasal saline. Paroxysmal atrial fibrillation:   Continue taking apixaban (Eliquis) 5 mg twice per day. Heart rate is 68 today. Hypertension:  Blood pressure is reasonable today at 130/74 (goal less than 130/80) before medication. Continue taking amlodipine (Norvasc) 5 mg every morning. Gastric reflux:  Continue taking famotidine (Pepcid) 20 mg once or twice per day as needed. Chronic back pain:  Take acetaminophen (Tylenol) 1000 mg (two 500 mg tablets) THREE times per day up to 3000 mg per day (six total 500 mg tablets). Type 2 Diabetes:  Diet controlled. Hemoglobin A1c increased to 6.3% (goal less than 6.5%) in September. We will recheck. Lower back pain:  Referral to orthopedic spine PA, appointment pending.        Follow-up in three months  LABS: HgbA1c

## 2023-03-21 ENCOUNTER — APPOINTMENT (OUTPATIENT)
Dept: CT IMAGING | Age: 73
End: 2023-03-21
Payer: MEDICARE

## 2023-03-21 ENCOUNTER — HOSPITAL ENCOUNTER (EMERGENCY)
Age: 73
Discharge: HOME | End: 2023-03-21
Attending: EMERGENCY MEDICINE
Payer: MEDICARE

## 2023-03-21 VITALS
TEMPERATURE: 97.6 F | WEIGHT: 200 LBS | RESPIRATION RATE: 16 BRPM | SYSTOLIC BLOOD PRESSURE: 126 MMHG | DIASTOLIC BLOOD PRESSURE: 96 MMHG | HEIGHT: 72 IN | HEART RATE: 83 BPM | BODY MASS INDEX: 27.09 KG/M2 | OXYGEN SATURATION: 97 %

## 2023-03-21 DIAGNOSIS — E86.0 DEHYDRATION: ICD-10-CM

## 2023-03-21 DIAGNOSIS — R11.2 NAUSEA AND VOMITING, UNSPECIFIED VOMITING TYPE: Primary | ICD-10-CM

## 2023-03-21 LAB
ALBUMIN SERPL-MCNC: 4.9 G/DL (ref 3.4–5)
ALBUMIN/GLOB SERPL: 1.3 {RATIO} (ref 1.1–2.2)
ALP SERPL-CCNC: 132 U/L (ref 40–129)
ALT SERPL-CCNC: 20 U/L (ref 10–40)
ANION GAP SERPL CALCULATED.3IONS-SCNC: 17 MMOL/L (ref 3–16)
AST SERPL-CCNC: 20 U/L (ref 15–37)
BACTERIA URNS QL MICRO: ABNORMAL /HPF
BASE EXCESS BLDV CALC-SCNC: -3.4 MMOL/L (ref -3–3)
BASOPHILS # BLD: 0.2 K/UL (ref 0–0.2)
BASOPHILS NFR BLD: 1.1 %
BILIRUB SERPL-MCNC: 1.5 MG/DL (ref 0–1)
BILIRUB UR QL STRIP.AUTO: NEGATIVE
BUN SERPL-MCNC: 22 MG/DL (ref 7–20)
CALCIUM SERPL-MCNC: 10.3 MG/DL (ref 8.3–10.6)
CHLORIDE SERPL-SCNC: 96 MMOL/L (ref 99–110)
CLARITY UR: CLEAR
CO2 BLDV-SCNC: 23 MMOL/L
CO2 SERPL-SCNC: 22 MMOL/L (ref 21–32)
COHGB MFR BLDV: 2.6 % (ref 0–1.5)
COLOR UR: YELLOW
CREAT SERPL-MCNC: 1.2 MG/DL (ref 0.8–1.3)
DEPRECATED RDW RBC AUTO: 13.8 % (ref 12.4–15.4)
EKG ATRIAL RATE: 72 BPM
EKG DIAGNOSIS: NORMAL
EKG P-R INTERVAL: 184 MS
EKG Q-T INTERVAL: 428 MS
EKG QRS DURATION: 86 MS
EKG QTC CALCULATION (BAZETT): 468 MS
EKG R AXIS: -44 DEGREES
EKG T AXIS: 65 DEGREES
EKG VENTRICULAR RATE: 72 BPM
EOSINOPHIL # BLD: 0 K/UL (ref 0–0.6)
EOSINOPHIL NFR BLD: 0 %
FLUAV RNA RESP QL NAA+PROBE: NOT DETECTED
FLUBV RNA RESP QL NAA+PROBE: NOT DETECTED
GFR SERPLBLD CREATININE-BSD FMLA CKD-EPI: >60 ML/MIN/{1.73_M2}
GLUCOSE SERPL-MCNC: 255 MG/DL (ref 70–99)
GLUCOSE UR STRIP.AUTO-MCNC: 250 MG/DL
HCO3 BLDV-SCNC: 21.7 MMOL/L (ref 23–29)
HCT VFR BLD AUTO: 46.2 % (ref 40.5–52.5)
HGB BLD-MCNC: 15.8 G/DL (ref 13.5–17.5)
HGB UR QL STRIP.AUTO: ABNORMAL
KETONES UR STRIP.AUTO-MCNC: ABNORMAL MG/DL
LACTATE BLDV-SCNC: 1.9 MMOL/L (ref 0.4–2)
LACTATE BLDV-SCNC: 3.3 MMOL/L (ref 0.4–1.9)
LACTATE BLDV-SCNC: 6 MMOL/L (ref 0.4–1.9)
LEUKOCYTE ESTERASE UR QL STRIP.AUTO: NEGATIVE
LIPASE SERPL-CCNC: 24 U/L (ref 13–60)
LYMPHOCYTES # BLD: 0.6 K/UL (ref 1–5.1)
LYMPHOCYTES NFR BLD: 3.9 %
MCH RBC QN AUTO: 30 PG (ref 26–34)
MCHC RBC AUTO-ENTMCNC: 34.3 G/DL (ref 31–36)
MCV RBC AUTO: 87.6 FL (ref 80–100)
METHGB MFR BLDV: 0.3 %
MONOCYTES # BLD: 0.6 K/UL (ref 0–1.3)
MONOCYTES NFR BLD: 3.8 %
MUCOUS THREADS #/AREA URNS LPF: ABNORMAL /LPF
NEUTROPHILS # BLD: 14.6 K/UL (ref 1.7–7.7)
NEUTROPHILS NFR BLD: 91.2 %
NITRITE UR QL STRIP.AUTO: NEGATIVE
O2 THERAPY: ABNORMAL
PCO2 BLDV: 39.5 MMHG (ref 40–50)
PH BLDV: 7.36 [PH] (ref 7.35–7.45)
PH UR STRIP.AUTO: 6 [PH] (ref 5–8)
PLATELET # BLD AUTO: 261 K/UL (ref 135–450)
PMV BLD AUTO: 8.7 FL (ref 5–10.5)
PO2 BLDV: 22.7 MMHG (ref 25–40)
POTASSIUM SERPL-SCNC: 3.7 MMOL/L (ref 3.5–5.1)
PROT SERPL-MCNC: 8.6 G/DL (ref 6.4–8.2)
PROT UR STRIP.AUTO-MCNC: 30 MG/DL
RBC # BLD AUTO: 5.28 M/UL (ref 4.2–5.9)
RBC #/AREA URNS HPF: ABNORMAL /HPF (ref 0–4)
SAO2 % BLDV: 37 %
SARS-COV-2 RNA RESP QL NAA+PROBE: NOT DETECTED
SODIUM SERPL-SCNC: 135 MMOL/L (ref 136–145)
SP GR UR STRIP.AUTO: <=1.005 (ref 1–1.03)
TROPONIN T SERPL-MCNC: <0.01 NG/ML
UA COMPLETE W REFLEX CULTURE PNL UR: ABNORMAL
UA DIPSTICK W REFLEX MICRO PNL UR: YES
URN SPEC COLLECT METH UR: ABNORMAL
UROBILINOGEN UR STRIP-ACNC: 0.2 E.U./DL
WBC # BLD AUTO: 16 K/UL (ref 4–11)
WBC #/AREA URNS HPF: ABNORMAL /HPF (ref 0–5)

## 2023-03-21 PROCEDURE — 74177 CT ABD & PELVIS W/CONTRAST: CPT

## 2023-03-21 PROCEDURE — 96372 THER/PROPH/DIAG INJ SC/IM: CPT

## 2023-03-21 PROCEDURE — 96375 TX/PRO/DX INJ NEW DRUG ADDON: CPT

## 2023-03-21 PROCEDURE — 82803 BLOOD GASES ANY COMBINATION: CPT

## 2023-03-21 PROCEDURE — 87636 SARSCOV2 & INF A&B AMP PRB: CPT

## 2023-03-21 PROCEDURE — 81001 URINALYSIS AUTO W/SCOPE: CPT

## 2023-03-21 PROCEDURE — 85025 COMPLETE CBC W/AUTO DIFF WBC: CPT

## 2023-03-21 PROCEDURE — 93005 ELECTROCARDIOGRAM TRACING: CPT | Performed by: PHYSICIAN ASSISTANT

## 2023-03-21 PROCEDURE — 87040 BLOOD CULTURE FOR BACTERIA: CPT

## 2023-03-21 PROCEDURE — 96361 HYDRATE IV INFUSION ADD-ON: CPT

## 2023-03-21 PROCEDURE — 36415 COLL VENOUS BLD VENIPUNCTURE: CPT

## 2023-03-21 PROCEDURE — 2580000003 HC RX 258: Performed by: PHYSICIAN ASSISTANT

## 2023-03-21 PROCEDURE — 99285 EMERGENCY DEPT VISIT HI MDM: CPT

## 2023-03-21 PROCEDURE — 84484 ASSAY OF TROPONIN QUANT: CPT

## 2023-03-21 PROCEDURE — 6360000002 HC RX W HCPCS: Performed by: PHYSICIAN ASSISTANT

## 2023-03-21 PROCEDURE — A4216 STERILE WATER/SALINE, 10 ML: HCPCS | Performed by: PHYSICIAN ASSISTANT

## 2023-03-21 PROCEDURE — 93010 ELECTROCARDIOGRAM REPORT: CPT | Performed by: INTERNAL MEDICINE

## 2023-03-21 PROCEDURE — 2500000003 HC RX 250 WO HCPCS: Performed by: PHYSICIAN ASSISTANT

## 2023-03-21 PROCEDURE — 6360000004 HC RX CONTRAST MEDICATION: Performed by: PHYSICIAN ASSISTANT

## 2023-03-21 PROCEDURE — 83690 ASSAY OF LIPASE: CPT

## 2023-03-21 PROCEDURE — 80053 COMPREHEN METABOLIC PANEL: CPT

## 2023-03-21 PROCEDURE — 83605 ASSAY OF LACTIC ACID: CPT

## 2023-03-21 RX ORDER — ONDANSETRON 4 MG/1
4 TABLET, ORALLY DISINTEGRATING ORAL 3 TIMES DAILY PRN
Qty: 21 TABLET | Refills: 0 | Status: SHIPPED | OUTPATIENT
Start: 2023-03-21

## 2023-03-21 RX ORDER — 0.9 % SODIUM CHLORIDE 0.9 %
2000 INTRAVENOUS SOLUTION INTRAVENOUS ONCE
Status: COMPLETED | OUTPATIENT
Start: 2023-03-21 | End: 2023-03-21

## 2023-03-21 RX ORDER — ONDANSETRON 2 MG/ML
4 INJECTION INTRAMUSCULAR; INTRAVENOUS ONCE
Status: COMPLETED | OUTPATIENT
Start: 2023-03-21 | End: 2023-03-21

## 2023-03-21 RX ORDER — DICYCLOMINE HYDROCHLORIDE 10 MG/ML
20 INJECTION INTRAMUSCULAR ONCE
Status: COMPLETED | OUTPATIENT
Start: 2023-03-21 | End: 2023-03-21

## 2023-03-21 RX ORDER — FAMOTIDINE 20 MG/1
10 TABLET, FILM COATED ORAL 2 TIMES DAILY
Qty: 60 TABLET | Refills: 3 | Status: SHIPPED | OUTPATIENT
Start: 2023-03-21

## 2023-03-21 RX ORDER — 0.9 % SODIUM CHLORIDE 0.9 %
1000 INTRAVENOUS SOLUTION INTRAVENOUS ONCE
Status: COMPLETED | OUTPATIENT
Start: 2023-03-21 | End: 2023-03-21

## 2023-03-21 RX ORDER — PROCHLORPERAZINE EDISYLATE 5 MG/ML
10 INJECTION INTRAMUSCULAR; INTRAVENOUS ONCE
Status: COMPLETED | OUTPATIENT
Start: 2023-03-21 | End: 2023-03-21

## 2023-03-21 RX ADMIN — IOPAMIDOL 75 ML: 755 INJECTION, SOLUTION INTRAVENOUS at 19:04

## 2023-03-21 RX ADMIN — ONDANSETRON 4 MG: 2 INJECTION INTRAMUSCULAR; INTRAVENOUS at 17:19

## 2023-03-21 RX ADMIN — DICYCLOMINE HYDROCHLORIDE 20 MG: 10 INJECTION, SOLUTION INTRAMUSCULAR at 18:22

## 2023-03-21 RX ADMIN — SODIUM CHLORIDE 2000 ML: 9 INJECTION, SOLUTION INTRAVENOUS at 18:27

## 2023-03-21 RX ADMIN — PROCHLORPERAZINE EDISYLATE 10 MG: 5 INJECTION INTRAMUSCULAR; INTRAVENOUS at 18:22

## 2023-03-21 RX ADMIN — FAMOTIDINE 20 MG: 10 INJECTION INTRAVENOUS at 17:19

## 2023-03-21 RX ADMIN — SODIUM CHLORIDE 1000 ML: 9 INJECTION, SOLUTION INTRAVENOUS at 17:32

## 2023-03-21 ASSESSMENT — PAIN DESCRIPTION - ORIENTATION
ORIENTATION: RIGHT
ORIENTATION: LOWER

## 2023-03-21 ASSESSMENT — PAIN DESCRIPTION - DESCRIPTORS
DESCRIPTORS: ACHING
DESCRIPTORS: ACHING

## 2023-03-21 ASSESSMENT — PAIN DESCRIPTION - LOCATION
LOCATION: ABDOMEN
LOCATION: ABDOMEN

## 2023-03-21 ASSESSMENT — PAIN - FUNCTIONAL ASSESSMENT: PAIN_FUNCTIONAL_ASSESSMENT: 0-10

## 2023-03-21 ASSESSMENT — PAIN SCALES - GENERAL
PAINLEVEL_OUTOF10: 8
PAINLEVEL_OUTOF10: 8

## 2023-03-21 NOTE — ED PROVIDER NOTES
Abnormal; Notable for the following components:    Lactic Acid, Sepsis 3.3 (*)     All other components within normal limits   URINALYSIS WITH REFLEX TO CULTURE - Abnormal; Notable for the following components:    Glucose, Ur 250 (*)     Ketones, Urine TRACE (*)     Blood, Urine SMALL (*)     Protein, UA 30 (*)     All other components within normal limits   BLOOD GAS, VENOUS - Abnormal; Notable for the following components:    pCO2, Jose 39.5 (*)     pO2, Jose 22.7 (*)     HCO3, Venous 21.7 (*)     Base Excess, Jose -3.4 (*)     Carboxyhemoglobin 2.6 (*)     All other components within normal limits   MICROSCOPIC URINALYSIS - Abnormal; Notable for the following components:    Mucus, UA 1+ (*)     Bacteria, UA 1+ (*)     All other components within normal limits   COVID-19 & INFLUENZA COMBO   CULTURE, BLOOD 1    Narrative:     ORDER#: [id number]                          ORDERED BY: Nestor Mobley  SOURCE: Blood Antecubital-Rig              COLLECTED:  03/21/23 19:29  ANTIBIOTICS AT MICHAEL.:                      RECEIVED :  03/21/23 19:38  If child <=2 yrs old please draw pediatric bottle. ~Blood Culture 1   CULTURE, BLOOD 2    Narrative:     ORDER#: [id number]                          ORDERED BY: Nestor Mobley  SOURCE: Blood Antecubital-Lef              COLLECTED:  03/21/23 19:29  ANTIBIOTICS AT MICHAEL.:                      RECEIVED :  03/21/23 19:39  If child <=2 yrs old please draw pediatric bottle. ~Blood Culture #2   LIPASE   TROPONIN   LACTIC ACID       When ordered only abnormal lab results are displayed. All other labs were within normal range or not returned as of this dictation. EKG: When ordered, EKG's are interpreted by the Emergency Department Physician in the absence of a cardiologist.  Please see their note for interpretation of EKG.     RADIOLOGY:   Non-plain film images such as CT, Ultrasound and MRI are read by the radiologist. Plain radiographic images are visualized and preliminarily interpreted by the ED

## 2023-03-25 LAB
BACTERIA BLD CULT ORG #2: NORMAL
BACTERIA BLD CULT: NORMAL

## 2023-05-16 RX ORDER — APIXABAN 5 MG/1
TABLET, FILM COATED ORAL
Qty: 60 TABLET | Refills: 5 | Status: SHIPPED | OUTPATIENT
Start: 2023-05-16

## 2023-05-16 NOTE — TELEPHONE ENCOUNTER
Requested Prescriptions     Pending Prescriptions Disp Refills    ELIQUIS 5 MG TABS tablet [Pharmacy Med Name: ELIQUIS 5MG TABLETS] 60 tablet 5     Sig: TAKE 1 TABLET BY MOUTH TWICE DAILY    Last seen 02/10/2023

## 2023-07-10 RX ORDER — AMLODIPINE BESYLATE 5 MG/1
5 TABLET ORAL DAILY
Qty: 30 TABLET | Refills: 5 | Status: SHIPPED | OUTPATIENT
Start: 2023-07-10

## 2023-07-12 ENCOUNTER — TELEPHONE (OUTPATIENT)
Dept: INTERNAL MEDICINE CLINIC | Age: 73
End: 2023-07-12

## 2023-07-18 ENCOUNTER — OFFICE VISIT (OUTPATIENT)
Dept: INTERNAL MEDICINE CLINIC | Age: 73
End: 2023-07-18

## 2023-07-18 VITALS
SYSTOLIC BLOOD PRESSURE: 126 MMHG | OXYGEN SATURATION: 98 % | HEART RATE: 69 BPM | BODY MASS INDEX: 25.19 KG/M2 | HEIGHT: 72 IN | WEIGHT: 186 LBS | DIASTOLIC BLOOD PRESSURE: 66 MMHG

## 2023-07-18 DIAGNOSIS — M16.12 OSTEOARTHRITIS OF LEFT HIP, UNSPECIFIED OSTEOARTHRITIS TYPE: ICD-10-CM

## 2023-07-18 DIAGNOSIS — I48.0 PAROXYSMAL ATRIAL FIBRILLATION (HCC): ICD-10-CM

## 2023-07-18 DIAGNOSIS — E11.9 TYPE 2 DIABETES MELLITUS WITHOUT COMPLICATION, WITHOUT LONG-TERM CURRENT USE OF INSULIN (HCC): Primary | ICD-10-CM

## 2023-07-18 LAB — HBA1C MFR BLD: 6.4 %

## 2023-07-18 NOTE — PROGRESS NOTES
Lab Results   Component Value Date/Time    PROTIME 11.7 12/07/2015 10:20 AM    INR 1.5 08/23/2018 03:18 PM    INR 3.4 06/28/2018 12:00 AM     No results for input(s): CKTOTAL, CKMB, CKMBINDEX, TROPONINI in the last 72 hours.   Lab Results   Component Value Date/Time    NITRU Negative 03/21/2023 08:17 PM    COLORU Yellow 03/21/2023 08:17 PM    PHUR 6.0 03/21/2023 08:17 PM    WBCUA 0-2 03/21/2023 08:17 PM    RBCUA 0-2 03/21/2023 08:17 PM    MUCUS 1+ 03/21/2023 08:17 PM    BACTERIA 1+ 03/21/2023 08:17 PM    CLARITYU Clear 03/21/2023 08:17 PM    SPECGRAV <=1.005 03/21/2023 08:17 PM    LEUKOCYTESUR Negative 03/21/2023 08:17 PM    UROBILINOGEN 0.2 03/21/2023 08:17 PM    BILIRUBINUR Negative 03/21/2023 08:17 PM    BILIRUBINUR NEGATIVE, By Confirmatory Testing 12/15/2011 09:33 PM    BLOODU SMALL 03/21/2023 08:17 PM    GLUCOSEU 250 03/21/2023 08:17 PM    GLUCOSEU NEGATIVE 12/15/2011 09:33 PM    KETUA TRACE 03/21/2023 08:17 PM    AMORPHOUS 2+ 12/15/2011 09:33 PM     Lab Results   Component Value Date/Time    MG 1.60 09/15/2022 05:21 AM     Lab Results   Component Value Date/Time    LABA1C 6.4 07/18/2023 10:06 AM     Lab Results   Component Value Date/Time    TSH 0.93 12/07/2015 06:12 PM    TKPYLIMI50 369 09/16/2022 10:07 AM    FOLATE 10.21 09/16/2022 10:07 AM    FERRITIN 703.7 09/16/2022 10:07 AM    IRON 103 09/16/2022 10:07 AM    TIBC 197 09/16/2022 10:07 AM     Lab Results   Component Value Date/Time    TRIG 45 10/16/2016 12:30 PM    HDL 42 10/16/2016 12:30 PM    HDL 35 02/13/2012 05:13 AM    LDLCALC 58 10/16/2016 12:30 PM    LABVLDL 9 10/16/2016 12:30 PM     Lab Results   Component Value Date/Time    LIPASE 24.0 03/21/2023 05:10 PM     No results found for: BNP  Lab Results   Component Value Date/Time    LACTA 1.9 03/21/2023 08:47 PM     No results found for: PHART, PH, PQF8LNA, PCO2, PO2ART, PO2, WPX8YJY, HCO3, BEART, BE, THGBART, THB, CIY2KPI, B1TRTEDS, O2SAT  No results found for: Syed Yanez,

## 2023-07-21 ENCOUNTER — OFFICE VISIT (OUTPATIENT)
Dept: ORTHOPEDIC SURGERY | Age: 73
End: 2023-07-21

## 2023-07-21 VITALS — WEIGHT: 186 LBS | HEIGHT: 72 IN | BODY MASS INDEX: 25.19 KG/M2

## 2023-07-21 DIAGNOSIS — M25.552 LEFT HIP PAIN: ICD-10-CM

## 2023-07-21 DIAGNOSIS — M16.12 PRIMARY OSTEOARTHRITIS OF LEFT HIP: Primary | ICD-10-CM

## 2023-07-21 DIAGNOSIS — M54.50 ACUTE BILATERAL LOW BACK PAIN, UNSPECIFIED WHETHER SCIATICA PRESENT: ICD-10-CM

## 2023-07-22 NOTE — PROGRESS NOTES
ORTHOPAEDIC SURGERY INITIAL EVALUATION NOTE  Chief Complaint   Patient presents with    New Patient     Left hip pain       HISTORY OF PRESENT ILLNESS:  Very pleasant 52-year-old male presents for evaluation of left hip pain. His pain has worsened over the past several months. He denies any injury or trauma. He has a history of chronic back pain as well. He reports that his pain is mostly about the groin with attempts at movement. He has trouble walking. He indicates that he has to lean over in order to walk without as much pain. He describes significantly limited hip movement. He will occasionally have pain in the posterior hip and buttock area. It will radiate into the lower extremity at times. He has had prior history of an epidural steroid injection. No hip injections or surgery. Pain level 8 out of 10. He is a community ambulator without assist devices. He does not smoke. He takes Eliquis for atrial fibrillation. Past Medical History:   Diagnosis Date    Abnormal ultrasound of carotid artery     CTA was normal    Back pain     epidual injectiion for back  1997     Chronic back pain     MRI 1998 Normal    Diabetes mellitus ([address])     DJD (degenerative joint disease)     Rt. Hip    Fibromyalgia     Post Traumatic/Myofascial Pain    Hypertension     Kidney stones     2002    Paroxysmal A-fib [facility])        Current Outpatient Medications   Medication Sig Dispense Refill    amLODIPine (NORVASC) 5 MG tablet Take 1 tablet by mouth daily 30 tablet 5    ELIQUIS 5 MG TABS tablet TAKE 1 TABLET BY MOUTH TWICE DAILY 60 tablet 5    ondansetron (ZOFRAN-ODT) 4 MG disintegrating tablet Take 1 tablet by mouth 3 times daily as needed for Nausea or Vomiting 21 tablet 0    famotidine (PEPCID) 20 MG tablet Take 0.5 tablets by mouth 2 times daily 60 tablet 3    fluticasone-salmeterol (ADVAIR) 100-50 MCG/ACT AEPB diskus inhaler Inhale 1 puff into the lungs in the morning and 1 puff in the evening.  (Patient not

## 2023-07-24 ENCOUNTER — TELEPHONE (OUTPATIENT)
Dept: ORTHOPEDIC SURGERY | Age: 73
End: 2023-07-24

## 2023-07-24 NOTE — TELEPHONE ENCOUNTER
Called patient to schedule surgery, he is having back issues and would like to get his back looked at and taken care of before he has hip surgery. He will call me when he is ready to schedule.  KB

## 2023-07-26 ENCOUNTER — OFFICE VISIT (OUTPATIENT)
Dept: ORTHOPEDIC SURGERY | Age: 73
End: 2023-07-26
Payer: MEDICARE

## 2023-07-26 VITALS — WEIGHT: 186 LBS | BODY MASS INDEX: 25.19 KG/M2 | HEIGHT: 72 IN

## 2023-07-26 DIAGNOSIS — M51.36 DDD (DEGENERATIVE DISC DISEASE), LUMBAR: Primary | ICD-10-CM

## 2023-07-26 DIAGNOSIS — M16.12 PRIMARY OSTEOARTHRITIS OF LEFT HIP: ICD-10-CM

## 2023-07-26 PROCEDURE — G8417 CALC BMI ABV UP PARAM F/U: HCPCS | Performed by: PHYSICIAN ASSISTANT

## 2023-07-26 PROCEDURE — 1124F ACP DISCUSS-NO DSCNMKR DOCD: CPT | Performed by: PHYSICIAN ASSISTANT

## 2023-07-26 PROCEDURE — 1036F TOBACCO NON-USER: CPT | Performed by: PHYSICIAN ASSISTANT

## 2023-07-26 PROCEDURE — 3017F COLORECTAL CA SCREEN DOC REV: CPT | Performed by: PHYSICIAN ASSISTANT

## 2023-07-26 PROCEDURE — G8427 DOCREV CUR MEDS BY ELIG CLIN: HCPCS | Performed by: PHYSICIAN ASSISTANT

## 2023-07-26 PROCEDURE — 99214 OFFICE O/P EST MOD 30 MIN: CPT | Performed by: PHYSICIAN ASSISTANT

## 2023-07-26 NOTE — PROGRESS NOTES
New Patient: LUMBAR SPINE    Referring Provider:  Mauricio Bolivar MD    CHIEF COMPLAINT:    Chief Complaint   Patient presents with    Back Pain     lumbar       HISTORY OF PRESENT ILLNESS:       Mr. Katelyn Hassan  is a pleasant 74 y.o. male here for evaluation regarding his LBP and left hip and leg pain. He states his pain began insidiously over 30 years ago. He states that the pain has been within the left low back but does radiate into his groin and lateral thigh. Pain also radiates to his knee. He states the pain within his lower back and right SI joint can be a 10/10 but he also states that the groin pain is worse and rates that 10/10. Pain does radiate to the anterior knee which ranges from 5-10/10. Pain is worse with standing and improved with sitting and lying down. Patient has had previous epidural steroid injection with benefit. He also has had physical therapy in the past with benefit. He finds that his ability to stand and walk has been limited to approximately 10 minutes before he has to sit because of groin thigh and buttock pain. Patient denies any bowel or bladder dysfunction or saddle anesthesia. The pain minimally affects his sleep. Pain Assessment  Location of Pain: Back  Location Modifiers: Other (Comment)  Severity of Pain: 10  Quality of Pain: Other (Comment)  Duration of Pain: Other (Comment)  Frequency of Pain: Other (Comment)  Aggravating Factors: Other (Comment)  Relieving Factors: Other (Comment)]  Current/Past Treatment:   Physical Therapy: In the past with some benefit  Chiropractic: None  Injection: Previous epidural steroid injection with benefit  Medications: None currently    Past Medical History:   Past Medical History:   Diagnosis Date    Abnormal ultrasound of carotid artery     CTA was normal    Back pain     epidual injectiion for back  1997     Chronic back pain     MRI 1998 Normal    Diabetes mellitus ([address])     DJD (degenerative joint disease)     Rt.  Hip

## 2023-08-06 ENCOUNTER — APPOINTMENT (OUTPATIENT)
Dept: CT IMAGING | Age: 73
DRG: 381 | End: 2023-08-06
Payer: MEDICARE

## 2023-08-06 ENCOUNTER — HOSPITAL ENCOUNTER (INPATIENT)
Age: 73
LOS: 5 days | Discharge: HOME | DRG: 381 | End: 2023-08-11
Attending: STUDENT IN AN ORGANIZED HEALTH CARE EDUCATION/TRAINING PROGRAM | Admitting: STUDENT IN AN ORGANIZED HEALTH CARE EDUCATION/TRAINING PROGRAM
Payer: MEDICARE

## 2023-08-06 ENCOUNTER — APPOINTMENT (OUTPATIENT)
Dept: GENERAL RADIOLOGY | Age: 73
DRG: 381 | End: 2023-08-06
Payer: MEDICARE

## 2023-08-06 DIAGNOSIS — R11.2 NAUSEA AND VOMITING, UNSPECIFIED VOMITING TYPE: Primary | ICD-10-CM

## 2023-08-06 DIAGNOSIS — R10.13 EPIGASTRIC PAIN: ICD-10-CM

## 2023-08-06 LAB
ALBUMIN SERPL-MCNC: 4.9 G/DL (ref 3.4–5)
ALBUMIN/GLOB SERPL: 1.5 {RATIO} (ref 1.1–2.2)
ALP SERPL-CCNC: 129 U/L (ref 40–129)
ALT SERPL-CCNC: <5 U/L (ref 10–40)
ANION GAP SERPL CALCULATED.3IONS-SCNC: 22 MMOL/L (ref 3–16)
AST SERPL-CCNC: 21 U/L (ref 15–37)
BASOPHILS # BLD: 0 K/UL (ref 0–0.2)
BASOPHILS NFR BLD: 0.2 %
BILIRUB SERPL-MCNC: 1.5 MG/DL (ref 0–1)
BILIRUB UR QL STRIP.AUTO: NEGATIVE
BUN SERPL-MCNC: 16 MG/DL (ref 7–20)
CALCIUM SERPL-MCNC: 10.2 MG/DL (ref 8.3–10.6)
CHLORIDE SERPL-SCNC: 98 MMOL/L (ref 99–110)
CLARITY UR: CLEAR
CO2 SERPL-SCNC: 20 MMOL/L (ref 21–32)
COLOR UR: YELLOW
CREAT SERPL-MCNC: 1.1 MG/DL (ref 0.8–1.3)
DEPRECATED RDW RBC AUTO: 13.3 % (ref 12.4–15.4)
EOSINOPHIL # BLD: 0 K/UL (ref 0–0.6)
EOSINOPHIL NFR BLD: 0 %
EPI CELLS #/AREA URNS HPF: ABNORMAL /HPF (ref 0–5)
GFR SERPLBLD CREATININE-BSD FMLA CKD-EPI: >60 ML/MIN/{1.73_M2}
GLUCOSE BLD-MCNC: 180 MG/DL (ref 70–99)
GLUCOSE SERPL-MCNC: 228 MG/DL (ref 70–99)
GLUCOSE UR STRIP.AUTO-MCNC: 500 MG/DL
HCT VFR BLD AUTO: 45.6 % (ref 40.5–52.5)
HGB BLD-MCNC: 15.9 G/DL (ref 13.5–17.5)
HGB UR QL STRIP.AUTO: ABNORMAL
KETONES UR STRIP.AUTO-MCNC: 40 MG/DL
LACTATE BLDV-SCNC: 4.5 MMOL/L (ref 0.4–1.9)
LACTATE BLDV-SCNC: 6.2 MMOL/L (ref 0.4–1.9)
LEUKOCYTE ESTERASE UR QL STRIP.AUTO: NEGATIVE
LIPASE SERPL-CCNC: 22 U/L (ref 13–60)
LYMPHOCYTES # BLD: 0.6 K/UL (ref 1–5.1)
LYMPHOCYTES NFR BLD: 4.9 %
MCH RBC QN AUTO: 30.3 PG (ref 26–34)
MCHC RBC AUTO-ENTMCNC: 34.9 G/DL (ref 31–36)
MCV RBC AUTO: 86.9 FL (ref 80–100)
MONOCYTES # BLD: 0.4 K/UL (ref 0–1.3)
MONOCYTES NFR BLD: 2.9 %
NEUTROPHILS # BLD: 12.1 K/UL (ref 1.7–7.7)
NEUTROPHILS NFR BLD: 92 %
NITRITE UR QL STRIP.AUTO: NEGATIVE
PERFORMED ON: ABNORMAL
PH UR STRIP.AUTO: 7.5 [PH] (ref 5–8)
PLATELET # BLD AUTO: 244 K/UL (ref 135–450)
PMV BLD AUTO: 8.7 FL (ref 5–10.5)
POTASSIUM SERPL-SCNC: 4 MMOL/L (ref 3.5–5.1)
PROT SERPL-MCNC: 8.1 G/DL (ref 6.4–8.2)
PROT UR STRIP.AUTO-MCNC: 100 MG/DL
RBC # BLD AUTO: 5.24 M/UL (ref 4.2–5.9)
RBC #/AREA URNS HPF: ABNORMAL /HPF (ref 0–4)
RENAL EPI CELLS #/AREA UR COMP ASSIST: ABNORMAL /HPF (ref 0–1)
SODIUM SERPL-SCNC: 140 MMOL/L (ref 136–145)
SP GR UR STRIP.AUTO: 1.02 (ref 1–1.03)
UA COMPLETE W REFLEX CULTURE PNL UR: ABNORMAL
UA DIPSTICK W REFLEX MICRO PNL UR: YES
URN SPEC COLLECT METH UR: ABNORMAL
UROBILINOGEN UR STRIP-ACNC: 0.2 E.U./DL
WBC # BLD AUTO: 13.2 K/UL (ref 4–11)
WBC #/AREA URNS HPF: ABNORMAL /HPF (ref 0–5)

## 2023-08-06 PROCEDURE — 83690 ASSAY OF LIPASE: CPT

## 2023-08-06 PROCEDURE — 96361 HYDRATE IV INFUSION ADD-ON: CPT

## 2023-08-06 PROCEDURE — 71045 X-RAY EXAM CHEST 1 VIEW: CPT

## 2023-08-06 PROCEDURE — 36415 COLL VENOUS BLD VENIPUNCTURE: CPT

## 2023-08-06 PROCEDURE — 6360000002 HC RX W HCPCS: Performed by: NURSE PRACTITIONER

## 2023-08-06 PROCEDURE — 87040 BLOOD CULTURE FOR BACTERIA: CPT

## 2023-08-06 PROCEDURE — 1200000000 HC SEMI PRIVATE

## 2023-08-06 PROCEDURE — 93005 ELECTROCARDIOGRAM TRACING: CPT | Performed by: NURSE PRACTITIONER

## 2023-08-06 PROCEDURE — 99285 EMERGENCY DEPT VISIT HI MDM: CPT

## 2023-08-06 PROCEDURE — 96372 THER/PROPH/DIAG INJ SC/IM: CPT

## 2023-08-06 PROCEDURE — 81001 URINALYSIS AUTO W/SCOPE: CPT

## 2023-08-06 PROCEDURE — 74177 CT ABD & PELVIS W/CONTRAST: CPT

## 2023-08-06 PROCEDURE — 2580000003 HC RX 258: Performed by: NURSE PRACTITIONER

## 2023-08-06 PROCEDURE — 80053 COMPREHEN METABOLIC PANEL: CPT

## 2023-08-06 PROCEDURE — 83605 ASSAY OF LACTIC ACID: CPT

## 2023-08-06 PROCEDURE — 85025 COMPLETE CBC W/AUTO DIFF WBC: CPT

## 2023-08-06 PROCEDURE — 6360000004 HC RX CONTRAST MEDICATION: Performed by: NURSE PRACTITIONER

## 2023-08-06 PROCEDURE — 96365 THER/PROPH/DIAG IV INF INIT: CPT

## 2023-08-06 PROCEDURE — 96375 TX/PRO/DX INJ NEW DRUG ADDON: CPT

## 2023-08-06 RX ORDER — DICYCLOMINE HCL 20 MG
20 TABLET ORAL
Status: DISCONTINUED | OUTPATIENT
Start: 2023-08-06 | End: 2023-08-07

## 2023-08-06 RX ORDER — SODIUM CHLORIDE 0.9 % (FLUSH) 0.9 %
5-40 SYRINGE (ML) INJECTION PRN
Status: DISCONTINUED | OUTPATIENT
Start: 2023-08-06 | End: 2023-08-11 | Stop reason: HOSPADM

## 2023-08-06 RX ORDER — ACETAMINOPHEN 650 MG/1
650 SUPPOSITORY RECTAL EVERY 6 HOURS PRN
Status: CANCELLED | OUTPATIENT
Start: 2023-08-06

## 2023-08-06 RX ORDER — ONDANSETRON 2 MG/ML
4 INJECTION INTRAMUSCULAR; INTRAVENOUS ONCE
Status: COMPLETED | OUTPATIENT
Start: 2023-08-06 | End: 2023-08-06

## 2023-08-06 RX ORDER — AMLODIPINE BESYLATE 5 MG/1
5 TABLET ORAL DAILY
Status: DISCONTINUED | OUTPATIENT
Start: 2023-08-07 | End: 2023-08-11 | Stop reason: HOSPADM

## 2023-08-06 RX ORDER — SODIUM CHLORIDE, SODIUM LACTATE, POTASSIUM CHLORIDE, AND CALCIUM CHLORIDE .6; .31; .03; .02 G/100ML; G/100ML; G/100ML; G/100ML
1000 INJECTION, SOLUTION INTRAVENOUS ONCE
Status: COMPLETED | OUTPATIENT
Start: 2023-08-06 | End: 2023-08-07

## 2023-08-06 RX ORDER — ONDANSETRON 2 MG/ML
4 INJECTION INTRAMUSCULAR; INTRAVENOUS EVERY 6 HOURS PRN
Status: DISCONTINUED | OUTPATIENT
Start: 2023-08-06 | End: 2023-08-11 | Stop reason: HOSPADM

## 2023-08-06 RX ORDER — 0.9 % SODIUM CHLORIDE 0.9 %
1000 INTRAVENOUS SOLUTION INTRAVENOUS ONCE
Status: COMPLETED | OUTPATIENT
Start: 2023-08-06 | End: 2023-08-06

## 2023-08-06 RX ORDER — MORPHINE SULFATE 4 MG/ML
4 INJECTION, SOLUTION INTRAMUSCULAR; INTRAVENOUS ONCE
Status: COMPLETED | OUTPATIENT
Start: 2023-08-06 | End: 2023-08-06

## 2023-08-06 RX ORDER — MAGNESIUM SULFATE IN WATER 40 MG/ML
2000 INJECTION, SOLUTION INTRAVENOUS PRN
Status: DISCONTINUED | OUTPATIENT
Start: 2023-08-06 | End: 2023-08-11 | Stop reason: HOSPADM

## 2023-08-06 RX ORDER — SODIUM CHLORIDE 9 MG/ML
INJECTION, SOLUTION INTRAVENOUS PRN
Status: DISCONTINUED | OUTPATIENT
Start: 2023-08-06 | End: 2023-08-11 | Stop reason: HOSPADM

## 2023-08-06 RX ORDER — NICOTINE 21 MG/24HR
1 PATCH, TRANSDERMAL 24 HOURS TRANSDERMAL DAILY PRN
Status: DISCONTINUED | OUTPATIENT
Start: 2023-08-06 | End: 2023-08-11 | Stop reason: HOSPADM

## 2023-08-06 RX ORDER — ONDANSETRON 4 MG/1
4 TABLET, ORALLY DISINTEGRATING ORAL EVERY 8 HOURS PRN
Status: DISCONTINUED | OUTPATIENT
Start: 2023-08-06 | End: 2023-08-11 | Stop reason: HOSPADM

## 2023-08-06 RX ORDER — FAMOTIDINE 20 MG/1
10 TABLET, FILM COATED ORAL 2 TIMES DAILY
Status: DISCONTINUED | OUTPATIENT
Start: 2023-08-07 | End: 2023-08-07

## 2023-08-06 RX ORDER — ACETAMINOPHEN 325 MG/1
650 TABLET ORAL EVERY 6 HOURS SCHEDULED
Status: DISCONTINUED | OUTPATIENT
Start: 2023-08-07 | End: 2023-08-07

## 2023-08-06 RX ORDER — SODIUM CHLORIDE 0.9 % (FLUSH) 0.9 %
5-40 SYRINGE (ML) INJECTION EVERY 12 HOURS SCHEDULED
Status: DISCONTINUED | OUTPATIENT
Start: 2023-08-07 | End: 2023-08-11 | Stop reason: HOSPADM

## 2023-08-06 RX ORDER — ACETAMINOPHEN 325 MG/1
650 TABLET ORAL EVERY 6 HOURS PRN
Status: CANCELLED | OUTPATIENT
Start: 2023-08-06

## 2023-08-06 RX ORDER — PROMETHAZINE HYDROCHLORIDE 25 MG/ML
25 INJECTION, SOLUTION INTRAMUSCULAR; INTRAVENOUS ONCE
Status: COMPLETED | OUTPATIENT
Start: 2023-08-06 | End: 2023-08-06

## 2023-08-06 RX ORDER — POTASSIUM CHLORIDE 7.45 MG/ML
10 INJECTION INTRAVENOUS PRN
Status: DISCONTINUED | OUTPATIENT
Start: 2023-08-06 | End: 2023-08-11 | Stop reason: HOSPADM

## 2023-08-06 RX ADMIN — SODIUM CHLORIDE 1000 ML: 9 INJECTION, SOLUTION INTRAVENOUS at 20:26

## 2023-08-06 RX ADMIN — IOPAMIDOL 75 ML: 755 INJECTION, SOLUTION INTRAVENOUS at 18:51

## 2023-08-06 RX ADMIN — ONDANSETRON 4 MG: 2 INJECTION INTRAMUSCULAR; INTRAVENOUS at 17:52

## 2023-08-06 RX ADMIN — MEROPENEM 1000 MG: 1 INJECTION, POWDER, FOR SOLUTION INTRAVENOUS at 21:13

## 2023-08-06 RX ADMIN — MORPHINE SULFATE 4 MG: 4 INJECTION, SOLUTION INTRAMUSCULAR; INTRAVENOUS at 17:53

## 2023-08-06 RX ADMIN — HYDROMORPHONE HYDROCHLORIDE 0.5 MG: 1 INJECTION, SOLUTION INTRAMUSCULAR; INTRAVENOUS; SUBCUTANEOUS at 20:24

## 2023-08-06 RX ADMIN — PROMETHAZINE HYDROCHLORIDE 25 MG: 25 INJECTION INTRAMUSCULAR; INTRAVENOUS at 19:56

## 2023-08-06 RX ADMIN — SODIUM CHLORIDE 1000 ML: 9 INJECTION, SOLUTION INTRAVENOUS at 18:18

## 2023-08-06 RX ADMIN — SODIUM CHLORIDE 1000 ML: 9 INJECTION, SOLUTION INTRAVENOUS at 17:57

## 2023-08-06 ASSESSMENT — PAIN DESCRIPTION - LOCATION
LOCATION: ABDOMEN
LOCATION: ABDOMEN;BACK
LOCATION: ABDOMEN;BACK;HIP
LOCATION: ABDOMEN

## 2023-08-06 ASSESSMENT — PAIN DESCRIPTION - DESCRIPTORS
DESCRIPTORS: ACHING

## 2023-08-06 ASSESSMENT — PAIN DESCRIPTION - ONSET
ONSET: AWAKENED FROM SLEEP
ONSET: ON-GOING
ONSET: ON-GOING

## 2023-08-06 ASSESSMENT — ENCOUNTER SYMPTOMS
EYE PAIN: 0
SORE THROAT: 0
SHORTNESS OF BREATH: 0
BACK PAIN: 0
COUGH: 0

## 2023-08-06 ASSESSMENT — PAIN DESCRIPTION - PAIN TYPE
TYPE: ACUTE PAIN

## 2023-08-06 ASSESSMENT — PAIN - FUNCTIONAL ASSESSMENT
PAIN_FUNCTIONAL_ASSESSMENT: 0-10
PAIN_FUNCTIONAL_ASSESSMENT: ACTIVITIES ARE NOT PREVENTED
PAIN_FUNCTIONAL_ASSESSMENT: PREVENTS OR INTERFERES WITH MANY ACTIVE NOT PASSIVE ACTIVITIES

## 2023-08-06 ASSESSMENT — PAIN SCALES - GENERAL
PAINLEVEL_OUTOF10: 9
PAINLEVEL_OUTOF10: 9
PAINLEVEL_OUTOF10: 10
PAINLEVEL_OUTOF10: 9

## 2023-08-06 ASSESSMENT — LIFESTYLE VARIABLES
HOW OFTEN DO YOU HAVE A DRINK CONTAINING ALCOHOL: 2-4 TIMES A MONTH
HOW MANY STANDARD DRINKS CONTAINING ALCOHOL DO YOU HAVE ON A TYPICAL DAY: 3 OR 4

## 2023-08-06 ASSESSMENT — PAIN DESCRIPTION - FREQUENCY
FREQUENCY: CONTINUOUS

## 2023-08-06 ASSESSMENT — PAIN DESCRIPTION - ORIENTATION
ORIENTATION: LEFT;RIGHT;LOWER;MID
ORIENTATION: RIGHT;LEFT;MID;LOWER
ORIENTATION: RIGHT;LOWER;UPPER

## 2023-08-07 PROBLEM — F12.90 CANNABIS USE DISORDER: Status: ACTIVE | Noted: 2023-08-07

## 2023-08-07 PROBLEM — K20.90 ESOPHAGITIS: Status: ACTIVE | Noted: 2023-08-07

## 2023-08-07 LAB
ALBUMIN SERPL-MCNC: 4.1 G/DL (ref 3.4–5)
ALBUMIN/GLOB SERPL: 1.4 {RATIO} (ref 1.1–2.2)
ALP SERPL-CCNC: 100 U/L (ref 40–129)
ALT SERPL-CCNC: 17 U/L (ref 10–40)
AMPHETAMINES UR QL SCN>1000 NG/ML: ABNORMAL
ANION GAP SERPL CALCULATED.3IONS-SCNC: 14 MMOL/L (ref 3–16)
AST SERPL-CCNC: 24 U/L (ref 15–37)
BARBITURATES UR QL SCN>200 NG/ML: ABNORMAL
BASOPHILS # BLD: 0 K/UL (ref 0–0.2)
BASOPHILS NFR BLD: 0.1 %
BENZODIAZ UR QL SCN>200 NG/ML: ABNORMAL
BILIRUB SERPL-MCNC: 0.9 MG/DL (ref 0–1)
BUN SERPL-MCNC: 14 MG/DL (ref 7–20)
CALCIUM SERPL-MCNC: 8.7 MG/DL (ref 8.3–10.6)
CANNABINOIDS UR QL SCN>50 NG/ML: POSITIVE
CHLORIDE SERPL-SCNC: 104 MMOL/L (ref 99–110)
CO2 SERPL-SCNC: 23 MMOL/L (ref 21–32)
COCAINE UR QL SCN: ABNORMAL
CREAT SERPL-MCNC: 1 MG/DL (ref 0.8–1.3)
DEPRECATED RDW RBC AUTO: 13.4 % (ref 12.4–15.4)
DRUG SCREEN COMMENT UR-IMP: ABNORMAL
EKG ATRIAL RATE: 79 BPM
EKG DIAGNOSIS: NORMAL
EKG P AXIS: 16 DEGREES
EKG P-R INTERVAL: 220 MS
EKG Q-T INTERVAL: 408 MS
EKG QRS DURATION: 86 MS
EKG QTC CALCULATION (BAZETT): 467 MS
EKG R AXIS: -45 DEGREES
EKG T AXIS: 64 DEGREES
EKG VENTRICULAR RATE: 79 BPM
EOSINOPHIL # BLD: 0 K/UL (ref 0–0.6)
EOSINOPHIL NFR BLD: 0 %
FENTANYL SCREEN, URINE: ABNORMAL
GFR SERPLBLD CREATININE-BSD FMLA CKD-EPI: >60 ML/MIN/{1.73_M2}
GLUCOSE BLD-MCNC: 133 MG/DL (ref 70–99)
GLUCOSE BLD-MCNC: 144 MG/DL (ref 70–99)
GLUCOSE BLD-MCNC: 152 MG/DL (ref 70–99)
GLUCOSE BLD-MCNC: 182 MG/DL (ref 70–99)
GLUCOSE SERPL-MCNC: 179 MG/DL (ref 70–99)
HCT VFR BLD AUTO: 39.7 % (ref 40.5–52.5)
HGB BLD-MCNC: 14 G/DL (ref 13.5–17.5)
INR PPP: 1.13 (ref 0.84–1.16)
LACTATE BLDV-SCNC: 2 MMOL/L (ref 0.4–2)
LYMPHOCYTES # BLD: 1 K/UL (ref 1–5.1)
LYMPHOCYTES NFR BLD: 7 %
MCH RBC QN AUTO: 30.8 PG (ref 26–34)
MCHC RBC AUTO-ENTMCNC: 35.2 G/DL (ref 31–36)
MCV RBC AUTO: 87.3 FL (ref 80–100)
METHADONE UR QL SCN>300 NG/ML: ABNORMAL
MONOCYTES # BLD: 0.8 K/UL (ref 0–1.3)
MONOCYTES NFR BLD: 5.6 %
NEUTROPHILS # BLD: 12.6 K/UL (ref 1.7–7.7)
NEUTROPHILS NFR BLD: 87.3 %
OPIATES UR QL SCN>300 NG/ML: POSITIVE
OXYCODONE UR QL SCN: ABNORMAL
PCP UR QL SCN>25 NG/ML: ABNORMAL
PERFORMED ON: ABNORMAL
PH UR STRIP: 7 [PH]
PLATELET # BLD AUTO: 211 K/UL (ref 135–450)
PMV BLD AUTO: 8.8 FL (ref 5–10.5)
POTASSIUM SERPL-SCNC: 3.8 MMOL/L (ref 3.5–5.1)
PROCALCITONIN SERPL IA-MCNC: 0.05 NG/ML (ref 0–0.15)
PROT SERPL-MCNC: 7.1 G/DL (ref 6.4–8.2)
PROTHROMBIN TIME: 14.5 SEC (ref 11.5–14.8)
RBC # BLD AUTO: 4.55 M/UL (ref 4.2–5.9)
SODIUM SERPL-SCNC: 141 MMOL/L (ref 136–145)
WBC # BLD AUTO: 14.5 K/UL (ref 4–11)

## 2023-08-07 PROCEDURE — 36415 COLL VENOUS BLD VENIPUNCTURE: CPT

## 2023-08-07 PROCEDURE — 2580000003 HC RX 258: Performed by: STUDENT IN AN ORGANIZED HEALTH CARE EDUCATION/TRAINING PROGRAM

## 2023-08-07 PROCEDURE — 83036 HEMOGLOBIN GLYCOSYLATED A1C: CPT

## 2023-08-07 PROCEDURE — 83605 ASSAY OF LACTIC ACID: CPT

## 2023-08-07 PROCEDURE — 80053 COMPREHEN METABOLIC PANEL: CPT

## 2023-08-07 PROCEDURE — 93010 ELECTROCARDIOGRAM REPORT: CPT | Performed by: INTERNAL MEDICINE

## 2023-08-07 PROCEDURE — 6370000000 HC RX 637 (ALT 250 FOR IP): Performed by: STUDENT IN AN ORGANIZED HEALTH CARE EDUCATION/TRAINING PROGRAM

## 2023-08-07 PROCEDURE — 85610 PROTHROMBIN TIME: CPT

## 2023-08-07 PROCEDURE — 84145 PROCALCITONIN (PCT): CPT

## 2023-08-07 PROCEDURE — 80307 DRUG TEST PRSMV CHEM ANLYZR: CPT

## 2023-08-07 PROCEDURE — 1200000000 HC SEMI PRIVATE

## 2023-08-07 PROCEDURE — 2580000003 HC RX 258

## 2023-08-07 PROCEDURE — 6360000002 HC RX W HCPCS

## 2023-08-07 PROCEDURE — 85025 COMPLETE CBC W/AUTO DIFF WBC: CPT

## 2023-08-07 PROCEDURE — C9113 INJ PANTOPRAZOLE SODIUM, VIA: HCPCS

## 2023-08-07 PROCEDURE — 6360000002 HC RX W HCPCS: Performed by: STUDENT IN AN ORGANIZED HEALTH CARE EDUCATION/TRAINING PROGRAM

## 2023-08-07 PROCEDURE — 99232 SBSQ HOSP IP/OBS MODERATE 35: CPT

## 2023-08-07 RX ORDER — PROMETHAZINE HYDROCHLORIDE 25 MG/ML
12.5 INJECTION, SOLUTION INTRAMUSCULAR; INTRAVENOUS EVERY 6 HOURS PRN
Status: DISCONTINUED | OUTPATIENT
Start: 2023-08-07 | End: 2023-08-07

## 2023-08-07 RX ORDER — SODIUM CHLORIDE 9 MG/ML
INJECTION, SOLUTION INTRAVENOUS CONTINUOUS
Status: DISCONTINUED | OUTPATIENT
Start: 2023-08-07 | End: 2023-08-09

## 2023-08-07 RX ORDER — CIPROFLOXACIN 2 MG/ML
400 INJECTION, SOLUTION INTRAVENOUS EVERY 12 HOURS
Status: DISCONTINUED | OUTPATIENT
Start: 2023-08-07 | End: 2023-08-11 | Stop reason: HOSPADM

## 2023-08-07 RX ORDER — DICYCLOMINE HCL 20 MG
20 TABLET ORAL 3 TIMES DAILY PRN
Status: DISCONTINUED | OUTPATIENT
Start: 2023-08-07 | End: 2023-08-11 | Stop reason: HOSPADM

## 2023-08-07 RX ORDER — DEXTROSE MONOHYDRATE 100 MG/ML
INJECTION, SOLUTION INTRAVENOUS CONTINUOUS PRN
Status: DISCONTINUED | OUTPATIENT
Start: 2023-08-07 | End: 2023-08-11 | Stop reason: HOSPADM

## 2023-08-07 RX ORDER — INSULIN LISPRO 100 [IU]/ML
0-4 INJECTION, SOLUTION INTRAVENOUS; SUBCUTANEOUS NIGHTLY
Status: DISCONTINUED | OUTPATIENT
Start: 2023-08-07 | End: 2023-08-11 | Stop reason: HOSPADM

## 2023-08-07 RX ORDER — INSULIN LISPRO 100 [IU]/ML
0-4 INJECTION, SOLUTION INTRAVENOUS; SUBCUTANEOUS
Status: DISCONTINUED | OUTPATIENT
Start: 2023-08-07 | End: 2023-08-11 | Stop reason: HOSPADM

## 2023-08-07 RX ORDER — MORPHINE SULFATE 2 MG/ML
2 INJECTION, SOLUTION INTRAMUSCULAR; INTRAVENOUS EVERY 4 HOURS PRN
Status: DISCONTINUED | OUTPATIENT
Start: 2023-08-07 | End: 2023-08-10

## 2023-08-07 RX ORDER — PANTOPRAZOLE SODIUM 40 MG/10ML
40 INJECTION, POWDER, LYOPHILIZED, FOR SOLUTION INTRAVENOUS DAILY
Status: DISCONTINUED | OUTPATIENT
Start: 2023-08-07 | End: 2023-08-09

## 2023-08-07 RX ORDER — MORPHINE SULFATE 2 MG/ML
2 INJECTION, SOLUTION INTRAMUSCULAR; INTRAVENOUS
Status: DISCONTINUED | OUTPATIENT
Start: 2023-08-07 | End: 2023-08-07

## 2023-08-07 RX ORDER — MORPHINE SULFATE 4 MG/ML
4 INJECTION, SOLUTION INTRAMUSCULAR; INTRAVENOUS
Status: DISCONTINUED | OUTPATIENT
Start: 2023-08-07 | End: 2023-08-07

## 2023-08-07 RX ORDER — PROCHLORPERAZINE EDISYLATE 5 MG/ML
10 INJECTION INTRAMUSCULAR; INTRAVENOUS EVERY 6 HOURS PRN
Status: DISCONTINUED | OUTPATIENT
Start: 2023-08-07 | End: 2023-08-07

## 2023-08-07 RX ORDER — METRONIDAZOLE 500 MG/100ML
500 INJECTION, SOLUTION INTRAVENOUS EVERY 8 HOURS
Status: DISCONTINUED | OUTPATIENT
Start: 2023-08-07 | End: 2023-08-11 | Stop reason: HOSPADM

## 2023-08-07 RX ORDER — MORPHINE SULFATE 4 MG/ML
4 INJECTION, SOLUTION INTRAMUSCULAR; INTRAVENOUS EVERY 4 HOURS PRN
Status: DISCONTINUED | OUTPATIENT
Start: 2023-08-07 | End: 2023-08-10

## 2023-08-07 RX ADMIN — APIXABAN 5 MG: 5 TABLET, FILM COATED ORAL at 22:49

## 2023-08-07 RX ADMIN — Medication 10 ML: at 22:49

## 2023-08-07 RX ADMIN — HYDROMORPHONE HYDROCHLORIDE 0.5 MG: 1 INJECTION, SOLUTION INTRAMUSCULAR; INTRAVENOUS; SUBCUTANEOUS at 05:43

## 2023-08-07 RX ADMIN — ONDANSETRON 4 MG: 2 INJECTION INTRAMUSCULAR; INTRAVENOUS at 00:14

## 2023-08-07 RX ADMIN — ACETAMINOPHEN 650 MG: 325 TABLET ORAL at 11:41

## 2023-08-07 RX ADMIN — SODIUM CHLORIDE, POTASSIUM CHLORIDE, SODIUM LACTATE AND CALCIUM CHLORIDE 1000 ML: 600; 310; 30; 20 INJECTION, SOLUTION INTRAVENOUS at 00:44

## 2023-08-07 RX ADMIN — METRONIDAZOLE 500 MG: 500 INJECTION, SOLUTION INTRAVENOUS at 13:36

## 2023-08-07 RX ADMIN — PROCHLORPERAZINE EDISYLATE 10 MG: 5 INJECTION INTRAMUSCULAR; INTRAVENOUS at 05:42

## 2023-08-07 RX ADMIN — MORPHINE SULFATE 2 MG: 2 INJECTION, SOLUTION INTRAMUSCULAR; INTRAVENOUS at 22:52

## 2023-08-07 RX ADMIN — AMLODIPINE BESYLATE 5 MG: 5 TABLET ORAL at 09:03

## 2023-08-07 RX ADMIN — FAMOTIDINE 10 MG: 20 TABLET ORAL at 09:03

## 2023-08-07 RX ADMIN — SODIUM CHLORIDE: 9 INJECTION, SOLUTION INTRAVENOUS at 11:39

## 2023-08-07 RX ADMIN — Medication 10 ML: at 09:06

## 2023-08-07 RX ADMIN — HYDROMORPHONE HYDROCHLORIDE 0.5 MG: 1 INJECTION, SOLUTION INTRAMUSCULAR; INTRAVENOUS; SUBCUTANEOUS at 00:14

## 2023-08-07 RX ADMIN — METRONIDAZOLE 500 MG: 500 INJECTION, SOLUTION INTRAVENOUS at 22:48

## 2023-08-07 RX ADMIN — PANTOPRAZOLE SODIUM 40 MG: 40 INJECTION, POWDER, FOR SOLUTION INTRAVENOUS at 13:36

## 2023-08-07 RX ADMIN — APIXABAN 5 MG: 5 TABLET, FILM COATED ORAL at 09:03

## 2023-08-07 RX ADMIN — CIPROFLOXACIN 400 MG: 2 INJECTION, SOLUTION INTRAVENOUS at 15:01

## 2023-08-07 RX ADMIN — DICYCLOMINE HYDROCHLORIDE 20 MG: 20 TABLET ORAL at 11:41

## 2023-08-07 ASSESSMENT — PAIN DESCRIPTION - DESCRIPTORS
DESCRIPTORS: ACHING;DISCOMFORT
DESCRIPTORS: ACHING;DISCOMFORT
DESCRIPTORS: ACHING

## 2023-08-07 ASSESSMENT — PAIN DESCRIPTION - ONSET
ONSET: ON-GOING
ONSET: ON-GOING

## 2023-08-07 ASSESSMENT — PAIN SCALES - GENERAL
PAINLEVEL_OUTOF10: 7
PAINLEVEL_OUTOF10: 2
PAINLEVEL_OUTOF10: 7
PAINLEVEL_OUTOF10: 0
PAINLEVEL_OUTOF10: 9

## 2023-08-07 ASSESSMENT — PAIN DESCRIPTION - ORIENTATION
ORIENTATION: MID

## 2023-08-07 ASSESSMENT — PAIN SCALES - WONG BAKER
WONGBAKER_NUMERICALRESPONSE: 2
WONGBAKER_NUMERICALRESPONSE: 2
WONGBAKER_NUMERICALRESPONSE: 0

## 2023-08-07 ASSESSMENT — PAIN DESCRIPTION - FREQUENCY: FREQUENCY: CONTINUOUS

## 2023-08-07 ASSESSMENT — PAIN DESCRIPTION - PAIN TYPE
TYPE: ACUTE PAIN;CHRONIC PAIN
TYPE: ACUTE PAIN;CHRONIC PAIN

## 2023-08-07 ASSESSMENT — PAIN - FUNCTIONAL ASSESSMENT
PAIN_FUNCTIONAL_ASSESSMENT: ACTIVITIES ARE NOT PREVENTED

## 2023-08-07 ASSESSMENT — PAIN DESCRIPTION - LOCATION
LOCATION: ABDOMEN
LOCATION: BACK;HIP;ABDOMEN
LOCATION: ABDOMEN;BACK;HIP

## 2023-08-07 NOTE — PROGRESS NOTES
Patient refused morning oral meds due to vomiting. Prn medication administered for vomiting, see MAR. Patient rates pain in abdomen 9/10. Prn medication administered for pain, see MAR. Patient denies any other needs at this time. Call light in reach.

## 2023-08-07 NOTE — ED PROVIDER NOTES
I independently performed a history and physical on Phill Medrano. I have completed a substantive portion of the visit including all aspects of the medical decision making. All diagnostic, treatment, and disposition decisions were made by myself in conjunction with the advanced practice provider/resident. In summary the patient presents with abdominal pain nausea and vomiting. On my examination he is alert and conversant in little acute distress. He is afebrile and hemodynamically stable. His work-up is reviewed and given his elevated lactate and leukocytosis he was started on antibiotics out of concern for septicemia. He was given IV fluids and unfortunately his lactate has been refractory. We will continue to trend. CT imaging was obtained and there was little clear evidence of an inciting cause for his condition. Overall the precise etiology of his pain and lactic acidemia remains unclear. I have a low suspicion for a ischemic bowel. Consideration is given to the possibility of profound dehydration with his ketones in the context of his nausea and vomiting. Regardless he does require admission for further evaluation and this will conclude his ED course. I independently reviewed the ECG as follows:    Sinus rhythm at a rate of 79 without ectopy. Left axis deviation. First-degree AV block CARLI 220 otherwise normal intervals. There is no evidence of acute ischemia. For further details of [facility] emergency department encounter, please see the MEL/resident's documentation.       MD Jeison Torres MD  08/07/23 2241

## 2023-08-07 NOTE — CARE COORDINATION
Case Management Assessment  Initial Evaluation    Date/Time of Evaluation: 8/7/2023 4:31 PM  Assessment Completed by: Meghan Rogers RN    If patient is discharged prior to next notation, then this note serves as note for discharge by case management. Patient Name: Diane Fatima                   YOB: 1950  Diagnosis: Abdominal pain [R10.9]  Nausea and vomiting, unspecified vomiting type [R11.2]                   Date / Time: 8/6/2023  4:33 PM    Patient Admission Status: Inpatient   Readmission Risk (Low < 19, Mod (19-27), High > 27): Readmission Risk Score: 9.6    Current PCP: Jaynee Primrose, MD  PCP verified by CM? Yes Loretta Patel)    Chart Reviewed: Yes      History Provided by:    Patient Orientation: Alert and Oriented    Patient Cognition: Alert    Hospitalization in the last 30 days (Readmission):  No    If yes, Readmission Assessment in CM Navigator will be completed. Advance Directives:      Code Status: Full Code   Patient's Primary Decision Maker is: Legal Next of Kin    Primary Decision MakerVchuck Lino - [phone]    Discharge Planning:    Patient lives with: Alone Type of Home: House (Lives 1 story cabin)  Primary Care Giver: Self  Patient Support Systems include: Children, Friends/Neighbors   Current Financial resources: Other (Comment) ([employer] medicare)  Current community resources: None  Current services prior to admission: Durable Medical Equipment            Current DME: Cane            Type of Home Care services:  None    ADLS  Prior functional level: Independent in ADLs/IADLs  Current functional level: Independent in ADLs/IADLs    PT AM-PAC:   /24  OT AM-PAC:   /24    Family can provide assistance at DC: Yes  Would you like Case Management to discuss the discharge plan with any other family members/significant others, and if so, who?  No  Plans to Return to Present Housing: Yes  Other Identified Issues/Barriers to RETURNING to current housing: None  Potential Assistance needed at discharge: N/A            Potential DME:    Patient expects to discharge to: [address] [facility] [place] for transportation at discharge:      Financial    Payor: [facility] / Plan: [address] / Product Type: *No Product type* /     Does insurance require precert for SNF: Yes    Potential assistance Purchasing Medications: No  Meds-to-Beds request: Yes      [facility] #[id number] - [facility] [place], [address] [facility] [phone] [place] [phone] [address] [facility] [address]  Phone: [phone] Fax: [phone]      Notes:    Factors facilitating achievement of predicted outcomes: Motivated and Cooperative    Barriers to discharge: None    Additional Case Management Notes: Reviewed chart and met with pt. Role of CM explained. States lives home alone in cabin. States IPTA. Denies services or needs. Anticipate no needs but will follow    The Plan for Transition of Care is related to the following treatment goals of Abdominal pain [R10.9]  Nausea and vomiting, unspecified vomiting type [U32.2]    IF APPLICABLE: The Patient and/or patient representative Vernon Cousin and his family were provided with a choice of provider and agrees with the discharge plan. Freedom of choice list with basic dialogue that supports the patient's individualized plan of care/goals and shares the quality data associated with the providers was provided to:     Patient Representative Name:       The Patient and/or Patient Representative Agree with the Discharge Plan?       Toney Webster RN  Case Management Department  Ph: [phone] Fax: [phone]

## 2023-08-07 NOTE — PLAN OF CARE
Problem: Discharge Planning  Goal: Discharge to home or other facility with appropriate resources  Outcome: Progressing  Flowsheets (Taken 8/7/2023 0004)  Discharge to home or other facility with appropriate resources: Identify barriers to discharge with patient and caregiver     Problem: Pain  Goal: Verbalizes/displays adequate comfort level or baseline comfort level  Outcome: Progressing     Problem: Safety - Adult  Goal: Free from fall injury  Outcome: Progressing

## 2023-08-07 NOTE — PLAN OF CARE
Problem: Discharge Planning  Goal: Discharge to home or other facility with appropriate resources  8/7/2023 1210 by Harshad Contreras RN  Outcome: Progressing  8/7/2023 0033 by Hartwell Brunner, RN  Outcome: Progressing  Flowsheets (Taken 8/7/2023 0004)  Discharge to home or other facility with appropriate resources: Identify barriers to discharge with patient and caregiver     Problem: Pain  Goal: Verbalizes/displays adequate comfort level or baseline comfort level  8/7/2023 1210 by Harshad Contreras RN  Outcome: Progressing  8/7/2023 0033 by Hartwell Brunner, RN  Outcome: Progressing     Problem: Safety - Adult  Goal: Free from fall injury  8/7/2023 1210 by Harshad Contreras RN  Outcome: Progressing  8/7/2023 0033 by Hartwell Brunner, RN  Outcome: Progressing

## 2023-08-07 NOTE — ACP (ADVANCE CARE PLANNING)
Advance Care Planning     General Advance Care Planning (ACP) Conversation    Date of Conversation: 8/6/2023  Conducted with: Patient with Decision Making Capacity    Healthcare Decision Maker:    Primary Decision Maker: Fiona Hastings - Child - [phone]  Click here to complete Healthcare Decision Makers including selection of the Healthcare Decision Maker Relationship (ie \"Primary\"). Today we documented Decision Maker(s) consistent with Legal Next of Kin hierarchy.     Content/Action Overview:  DECLINED ACP Conversation - will revisit periodically  Reviewed DNR/DNI and patient elects Full Code (Attempt Resuscitation)        Length of Voluntary ACP Conversation in minutes:  <16 minutes (Non-Billable)    Florin Henry RN

## 2023-08-07 NOTE — PROGRESS NOTES
Patient admitted to room __223__ from ER. Patient oriented to room, call light, bed rails, phone, lights and bathroom. Patient instructed about the schedule of the day including: vital sign frequency, lab draws, possible tests, frequency of MD and staff rounds, daily weights, I &O's and prescribed diet. Bed locked, in lowest position, side rails up 2/4, call light within reach. Recliner Assessment:     Patient is able to demonstrate the ability to move from a reclining position to an upright position within the recliner. 4 Eyes Skin Assessment     The patient is being assess for   Admission    I agree that 2 RN's have performed a thorough Head to Toe Skin Assessment on the patient. ALL assessment sites listed below have been assessed. Areas assessed for pressure by both nurses:   [x]   Head, Face, and Ears   [x]   Shoulders, Back, and Chest, Abdomen  [x]   Arms, Elbows, and Hands   [x]   Coccyx, Sacrum, and Ischium  [x]   Legs, Feet, and Heels        Skin Assessed Under all Medical Devices by both nurses:  na               All Mepilex Borders were peeled back and area peeked at by both nurses:  No: na  Please list where Mepilex Borders are located:  na             **SHARE this note so that the co-signing nurse is able to place an eSignature**    Co-signer eSignature: Electronically signed by Nilda Simmons RN on 8/8/23 at 4:44 AM EDT    Does the Patient have Skin Breakdown related to pressure?   No     Rash on buttocks         Dalton Prevention initiated:  NA   Wound Care Orders initiated:  NA      [facility] nurse consulted for Pressure Injury (Stage 3,4, Unstageable, DTI, NWPT, Complex wounds)and New or Established Ostomies:  NA      Primary Nurse eSignature: Electronically signed by Amanuel Arevalo RN on 8/7/23 at 12:35 AM EDT

## 2023-08-07 NOTE — H&P
History and Physical      Name:  Vinicio Dobson /Age/Sex: 1950  (74 y.o. male)   MRN & CSN:  [id number] & [id number] Encounter Date/Time: 2023 11:07 PM EDT   Location:   PCP: Annette Bauman MD       Hospital Day: 1    Assessment and Plan:     Patient is a 66-year-old male who presented with abdominal pain. # Abdominal pain with intractable nausea and vomiting  - Endorsed worsening nausea, NBNB emesis and epigastric abdominal pain. No sick contacts or travel. Has chronic diarrhea from IBS. Had previous admissions for similar presentation.   - Exam benign, Preciado's negative. Labs unremarkable. CT showing possible mild colitis and distal esophagitits. - Continue supportive care. Scheduled Bentyl, monitor for anticholinergic side effect. Continue Pepcid. Consider GI consult if no improvement. # Lactic acidosis  - No evidence of infection or shock. Received 4L IVF with elevation, thus likely type B. Chronically elevated in past as well. No need to repeat. # PAF  - Continue Eliquis. # Essential hypretension  - Continue home Norvasc. # T2DM with hyperglycemia  - Last A1c 6.4% in 2023.  - Not on medications. Avoid Metformin in setting of LA.   - LCSI. Checklist:  Advanced directive: full  Diet: cardiac  DVT ppx: Eliquis  Sugar: BG goal of 140-180 while inpatient    Disposition: admit to inpatient. Estimated discharge: 1-2 day(s). Current living situation: home. Expected disposition: home. Spoke with ED provider who recommended admission for the patient and I agree with that plan. Personally reviewed lab studies and imaging. EKG interpreted personally and results as stated above. Imaging that was interpreted personally and results as stated above.     History of Present Illness:     Chief Complaint: abdominal pain    Patient is a 66-year-old male with a PMHx of HTN, T2DM, fibromyalgia, IBS, DJD and PAF who presented to the ED with worsening nausea, NBNB emesis REPAIR WITH MESH performed by Maci Malhotra MD at [facility] OR       Allergies: Allergies   Allergen Reactions    Amitriptyline     Citalopram Hydrobromide     Lisinopril Cough    Naproxen     Other      No SSRI's    Paroxetine     Pcn [Penicillins]     Penicillins        FHx: family history includes Heart Attack (age of onset: 61) in his mother; Heart Attack (age of onset: 80) in his father. SHx:   Social History     Socioeconomic History    Marital status:      Spouse name: None    Number of children: None    Years of education: None    Highest education level: None   Tobacco Use    Smoking status: Former     Types: Cigarettes     Quit date: 2008     Years since quittin.6    Smokeless tobacco: Never   Substance and Sexual Activity    Alcohol use: No    Drug use: Yes     Types: Marijuana Mcclellan Karlie)     Comment: occasionally    Sexual activity: Not Currently   Social History Narrative    ** Merged History Encounter **            Medications Prior to Admission     Prior to Admission medications    Medication Sig Start Date End Date Taking?  Authorizing Provider   amLODIPine (NORVASC) 5 MG tablet Take 1 tablet by mouth daily 7/10/23   Javi Zepeda MD   ELIQUIS 5 MG TABS tablet TAKE 1 TABLET BY MOUTH TWICE DAILY 23   Manan Escamilla MD   ondansetron (ZOFRAN-ODT) 4 MG disintegrating tablet Take 1 tablet by mouth 3 times daily as needed for Nausea or Vomiting 3/21/23   FRANCO Valles   famotidine (PEPCID) 20 MG tablet Take 0.5 tablets by mouth 2 times daily 3/21/23   FRANCO Mazariegos   metFORMIN (GLUCOPHAGE) 500 MG tablet TAKE 1 TABLET BY MOUTH TWICE DAILY WITH MEALS  Patient not taking: No sig reported 22  Javi Zepeda MD       Medications:     Medications:    lactated ringers bolus  1,000 mL IntraVENous Once    dicyclomine  20 mg Oral 4x Daily AC & HS        Infusions:       PRN Meds:        Data:     CBC:   Recent Labs     23  1722   WBC 13.2*

## 2023-08-07 NOTE — PROGRESS NOTES
Consult has been called to Dr. Juliann Landeros on 8/7/23. Spoke with office.  2:11 PM    Dulce Scherer RN  8/7/2023

## 2023-08-07 NOTE — PROGRESS NOTES
Admission assessment complete. Alert and oriented. Patient rates pain in abdomen rated 9/10. Prn medication administered for pain, see MAR. Patient vomiting. Prn medication administered for vomiting, see MAR. Rash noted on buttocks. Patient denies any needs at this time. Bed in lowest position. Side rails up x2. Call light in reach.

## 2023-08-08 ENCOUNTER — APPOINTMENT (OUTPATIENT)
Dept: ULTRASOUND IMAGING | Age: 73
DRG: 381 | End: 2023-08-08
Payer: MEDICARE

## 2023-08-08 LAB
ANION GAP SERPL CALCULATED.3IONS-SCNC: 12 MMOL/L (ref 3–16)
BASOPHILS # BLD: 0 K/UL (ref 0–0.2)
BASOPHILS NFR BLD: 0.4 %
BUN SERPL-MCNC: 14 MG/DL (ref 7–20)
CALCIUM SERPL-MCNC: 8 MG/DL (ref 8.3–10.6)
CHLORIDE SERPL-SCNC: 104 MMOL/L (ref 99–110)
CO2 SERPL-SCNC: 23 MMOL/L (ref 21–32)
CREAT SERPL-MCNC: 1 MG/DL (ref 0.8–1.3)
DEPRECATED RDW RBC AUTO: 13.2 % (ref 12.4–15.4)
EOSINOPHIL # BLD: 0 K/UL (ref 0–0.6)
EOSINOPHIL NFR BLD: 0.1 %
EST. AVERAGE GLUCOSE BLD GHB EST-MCNC: 137 MG/DL
GFR SERPLBLD CREATININE-BSD FMLA CKD-EPI: >60 ML/MIN/{1.73_M2}
GLUCOSE BLD-MCNC: 121 MG/DL (ref 70–99)
GLUCOSE BLD-MCNC: 125 MG/DL (ref 70–99)
GLUCOSE BLD-MCNC: 126 MG/DL (ref 70–99)
GLUCOSE BLD-MCNC: 132 MG/DL (ref 70–99)
GLUCOSE BLD-MCNC: 147 MG/DL (ref 70–99)
GLUCOSE SERPL-MCNC: 124 MG/DL (ref 70–99)
HBA1C MFR BLD: 6.4 %
HCT VFR BLD AUTO: 34.7 % (ref 40.5–52.5)
HGB BLD-MCNC: 12.5 G/DL (ref 13.5–17.5)
LYMPHOCYTES # BLD: 1.4 K/UL (ref 1–5.1)
LYMPHOCYTES NFR BLD: 13.8 %
MAGNESIUM SERPL-MCNC: 1.6 MG/DL (ref 1.8–2.4)
MCH RBC QN AUTO: 31.3 PG (ref 26–34)
MCHC RBC AUTO-ENTMCNC: 36.1 G/DL (ref 31–36)
MCV RBC AUTO: 86.8 FL (ref 80–100)
MONOCYTES # BLD: 0.6 K/UL (ref 0–1.3)
MONOCYTES NFR BLD: 6.1 %
NEUTROPHILS # BLD: 8.3 K/UL (ref 1.7–7.7)
NEUTROPHILS NFR BLD: 79.6 %
PERFORMED ON: ABNORMAL
PLATELET # BLD AUTO: 168 K/UL (ref 135–450)
PMV BLD AUTO: 8.5 FL (ref 5–10.5)
POTASSIUM SERPL-SCNC: 3.5 MMOL/L (ref 3.5–5.1)
RBC # BLD AUTO: 4 M/UL (ref 4.2–5.9)
SODIUM SERPL-SCNC: 139 MMOL/L (ref 136–145)
WBC # BLD AUTO: 10.4 K/UL (ref 4–11)

## 2023-08-08 PROCEDURE — 83735 ASSAY OF MAGNESIUM: CPT

## 2023-08-08 PROCEDURE — 85025 COMPLETE CBC W/AUTO DIFF WBC: CPT

## 2023-08-08 PROCEDURE — 6370000000 HC RX 637 (ALT 250 FOR IP): Performed by: STUDENT IN AN ORGANIZED HEALTH CARE EDUCATION/TRAINING PROGRAM

## 2023-08-08 PROCEDURE — 76705 ECHO EXAM OF ABDOMEN: CPT

## 2023-08-08 PROCEDURE — C9113 INJ PANTOPRAZOLE SODIUM, VIA: HCPCS

## 2023-08-08 PROCEDURE — 2580000003 HC RX 258

## 2023-08-08 PROCEDURE — 6370000000 HC RX 637 (ALT 250 FOR IP)

## 2023-08-08 PROCEDURE — 6360000002 HC RX W HCPCS: Performed by: STUDENT IN AN ORGANIZED HEALTH CARE EDUCATION/TRAINING PROGRAM

## 2023-08-08 PROCEDURE — 99232 SBSQ HOSP IP/OBS MODERATE 35: CPT | Performed by: INTERNAL MEDICINE

## 2023-08-08 PROCEDURE — 1200000000 HC SEMI PRIVATE

## 2023-08-08 PROCEDURE — 36415 COLL VENOUS BLD VENIPUNCTURE: CPT

## 2023-08-08 PROCEDURE — 80048 BASIC METABOLIC PNL TOTAL CA: CPT

## 2023-08-08 PROCEDURE — 6360000002 HC RX W HCPCS

## 2023-08-08 RX ORDER — SUCRALFATE 1 G/1
1 TABLET ORAL EVERY 6 HOURS SCHEDULED
Status: DISCONTINUED | OUTPATIENT
Start: 2023-08-08 | End: 2023-08-11 | Stop reason: HOSPADM

## 2023-08-08 RX ORDER — SUCRALFATE ORAL 1 G/10ML
1 SUSPENSION ORAL EVERY 6 HOURS SCHEDULED
Status: DISCONTINUED | OUTPATIENT
Start: 2023-08-08 | End: 2023-08-08 | Stop reason: CLARIF

## 2023-08-08 RX ADMIN — CIPROFLOXACIN 400 MG: 2 INJECTION, SOLUTION INTRAVENOUS at 14:02

## 2023-08-08 RX ADMIN — PANTOPRAZOLE SODIUM 40 MG: 40 INJECTION, POWDER, FOR SOLUTION INTRAVENOUS at 08:42

## 2023-08-08 RX ADMIN — SUCRALFATE 1 G: 1 TABLET ORAL at 22:44

## 2023-08-08 RX ADMIN — METRONIDAZOLE 500 MG: 500 INJECTION, SOLUTION INTRAVENOUS at 05:30

## 2023-08-08 RX ADMIN — METRONIDAZOLE 500 MG: 500 INJECTION, SOLUTION INTRAVENOUS at 21:21

## 2023-08-08 RX ADMIN — APIXABAN 5 MG: 5 TABLET, FILM COATED ORAL at 21:19

## 2023-08-08 RX ADMIN — SODIUM CHLORIDE: 9 INJECTION, SOLUTION INTRAVENOUS at 03:24

## 2023-08-08 RX ADMIN — SUCRALFATE 1 G: 1 TABLET ORAL at 17:24

## 2023-08-08 RX ADMIN — METRONIDAZOLE 500 MG: 500 INJECTION, SOLUTION INTRAVENOUS at 13:57

## 2023-08-08 RX ADMIN — ONDANSETRON 4 MG: 2 INJECTION INTRAMUSCULAR; INTRAVENOUS at 03:28

## 2023-08-08 RX ADMIN — CIPROFLOXACIN 400 MG: 2 INJECTION, SOLUTION INTRAVENOUS at 03:26

## 2023-08-08 RX ADMIN — MORPHINE SULFATE 4 MG: 4 INJECTION, SOLUTION INTRAMUSCULAR; INTRAVENOUS at 18:23

## 2023-08-08 RX ADMIN — SUCRALFATE 1 G: 1 TABLET ORAL at 13:54

## 2023-08-08 RX ADMIN — MORPHINE SULFATE 4 MG: 4 INJECTION, SOLUTION INTRAMUSCULAR; INTRAVENOUS at 08:42

## 2023-08-08 RX ADMIN — MORPHINE SULFATE 2 MG: 2 INJECTION, SOLUTION INTRAMUSCULAR; INTRAVENOUS at 03:28

## 2023-08-08 RX ADMIN — MORPHINE SULFATE 2 MG: 2 INJECTION, SOLUTION INTRAMUSCULAR; INTRAVENOUS at 22:45

## 2023-08-08 RX ADMIN — MAGNESIUM SULFATE HEPTAHYDRATE 2000 MG: 40 INJECTION, SOLUTION INTRAVENOUS at 08:47

## 2023-08-08 ASSESSMENT — PAIN SCALES - GENERAL
PAINLEVEL_OUTOF10: 8

## 2023-08-08 ASSESSMENT — PAIN DESCRIPTION - DESCRIPTORS
DESCRIPTORS: ACHING
DESCRIPTORS: SHOOTING

## 2023-08-08 ASSESSMENT — PAIN - FUNCTIONAL ASSESSMENT: PAIN_FUNCTIONAL_ASSESSMENT: ACTIVITIES ARE NOT PREVENTED

## 2023-08-08 ASSESSMENT — ENCOUNTER SYMPTOMS
ABDOMINAL PAIN: 1
NAUSEA: 1
VOMITING: 1

## 2023-08-08 ASSESSMENT — PAIN DESCRIPTION - LOCATION
LOCATION: ABDOMEN;BACK;HIP
LOCATION: ABDOMEN;BACK;HIP

## 2023-08-08 ASSESSMENT — PAIN DESCRIPTION - ORIENTATION
ORIENTATION: LEFT;LOWER
ORIENTATION: MID

## 2023-08-08 NOTE — PLAN OF CARE
Problem: Discharge Planning  Goal: Discharge to home or other facility with appropriate resources  8/8/2023 1007 by Heena Teran RN  Outcome: Progressing  8/8/2023 0046 by Jenelle Brock RN  Outcome: Progressing     Problem: Pain  Goal: Verbalizes/displays adequate comfort level or baseline comfort level  8/8/2023 1007 by Heena Teran RN  Outcome: Progressing  8/8/2023 0046 by Jenelle Brock RN  Outcome: Progressing     Problem: Safety - Adult  Goal: Free from fall injury  8/8/2023 1007 by Heena Teran RN  Outcome: Progressing  8/8/2023 0046 by Jenelle Brock RN  Outcome: Progressing

## 2023-08-08 NOTE — PROGRESS NOTES
Patient reports nausea and pain rated 8/10 in abdomen. Prn medications administered for pain and nausea, see MAR.

## 2023-08-08 NOTE — PLAN OF CARE
Problem: Discharge Planning  Goal: Discharge to home or other facility with appropriate resources  8/8/2023 0046 by Skip Jacinto RN  Outcome: Progressing  8/7/2023 1210 by Urvashi Ogden RN  Outcome: Progressing     Problem: Pain  Goal: Verbalizes/displays adequate comfort level or baseline comfort level  8/8/2023 0046 by Skip Jacinto RN  Outcome: Progressing  8/7/2023 1210 by Urvashi Ogden RN  Outcome: Progressing     Problem: Safety - Adult  Goal: Free from fall injury  8/8/2023 0046 by Skip Jacinto RN  Outcome: Progressing  8/7/2023 1210 by Urvashi Ogden RN  Outcome: Progressing

## 2023-08-08 NOTE — PROGRESS NOTES
HS assessment completed. Pain 8/10; morphine given. No signs of symptoms of distress noted. Patient tolerated night medications well. Respirations easy and even. Bed in lowest position, bed alarm in place and functioning properly, bed rails x2 up,  Call light within reach. Bedside Mobility Assessment Tool (BMAT):     Assessment Level 1- Sit and Shake    1. From a semi-reclined position, ask patient to sit up and rotate to a seated position at the side of the bed. Can use the bedrail. 2. Ask patient to reach out and grab your hand and shake making sure patient reaches across his/her midline. Pass- Patient is able to come to a seated position, maintain core strength. Maintains seated balance while reaching across midline. Move on to Assessment Level 2. Assessment Level 2- Stretch and Point   1. With patient in seated position at the side of the bed, have patient place both feet on the floor (or stool) with knees no higher than hips. 2. Ask patient to stretch one leg and straighten the knee, then bend the ankle/flex and point the toes. If appropriate, repeat with the other leg. Pass- Patient is able to demonstrate appropriate quad strength on intended weight bearing limb(s). Move onto Assessment Level 3. Assessment Level 3- Stand   1. Ask patient to elevate off the bed or chair (seated to standing) using an assistive device (cane, bedrail). 2. Patient should be able to raise buttocks off be and hold for a count of five. May repeat once. Pass- Patient maintains standing stability for at least 5 seconds, proceed to assessment level 4. Assessment Level 4- Walk   1. Ask patient to march in place at bedside. 2. Then ask patient to advance step and return each foot. Some medical conditions may render a patient from stepping backwards, use your best clinical judgement. Fail- Patient not able to complete tasks OR requires use of assistive device. Patient is MOBILITY LEVEL 3.        Mobility

## 2023-08-08 NOTE — PROGRESS NOTES
Shift assessment complete. Alert and oriented. Patient rates pain 7/10, prn medication administered for pain, see MAR. Snack provided to patient. Patient denies any other needs at this time. Patient educated on NPO diet status at midnight. Bed in lowest position. Side rails up x2. Call light in reach.

## 2023-08-09 LAB
ANION GAP SERPL CALCULATED.3IONS-SCNC: 11 MMOL/L (ref 3–16)
BASOPHILS # BLD: 0 K/UL (ref 0–0.2)
BASOPHILS NFR BLD: 0.7 %
BUN SERPL-MCNC: 12 MG/DL (ref 7–20)
CALCIUM SERPL-MCNC: 8 MG/DL (ref 8.3–10.6)
CHLORIDE SERPL-SCNC: 104 MMOL/L (ref 99–110)
CO2 SERPL-SCNC: 21 MMOL/L (ref 21–32)
CREAT SERPL-MCNC: 0.9 MG/DL (ref 0.8–1.3)
DEPRECATED RDW RBC AUTO: 13.1 % (ref 12.4–15.4)
EOSINOPHIL # BLD: 0 K/UL (ref 0–0.6)
EOSINOPHIL NFR BLD: 0.6 %
GFR SERPLBLD CREATININE-BSD FMLA CKD-EPI: >60 ML/MIN/{1.73_M2}
GLUCOSE BLD-MCNC: 117 MG/DL (ref 70–99)
GLUCOSE SERPL-MCNC: 120 MG/DL (ref 70–99)
HCT VFR BLD AUTO: 37.7 % (ref 40.5–52.5)
HGB BLD-MCNC: 13.5 G/DL (ref 13.5–17.5)
LYMPHOCYTES # BLD: 1.2 K/UL (ref 1–5.1)
LYMPHOCYTES NFR BLD: 18 %
MAGNESIUM SERPL-MCNC: 1.8 MG/DL (ref 1.8–2.4)
MCH RBC QN AUTO: 31.2 PG (ref 26–34)
MCHC RBC AUTO-ENTMCNC: 35.9 G/DL (ref 31–36)
MCV RBC AUTO: 86.8 FL (ref 80–100)
MONOCYTES # BLD: 0.5 K/UL (ref 0–1.3)
MONOCYTES NFR BLD: 8.1 %
NEUTROPHILS # BLD: 4.8 K/UL (ref 1.7–7.7)
NEUTROPHILS NFR BLD: 72.6 %
PERFORMED ON: ABNORMAL
PLATELET # BLD AUTO: 163 K/UL (ref 135–450)
PMV BLD AUTO: 8.6 FL (ref 5–10.5)
POTASSIUM SERPL-SCNC: 3.4 MMOL/L (ref 3.5–5.1)
RBC # BLD AUTO: 4.34 M/UL (ref 4.2–5.9)
SODIUM SERPL-SCNC: 136 MMOL/L (ref 136–145)
WBC # BLD AUTO: 6.6 K/UL (ref 4–11)

## 2023-08-09 PROCEDURE — 36415 COLL VENOUS BLD VENIPUNCTURE: CPT

## 2023-08-09 PROCEDURE — 99232 SBSQ HOSP IP/OBS MODERATE 35: CPT

## 2023-08-09 PROCEDURE — 1200000000 HC SEMI PRIVATE

## 2023-08-09 PROCEDURE — 6370000000 HC RX 637 (ALT 250 FOR IP): Performed by: INTERNAL MEDICINE

## 2023-08-09 PROCEDURE — 2709999900 HC NON-CHARGEABLE SUPPLY: Performed by: INTERNAL MEDICINE

## 2023-08-09 PROCEDURE — 2580000003 HC RX 258

## 2023-08-09 PROCEDURE — 88305 TISSUE EXAM BY PATHOLOGIST: CPT

## 2023-08-09 PROCEDURE — C9113 INJ PANTOPRAZOLE SODIUM, VIA: HCPCS

## 2023-08-09 PROCEDURE — 6360000002 HC RX W HCPCS: Performed by: INTERNAL MEDICINE

## 2023-08-09 PROCEDURE — 6370000000 HC RX 637 (ALT 250 FOR IP)

## 2023-08-09 PROCEDURE — 2580000003 HC RX 258: Performed by: INTERNAL MEDICINE

## 2023-08-09 PROCEDURE — 85025 COMPLETE CBC W/AUTO DIFF WBC: CPT

## 2023-08-09 PROCEDURE — 83735 ASSAY OF MAGNESIUM: CPT

## 2023-08-09 PROCEDURE — C9113 INJ PANTOPRAZOLE SODIUM, VIA: HCPCS | Performed by: INTERNAL MEDICINE

## 2023-08-09 PROCEDURE — 6360000002 HC RX W HCPCS

## 2023-08-09 PROCEDURE — 7100000010 HC PHASE II RECOVERY - FIRST 15 MIN: Performed by: INTERNAL MEDICINE

## 2023-08-09 PROCEDURE — 88341 IMHCHEM/IMCYTCHM EA ADD ANTB: CPT

## 2023-08-09 PROCEDURE — 3609012400 HC EGD TRANSORAL BIOPSY SINGLE/MULTIPLE: Performed by: INTERNAL MEDICINE

## 2023-08-09 PROCEDURE — 80048 BASIC METABOLIC PNL TOTAL CA: CPT

## 2023-08-09 PROCEDURE — 2580000003 HC RX 258: Performed by: STUDENT IN AN ORGANIZED HEALTH CARE EDUCATION/TRAINING PROGRAM

## 2023-08-09 PROCEDURE — 7100000011 HC PHASE II RECOVERY - ADDTL 15 MIN: Performed by: INTERNAL MEDICINE

## 2023-08-09 PROCEDURE — 88342 IMHCHEM/IMCYTCHM 1ST ANTB: CPT

## 2023-08-09 PROCEDURE — 99152 MOD SED SAME PHYS/QHP 5/>YRS: CPT | Performed by: INTERNAL MEDICINE

## 2023-08-09 RX ORDER — FENTANYL CITRATE 50 UG/ML
INJECTION, SOLUTION INTRAMUSCULAR; INTRAVENOUS PRN
Status: DISCONTINUED | OUTPATIENT
Start: 2023-08-09 | End: 2023-08-09 | Stop reason: ALTCHOICE

## 2023-08-09 RX ORDER — MIDAZOLAM HYDROCHLORIDE 5 MG/ML
INJECTION INTRAMUSCULAR; INTRAVENOUS PRN
Status: DISCONTINUED | OUTPATIENT
Start: 2023-08-09 | End: 2023-08-09 | Stop reason: ALTCHOICE

## 2023-08-09 RX ORDER — POTASSIUM CHLORIDE 20 MEQ/1
40 TABLET, EXTENDED RELEASE ORAL ONCE
Status: COMPLETED | OUTPATIENT
Start: 2023-08-09 | End: 2023-08-09

## 2023-08-09 RX ORDER — SODIUM CHLORIDE 9 MG/ML
INJECTION, SOLUTION INTRAVENOUS CONTINUOUS
Status: ACTIVE | OUTPATIENT
Start: 2023-08-09 | End: 2023-08-09

## 2023-08-09 RX ORDER — PANTOPRAZOLE SODIUM 40 MG/10ML
40 INJECTION, POWDER, LYOPHILIZED, FOR SOLUTION INTRAVENOUS 2 TIMES DAILY
Status: DISCONTINUED | OUTPATIENT
Start: 2023-08-09 | End: 2023-08-11 | Stop reason: HOSPADM

## 2023-08-09 RX ADMIN — Medication 10 ML: at 21:07

## 2023-08-09 RX ADMIN — SUCRALFATE 1 G: 1 TABLET ORAL at 05:31

## 2023-08-09 RX ADMIN — SUCRALFATE 1 G: 1 TABLET ORAL at 18:08

## 2023-08-09 RX ADMIN — AMLODIPINE BESYLATE 5 MG: 5 TABLET ORAL at 15:48

## 2023-08-09 RX ADMIN — SODIUM CHLORIDE: 9 INJECTION, SOLUTION INTRAVENOUS at 12:14

## 2023-08-09 RX ADMIN — METRONIDAZOLE 500 MG: 500 INJECTION, SOLUTION INTRAVENOUS at 05:32

## 2023-08-09 RX ADMIN — METRONIDAZOLE 500 MG: 500 INJECTION, SOLUTION INTRAVENOUS at 21:35

## 2023-08-09 RX ADMIN — MORPHINE SULFATE 2 MG: 2 INJECTION, SOLUTION INTRAMUSCULAR; INTRAVENOUS at 18:21

## 2023-08-09 RX ADMIN — SUCRALFATE 1 G: 1 TABLET ORAL at 23:36

## 2023-08-09 RX ADMIN — POTASSIUM CHLORIDE 40 MEQ: 1500 TABLET, EXTENDED RELEASE ORAL at 18:08

## 2023-08-09 RX ADMIN — METRONIDAZOLE 500 MG: 500 INJECTION, SOLUTION INTRAVENOUS at 15:46

## 2023-08-09 RX ADMIN — CIPROFLOXACIN 400 MG: 2 INJECTION, SOLUTION INTRAVENOUS at 02:48

## 2023-08-09 RX ADMIN — SODIUM CHLORIDE: 9 INJECTION, SOLUTION INTRAVENOUS at 02:08

## 2023-08-09 RX ADMIN — MORPHINE SULFATE 4 MG: 4 INJECTION, SOLUTION INTRAMUSCULAR; INTRAVENOUS at 22:47

## 2023-08-09 RX ADMIN — APIXABAN 5 MG: 5 TABLET, FILM COATED ORAL at 21:07

## 2023-08-09 RX ADMIN — Medication 10 ML: at 08:13

## 2023-08-09 RX ADMIN — PANTOPRAZOLE SODIUM 40 MG: 40 INJECTION, POWDER, FOR SOLUTION INTRAVENOUS at 21:07

## 2023-08-09 RX ADMIN — MORPHINE SULFATE 4 MG: 4 INJECTION, SOLUTION INTRAMUSCULAR; INTRAVENOUS at 08:12

## 2023-08-09 RX ADMIN — PANTOPRAZOLE SODIUM 40 MG: 40 INJECTION, POWDER, FOR SOLUTION INTRAVENOUS at 08:13

## 2023-08-09 RX ADMIN — CIPROFLOXACIN 400 MG: 2 INJECTION, SOLUTION INTRAVENOUS at 15:44

## 2023-08-09 ASSESSMENT — PAIN DESCRIPTION - LOCATION
LOCATION: ABDOMEN
LOCATION: BACK;HIP
LOCATION: ABDOMEN
LOCATION: BACK;HIP

## 2023-08-09 ASSESSMENT — PAIN SCALES - GENERAL
PAINLEVEL_OUTOF10: 6
PAINLEVEL_OUTOF10: 5
PAINLEVEL_OUTOF10: 0
PAINLEVEL_OUTOF10: 7
PAINLEVEL_OUTOF10: 9
PAINLEVEL_OUTOF10: 0

## 2023-08-09 ASSESSMENT — PAIN DESCRIPTION - DESCRIPTORS
DESCRIPTORS: SHOOTING
DESCRIPTORS: ACHING
DESCRIPTORS: SHOOTING
DESCRIPTORS: DULL

## 2023-08-09 ASSESSMENT — PAIN DESCRIPTION - ORIENTATION
ORIENTATION: LOWER;LEFT
ORIENTATION: LOWER;LEFT

## 2023-08-09 ASSESSMENT — PAIN - FUNCTIONAL ASSESSMENT
PAIN_FUNCTIONAL_ASSESSMENT: 0-10
PAIN_FUNCTIONAL_ASSESSMENT: ACTIVITIES ARE NOT PREVENTED

## 2023-08-09 ASSESSMENT — PAIN DESCRIPTION - PAIN TYPE: TYPE: CHRONIC PAIN;ACUTE PAIN

## 2023-08-09 NOTE — PROGRESS NOTES
Patient walked one lap around the unit. Patient tolerated fairly well. Patient has a hurt left hip so he limps a little on that side.

## 2023-08-09 NOTE — H&P
History and Physical / Pre-Sedation Assessment    Patient:  Estelle Nevarez   :   1950     Intended Procedure:  EGD    HPI: gerd dysphagia    Nurses notes reviewed and agreed.   Current Facility-Administered Medications   Medication Dose Route Frequency Provider Last Rate Last Admin    0.9 % sodium chloride infusion   IntraVENous Continuous FRANCO Foley 76 mL/hr at 23 1214 New Bag at 23 1214    sucralfate (CARAFATE) tablet 1 g  1 g Oral 4 times per day JEWEL Field Junior, CNP   1 g at 23 0531    ciprofloxacin (CIPRO) IVPB 400 mg  400 mg IntraVENous Q12H FRANCO Foley   Stopped at 23 0354    metronidazole (FLAGYL) 500 mg in 0.9% NaCl 100 mL IVPB premix  500 mg IntraVENous Q8H FRANCO Foley   Stopped at 23 0712    pantoprazole (PROTONIX) injection 40 mg  40 mg IntraVENous Daily FRANCO Foley   40 mg at 23 0813    glucose chewable tablet 16 g  4 tablet Oral PRN FRANCO Burdick        dextrose bolus 10% 125 mL  125 mL IntraVENous PRN FRANCO Foley        Or    dextrose bolus 10% 250 mL  250 mL IntraVENous PRN FRANCO Burdick        glucagon (rDNA) injection 1 mg  1 mg SubCUTAneous PRN FRANCO Burdick        dextrose 10 % infusion   IntraVENous Continuous PRN FRANCO Burdick        insulin lispro (HUMALOG) injection vial 0-4 Units  0-4 Units SubCUTAneous TID WC FRANCO Burdick        insulin lispro (HUMALOG) injection vial 0-4 Units  0-4 Units SubCUTAneous Nightly FRANCO Burdick        dicyclomine (BENTYL) tablet 20 mg  20 mg Oral TID PRN FRANCO Foley        morphine (PF) injection 2 mg  2 mg IntraVENous Q4H PRN FRANCO Foley   2 mg at 23    Or    morphine sulfate (PF) injection 4 mg  4 mg IntraVENous Q4H PRN FRANCO Foley   4 mg at 23 08    amLODIPine (NORVASC) tablet 5 mg  5 mg Oral Daily Alexa Vasquez MD   5 mg at 23 0903    apixaban (ELIQUIS) tablet 5 mg  5 mg Oral BID Benitez Tamez MD   5 mg at 08/08/23 2119    sodium chloride flush 0.9 % injection 5-40 mL  5-40 mL IntraVENous 2 times per day Benitez Tamez MD   10 mL at 08/09/23 0813    sodium chloride flush 0.9 % injection 5-40 mL  5-40 mL IntraVENous PRN Benitez Tamez MD        0.9 % sodium chloride infusion   IntraVENous PRN Benitez Tamez MD        potassium chloride 10 mEq/100 mL IVPB (Peripheral Line)  10 mEq IntraVENous PRN Benitez Tamez MD        magnesium sulfate 2000 mg in 50 mL IVPB premix  2,000 mg IntraVENous PRN Benitez Tamez MD   Stopped at 08/08/23 1047    ondansetron (ZOFRAN-ODT) disintegrating tablet 4 mg  4 mg Oral Q8H PRN Benitez Tamez MD        Or    ondansetron (ZOFRAN) injection 4 mg  4 mg IntraVENous Q6H PRN Benitez Tamez MD   4 mg at 08/08/23 0328    nicotine (NICODERM CQ) 21 MG/24HR 1 patch  1 patch TransDERmal Daily PRN Benitez Tamez MD         No current facility-administered medications on file prior to encounter. Current Outpatient Medications on File Prior to Encounter   Medication Sig Dispense Refill    amLODIPine (NORVASC) 5 MG tablet Take 1 tablet by mouth daily 30 tablet 5    ELIQUIS 5 MG TABS tablet TAKE 1 TABLET BY MOUTH TWICE DAILY 60 tablet 5    ondansetron (ZOFRAN-ODT) 4 MG disintegrating tablet Take 1 tablet by mouth 3 times daily as needed for Nausea or Vomiting 21 tablet 0    famotidine (PEPCID) 20 MG tablet Take 0.5 tablets by mouth 2 times daily 60 tablet 3    [DISCONTINUED] metFORMIN (GLUCOPHAGE) 500 MG tablet TAKE 1 TABLET BY MOUTH TWICE DAILY WITH MEALS (Patient not taking: No sig reported) 60 tablet 5     Past Medical History:   Diagnosis Date    Abnormal ultrasound of carotid artery     CTA was normal    Back pain     epidual injectiion for back  1997     Chronic back pain     MRI 1998 Normal    Diabetes mellitus ([address])     DJD (degenerative joint disease)     Rt.  Hip    Fibromyalgia     Post Traumatic/Myofascial Pain    Hypertension

## 2023-08-09 NOTE — BRIEF OP NOTE
Vinicio Dobson   8/9/2023  Esophagogastroduodenoscopy  A pre-procedure re-evaluation was performed immediately prior to the procedure.   Preprocedure Dx: gerd dysphagia  Postprocedure Dx: sever LA grade 4 esphagitis from 30 cm to 40 cm  Duodenitis  I did not dilate due to severe ulceration    Medications: Procedural sedation with Versed & Fentanyl  Complications: None  Estimated Blood Loss: <5cc  Specimens: were obtained  Marla Quarles MD

## 2023-08-09 NOTE — PLAN OF CARE
Problem: Discharge Planning  Goal: Discharge to home or other facility with appropriate resources  8/8/2023 2148 by Mauricio Avendano RN  Outcome: Progressing  8/8/2023 1007 by Vanessa Collazo RN  Outcome: Progressing     Problem: Pain  Goal: Verbalizes/displays adequate comfort level or baseline comfort level  8/8/2023 2148 by Mauricio Avendano RN  Outcome: Progressing  8/8/2023 1007 by Vanessa Collazo RN  Outcome: Progressing     Problem: Safety - Adult  Goal: Free from fall injury  8/8/2023 2148 by Mauricio Avendano RN  Outcome: Progressing  8/8/2023 1007 by Vanessa Collazo RN  Outcome: Progressing

## 2023-08-09 NOTE — CARE COORDINATION
INTERDISCIPLINARY PLAN OF CARE CONFERENCE    Date/Time: 8/9/2023 3:55 PM  Completed by: Martin Rizzo RN, Case Management      Patient Name:  Florentino Barnes  YOB: 1950  Admitting Diagnosis: Abdominal pain [R10.9]  Nausea and vomiting, unspecified vomiting type [R11.2]     Admit Date/Time:  8/6/2023  4:33 PM    Chart reviewed. Interdisciplinary team contacted or reviewed plan related to patient progress and discharge plans. Disciplines included Case Management, Nursing, and Dietitian. Current Status:Stable  PT/OT recommendation for discharge plan of care: N/A    Expected D/C Disposition:  Home  Confirmed plan with patient  Yes     Met with:patient  Discharge Plan Comments: Reviewed chart and met with pt. Plan remains home. Cont to deny needs.  Will cont to follow for poss [address] O2 in place on admit: No  Pt informed of need to bring portable home O2 tank on day of discharge for nursing to connect prior to leaving:  Not Indicated  Verbalized agreement/Understanding:  Not Indicated

## 2023-08-09 NOTE — PROGRESS NOTES
Pulled morphine 2mg from [employer] and when removed from the package the stopper fell out spilling out the medication. Waste was recorded and another package was pulled and administered.  NAIDA Phillip RN

## 2023-08-09 NOTE — PROGRESS NOTES
Transport here to take pt to floor. Pt in stable condition at this time. Pt denies pain or other needs.

## 2023-08-10 ENCOUNTER — APPOINTMENT (OUTPATIENT)
Dept: GENERAL RADIOLOGY | Age: 73
DRG: 381 | End: 2023-08-10
Payer: MEDICARE

## 2023-08-10 LAB
ANION GAP SERPL CALCULATED.3IONS-SCNC: 13 MMOL/L (ref 3–16)
BASOPHILS # BLD: 0 K/UL (ref 0–0.2)
BASOPHILS NFR BLD: 0.6 %
BUN SERPL-MCNC: 10 MG/DL (ref 7–20)
CALCIUM SERPL-MCNC: 8 MG/DL (ref 8.3–10.6)
CHLORIDE SERPL-SCNC: 103 MMOL/L (ref 99–110)
CO2 SERPL-SCNC: 20 MMOL/L (ref 21–32)
CREAT SERPL-MCNC: 1 MG/DL (ref 0.8–1.3)
DEPRECATED RDW RBC AUTO: 13.3 % (ref 12.4–15.4)
EOSINOPHIL # BLD: 0.1 K/UL (ref 0–0.6)
EOSINOPHIL NFR BLD: 1.3 %
GFR SERPLBLD CREATININE-BSD FMLA CKD-EPI: >60 ML/MIN/{1.73_M2}
GLUCOSE BLD-MCNC: 110 MG/DL (ref 70–99)
GLUCOSE BLD-MCNC: 116 MG/DL (ref 70–99)
GLUCOSE BLD-MCNC: 158 MG/DL (ref 70–99)
GLUCOSE BLD-MCNC: 99 MG/DL (ref 70–99)
GLUCOSE SERPL-MCNC: 123 MG/DL (ref 70–99)
HCT VFR BLD AUTO: 35.9 % (ref 40.5–52.5)
HGB BLD-MCNC: 12.7 G/DL (ref 13.5–17.5)
LYMPHOCYTES # BLD: 1 K/UL (ref 1–5.1)
LYMPHOCYTES NFR BLD: 17.7 %
MCH RBC QN AUTO: 31.3 PG (ref 26–34)
MCHC RBC AUTO-ENTMCNC: 35.4 G/DL (ref 31–36)
MCV RBC AUTO: 88.4 FL (ref 80–100)
MONOCYTES # BLD: 0.5 K/UL (ref 0–1.3)
MONOCYTES NFR BLD: 9 %
NEUTROPHILS # BLD: 4.2 K/UL (ref 1.7–7.7)
NEUTROPHILS NFR BLD: 71.4 %
PERFORMED ON: ABNORMAL
PERFORMED ON: NORMAL
PLATELET # BLD AUTO: 168 K/UL (ref 135–450)
PMV BLD AUTO: 8.4 FL (ref 5–10.5)
POTASSIUM SERPL-SCNC: 3.6 MMOL/L (ref 3.5–5.1)
RBC # BLD AUTO: 4.06 M/UL (ref 4.2–5.9)
SODIUM SERPL-SCNC: 136 MMOL/L (ref 136–145)
WBC # BLD AUTO: 5.9 K/UL (ref 4–11)

## 2023-08-10 PROCEDURE — 6370000000 HC RX 637 (ALT 250 FOR IP)

## 2023-08-10 PROCEDURE — 36415 COLL VENOUS BLD VENIPUNCTURE: CPT

## 2023-08-10 PROCEDURE — 85025 COMPLETE CBC W/AUTO DIFF WBC: CPT

## 2023-08-10 PROCEDURE — C9113 INJ PANTOPRAZOLE SODIUM, VIA: HCPCS | Performed by: INTERNAL MEDICINE

## 2023-08-10 PROCEDURE — 6370000000 HC RX 637 (ALT 250 FOR IP): Performed by: INTERNAL MEDICINE

## 2023-08-10 PROCEDURE — 99232 SBSQ HOSP IP/OBS MODERATE 35: CPT

## 2023-08-10 PROCEDURE — 2580000003 HC RX 258: Performed by: INTERNAL MEDICINE

## 2023-08-10 PROCEDURE — 6360000002 HC RX W HCPCS: Performed by: INTERNAL MEDICINE

## 2023-08-10 PROCEDURE — 1200000000 HC SEMI PRIVATE

## 2023-08-10 PROCEDURE — 80048 BASIC METABOLIC PNL TOTAL CA: CPT

## 2023-08-10 PROCEDURE — 74018 RADEX ABDOMEN 1 VIEW: CPT

## 2023-08-10 PROCEDURE — 87506 IADNA-DNA/RNA PROBE TQ 6-11: CPT

## 2023-08-10 RX ORDER — SENNOSIDES A AND B 8.6 MG/1
1 TABLET, FILM COATED ORAL 2 TIMES DAILY
Status: DISCONTINUED | OUTPATIENT
Start: 2023-08-10 | End: 2023-08-11 | Stop reason: HOSPADM

## 2023-08-10 RX ORDER — OXYCODONE HYDROCHLORIDE AND ACETAMINOPHEN 5; 325 MG/1; MG/1
1 TABLET ORAL EVERY 4 HOURS PRN
Status: DISCONTINUED | OUTPATIENT
Start: 2023-08-10 | End: 2023-08-11 | Stop reason: HOSPADM

## 2023-08-10 RX ORDER — POLYETHYLENE GLYCOL 3350 17 G/17G
17 POWDER, FOR SOLUTION ORAL 2 TIMES DAILY
Status: DISCONTINUED | OUTPATIENT
Start: 2023-08-10 | End: 2023-08-11 | Stop reason: HOSPADM

## 2023-08-10 RX ADMIN — SUCRALFATE 1 G: 1 TABLET ORAL at 15:11

## 2023-08-10 RX ADMIN — OXYCODONE HYDROCHLORIDE AND ACETAMINOPHEN 1 TABLET: 5; 325 TABLET ORAL at 15:11

## 2023-08-10 RX ADMIN — OXYCODONE HYDROCHLORIDE AND ACETAMINOPHEN 1 TABLET: 5; 325 TABLET ORAL at 22:34

## 2023-08-10 RX ADMIN — POLYETHYLENE GLYCOL (3350) 17 G: 17 POWDER, FOR SOLUTION ORAL at 20:29

## 2023-08-10 RX ADMIN — SUCRALFATE 1 G: 1 TABLET ORAL at 19:46

## 2023-08-10 RX ADMIN — SENNOSIDES 8.6 MG: 8.6 TABLET, FILM COATED ORAL at 15:11

## 2023-08-10 RX ADMIN — Medication 10 ML: at 08:22

## 2023-08-10 RX ADMIN — CIPROFLOXACIN 400 MG: 2 INJECTION, SOLUTION INTRAVENOUS at 01:40

## 2023-08-10 RX ADMIN — APIXABAN 5 MG: 5 TABLET, FILM COATED ORAL at 20:30

## 2023-08-10 RX ADMIN — PANTOPRAZOLE SODIUM 40 MG: 40 INJECTION, POWDER, FOR SOLUTION INTRAVENOUS at 08:21

## 2023-08-10 RX ADMIN — MORPHINE SULFATE 2 MG: 2 INJECTION, SOLUTION INTRAMUSCULAR; INTRAVENOUS at 04:48

## 2023-08-10 RX ADMIN — SUCRALFATE 1 G: 1 TABLET ORAL at 05:47

## 2023-08-10 RX ADMIN — CIPROFLOXACIN 400 MG: 2 INJECTION, SOLUTION INTRAVENOUS at 15:27

## 2023-08-10 RX ADMIN — METRONIDAZOLE 500 MG: 500 INJECTION, SOLUTION INTRAVENOUS at 15:28

## 2023-08-10 RX ADMIN — POLYETHYLENE GLYCOL (3350) 17 G: 17 POWDER, FOR SOLUTION ORAL at 15:12

## 2023-08-10 RX ADMIN — MORPHINE SULFATE 4 MG: 4 INJECTION, SOLUTION INTRAMUSCULAR; INTRAVENOUS at 08:22

## 2023-08-10 RX ADMIN — METRONIDAZOLE 500 MG: 500 INJECTION, SOLUTION INTRAVENOUS at 22:32

## 2023-08-10 RX ADMIN — PANTOPRAZOLE SODIUM 40 MG: 40 INJECTION, POWDER, FOR SOLUTION INTRAVENOUS at 20:29

## 2023-08-10 RX ADMIN — AMLODIPINE BESYLATE 5 MG: 5 TABLET ORAL at 08:21

## 2023-08-10 RX ADMIN — APIXABAN 5 MG: 5 TABLET, FILM COATED ORAL at 08:21

## 2023-08-10 RX ADMIN — Medication 10 ML: at 20:31

## 2023-08-10 RX ADMIN — METRONIDAZOLE 500 MG: 500 INJECTION, SOLUTION INTRAVENOUS at 05:48

## 2023-08-10 RX ADMIN — SENNOSIDES 8.6 MG: 8.6 TABLET, FILM COATED ORAL at 20:29

## 2023-08-10 ASSESSMENT — PAIN DESCRIPTION - DESCRIPTORS
DESCRIPTORS: ACHING
DESCRIPTORS: SHOOTING

## 2023-08-10 ASSESSMENT — PAIN SCALES - GENERAL
PAINLEVEL_OUTOF10: 8
PAINLEVEL_OUTOF10: 7
PAINLEVEL_OUTOF10: 4
PAINLEVEL_OUTOF10: 7
PAINLEVEL_OUTOF10: 2
PAINLEVEL_OUTOF10: 6

## 2023-08-10 ASSESSMENT — PAIN - FUNCTIONAL ASSESSMENT: PAIN_FUNCTIONAL_ASSESSMENT: ACTIVITIES ARE NOT PREVENTED

## 2023-08-10 ASSESSMENT — PAIN DESCRIPTION - LOCATION
LOCATION: ABDOMEN;BACK;HIP
LOCATION: LEG
LOCATION: BACK;HIP
LOCATION: BACK;HIP
LOCATION: BACK;HIP;OTHER (COMMENT)

## 2023-08-10 ASSESSMENT — PAIN DESCRIPTION - ORIENTATION
ORIENTATION: LEFT;LOWER
ORIENTATION: LEFT
ORIENTATION: LEFT
ORIENTATION: LEFT;LOWER
ORIENTATION: RIGHT;LEFT

## 2023-08-11 VITALS
HEIGHT: 72 IN | DIASTOLIC BLOOD PRESSURE: 79 MMHG | BODY MASS INDEX: 25.19 KG/M2 | WEIGHT: 186 LBS | SYSTOLIC BLOOD PRESSURE: 151 MMHG | TEMPERATURE: 96.7 F | OXYGEN SATURATION: 94 % | HEART RATE: 61 BPM | RESPIRATION RATE: 16 BRPM

## 2023-08-11 LAB
BACTERIA BLD CULT ORG #2: NORMAL
BACTERIA BLD CULT: NORMAL
GLUCOSE BLD-MCNC: 117 MG/DL (ref 70–99)
GLUCOSE BLD-MCNC: 120 MG/DL (ref 70–99)
PERFORMED ON: ABNORMAL
PERFORMED ON: ABNORMAL

## 2023-08-11 PROCEDURE — 0DB58ZX EXCISION OF ESOPHAGUS, VIA NATURAL OR ARTIFICIAL OPENING ENDOSCOPIC, DIAGNOSTIC: ICD-10-PCS | Performed by: INTERNAL MEDICINE

## 2023-08-11 PROCEDURE — 6370000000 HC RX 637 (ALT 250 FOR IP): Performed by: INTERNAL MEDICINE

## 2023-08-11 PROCEDURE — 6370000000 HC RX 637 (ALT 250 FOR IP)

## 2023-08-11 PROCEDURE — 2580000003 HC RX 258: Performed by: INTERNAL MEDICINE

## 2023-08-11 PROCEDURE — 0DB98ZX EXCISION OF DUODENUM, VIA NATURAL OR ARTIFICIAL OPENING ENDOSCOPIC, DIAGNOSTIC: ICD-10-PCS | Performed by: INTERNAL MEDICINE

## 2023-08-11 PROCEDURE — 6360000002 HC RX W HCPCS: Performed by: INTERNAL MEDICINE

## 2023-08-11 PROCEDURE — C9113 INJ PANTOPRAZOLE SODIUM, VIA: HCPCS | Performed by: INTERNAL MEDICINE

## 2023-08-11 PROCEDURE — 99238 HOSP IP/OBS DSCHRG MGMT 30/<: CPT | Performed by: INTERNAL MEDICINE

## 2023-08-11 RX ORDER — SUCRALFATE 1 G/1
1 TABLET ORAL 4 TIMES DAILY
Qty: 120 TABLET | Refills: 0 | Status: SHIPPED | OUTPATIENT
Start: 2023-08-11

## 2023-08-11 RX ORDER — METRONIDAZOLE 500 MG/1
500 TABLET ORAL 3 TIMES DAILY
Qty: 21 TABLET | Refills: 0 | Status: SHIPPED | OUTPATIENT
Start: 2023-08-11 | End: 2023-08-18

## 2023-08-11 RX ORDER — PANTOPRAZOLE SODIUM 40 MG/1
40 TABLET, DELAYED RELEASE ORAL 2 TIMES DAILY
Qty: 60 TABLET | Refills: 1 | Status: SHIPPED | OUTPATIENT
Start: 2023-08-11

## 2023-08-11 RX ORDER — CIPROFLOXACIN 500 MG/1
500 TABLET, FILM COATED ORAL 2 TIMES DAILY
Qty: 14 TABLET | Refills: 0 | Status: SHIPPED | OUTPATIENT
Start: 2023-08-11 | End: 2023-08-18

## 2023-08-11 RX ADMIN — DICYCLOMINE HYDROCHLORIDE 20 MG: 20 TABLET ORAL at 08:13

## 2023-08-11 RX ADMIN — SUCRALFATE 1 G: 1 TABLET ORAL at 00:29

## 2023-08-11 RX ADMIN — PANTOPRAZOLE SODIUM 40 MG: 40 INJECTION, POWDER, FOR SOLUTION INTRAVENOUS at 08:08

## 2023-08-11 RX ADMIN — SENNOSIDES 8.6 MG: 8.6 TABLET, FILM COATED ORAL at 08:08

## 2023-08-11 RX ADMIN — POLYETHYLENE GLYCOL (3350) 17 G: 17 POWDER, FOR SOLUTION ORAL at 08:08

## 2023-08-11 RX ADMIN — OXYCODONE HYDROCHLORIDE AND ACETAMINOPHEN 1 TABLET: 5; 325 TABLET ORAL at 02:37

## 2023-08-11 RX ADMIN — Medication 10 ML: at 08:09

## 2023-08-11 RX ADMIN — AMLODIPINE BESYLATE 5 MG: 5 TABLET ORAL at 08:08

## 2023-08-11 RX ADMIN — OXYCODONE HYDROCHLORIDE AND ACETAMINOPHEN 1 TABLET: 5; 325 TABLET ORAL at 07:21

## 2023-08-11 RX ADMIN — SUCRALFATE 1 G: 1 TABLET ORAL at 11:30

## 2023-08-11 RX ADMIN — OXYCODONE HYDROCHLORIDE AND ACETAMINOPHEN 1 TABLET: 5; 325 TABLET ORAL at 11:30

## 2023-08-11 RX ADMIN — APIXABAN 5 MG: 5 TABLET, FILM COATED ORAL at 08:09

## 2023-08-11 RX ADMIN — METRONIDAZOLE 500 MG: 500 INJECTION, SOLUTION INTRAVENOUS at 05:48

## 2023-08-11 RX ADMIN — SUCRALFATE 1 G: 1 TABLET ORAL at 05:45

## 2023-08-11 RX ADMIN — CIPROFLOXACIN 400 MG: 2 INJECTION, SOLUTION INTRAVENOUS at 02:19

## 2023-08-11 ASSESSMENT — PAIN DESCRIPTION - LOCATION
LOCATION: BACK;HIP
LOCATION: BACK;HIP
LOCATION: LEG
LOCATION: HIP;BACK

## 2023-08-11 ASSESSMENT — PAIN - FUNCTIONAL ASSESSMENT
PAIN_FUNCTIONAL_ASSESSMENT: ACTIVITIES ARE NOT PREVENTED

## 2023-08-11 ASSESSMENT — PAIN DESCRIPTION - DESCRIPTORS
DESCRIPTORS: SHOOTING;ACHING
DESCRIPTORS: SHOOTING
DESCRIPTORS: SHOOTING
DESCRIPTORS: ACHING

## 2023-08-11 ASSESSMENT — PAIN DESCRIPTION - ORIENTATION
ORIENTATION: LOWER
ORIENTATION: LOWER
ORIENTATION: LEFT
ORIENTATION: RIGHT;LEFT

## 2023-08-11 ASSESSMENT — PAIN DESCRIPTION - ONSET: ONSET: ON-GOING

## 2023-08-11 ASSESSMENT — PAIN SCALES - GENERAL
PAINLEVEL_OUTOF10: 7
PAINLEVEL_OUTOF10: 7
PAINLEVEL_OUTOF10: 5
PAINLEVEL_OUTOF10: 7
PAINLEVEL_OUTOF10: 2
PAINLEVEL_OUTOF10: 0
PAINLEVEL_OUTOF10: 7

## 2023-08-11 ASSESSMENT — PAIN DESCRIPTION - PAIN TYPE
TYPE: CHRONIC PAIN
TYPE: CHRONIC PAIN

## 2023-08-11 ASSESSMENT — PAIN DESCRIPTION - FREQUENCY: FREQUENCY: CONTINUOUS

## 2023-08-11 NOTE — PLAN OF CARE
Problem: Discharge Planning  Goal: Discharge to home or other facility with appropriate resources  8/11/2023 0003 by Nilay Mcgarry RN  Outcome: Progressing  Flowsheets (Taken 8/10/2023 2025)  Discharge to home or other facility with appropriate resources: Identify barriers to discharge with patient and caregiver     Problem: Pain  Goal: Verbalizes/displays adequate comfort level or baseline comfort level  8/11/2023 0003 by Nilay Mcgarry RN  Outcome: Progressing     Problem: Safety - Adult  Goal: Free from fall injury  8/11/2023 0003 by Nilay Mcgarry RN  Outcome: Progressing     Problem: Chronic Conditions and Co-morbidities  Goal: Patient's chronic conditions and co-morbidity symptoms are monitored and maintained or improved  8/11/2023 0847 by Fernando Carroll RN  Outcome: Progressing  8/11/2023 0003 by Nilay Mcgarry RN  Outcome: Progressing  Flowsheets (Taken 8/10/2023 2025)  Care Plan - Patient's Chronic Conditions and Co-Morbidity Symptoms are Monitored and Maintained or Improved: Monitor and assess patient's chronic conditions and comorbid symptoms for stability, deterioration, or improvement     Problem: Musculoskeletal - Adult  Goal: Return mobility to safest level of function  8/11/2023 0847 by Fernando Carroll RN  Outcome: Progressing  8/11/2023 0003 by Nilay Mcgarry RN  Outcome: Progressing  Flowsheets (Taken 8/10/2023 2025)  Return Mobility to Safest Level of Function: Assist with transfers and ambulation using safe patient handling equipment as needed     Problem: Gastrointestinal - Adult  Goal: Minimal or absence of nausea and vomiting  8/11/2023 0847 by Fernando Carroll RN  Outcome: Progressing  8/11/2023 0003 by Nilay Mcgarry RN  Outcome: Progressing  Flowsheets (Taken 8/10/2023 2025)  Minimal or absence of nausea and vomiting: Administer ordered antiemetic medications as needed     Problem: Infection - Adult  Goal: Absence of infection at discharge  8/11/2023 0003 by J Luis Banda

## 2023-08-11 NOTE — PROGRESS NOTES
Patient resting in bed awake, respiration easy, even, unlabored. No s/s of distress noted. No complains of pain or discomfort at this time. Call light within reach.

## 2023-08-11 NOTE — PROGRESS NOTES
Physician Progress Note      Bj Araya  CSN #:                  [id number]  :                       1950  ADMIT DATE:       2023 4:33 PM  DISCH DATE:  RESPONDING  PROVIDER #:        Haydee Farias          QUERY TEXT:    Pt with possible colitis documented. Treatment with IV Cipro and Flagyl. If   possible, please document the type of colitis in the medical record. The medical record reflects the following:  Risk Factors: age, constipation, DM  Clinical Indicators: abd pain, n/v, possible colitis  Treatment: -stool studies pending, -on IV cipro and flagyl for possible   colitis    Thank you,  Joleen Louis RN,BSN,CCDS,CRCR  Options provided:  -- Bacterial Colitis  -- Colitis due to other etiology, Please document other etiology. -- Other - I will add my own diagnosis  -- Disagree - Not applicable / Not valid  -- Disagree - Clinically unable to determine / Unknown  -- Refer to Clinical Documentation Reviewer    PROVIDER RESPONSE TEXT:    This patient has bacterial colitis. Query created by: Nathen Turner on 2023 7:35 AM      QUERY TEXT:    Pt admitted with Abdominal pain with intractable nausea and vomiting. Documentation of colitis with EGD noting severe esophagitis and duodenitis. GI notes per procedure note \"I did not dilate due to severe ulceration\". Please further specify the following: The medical record reflects the following:  Risk Factors: age, Hyperemesis cannabis use disorder, constipation, DM  Clinical Indicators: per EGD: Postprocedure Dx: sever LA grade 4 esphagitis   from 30 cm to 40 cm, Duodenitis, I did not dilate due to severe ulceration,   possible colitis, Hyperemesis cannabis use disorder?   Treatment: GI consult, EGD, PPI, bentyl, zofran, stool studies, Cipro, Flagyl,   Carafate    Thank you,  Joleen Louis RN,BSN,CCDS,CRCR  Options provided:  -- Abdominal pain with intractable nausea and vomiting due to ulcerative

## 2023-08-11 NOTE — PROGRESS NOTES
Pt leaving via private vehicle to home. Discharge instructions given. Pt voiced understanding. Copy of discharge and scripts with patient. Iv removed. CP and PE completed. No further needs at discharge. Pt leaving with all personal belonging. Patient to drive self home. Last oxycodone administration around 1130. Must wait 4 hours per policy to discharge, patient aware to leave around 1530.

## 2023-08-11 NOTE — PLAN OF CARE
Problem: Discharge Planning  Goal: Discharge to home or other facility with appropriate resources  8/11/2023 1235 by Rere Richard RN  Outcome: Completed  8/11/2023 0003 by Hilda Rob RN  Outcome: Progressing  Flowsheets (Taken 8/10/2023 2025)  Discharge to home or other facility with appropriate resources: Identify barriers to discharge with patient and caregiver     Problem: Pain  Goal: Verbalizes/displays adequate comfort level or baseline comfort level  8/11/2023 1235 by Rere Richard RN  Outcome: Completed  8/11/2023 0003 by Hilda Rob RN  Outcome: Progressing     Problem: Safety - Adult  Goal: Free from fall injury  8/11/2023 1235 by Rere Richard RN  Outcome: Completed  8/11/2023 0003 by Hilda Rob RN  Outcome: Progressing     Problem: Chronic Conditions and Co-morbidities  Goal: Patient's chronic conditions and co-morbidity symptoms are monitored and maintained or improved  8/11/2023 1235 by Rere Richard RN  Outcome: Completed  8/11/2023 0847 by Rere Richard RN  Outcome: Progressing  8/11/2023 0003 by Hilda Rob RN  Outcome: Progressing  Flowsheets (Taken 8/10/2023 2025)  Care Plan - Patient's Chronic Conditions and Co-Morbidity Symptoms are Monitored and Maintained or Improved: Monitor and assess patient's chronic conditions and comorbid symptoms for stability, deterioration, or improvement     Problem: Musculoskeletal - Adult  Goal: Return mobility to safest level of function  8/11/2023 1235 by Rere Richard RN  Outcome: Completed  8/11/2023 0847 by Rere Richard RN  Outcome: Progressing  8/11/2023 0003 by Hilda Rob RN  Outcome: Progressing  Flowsheets (Taken 8/10/2023 2025)  Return Mobility to Safest Level of Function: Assist with transfers and ambulation using safe patient handling equipment as needed     Problem: Gastrointestinal - Adult  Goal: Minimal or absence of nausea and vomiting  8/11/2023 1235 by Rere Richard RN  Outcome:

## 2023-08-11 NOTE — DISCHARGE INSTR - DIET

## 2023-08-11 NOTE — PLAN OF CARE
Problem: Discharge Planning  Goal: Discharge to home or other facility with appropriate resources  Outcome: Progressing  Flowsheets (Taken 8/10/2023 2025)  Discharge to home or other facility with appropriate resources: Identify barriers to discharge with patient and caregiver     Problem: Pain  Goal: Verbalizes/displays adequate comfort level or baseline comfort level  Outcome: Progressing     Problem: Safety - Adult  Goal: Free from fall injury  Outcome: Progressing     Problem: Chronic Conditions and Co-morbidities  Goal: Patient's chronic conditions and co-morbidity symptoms are monitored and maintained or improved  Outcome: Progressing  Flowsheets (Taken 8/10/2023 2025)  Care Plan - Patient's Chronic Conditions and Co-Morbidity Symptoms are Monitored and Maintained or Improved: Monitor and assess patient's chronic conditions and comorbid symptoms for stability, deterioration, or improvement     Problem: Musculoskeletal - Adult  Goal: Return mobility to safest level of function  Outcome: Progressing  Flowsheets (Taken 8/10/2023 2025)  Return Mobility to Safest Level of Function: Assist with transfers and ambulation using safe patient handling equipment as needed     Problem: Gastrointestinal - Adult  Goal: Minimal or absence of nausea and vomiting  Outcome: Progressing  Flowsheets (Taken 8/10/2023 2025)  Minimal or absence of nausea and vomiting: Administer ordered antiemetic medications as needed     Problem: Infection - Adult  Goal: Absence of infection at discharge  Outcome: Progressing  Flowsheets (Taken 8/10/2023 2025)  Absence of infection at discharge: Assess and monitor for signs and symptoms of infection     Problem: Cardiovascular - Adult  Goal: Maintains optimal cardiac output and hemodynamic stability  Outcome: Progressing  Flowsheets (Taken 8/10/2023 2025)  Maintains optimal cardiac output and hemodynamic stability: Monitor blood pressure and heart rate

## 2023-08-11 NOTE — CARE COORDINATION
DISCHARGE ORDER  Date/Time 2023 3:49 PM  Completed by: Guerrero Arroyo RN, Case Management    Patient Name: Georgie Ziegler      : 1950  Admitting Diagnosis: Abdominal pain [R10.9]  Nausea and vomiting, unspecified vomiting type [R11.2]      Admit order Date and Status:23 inpt  (verify MD's last order for status of admission)      Noted discharge order. If applicable PT/OT recommendation at Discharge: N/A  DME recommendation by PT/OT:N/A  Confirmed discharge plan  Yes  with whom patient  If pt confirmed DC plan does family need to be contacted by CM No   Discharge Plan: Order for dc noted. Poke with pt who cont plan to return home at dc. Discuss HHC and pt declines. Chart reviewed and no other dc needs identified. Date of Last IMM Given: 23    Reviewed chart. Role of discharge planner explained and patient verbalized understanding. Discharge order is noted. Has Home O2 in place on admit:  No  Informed of need to bring portable home O2 tank on day of discharge for nursing to connect prior to leaving:   Not Indicated  Verbalized agreement/Understanding:   Not Indicated  Pt is being d/c'd to home today. Pt's O2 sats are 94% on RA. Discharge timeout done with nsg, CM and pt. All discharge needs and concerns addressed.

## 2023-08-12 LAB — GI PATHOGENS PNL STL NAA+PROBE: NORMAL

## 2023-08-18 NOTE — OP NOTE
[address] [facility] [place]                 [address] [address]                                OPERATIVE REPORT    PATIENT NAME: Margi Dixon                  :        1950  MED REC NO:   [id number]                          ROOM:       9536  ACCOUNT NO:   [de-identified]                           ADMIT DATE: 2023  PROVIDER:     Clayton Martinez MD    DATE OF PROCEDURE:  2023    PREPROCEDURE DIAGNOSIS:  Dysphagia, abnormal CT scan. POSTPROCEDURE DIAGNOSES:  1. Severe ulcerative esophagitis. 2.  Hiatal hernia. 3.  Duodenitis. OPERATION PERFORMED:  EGD. SURGEON:  Clayton Martinez MD    INDICATIONS:  The patient is a 70-year-old male who has dysphagia. EGD  shows thickening of the esophagus. EGD is being performed. OPERATIVE PROCEDURE:  The patient underwent sedation with fentanyl and  Versed as outlined in the nurse's notes. He underwent continuous blood  pressure, oximetry, and cardiac monitoring. An Olympus video endoscope  was passed into the esophagus. Esophagus showed diffuse severe  ulcerative esophagitis throughout. Biopsies were taken. Stomach was  visualized both on antegrade and retrograde views and showed small  hiatal hernia. Pylorus was patent. There was mild duodenitis. Biopsies were taken here as well. Scope was withdrawn. He tolerated  the procedure well. Blood loss less than 5 mL. IMPRESSION:  1. Ulcerative esophagitis. Continue twice a day PPI and Carafate. I  did not dilate due to the severe ulcerative esophagitis. He will need  follow up in two to three months. 2.  Mild duodenitis. We will wait biopsy results.         Clayton Martinez MD    D: 2023 10:49:55       T: 2023 11:00:21     [id number]  Job#: [id number]     Doc#: [id number]    CC:

## 2023-08-23 ENCOUNTER — TELEPHONE (OUTPATIENT)
Dept: ORTHOPEDIC SURGERY | Age: 73
End: 2023-08-23

## 2023-08-23 DIAGNOSIS — R79.1 ABNORMAL COAGULATION PROFILE: ICD-10-CM

## 2023-08-23 DIAGNOSIS — M16.12 PRIMARY OSTEOARTHRITIS OF LEFT HIP: Primary | ICD-10-CM

## 2023-08-23 DIAGNOSIS — R73.03 PREDIABETES: ICD-10-CM

## 2023-08-23 NOTE — TELEPHONE ENCOUNTER
Auth: # [id number]  Date Range: 09/25/23-12/24/23  Type of SX:  OUTPATIENT  Location: [facility]  CPT: 20374   DX Code: M16.12  SX area: LEFT HIP  Insurance: [employer] MEDICARE [facility]  AUTH LETTER SCANNED.

## 2023-08-31 ENCOUNTER — TELEPHONE (OUTPATIENT)
Dept: ORTHOPEDIC SURGERY | Age: 73
End: 2023-08-31

## 2023-08-31 NOTE — TELEPHONE ENCOUNTER
Left voicemail for patient. His post-op is currently scheduled for 9/8/23, 2 weeks before his surgery. I asked him to call back to move it to either 10/6/23 or 10/10/23.

## 2023-09-08 RX ORDER — SUCRALFATE 1 G/1
1 TABLET ORAL 4 TIMES DAILY
Qty: 120 TABLET | Refills: 0 | Status: SHIPPED | OUTPATIENT
Start: 2023-09-08

## 2023-09-08 NOTE — TELEPHONE ENCOUNTER
Requested Prescriptions     Pending Prescriptions Disp Refills    sucralfate (CARAFATE) 1 GM tablet 120 tablet 0     Sig: Take 1 tablet by mouth 4 times daily

## 2023-09-12 ENCOUNTER — HOSPITAL ENCOUNTER (OUTPATIENT)
Age: 73
Discharge: HOME | End: 2023-09-12
Payer: MEDICARE

## 2023-09-12 DIAGNOSIS — M16.12 PRIMARY OSTEOARTHRITIS OF LEFT HIP: ICD-10-CM

## 2023-09-12 DIAGNOSIS — R73.03 PREDIABETES: ICD-10-CM

## 2023-09-12 DIAGNOSIS — R79.1 ABNORMAL COAGULATION PROFILE: ICD-10-CM

## 2023-09-12 LAB
ALBUMIN SERPL-MCNC: 4.5 G/DL (ref 3.4–5)
ANION GAP SERPL CALCULATED.3IONS-SCNC: 9 MMOL/L (ref 3–16)
BASOPHILS # BLD: 0.1 K/UL (ref 0–0.2)
BASOPHILS NFR BLD: 0.8 %
BUN SERPL-MCNC: 17 MG/DL (ref 7–20)
CALCIUM SERPL-MCNC: 9.7 MG/DL (ref 8.3–10.6)
CHLORIDE SERPL-SCNC: 103 MMOL/L (ref 99–110)
CO2 SERPL-SCNC: 26 MMOL/L (ref 21–32)
CREAT SERPL-MCNC: 1.1 MG/DL (ref 0.8–1.3)
DEPRECATED RDW RBC AUTO: 13.1 % (ref 12.4–15.4)
EOSINOPHIL # BLD: 0.1 K/UL (ref 0–0.6)
EOSINOPHIL NFR BLD: 0.8 %
GFR SERPLBLD CREATININE-BSD FMLA CKD-EPI: >60 ML/MIN/{1.73_M2}
GLUCOSE SERPL-MCNC: 147 MG/DL (ref 70–99)
HCT VFR BLD AUTO: 42.6 % (ref 40.5–52.5)
HGB BLD-MCNC: 14.9 G/DL (ref 13.5–17.5)
INR PPP: 1.02 (ref 0.84–1.16)
LYMPHOCYTES # BLD: 1.5 K/UL (ref 1–5.1)
LYMPHOCYTES NFR BLD: 21.9 %
MCH RBC QN AUTO: 30.4 PG (ref 26–34)
MCHC RBC AUTO-ENTMCNC: 34.9 G/DL (ref 31–36)
MCV RBC AUTO: 87.2 FL (ref 80–100)
MONOCYTES # BLD: 0.5 K/UL (ref 0–1.3)
MONOCYTES NFR BLD: 7 %
NEUTROPHILS # BLD: 4.7 K/UL (ref 1.7–7.7)
NEUTROPHILS NFR BLD: 69.5 %
PLATELET # BLD AUTO: 223 K/UL (ref 135–450)
PMV BLD AUTO: 8.2 FL (ref 5–10.5)
POTASSIUM SERPL-SCNC: 3.7 MMOL/L (ref 3.5–5.1)
PROTHROMBIN TIME: 13.4 SEC (ref 11.5–14.8)
RBC # BLD AUTO: 4.89 M/UL (ref 4.2–5.9)
SODIUM SERPL-SCNC: 138 MMOL/L (ref 136–145)
WBC # BLD AUTO: 6.8 K/UL (ref 4–11)

## 2023-09-12 PROCEDURE — 80048 BASIC METABOLIC PNL TOTAL CA: CPT

## 2023-09-12 PROCEDURE — 82040 ASSAY OF SERUM ALBUMIN: CPT

## 2023-09-12 PROCEDURE — 36415 COLL VENOUS BLD VENIPUNCTURE: CPT

## 2023-09-12 PROCEDURE — 85610 PROTHROMBIN TIME: CPT

## 2023-09-12 PROCEDURE — 85025 COMPLETE CBC W/AUTO DIFF WBC: CPT

## 2023-09-12 PROCEDURE — 83036 HEMOGLOBIN GLYCOSYLATED A1C: CPT

## 2023-09-12 RX ORDER — PANTOPRAZOLE SODIUM 40 MG/1
40 TABLET, DELAYED RELEASE ORAL 2 TIMES DAILY
Qty: 60 TABLET | Refills: 1 | Status: SHIPPED | OUTPATIENT
Start: 2023-09-12

## 2023-09-12 NOTE — TELEPHONE ENCOUNTER
Requested Prescriptions     Pending Prescriptions Disp Refills    pantoprazole (PROTONIX) 40 MG tablet 60 tablet 1     Sig: Take 1 tablet by mouth in the morning and at bedtime    Med pending

## 2023-09-13 ENCOUNTER — TELEPHONE (OUTPATIENT)
Dept: ORTHOPEDIC SURGERY | Age: 73
End: 2023-09-13

## 2023-09-13 ENCOUNTER — ANESTHESIA EVENT (OUTPATIENT)
Dept: OPERATING ROOM | Age: 73
End: 2023-09-13
Payer: MEDICARE

## 2023-09-13 LAB
EST. AVERAGE GLUCOSE BLD GHB EST-MCNC: 137 MG/DL
HBA1C MFR BLD: 6.4 %

## 2023-09-13 NOTE — TELEPHONE ENCOUNTER
Orthopedic Nurse Navigator Summary    Patient Name:  Douglas Hernández Date of Surgery:  09/25/23  Attended Pre-op Education Class:  No email  PCP: Delonte Bach MD  Date of PCP visit for H&P: patient states they are trying to work him in the schedule on 09/20/23  Is patient in a Pain Management program:  Review of Medical history reveals history of: PAF, HTN, Diabetes, H/O Peptic ulcer, Gastric reflux, First degree AV block    Critical Lab Values  - Hemoglobin (g/dL):  Date: 09/12/23 Value 14.9  - Hematocrit(%): Date: 09/12/23   Value 42.6  - HgbA1C:  Date: 09/12/23  Value 6.4  - Albumin:  Date: 09/12/23  Value 4.5  - BUN:  Date: 09/12/23  Value 17  - Creatinine:  Date: 09/12/23  Value 1.1    Coronary Artery Disease/HTN/CHF history: Yes  Cardiologist: His cardiologist retired in 2021, and his PCP has handled his Eliquis prescription since then. Cardiac clearance necessary:    Date of cardiac clearance appt:  Final Cardiac recommendations: Hold Eliquis starting 09/20/23, restart after surger  On any anticoagulation: Eliquis 5 mg BID    Diabetes History:  Yes  Most recent HgbA1C:  Pulmonary:  COPD/Emphysema/Use of home oxygen: No  Alcohol use: No    BMI greater than 40 at time of scheduling: Additional medical concerns: [place] does not have any running water or indoor bathroom facilities. He does have a jatin potty on his property that has 3 steps to get into it. He gets buckets of water from his neighbors property. He also lives alone and has very limited help. His friend will drive him to surgery, but he was asking if he could go to [facility] after surgery. I explained that we would have to try to get approval for this once he comes to the hospital for his surgery. I called and spoke to Braulio N Eulalia Sánchez, the  at [facility] and explained the situation to her.   She is going to save the patient a bed for admission

## 2023-09-20 ENCOUNTER — OFFICE VISIT (OUTPATIENT)
Dept: INTERNAL MEDICINE CLINIC | Age: 73
End: 2023-09-20
Payer: MEDICARE

## 2023-09-20 VITALS
WEIGHT: 185.8 LBS | OXYGEN SATURATION: 97 % | HEART RATE: 77 BPM | DIASTOLIC BLOOD PRESSURE: 82 MMHG | SYSTOLIC BLOOD PRESSURE: 142 MMHG | HEIGHT: 72 IN | BODY MASS INDEX: 25.17 KG/M2

## 2023-09-20 DIAGNOSIS — M16.12 OSTEOARTHRITIS OF LEFT HIP, UNSPECIFIED OSTEOARTHRITIS TYPE: ICD-10-CM

## 2023-09-20 DIAGNOSIS — I44.0 FIRST DEGREE ATRIOVENTRICULAR BLOCK: ICD-10-CM

## 2023-09-20 DIAGNOSIS — K20.90 ESOPHAGITIS: ICD-10-CM

## 2023-09-20 DIAGNOSIS — E11.9 TYPE 2 DIABETES MELLITUS WITHOUT COMPLICATION, WITHOUT LONG-TERM CURRENT USE OF INSULIN (HCC): Primary | ICD-10-CM

## 2023-09-20 DIAGNOSIS — K21.9 GASTRIC REFLUX: ICD-10-CM

## 2023-09-20 DIAGNOSIS — I10 ESSENTIAL HYPERTENSION: ICD-10-CM

## 2023-09-20 DIAGNOSIS — I10 PRIMARY HYPERTENSION: ICD-10-CM

## 2023-09-20 DIAGNOSIS — I48.0 PAROXYSMAL ATRIAL FIBRILLATION (HCC): ICD-10-CM

## 2023-09-20 PROCEDURE — G8417 CALC BMI ABV UP PARAM F/U: HCPCS | Performed by: INTERNAL MEDICINE

## 2023-09-20 PROCEDURE — 3017F COLORECTAL CA SCREEN DOC REV: CPT | Performed by: INTERNAL MEDICINE

## 2023-09-20 PROCEDURE — 2022F DILAT RTA XM EVC RTNOPTHY: CPT | Performed by: INTERNAL MEDICINE

## 2023-09-20 PROCEDURE — 3044F HG A1C LEVEL LT 7.0%: CPT | Performed by: INTERNAL MEDICINE

## 2023-09-20 PROCEDURE — 3078F DIAST BP <80 MM HG: CPT | Performed by: INTERNAL MEDICINE

## 2023-09-20 PROCEDURE — 3074F SYST BP LT 130 MM HG: CPT | Performed by: INTERNAL MEDICINE

## 2023-09-20 PROCEDURE — 1123F ACP DISCUSS/DSCN MKR DOCD: CPT | Performed by: INTERNAL MEDICINE

## 2023-09-20 PROCEDURE — G8427 DOCREV CUR MEDS BY ELIG CLIN: HCPCS | Performed by: INTERNAL MEDICINE

## 2023-09-20 PROCEDURE — 99214 OFFICE O/P EST MOD 30 MIN: CPT | Performed by: INTERNAL MEDICINE

## 2023-09-20 PROCEDURE — 1036F TOBACCO NON-USER: CPT | Performed by: INTERNAL MEDICINE

## 2023-09-20 NOTE — PROGRESS NOTES
[facility] PRE-OP   HISTORY AND PHYSICAL      I am seeing Vinicio Dobson at the request of Dr Chapincito Jackson for evaluation of the patient's medical problems prior to total hip replacement. 9/20/2023 2:49 PM    Patient Information:  Vinicio Dobson is a 74 y.o. male [id number]  PCP:  Annette Bauman MD (Tel: [phone] )    Chief complaint:    Chief Complaint   Patient presents with    Pre-op Exam     Pt is have a left hip replacement on the 25th of September. History of Present Illness:  Vinicio Dobson is a 74 y.o. male who presented with need for pre-op exam for L total hip replacement on 9/25. Saman Sorensen Symptom onset of L hip pain was chronic for a time period of 5 year(s). The severity is described as moderate to severe. The course of his symptoms over time is worsening. The symptoms improved with rest and worsened with activity. The patient's symptom is associated with osteoarthritis. History obtained from patient   Old medical records show recent dx of erosive esophagitis diagnosed 8/11/2023 in setting of intractable nausea and vomiting. Sx are controlled now on protonix and carafate. He does have diet controlled DM and his A1c is controlled at 6.4%. REVIEW OF SYSTEMS:   Constitutional:  Negative for fever,chills or night sweats  ENT:  Negative for rhinorrhea, epistaxis, hoarseness, sore throat. Respiratory:   Negative for shortness of breath,wheezing  Cardiovascular:   Negative for  chest pain, palpitations   Gastrointestinal:  Negative for nausea, vomiting, diarrhea  Genitourinary:  Negative for polyuria, dysuria   Hematologic/Lymphatic:  Negative for  bleeding tendency, easy bruising  Musculoskeletal:  positive for myalgias and arthralgias in his back and L hip  Neurologic:  Negative for  confusion,dysarthria. Skin:  Negative for itching,rash  Psychiatric:  Negative for depression, agitation.   Positive for mild

## 2023-09-25 ENCOUNTER — ANESTHESIA (OUTPATIENT)
Dept: OPERATING ROOM | Age: 73
End: 2023-09-25
Payer: MEDICARE

## 2023-09-25 ENCOUNTER — HOSPITAL ENCOUNTER (OUTPATIENT)
Age: 73
Setting detail: OBSERVATION
LOS: 2 days | Discharge: NURSING, REHAB OR LONG-TERM CARE | End: 2023-10-01
Attending: ORTHOPAEDIC SURGERY | Admitting: ORTHOPAEDIC SURGERY
Payer: MEDICARE

## 2023-09-25 ENCOUNTER — APPOINTMENT (OUTPATIENT)
Dept: GENERAL RADIOLOGY | Age: 73
End: 2023-09-25
Attending: ORTHOPAEDIC SURGERY
Payer: MEDICARE

## 2023-09-25 DIAGNOSIS — Z96.642 H/O TOTAL HIP ARTHROPLASTY, LEFT: Primary | ICD-10-CM

## 2023-09-25 PROBLEM — M16.12 PRIMARY OSTEOARTHRITIS OF LEFT HIP: Status: ACTIVE | Noted: 2023-09-25

## 2023-09-25 LAB
ABO + RH BLD: NORMAL
APTT BLD: 33.6 SEC (ref 22.7–35.9)
BLD GP AB SCN SERPL QL: NORMAL
INR PPP: 1.03 (ref 0.84–1.16)
PROTHROMBIN TIME: 13.5 SEC (ref 11.5–14.8)

## 2023-09-25 PROCEDURE — 1200000000 HC SEMI PRIVATE

## 2023-09-25 PROCEDURE — 7100000001 HC PACU RECOVERY - ADDTL 15 MIN: Performed by: ORTHOPAEDIC SURGERY

## 2023-09-25 PROCEDURE — 97162 PT EVAL MOD COMPLEX 30 MIN: CPT

## 2023-09-25 PROCEDURE — 27130 TOTAL HIP ARTHROPLASTY: CPT | Performed by: ORTHOPAEDIC SURGERY

## 2023-09-25 PROCEDURE — 3700000000 HC ANESTHESIA ATTENDED CARE: Performed by: ORTHOPAEDIC SURGERY

## 2023-09-25 PROCEDURE — 6370000000 HC RX 637 (ALT 250 FOR IP): Performed by: ORTHOPAEDIC SURGERY

## 2023-09-25 PROCEDURE — 73502 X-RAY EXAM HIP UNI 2-3 VIEWS: CPT

## 2023-09-25 PROCEDURE — 97530 THERAPEUTIC ACTIVITIES: CPT

## 2023-09-25 PROCEDURE — 7100000000 HC PACU RECOVERY - FIRST 15 MIN: Performed by: ORTHOPAEDIC SURGERY

## 2023-09-25 PROCEDURE — 86901 BLOOD TYPING SEROLOGIC RH(D): CPT

## 2023-09-25 PROCEDURE — 6360000002 HC RX W HCPCS: Performed by: NURSE ANESTHETIST, CERTIFIED REGISTERED

## 2023-09-25 PROCEDURE — 2580000003 HC RX 258: Performed by: NURSE ANESTHETIST, CERTIFIED REGISTERED

## 2023-09-25 PROCEDURE — 97535 SELF CARE MNGMENT TRAINING: CPT

## 2023-09-25 PROCEDURE — C1776 JOINT DEVICE (IMPLANTABLE): HCPCS | Performed by: ORTHOPAEDIC SURGERY

## 2023-09-25 PROCEDURE — 6360000002 HC RX W HCPCS: Performed by: ORTHOPAEDIC SURGERY

## 2023-09-25 PROCEDURE — 97110 THERAPEUTIC EXERCISES: CPT

## 2023-09-25 PROCEDURE — 85610 PROTHROMBIN TIME: CPT

## 2023-09-25 PROCEDURE — 85730 THROMBOPLASTIN TIME PARTIAL: CPT

## 2023-09-25 PROCEDURE — 2580000003 HC RX 258: Performed by: ORTHOPAEDIC SURGERY

## 2023-09-25 PROCEDURE — 6360000002 HC RX W HCPCS: Performed by: ANESTHESIOLOGY

## 2023-09-25 PROCEDURE — 3600000015 HC SURGERY LEVEL 5 ADDTL 15MIN: Performed by: ORTHOPAEDIC SURGERY

## 2023-09-25 PROCEDURE — A4217 STERILE WATER/SALINE, 500 ML: HCPCS | Performed by: ORTHOPAEDIC SURGERY

## 2023-09-25 PROCEDURE — 36415 COLL VENOUS BLD VENIPUNCTURE: CPT

## 2023-09-25 PROCEDURE — 86850 RBC ANTIBODY SCREEN: CPT

## 2023-09-25 PROCEDURE — 3600000005 HC SURGERY LEVEL 5 BASE: Performed by: ORTHOPAEDIC SURGERY

## 2023-09-25 PROCEDURE — 97165 OT EVAL LOW COMPLEX 30 MIN: CPT

## 2023-09-25 PROCEDURE — 2500000003 HC RX 250 WO HCPCS: Performed by: NURSE ANESTHETIST, CERTIFIED REGISTERED

## 2023-09-25 PROCEDURE — 3700000001 HC ADD 15 MINUTES (ANESTHESIA): Performed by: ORTHOPAEDIC SURGERY

## 2023-09-25 PROCEDURE — 2580000003 HC RX 258: Performed by: ANESTHESIOLOGY

## 2023-09-25 PROCEDURE — 2709999900 HC NON-CHARGEABLE SUPPLY: Performed by: ORTHOPAEDIC SURGERY

## 2023-09-25 PROCEDURE — 86900 BLOOD TYPING SEROLOGIC ABO: CPT

## 2023-09-25 PROCEDURE — 2500000003 HC RX 250 WO HCPCS: Performed by: ORTHOPAEDIC SURGERY

## 2023-09-25 PROCEDURE — 2500000003 HC RX 250 WO HCPCS: Performed by: ANESTHESIOLOGY

## 2023-09-25 DEVICE — STEM FEM SZ 8 L111MM 12/14 TAPR STD OFFSET HIP DUOFIX CLLRD: Type: IMPLANTABLE DEVICE | Site: HIP | Status: FUNCTIONAL

## 2023-09-25 DEVICE — SCREW BNE L25MM DIA6.5MM CANC HIP S STL GRIPTION FULL THRD: Type: IMPLANTABLE DEVICE | Site: HIP | Status: FUNCTIONAL

## 2023-09-25 DEVICE — HEAD FEM DIA28MM +5MM OFFSET 12/14 TAPR HIP CERAMIC BIOLOX: Type: IMPLANTABLE DEVICE | Site: HIP | Status: FUNCTIONAL

## 2023-09-25 DEVICE — IMPLANTABLE DEVICE: Type: IMPLANTABLE DEVICE | Site: HIP | Status: FUNCTIONAL

## 2023-09-25 DEVICE — HIP H3 TOT ADV DUAL MOB IMPL CAPPED H3: Type: IMPLANTABLE DEVICE | Site: HIP | Status: FUNCTIONAL

## 2023-09-25 DEVICE — CUP ACET DIA56MM HIP GRIPTION PRI CEMENTLESS FIX SECT SER: Type: IMPLANTABLE DEVICE | Site: HIP | Status: FUNCTIONAL

## 2023-09-25 RX ORDER — SODIUM CHLORIDE 0.9 % (FLUSH) 0.9 %
5-40 SYRINGE (ML) INJECTION EVERY 12 HOURS SCHEDULED
Status: DISCONTINUED | OUTPATIENT
Start: 2023-09-25 | End: 2023-10-01 | Stop reason: HOSPADM

## 2023-09-25 RX ORDER — SODIUM CHLORIDE 0.9 % (FLUSH) 0.9 %
5-40 SYRINGE (ML) INJECTION PRN
Status: DISCONTINUED | OUTPATIENT
Start: 2023-09-25 | End: 2023-10-01 | Stop reason: HOSPADM

## 2023-09-25 RX ORDER — ONDANSETRON 4 MG/1
4 TABLET, ORALLY DISINTEGRATING ORAL EVERY 8 HOURS PRN
Status: DISCONTINUED | OUTPATIENT
Start: 2023-09-25 | End: 2023-10-01 | Stop reason: HOSPADM

## 2023-09-25 RX ORDER — SODIUM CHLORIDE, SODIUM LACTATE, POTASSIUM CHLORIDE, CALCIUM CHLORIDE 600; 310; 30; 20 MG/100ML; MG/100ML; MG/100ML; MG/100ML
INJECTION, SOLUTION INTRAVENOUS CONTINUOUS
Status: DISCONTINUED | OUTPATIENT
Start: 2023-09-25 | End: 2023-09-25

## 2023-09-25 RX ORDER — EPHEDRINE SULFATE 50 MG/ML
INJECTION INTRAVENOUS PRN
Status: DISCONTINUED | OUTPATIENT
Start: 2023-09-25 | End: 2023-09-25 | Stop reason: SDUPTHER

## 2023-09-25 RX ORDER — FAMOTIDINE 10 MG/ML
20 INJECTION, SOLUTION INTRAVENOUS ONCE
Status: COMPLETED | OUTPATIENT
Start: 2023-09-25 | End: 2023-09-25

## 2023-09-25 RX ORDER — SODIUM CHLORIDE 0.9 % (FLUSH) 0.9 %
5-40 SYRINGE (ML) INJECTION PRN
Status: DISCONTINUED | OUTPATIENT
Start: 2023-09-25 | End: 2023-09-25 | Stop reason: HOSPADM

## 2023-09-25 RX ORDER — MEPERIDINE HYDROCHLORIDE 25 MG/ML
12.5 INJECTION INTRAMUSCULAR; INTRAVENOUS; SUBCUTANEOUS EVERY 5 MIN PRN
Status: DISCONTINUED | OUTPATIENT
Start: 2023-09-25 | End: 2023-09-25 | Stop reason: HOSPADM

## 2023-09-25 RX ORDER — KETAMINE HYDROCHLORIDE 100 MG/ML
INJECTION INTRAMUSCULAR; INTRAVENOUS PRN
Status: DISCONTINUED | OUTPATIENT
Start: 2023-09-25 | End: 2023-09-25 | Stop reason: SDUPTHER

## 2023-09-25 RX ORDER — OXYCODONE HYDROCHLORIDE 5 MG/1
5 TABLET ORAL EVERY 4 HOURS PRN
Status: DISCONTINUED | OUTPATIENT
Start: 2023-09-25 | End: 2023-10-01 | Stop reason: HOSPADM

## 2023-09-25 RX ORDER — ONDANSETRON 2 MG/ML
4 INJECTION INTRAMUSCULAR; INTRAVENOUS EVERY 10 MIN PRN
Status: DISCONTINUED | OUTPATIENT
Start: 2023-09-25 | End: 2023-09-25 | Stop reason: HOSPADM

## 2023-09-25 RX ORDER — ASPIRIN 325 MG
325 TABLET, DELAYED RELEASE (ENTERIC COATED) ORAL DAILY
Status: DISCONTINUED | OUTPATIENT
Start: 2023-09-26 | End: 2023-10-01 | Stop reason: HOSPADM

## 2023-09-25 RX ORDER — HYDRALAZINE HYDROCHLORIDE 20 MG/ML
5 INJECTION INTRAMUSCULAR; INTRAVENOUS
Status: DISCONTINUED | OUTPATIENT
Start: 2023-09-25 | End: 2023-09-25 | Stop reason: HOSPADM

## 2023-09-25 RX ORDER — DEXAMETHASONE SODIUM PHOSPHATE 4 MG/ML
INJECTION, SOLUTION INTRA-ARTICULAR; INTRALESIONAL; INTRAMUSCULAR; INTRAVENOUS; SOFT TISSUE PRN
Status: DISCONTINUED | OUTPATIENT
Start: 2023-09-25 | End: 2023-09-25 | Stop reason: SDUPTHER

## 2023-09-25 RX ORDER — ONDANSETRON 2 MG/ML
4 INJECTION INTRAMUSCULAR; INTRAVENOUS EVERY 6 HOURS PRN
Status: DISCONTINUED | OUTPATIENT
Start: 2023-09-25 | End: 2023-10-01 | Stop reason: HOSPADM

## 2023-09-25 RX ORDER — ONDANSETRON 2 MG/ML
INJECTION INTRAMUSCULAR; INTRAVENOUS PRN
Status: DISCONTINUED | OUTPATIENT
Start: 2023-09-25 | End: 2023-09-25 | Stop reason: SDUPTHER

## 2023-09-25 RX ORDER — ROCURONIUM BROMIDE 10 MG/ML
INJECTION, SOLUTION INTRAVENOUS PRN
Status: DISCONTINUED | OUTPATIENT
Start: 2023-09-25 | End: 2023-09-25 | Stop reason: SDUPTHER

## 2023-09-25 RX ORDER — MIDAZOLAM HYDROCHLORIDE 1 MG/ML
1 INJECTION INTRAMUSCULAR; INTRAVENOUS EVERY 5 MIN PRN
Status: DISCONTINUED | OUTPATIENT
Start: 2023-09-25 | End: 2023-09-25 | Stop reason: HOSPADM

## 2023-09-25 RX ORDER — SODIUM CHLORIDE 9 MG/ML
INJECTION, SOLUTION INTRAVENOUS PRN
Status: DISCONTINUED | OUTPATIENT
Start: 2023-09-25 | End: 2023-10-01 | Stop reason: HOSPADM

## 2023-09-25 RX ORDER — VANCOMYCIN HYDROCHLORIDE 1 G/20ML
INJECTION, POWDER, LYOPHILIZED, FOR SOLUTION INTRAVENOUS PRN
Status: DISCONTINUED | OUTPATIENT
Start: 2023-09-25 | End: 2023-09-25 | Stop reason: ALTCHOICE

## 2023-09-25 RX ORDER — FENTANYL CITRATE 50 UG/ML
INJECTION, SOLUTION INTRAMUSCULAR; INTRAVENOUS PRN
Status: DISCONTINUED | OUTPATIENT
Start: 2023-09-25 | End: 2023-09-25 | Stop reason: SDUPTHER

## 2023-09-25 RX ORDER — OXYCODONE HYDROCHLORIDE 5 MG/1
5 TABLET ORAL
Status: DISCONTINUED | OUTPATIENT
Start: 2023-09-25 | End: 2023-09-25 | Stop reason: HOSPADM

## 2023-09-25 RX ORDER — ACETAMINOPHEN 325 MG/1
650 TABLET ORAL EVERY 6 HOURS
Status: DISCONTINUED | OUTPATIENT
Start: 2023-09-25 | End: 2023-10-01 | Stop reason: HOSPADM

## 2023-09-25 RX ORDER — SODIUM CHLORIDE 9 MG/ML
INJECTION, SOLUTION INTRAVENOUS PRN
Status: DISCONTINUED | OUTPATIENT
Start: 2023-09-25 | End: 2023-09-25 | Stop reason: HOSPADM

## 2023-09-25 RX ORDER — OXYCODONE HYDROCHLORIDE 5 MG/1
10 TABLET ORAL EVERY 4 HOURS PRN
Status: DISCONTINUED | OUTPATIENT
Start: 2023-09-25 | End: 2023-10-01 | Stop reason: HOSPADM

## 2023-09-25 RX ORDER — SODIUM CHLORIDE 0.9 % (FLUSH) 0.9 %
5-40 SYRINGE (ML) INJECTION EVERY 12 HOURS SCHEDULED
Status: DISCONTINUED | OUTPATIENT
Start: 2023-09-25 | End: 2023-09-25 | Stop reason: HOSPADM

## 2023-09-25 RX ORDER — PHENYLEPHRINE HYDROCHLORIDE 10 MG/ML
INJECTION INTRAVENOUS PRN
Status: DISCONTINUED | OUTPATIENT
Start: 2023-09-25 | End: 2023-09-25 | Stop reason: SDUPTHER

## 2023-09-25 RX ORDER — LIDOCAINE HYDROCHLORIDE 20 MG/ML
INJECTION, SOLUTION INFILTRATION; PERINEURAL PRN
Status: DISCONTINUED | OUTPATIENT
Start: 2023-09-25 | End: 2023-09-25 | Stop reason: SDUPTHER

## 2023-09-25 RX ORDER — MAGNESIUM HYDROXIDE 1200 MG/15ML
LIQUID ORAL CONTINUOUS PRN
Status: COMPLETED | OUTPATIENT
Start: 2023-09-25 | End: 2023-09-25

## 2023-09-25 RX ORDER — SODIUM CHLORIDE, SODIUM LACTATE, POTASSIUM CHLORIDE, CALCIUM CHLORIDE 600; 310; 30; 20 MG/100ML; MG/100ML; MG/100ML; MG/100ML
INJECTION, SOLUTION INTRAVENOUS CONTINUOUS PRN
Status: DISCONTINUED | OUTPATIENT
Start: 2023-09-25 | End: 2023-09-25 | Stop reason: SDUPTHER

## 2023-09-25 RX ORDER — PROPOFOL 10 MG/ML
INJECTION, EMULSION INTRAVENOUS PRN
Status: DISCONTINUED | OUTPATIENT
Start: 2023-09-25 | End: 2023-09-25 | Stop reason: SDUPTHER

## 2023-09-25 RX ORDER — HYDROMORPHONE HYDROCHLORIDE 2 MG/ML
INJECTION, SOLUTION INTRAMUSCULAR; INTRAVENOUS; SUBCUTANEOUS PRN
Status: DISCONTINUED | OUTPATIENT
Start: 2023-09-25 | End: 2023-09-25 | Stop reason: SDUPTHER

## 2023-09-25 RX ORDER — DIPHENHYDRAMINE HYDROCHLORIDE 50 MG/ML
12.5 INJECTION INTRAMUSCULAR; INTRAVENOUS
Status: DISCONTINUED | OUTPATIENT
Start: 2023-09-25 | End: 2023-09-25 | Stop reason: HOSPADM

## 2023-09-25 RX ADMIN — SODIUM CHLORIDE, POTASSIUM CHLORIDE, SODIUM LACTATE AND CALCIUM CHLORIDE: 600; 310; 30; 20 INJECTION, SOLUTION INTRAVENOUS at 07:30

## 2023-09-25 RX ADMIN — CEFAZOLIN 2000 MG: 2 INJECTION, POWDER, FOR SOLUTION INTRAMUSCULAR; INTRAVENOUS at 23:49

## 2023-09-25 RX ADMIN — ACETAMINOPHEN 650 MG: 325 TABLET ORAL at 12:05

## 2023-09-25 RX ADMIN — PHENYLEPHRINE HYDROCHLORIDE 100 MCG: 10 INJECTION INTRAVENOUS at 08:35

## 2023-09-25 RX ADMIN — CEFAZOLIN 2000 MG: 2 INJECTION, POWDER, FOR SOLUTION INTRAMUSCULAR; INTRAVENOUS at 15:12

## 2023-09-25 RX ADMIN — KETAMINE HYDROCHLORIDE 25 MG: 100 INJECTION, SOLUTION, CONCENTRATE INTRAMUSCULAR; INTRAVENOUS at 07:57

## 2023-09-25 RX ADMIN — ACETAMINOPHEN 650 MG: 325 TABLET ORAL at 17:55

## 2023-09-25 RX ADMIN — ROCURONIUM BROMIDE 20 MG: 10 INJECTION INTRAVENOUS at 08:38

## 2023-09-25 RX ADMIN — HYDROMORPHONE HYDROCHLORIDE 0.4 MG: 2 INJECTION, SOLUTION INTRAMUSCULAR; INTRAVENOUS; SUBCUTANEOUS at 08:11

## 2023-09-25 RX ADMIN — HYDROMORPHONE HYDROCHLORIDE 0.5 MG: 1 INJECTION, SOLUTION INTRAMUSCULAR; INTRAVENOUS; SUBCUTANEOUS at 10:53

## 2023-09-25 RX ADMIN — SUGAMMADEX 200 MG: 100 INJECTION, SOLUTION INTRAVENOUS at 09:42

## 2023-09-25 RX ADMIN — HYDROMORPHONE HYDROCHLORIDE 0.5 MG: 1 INJECTION, SOLUTION INTRAMUSCULAR; INTRAVENOUS; SUBCUTANEOUS at 10:42

## 2023-09-25 RX ADMIN — Medication 10 ML: at 20:32

## 2023-09-25 RX ADMIN — PHENYLEPHRINE HYDROCHLORIDE 100 MCG: 10 INJECTION INTRAVENOUS at 08:55

## 2023-09-25 RX ADMIN — OXYCODONE HYDROCHLORIDE 10 MG: 5 TABLET ORAL at 15:07

## 2023-09-25 RX ADMIN — PHENYLEPHRINE HYDROCHLORIDE 100 MCG: 10 INJECTION INTRAVENOUS at 08:44

## 2023-09-25 RX ADMIN — ACETAMINOPHEN 650 MG: 325 TABLET ORAL at 23:49

## 2023-09-25 RX ADMIN — DEXAMETHASONE SODIUM PHOSPHATE 10 MG: 4 INJECTION INTRA-ARTICULAR; INTRALESIONAL; INTRAMUSCULAR; INTRAVENOUS; SOFT TISSUE at 07:38

## 2023-09-25 RX ADMIN — Medication 10 ML: at 12:06

## 2023-09-25 RX ADMIN — CEFAZOLIN 2000 MG: 2 INJECTION, POWDER, FOR SOLUTION INTRAMUSCULAR; INTRAVENOUS at 07:30

## 2023-09-25 RX ADMIN — EPHEDRINE SULFATE 5 MG: 50 INJECTION INTRAVENOUS at 09:07

## 2023-09-25 RX ADMIN — FAMOTIDINE 20 MG: 10 INJECTION, SOLUTION INTRAVENOUS at 07:07

## 2023-09-25 RX ADMIN — OXYCODONE HYDROCHLORIDE 10 MG: 5 TABLET ORAL at 20:44

## 2023-09-25 RX ADMIN — ROCURONIUM BROMIDE 50 MG: 10 INJECTION INTRAVENOUS at 07:38

## 2023-09-25 RX ADMIN — LIDOCAINE HYDROCHLORIDE 60 MG: 20 INJECTION, SOLUTION INFILTRATION; PERINEURAL at 07:38

## 2023-09-25 RX ADMIN — PHENYLEPHRINE HYDROCHLORIDE 100 MCG: 10 INJECTION INTRAVENOUS at 08:28

## 2023-09-25 RX ADMIN — SODIUM CHLORIDE, POTASSIUM CHLORIDE, SODIUM LACTATE AND CALCIUM CHLORIDE: 600; 310; 30; 20 INJECTION, SOLUTION INTRAVENOUS at 07:08

## 2023-09-25 RX ADMIN — ONDANSETRON 4 MG: 2 INJECTION INTRAMUSCULAR; INTRAVENOUS at 07:38

## 2023-09-25 RX ADMIN — PROPOFOL 200 MG: 10 INJECTION, EMULSION INTRAVENOUS at 07:38

## 2023-09-25 RX ADMIN — SODIUM CHLORIDE, POTASSIUM CHLORIDE, SODIUM LACTATE AND CALCIUM CHLORIDE: 600; 310; 30; 20 INJECTION, SOLUTION INTRAVENOUS at 09:42

## 2023-09-25 RX ADMIN — FENTANYL CITRATE 100 MCG: 50 INJECTION INTRAMUSCULAR; INTRAVENOUS at 07:38

## 2023-09-25 RX ADMIN — PHENYLEPHRINE HYDROCHLORIDE 100 MCG: 10 INJECTION INTRAVENOUS at 08:24

## 2023-09-25 ASSESSMENT — PAIN SCALES - GENERAL
PAINLEVEL_OUTOF10: 8
PAINLEVEL_OUTOF10: 9
PAINLEVEL_OUTOF10: 7
PAINLEVEL_OUTOF10: 4
PAINLEVEL_OUTOF10: 2
PAINLEVEL_OUTOF10: 9
PAINLEVEL_OUTOF10: 6
PAINLEVEL_OUTOF10: 0
PAINLEVEL_OUTOF10: 7
PAINLEVEL_OUTOF10: 5
PAINLEVEL_OUTOF10: 4

## 2023-09-25 ASSESSMENT — PAIN DESCRIPTION - DESCRIPTORS
DESCRIPTORS: ACHING;DISCOMFORT
DESCRIPTORS: ACHING
DESCRIPTORS: ACHING
DESCRIPTORS: DISCOMFORT
DESCRIPTORS: ACHING
DESCRIPTORS: ACHING
DESCRIPTORS: ACHING;DISCOMFORT
DESCRIPTORS: ACHING

## 2023-09-25 ASSESSMENT — PAIN - FUNCTIONAL ASSESSMENT
PAIN_FUNCTIONAL_ASSESSMENT: 0-10
PAIN_FUNCTIONAL_ASSESSMENT: PREVENTS OR INTERFERES WITH MANY ACTIVE NOT PASSIVE ACTIVITIES

## 2023-09-25 ASSESSMENT — PAIN DESCRIPTION - LOCATION
LOCATION: HIP
LOCATION: BUTTOCKS
LOCATION: HIP
LOCATION: HIP
LOCATION: BACK;BUTTOCKS

## 2023-09-25 ASSESSMENT — PAIN DESCRIPTION - ORIENTATION
ORIENTATION: LEFT

## 2023-09-25 NOTE — ANESTHESIA POSTPROCEDURE EVALUATION
Department of Anesthesiology  Postprocedure Note    Patient: Zeny Randle  MRN: [id number]  YOB: 1950  Date of evaluation: 9/25/2023      Procedure Summary     Date: 09/25/23 Room / Location: [address] [place] [address] / [facility]    Anesthesia Start: 0730 Anesthesia Stop: 1007    Procedure: LEFT COMPLEX PRIMARY TOTAL HIP ARTHROPLASTY **23HR HOLD** (Left: Hip) Diagnosis:       Primary osteoarthritis of left hip      Left hip pain      Acute bilateral low back pain, unspecified whether sciatica present      (Primary osteoarthritis of left hip [M16.12])      (Left hip pain [M25.552])      (Acute bilateral low back pain, unspecified whether sciatica present [M54.50])    Surgeons: Larisa Eastman MD Responsible Provider: Moise Orozco MD    Anesthesia Type: general ASA Status: 3          Anesthesia Type: No value filed.     Jennifer Phase I: Jennifer Score: 10    Jennifer Phase II:        Anesthesia Post Evaluation    Patient location during evaluation: PACU  Level of consciousness: awake  Airway patency: patent  Nausea & Vomiting: no nausea  Complications: no  Cardiovascular status: blood pressure returned to baseline  Respiratory status: acceptable  Hydration status: euvolemic  Pain management: adequate

## 2023-09-25 NOTE — PLAN OF CARE
Problem: Chronic Conditions and Co-morbidities  Goal: Patient's chronic conditions and co-morbidity symptoms are monitored and maintained or improved  Outcome: Progressing  Flowsheets (Taken 9/25/2023 0646 by Joesy Jacome RN)  Care Plan - Patient's Chronic Conditions and Co-Morbidity Symptoms are Monitored and Maintained or Improved: Monitor and assess patient's chronic conditions and comorbid symptoms for stability, deterioration, or improvement     Problem: Discharge Planning  Goal: Discharge to home or other facility with appropriate resources  Outcome: Progressing  Flowsheets (Taken 9/25/2023 0646 by Josey Jacome RN)  Discharge to home or other facility with appropriate resources: Identify barriers to discharge with patient and caregiver

## 2023-09-25 NOTE — H&P
H&P INTERVAL NOTE    Patient was seen and examined. Risks, benefits, alternatives discussed. Informed consent obtained. Surgical site marked. All questions answered. There are no changes to H&P from 9/20/2023 as documented. Plan OR for Left total hip arthroplasty.     Oskar Jain MD

## 2023-09-26 LAB
ANION GAP SERPL CALCULATED.3IONS-SCNC: 12 MMOL/L (ref 3–16)
BUN SERPL-MCNC: 25 MG/DL (ref 7–20)
CALCIUM SERPL-MCNC: 8.7 MG/DL (ref 8.3–10.6)
CHLORIDE SERPL-SCNC: 101 MMOL/L (ref 99–110)
CO2 SERPL-SCNC: 21 MMOL/L (ref 21–32)
CREAT SERPL-MCNC: 1.1 MG/DL (ref 0.8–1.3)
DEPRECATED RDW RBC AUTO: 12.8 % (ref 12.4–15.4)
GFR SERPLBLD CREATININE-BSD FMLA CKD-EPI: >60 ML/MIN/{1.73_M2}
GLUCOSE SERPL-MCNC: 206 MG/DL (ref 70–99)
HCT VFR BLD AUTO: 33.7 % (ref 40.5–52.5)
HGB BLD-MCNC: 12.1 G/DL (ref 13.5–17.5)
MCH RBC QN AUTO: 31.3 PG (ref 26–34)
MCHC RBC AUTO-ENTMCNC: 35.8 G/DL (ref 31–36)
MCV RBC AUTO: 87.6 FL (ref 80–100)
PLATELET # BLD AUTO: 198 K/UL (ref 135–450)
PMV BLD AUTO: 8.7 FL (ref 5–10.5)
POTASSIUM SERPL-SCNC: 4 MMOL/L (ref 3.5–5.1)
RBC # BLD AUTO: 3.85 M/UL (ref 4.2–5.9)
SODIUM SERPL-SCNC: 134 MMOL/L (ref 136–145)
WBC # BLD AUTO: 10.7 K/UL (ref 4–11)

## 2023-09-26 PROCEDURE — 80048 BASIC METABOLIC PNL TOTAL CA: CPT

## 2023-09-26 PROCEDURE — 36415 COLL VENOUS BLD VENIPUNCTURE: CPT

## 2023-09-26 PROCEDURE — 97110 THERAPEUTIC EXERCISES: CPT

## 2023-09-26 PROCEDURE — 2580000003 HC RX 258: Performed by: ORTHOPAEDIC SURGERY

## 2023-09-26 PROCEDURE — 1200000000 HC SEMI PRIVATE

## 2023-09-26 PROCEDURE — 97116 GAIT TRAINING THERAPY: CPT

## 2023-09-26 PROCEDURE — 85027 COMPLETE CBC AUTOMATED: CPT

## 2023-09-26 PROCEDURE — 6370000000 HC RX 637 (ALT 250 FOR IP): Performed by: ORTHOPAEDIC SURGERY

## 2023-09-26 PROCEDURE — APPNB60 APP NON BILLABLE TIME 46-60 MINS: Performed by: PHYSICIAN ASSISTANT

## 2023-09-26 RX ADMIN — OXYCODONE HYDROCHLORIDE 10 MG: 5 TABLET ORAL at 22:37

## 2023-09-26 RX ADMIN — ASPIRIN 325 MG: 325 TABLET, COATED ORAL at 08:48

## 2023-09-26 RX ADMIN — Medication 10 ML: at 08:49

## 2023-09-26 RX ADMIN — ACETAMINOPHEN 650 MG: 325 TABLET ORAL at 17:47

## 2023-09-26 RX ADMIN — ACETAMINOPHEN 650 MG: 325 TABLET ORAL at 12:11

## 2023-09-26 RX ADMIN — Medication 10 ML: at 21:14

## 2023-09-26 RX ADMIN — OXYCODONE HYDROCHLORIDE 10 MG: 5 TABLET ORAL at 13:23

## 2023-09-26 RX ADMIN — OXYCODONE HYDROCHLORIDE 5 MG: 5 TABLET ORAL at 08:48

## 2023-09-26 RX ADMIN — ACETAMINOPHEN 650 MG: 325 TABLET ORAL at 22:37

## 2023-09-26 RX ADMIN — ACETAMINOPHEN 650 MG: 325 TABLET ORAL at 05:51

## 2023-09-26 RX ADMIN — OXYCODONE HYDROCHLORIDE 10 MG: 5 TABLET ORAL at 02:17

## 2023-09-26 RX ADMIN — OXYCODONE HYDROCHLORIDE 10 MG: 5 TABLET ORAL at 18:35

## 2023-09-26 ASSESSMENT — PAIN SCALES - GENERAL
PAINLEVEL_OUTOF10: 5
PAINLEVEL_OUTOF10: 5
PAINLEVEL_OUTOF10: 8
PAINLEVEL_OUTOF10: 5
PAINLEVEL_OUTOF10: 5
PAINLEVEL_OUTOF10: 6
PAINLEVEL_OUTOF10: 9
PAINLEVEL_OUTOF10: 5
PAINLEVEL_OUTOF10: 5
PAINLEVEL_OUTOF10: 9
PAINLEVEL_OUTOF10: 8
PAINLEVEL_OUTOF10: 9
PAINLEVEL_OUTOF10: 9

## 2023-09-26 ASSESSMENT — PAIN DESCRIPTION - LOCATION
LOCATION: HIP
LOCATION: HIP
LOCATION: HIP;LEG
LOCATION: BACK;BUTTOCKS
LOCATION: BACK;BUTTOCKS
LOCATION: HIP

## 2023-09-26 ASSESSMENT — PAIN DESCRIPTION - DESCRIPTORS
DESCRIPTORS: ACHING
DESCRIPTORS: ACHING;DISCOMFORT
DESCRIPTORS: ACHING
DESCRIPTORS: ACHING;DISCOMFORT
DESCRIPTORS: ACHING;DISCOMFORT

## 2023-09-26 ASSESSMENT — PAIN DESCRIPTION - ORIENTATION
ORIENTATION: LEFT

## 2023-09-26 NOTE — CARE COORDINATION
Case Management Assessment  Initial Evaluation    Date/Time of Evaluation: 9/26/2023 10:52 AM  Assessment Completed by: Nash Oconnell RN    If patient is discharged prior to next notation, then this note serves as note for discharge by case management. Patient Name: Pauline Reynolds                   YOB: 1950  Diagnosis: Primary osteoarthritis of left hip [M16.12]  Left hip pain [M25.552]  Acute bilateral low back pain, unspecified whether sciatica present [M54.50]                   Date / Time: 9/25/2023  6:19 AM    Patient Admission Status: Inpatient   Readmission Risk (Low < 19, Mod (19-27), High > 27): Readmission Risk Score: 10.2    Current PCP: Mago Andrews MD  PCP verified by CM? (P) Yes     09/26/23 1048   Service Assessment   Patient Orientation Alert and Oriented;Person;Place;Situation   Cognition Alert   History Provided By Patient   Primary Caregiver Self   Accompanied By/Relationship alone   Support Systems Family Members   Patient's Healthcare Decision Maker is: Legal Next of [address] [place] [address]   PCP Verified by CM Yes   Last Visit to PCP Within last 3 months   Prior Functional Level Independent in ADLs/IADLs  (Lives in 2 room cabin, Uses port o let outside (has to go down steps into yard to get to toilet) Showers at [address] with the following:;Mobility   Can patient return to prior living arrangement Yes  (will need rehab prior to return)   Ability to make needs known: Good   Family able to assist with home care needs: No   Would you like for me to discuss the discharge plan with any other family members/significant others, and if so, who? No   Financial Resources Medicare   Community Resources None   CM/SW Referral ADLs/IADLs;DME   Discharge Planning   Type of Residence House   Living Arrangements Alone   Current Services Prior To Admission None   Potential Assistance Needed Durable Medical Equipment   Potential DME Needed Walker   DME Ordered?  No

## 2023-09-26 NOTE — ACP (ADVANCE CARE PLANNING)
Advance Care Planning     General Advance Care Planning (ACP) Conversation    Date of Conversation: 8/24/2023  Conducted with: Patient with Decision Making Capacity    Healthcare Decision Maker:    Primary Decision Maker: Cort Dakins - Child - [phone]  Click here to complete Healthcare Decision Makers including selection of the Healthcare Decision Maker Relationship (ie \"Primary\"). Today we documented Decision Maker(s) consistent with Legal Next of Kin hierarchy.     Content/Action Overview:    Reviewed DNR/DNI and patient elects Full Code (Attempt Resuscitation)    Length of Voluntary ACP Conversation in minutes:  <16 minutes (Non-Billable)    Parvez Ingram, RN

## 2023-09-26 NOTE — CARE COORDINATION
Referral called to [facility]. Faxed face sheet, H/P, MAR and therapy notes for review: EPIC  Bed available?: Yes  Insurance precertification required? Yes  Continuity of Care Form (CULLEN) initiated? No   Hospital Exemption Notification (HENS) initiated? No    Initiate Level of Care (LOC), if Medicaid is the primary payer?  No

## 2023-09-27 PROBLEM — Z96.642 H/O TOTAL HIP ARTHROPLASTY, LEFT: Status: ACTIVE | Noted: 2023-09-27

## 2023-09-27 PROCEDURE — 6360000002 HC RX W HCPCS: Performed by: ORTHOPAEDIC SURGERY

## 2023-09-27 PROCEDURE — 97110 THERAPEUTIC EXERCISES: CPT

## 2023-09-27 PROCEDURE — 6370000000 HC RX 637 (ALT 250 FOR IP): Performed by: PHYSICIAN ASSISTANT

## 2023-09-27 PROCEDURE — 97535 SELF CARE MNGMENT TRAINING: CPT

## 2023-09-27 PROCEDURE — G0378 HOSPITAL OBSERVATION PER HR: HCPCS

## 2023-09-27 PROCEDURE — 97116 GAIT TRAINING THERAPY: CPT

## 2023-09-27 PROCEDURE — APPNB60 APP NON BILLABLE TIME 46-60 MINS: Performed by: PHYSICIAN ASSISTANT

## 2023-09-27 PROCEDURE — 2580000003 HC RX 258: Performed by: ORTHOPAEDIC SURGERY

## 2023-09-27 PROCEDURE — 6370000000 HC RX 637 (ALT 250 FOR IP): Performed by: ORTHOPAEDIC SURGERY

## 2023-09-27 PROCEDURE — 97530 THERAPEUTIC ACTIVITIES: CPT

## 2023-09-27 RX ORDER — PANTOPRAZOLE SODIUM 40 MG/1
40 TABLET, DELAYED RELEASE ORAL 2 TIMES DAILY
Status: DISCONTINUED | OUTPATIENT
Start: 2023-09-27 | End: 2023-10-01 | Stop reason: HOSPADM

## 2023-09-27 RX ORDER — OXYCODONE HYDROCHLORIDE 5 MG/1
5-10 TABLET ORAL
Qty: 10 TABLET | Refills: 0 | Status: SHIPPED | OUTPATIENT
Start: 2023-09-27 | End: 2023-10-04

## 2023-09-27 RX ORDER — AMLODIPINE BESYLATE 5 MG/1
5 TABLET ORAL DAILY
Status: DISCONTINUED | OUTPATIENT
Start: 2023-09-27 | End: 2023-10-01 | Stop reason: HOSPADM

## 2023-09-27 RX ORDER — SUCRALFATE 1 G/1
1 TABLET ORAL 4 TIMES DAILY
Status: DISCONTINUED | OUTPATIENT
Start: 2023-09-27 | End: 2023-10-01 | Stop reason: HOSPADM

## 2023-09-27 RX ORDER — DOCUSATE SODIUM 100 MG/1
100 CAPSULE, LIQUID FILLED ORAL DAILY
Status: DISCONTINUED | OUTPATIENT
Start: 2023-09-27 | End: 2023-10-01 | Stop reason: HOSPADM

## 2023-09-27 RX ORDER — ONDANSETRON 4 MG/1
4 TABLET, ORALLY DISINTEGRATING ORAL 3 TIMES DAILY PRN
Status: DISCONTINUED | OUTPATIENT
Start: 2023-09-27 | End: 2023-09-27 | Stop reason: SDUPTHER

## 2023-09-27 RX ADMIN — OXYCODONE HYDROCHLORIDE 10 MG: 5 TABLET ORAL at 14:32

## 2023-09-27 RX ADMIN — OXYCODONE HYDROCHLORIDE 10 MG: 5 TABLET ORAL at 18:34

## 2023-09-27 RX ADMIN — Medication 10 ML: at 10:02

## 2023-09-27 RX ADMIN — DOCUSATE SODIUM 100 MG: 100 CAPSULE, LIQUID FILLED ORAL at 10:19

## 2023-09-27 RX ADMIN — ACETAMINOPHEN 650 MG: 325 TABLET ORAL at 11:49

## 2023-09-27 RX ADMIN — OXYCODONE HYDROCHLORIDE 10 MG: 5 TABLET ORAL at 06:19

## 2023-09-27 RX ADMIN — ASPIRIN 325 MG: 325 TABLET, COATED ORAL at 10:02

## 2023-09-27 RX ADMIN — Medication 10 ML: at 21:50

## 2023-09-27 RX ADMIN — PANTOPRAZOLE SODIUM 40 MG: 40 TABLET, DELAYED RELEASE ORAL at 14:31

## 2023-09-27 RX ADMIN — SUCRALFATE 1 G: 1 TABLET ORAL at 21:50

## 2023-09-27 RX ADMIN — SUCRALFATE 1 G: 1 TABLET ORAL at 17:03

## 2023-09-27 RX ADMIN — OXYCODONE HYDROCHLORIDE 10 MG: 5 TABLET ORAL at 22:39

## 2023-09-27 RX ADMIN — APIXABAN 5 MG: 5 TABLET, FILM COATED ORAL at 21:50

## 2023-09-27 RX ADMIN — OXYCODONE HYDROCHLORIDE 10 MG: 5 TABLET ORAL at 10:19

## 2023-09-27 RX ADMIN — ONDANSETRON 4 MG: 2 INJECTION INTRAMUSCULAR; INTRAVENOUS at 05:15

## 2023-09-27 RX ADMIN — ACETAMINOPHEN 650 MG: 325 TABLET ORAL at 17:03

## 2023-09-27 RX ADMIN — ACETAMINOPHEN 650 MG: 325 TABLET ORAL at 06:20

## 2023-09-27 RX ADMIN — AMLODIPINE BESYLATE 5 MG: 5 TABLET ORAL at 14:32

## 2023-09-27 RX ADMIN — PANTOPRAZOLE SODIUM 40 MG: 40 TABLET, DELAYED RELEASE ORAL at 21:50

## 2023-09-27 RX ADMIN — OXYCODONE HYDROCHLORIDE 10 MG: 5 TABLET ORAL at 02:40

## 2023-09-27 ASSESSMENT — PAIN DESCRIPTION - ORIENTATION
ORIENTATION: LEFT

## 2023-09-27 ASSESSMENT — PAIN DESCRIPTION - DESCRIPTORS
DESCRIPTORS: THROBBING
DESCRIPTORS: ACHING
DESCRIPTORS: ACHING;DISCOMFORT;SORE
DESCRIPTORS: THROBBING
DESCRIPTORS: THROBBING
DESCRIPTORS: ACHING
DESCRIPTORS: ACHING
DESCRIPTORS: ACHING;DISCOMFORT;SORE
DESCRIPTORS: THROBBING

## 2023-09-27 ASSESSMENT — PAIN DESCRIPTION - PAIN TYPE
TYPE: SURGICAL PAIN

## 2023-09-27 ASSESSMENT — PAIN DESCRIPTION - LOCATION
LOCATION: HIP;LEG
LOCATION: HIP
LOCATION: LEG;HIP
LOCATION: LEG
LOCATION: HIP;LEG

## 2023-09-27 ASSESSMENT — PAIN - FUNCTIONAL ASSESSMENT
PAIN_FUNCTIONAL_ASSESSMENT: ACTIVITIES ARE NOT PREVENTED
PAIN_FUNCTIONAL_ASSESSMENT: PREVENTS OR INTERFERES SOME ACTIVE ACTIVITIES AND ADLS

## 2023-09-27 ASSESSMENT — PAIN DESCRIPTION - FREQUENCY
FREQUENCY: CONTINUOUS

## 2023-09-27 ASSESSMENT — PAIN DESCRIPTION - ONSET
ONSET: GRADUAL

## 2023-09-27 ASSESSMENT — PAIN SCALES - GENERAL
PAINLEVEL_OUTOF10: 7
PAINLEVEL_OUTOF10: 6
PAINLEVEL_OUTOF10: 9
PAINLEVEL_OUTOF10: 7
PAINLEVEL_OUTOF10: 7
PAINLEVEL_OUTOF10: 6
PAINLEVEL_OUTOF10: 7
PAINLEVEL_OUTOF10: 9
PAINLEVEL_OUTOF10: 3
PAINLEVEL_OUTOF10: 7

## 2023-09-27 NOTE — DISCHARGE INSTR - COC
Continuity of Care Form    Patient Name: Joseph Donald   :  1950  MRN:  [id number]    Admit date:  2023  Discharge date:  10/01/2023    Code Status Order: Full Code   Advance Directives:   [address]Agustina Sánchez Documentation       Date/Time Healthcare Directive Type of Healthcare Directive Copy in [address] Agent's Name Healthcare Agent's Phone Number    [phone] No, patient does not have an advance directive for healthcare treatment -- -- -- -- --            Admitting Physician:  Charlene Whittington MD  PCP: Rosana Wong MD    Discharging Nurse: [facility] Unit/Room#: 0220/0220-01  Discharging Unit Phone Number: [phone]    Emergency Contact:   Extended Emergency Contact Information  Primary Emergency Contact: [facility] Phone: [phone]  Relation: Friend  Preferred language: English   needed? No  Secondary Emergency Contact: [place] Phone: [phone]  Relation: Friend  Preferred language: English   needed?  No    Past Surgical History:  Past Surgical History:   Procedure Laterality Date    BACK SURGERY      ESOPHAGOGASTRODUODENOSCOPY  2023    EGD BIOPSY    HERNIA REPAIR      LITHOTRIPSY      OTHER SURGICAL HISTORY  2021    laparoscopic ventral hernia repair with mesh    OTHER SURGICAL HISTORY Left 2023    LEFT COMPLEX PRIMARY TOTAL HIP ARTHROPLASTY    TOTAL HIP ARTHROPLASTY Left 2023    LEFT COMPLEX PRIMARY TOTAL HIP ARTHROPLASTY **23HR HOLD** performed by Charlene Whittington MD at [facility]  2011    UPPER GASTROINTESTINAL ENDOSCOPY  2011    UPPER GASTROINTESTINAL ENDOSCOPY N/A 2023    EGD BIOPSY performed by Hayes Castillo MD at [facility] N/A 2021    LAPAROSCOPIC VENTRAL HERNIA REPAIR WITH MESH performed by Lalit Madrid MD at [address] [place] [address]       Immunization History:

## 2023-09-27 NOTE — PLAN OF CARE
Problem: Chronic Conditions and Co-morbidities  Goal: Patient's chronic conditions and co-morbidity symptoms are monitored and maintained or improved  9/27/2023 1612 by Conrad Johnson RN  Outcome: Progressing  Flowsheets (Taken 9/27/2023 1142)  Care Plan - Patient's Chronic Conditions and Co-Morbidity Symptoms are Monitored and Maintained or Improved: Monitor and assess patient's chronic conditions and comorbid symptoms for stability, deterioration, or improvement  9/27/2023 0306 by Lucas Garcia RN  Outcome: Progressing     Problem: Discharge Planning  Goal: Discharge to home or other facility with appropriate resources  9/27/2023 1612 by Conrad Johnson RN  Outcome: Progressing  Flowsheets (Taken 9/27/2023 1142)  Discharge to home or other facility with appropriate resources: Identify barriers to discharge with patient and caregiver  9/27/2023 0306 by Lucas Garcia RN  Outcome: Progressing     Problem: Safety - Adult  Goal: Free from fall injury  9/27/2023 1612 by Conrad Johnson RN  Outcome: Progressing  9/27/2023 0306 by Lucas Garcia RN  Outcome: Progressing     Problem: Pain  Goal: Verbalizes/displays adequate comfort level or baseline comfort level  9/27/2023 1612 by Cornad Johnson RN  Outcome: Progressing  9/27/2023 0306 by Lucas Garcia RN  Outcome: Progressing     Problem: Skin/Tissue Integrity  Goal: Absence of new skin breakdown  Description: 1. Monitor for areas of redness and/or skin breakdown  2. Assess vascular access sites hourly  3. Every 4-6 hours minimum:  Change oxygen saturation probe site  4. Every 4-6 hours:  If on nasal continuous positive airway pressure, respiratory therapy assess nares and determine need for appliance change or resting period.   9/27/2023 0306 by Lucas Garcia RN  Outcome: Progressing

## 2023-09-27 NOTE — CARE COORDINATION
INTERDISCIPLINARY PLAN OF CARE CONFERENCE    Date/Time: 9/27/2023 10:03 AM  Completed by: Freeman Ruiz RN, Case Management      Patient Name:  Lorena Campos  YOB: 1950  Admitting Diagnosis: Primary osteoarthritis of left hip [M16.12]  Left hip pain [M25.552]  Acute bilateral low back pain, unspecified whether sciatica present [M54.50]     Admit Date/Time:  9/25/2023  6:19 AM    Chart reviewed. Interdisciplinary team contacted or reviewed plan related to patient progress and discharge plans. Disciplines included Case Management, Nursing, and Dietitian. Current Status: IP 09/25/2023  PT/OT recommendation for discharge plan of care: [facility] [address]  Discharge Recommendations: SNF  DME needs for discharge: Defer to facility  Expected D/C Disposition:  Skilled nursing facility    Discharge Plan Comments: Chart reviewed. Met with pt at bedside and explained the role of the CM. Plan: [facility] (sk[facility]).  [address] checked and is still PENDING this AM. CM following    Home O2 in place on admit: No  Pt informed of need to bring portable home O2 tank on day of discharge for nursing to connect prior to leaving:  No  Verbalized agreement/Understanding:  No

## 2023-09-28 ENCOUNTER — TELEPHONE (OUTPATIENT)
Dept: ORTHOPEDIC SURGERY | Age: 73
End: 2023-09-28

## 2023-09-28 PROCEDURE — 97116 GAIT TRAINING THERAPY: CPT

## 2023-09-28 PROCEDURE — G0378 HOSPITAL OBSERVATION PER HR: HCPCS

## 2023-09-28 PROCEDURE — 6370000000 HC RX 637 (ALT 250 FOR IP): Performed by: ORTHOPAEDIC SURGERY

## 2023-09-28 PROCEDURE — 2580000003 HC RX 258: Performed by: ORTHOPAEDIC SURGERY

## 2023-09-28 PROCEDURE — 97110 THERAPEUTIC EXERCISES: CPT

## 2023-09-28 PROCEDURE — APPNB60 APP NON BILLABLE TIME 46-60 MINS: Performed by: PHYSICIAN ASSISTANT

## 2023-09-28 PROCEDURE — 6370000000 HC RX 637 (ALT 250 FOR IP): Performed by: PHYSICIAN ASSISTANT

## 2023-09-28 RX ADMIN — APIXABAN 5 MG: 5 TABLET, FILM COATED ORAL at 09:44

## 2023-09-28 RX ADMIN — OXYCODONE HYDROCHLORIDE 10 MG: 5 TABLET ORAL at 23:59

## 2023-09-28 RX ADMIN — ACETAMINOPHEN 650 MG: 325 TABLET ORAL at 11:53

## 2023-09-28 RX ADMIN — SUCRALFATE 1 G: 1 TABLET ORAL at 13:58

## 2023-09-28 RX ADMIN — AMLODIPINE BESYLATE 5 MG: 5 TABLET ORAL at 09:43

## 2023-09-28 RX ADMIN — SUCRALFATE 1 G: 1 TABLET ORAL at 09:43

## 2023-09-28 RX ADMIN — PANTOPRAZOLE SODIUM 40 MG: 40 TABLET, DELAYED RELEASE ORAL at 21:36

## 2023-09-28 RX ADMIN — OXYCODONE HYDROCHLORIDE 10 MG: 5 TABLET ORAL at 02:42

## 2023-09-28 RX ADMIN — OXYCODONE HYDROCHLORIDE 10 MG: 5 TABLET ORAL at 06:36

## 2023-09-28 RX ADMIN — ACETAMINOPHEN 650 MG: 325 TABLET ORAL at 07:20

## 2023-09-28 RX ADMIN — APIXABAN 5 MG: 5 TABLET, FILM COATED ORAL at 21:36

## 2023-09-28 RX ADMIN — Medication 10 ML: at 09:42

## 2023-09-28 RX ADMIN — OXYCODONE HYDROCHLORIDE 10 MG: 5 TABLET ORAL at 19:31

## 2023-09-28 RX ADMIN — OXYCODONE HYDROCHLORIDE 10 MG: 5 TABLET ORAL at 15:30

## 2023-09-28 RX ADMIN — SUCRALFATE 1 G: 1 TABLET ORAL at 21:36

## 2023-09-28 RX ADMIN — ASPIRIN 325 MG: 325 TABLET, COATED ORAL at 09:44

## 2023-09-28 RX ADMIN — PANTOPRAZOLE SODIUM 40 MG: 40 TABLET, DELAYED RELEASE ORAL at 09:43

## 2023-09-28 RX ADMIN — ACETAMINOPHEN 650 MG: 325 TABLET ORAL at 00:10

## 2023-09-28 RX ADMIN — SUCRALFATE 1 G: 1 TABLET ORAL at 17:31

## 2023-09-28 RX ADMIN — ACETAMINOPHEN 650 MG: 325 TABLET ORAL at 17:31

## 2023-09-28 RX ADMIN — DOCUSATE SODIUM 100 MG: 100 CAPSULE, LIQUID FILLED ORAL at 09:43

## 2023-09-28 RX ADMIN — MAGNESIUM HYDROXIDE 30 ML: 400 SUSPENSION ORAL at 18:25

## 2023-09-28 RX ADMIN — OXYCODONE HYDROCHLORIDE 10 MG: 5 TABLET ORAL at 10:50

## 2023-09-28 ASSESSMENT — PAIN DESCRIPTION - ORIENTATION
ORIENTATION: LEFT

## 2023-09-28 ASSESSMENT — PAIN DESCRIPTION - DESCRIPTORS
DESCRIPTORS: ACHING
DESCRIPTORS: THROBBING

## 2023-09-28 ASSESSMENT — PAIN - FUNCTIONAL ASSESSMENT
PAIN_FUNCTIONAL_ASSESSMENT: PREVENTS OR INTERFERES SOME ACTIVE ACTIVITIES AND ADLS
PAIN_FUNCTIONAL_ASSESSMENT: ACTIVITIES ARE NOT PREVENTED

## 2023-09-28 ASSESSMENT — PAIN SCALES - GENERAL
PAINLEVEL_OUTOF10: 4
PAINLEVEL_OUTOF10: 5
PAINLEVEL_OUTOF10: 7
PAINLEVEL_OUTOF10: 9
PAINLEVEL_OUTOF10: 6
PAINLEVEL_OUTOF10: 9
PAINLEVEL_OUTOF10: 7
PAINLEVEL_OUTOF10: 8
PAINLEVEL_OUTOF10: 4
PAINLEVEL_OUTOF10: 9

## 2023-09-28 ASSESSMENT — PAIN DESCRIPTION - LOCATION
LOCATION: LEG;HIP
LOCATION: HIP;LEG
LOCATION: HIP
LOCATION: HIP;LEG

## 2023-09-28 ASSESSMENT — PAIN DESCRIPTION - ONSET: ONSET: GRADUAL

## 2023-09-28 ASSESSMENT — PAIN DESCRIPTION - PAIN TYPE: TYPE: SURGICAL PAIN

## 2023-09-28 ASSESSMENT — PAIN DESCRIPTION - FREQUENCY: FREQUENCY: CONTINUOUS

## 2023-09-28 NOTE — TELEPHONE ENCOUNTER
General Question     Subject: Pt CALLING FROM [facility] - DOESN'T REMEMBER THE NAME OF HIS [facility] NAVIGATOR   Patient and /or Facility Request: Margo Danial  Contact Number: [phone]     CAN THE OFFICE HELP THE Pt FIND THE PERSONM HIS [facility] NAVIGATOR, WHO IS HELPING THIS Pt? Pt  STATES THEY ARE KICKING HIM OUT OF THE [facility] AND HE NEEDS TO GET BACK TO [facility]. Pt WOULD LIKE A CALL BACK ASA, PLEASE?

## 2023-09-28 NOTE — PLAN OF CARE
Problem: Chronic Conditions and Co-morbidities  Goal: Patient's chronic conditions and co-morbidity symptoms are monitored and maintained or improved  9/28/2023 0223 by Cyndy Romano RN  Outcome: Progressing  Flowsheets (Taken 9/27/2023 2147)  Care Plan - Patient's Chronic Conditions and Co-Morbidity Symptoms are Monitored and Maintained or Improved: Monitor and assess patient's chronic conditions and comorbid symptoms for stability, deterioration, or improvement  Problem: Discharge Planning  Goal: Discharge to home or other facility with appropriate resources  9/28/2023 0223 by Cyndy Romano RN  Outcome: Progressing  Flowsheets (Taken 9/27/2023 2147)  Discharge to home or other facility with appropriate resources: Identify barriers to discharge with patient and caregiver     Problem: Safety - Adult  Goal: Free from fall injury  9/28/2023 0223 by Cyndy Romano RN  Outcome: Progressing     Problem: Pain  Goal: Verbalizes/displays adequate comfort level or baseline comfort level  9/28/2023 0223 by Cyndy Romano RN  Outcome: Progressing     Problem: Skin/Tissue Integrity  Goal: Absence of new skin breakdown  Description: 1. Monitor for areas of redness and/or skin breakdown  2. Assess vascular access sites hourly  3. Every 4-6 hours minimum:  Change oxygen saturation probe site  4. Every 4-6 hours:  If on nasal continuous positive airway pressure, respiratory therapy assess nares and determine need for appliance change or resting period.   Outcome: Progressing     Problem: ABCDS Injury Assessment  Goal: Absence of physical injury  Outcome: Progressing

## 2023-09-28 NOTE — PLAN OF CARE
Problem: Chronic Conditions and Co-morbidities  Goal: Patient's chronic conditions and co-morbidity symptoms are monitored and maintained or improved  9/28/2023 1126 by Itzel Navarro RN  Outcome: Progressing  Flowsheets (Taken 9/28/2023 1000)  Care Plan - Patient's Chronic Conditions and Co-Morbidity Symptoms are Monitored and Maintained or Improved: Monitor and assess patient's chronic conditions and comorbid symptoms for stability, deterioration, or improvement  9/28/2023 0223 by Cara Hardwick RN  Outcome: Progressing  Flowsheets (Taken 9/27/2023 2147)  Care Plan - Patient's Chronic Conditions and Co-Morbidity Symptoms are Monitored and Maintained or Improved: Monitor and assess patient's chronic conditions and comorbid symptoms for stability, deterioration, or improvement     Problem: Discharge Planning  Goal: Discharge to home or other facility with appropriate resources  9/28/2023 1126 by Itzel Navarro RN  Outcome: Progressing  Flowsheets (Taken 9/28/2023 1000)  Discharge to home or other facility with appropriate resources: Identify barriers to discharge with patient and caregiver  9/28/2023 0223 by Cara Hardwick RN  Outcome: Progressing  Flowsheets (Taken 9/27/2023 2147)  Discharge to home or other facility with appropriate resources: Identify barriers to discharge with patient and caregiver     Problem: Safety - Adult  Goal: Free from fall injury  9/28/2023 1126 by Itzel Navarro RN  Outcome: Progressing  9/28/2023 0223 by Cara Hardwick RN  Outcome: Progressing     Problem: Pain  Goal: Verbalizes/displays adequate comfort level or baseline comfort level  9/28/2023 0223 by Cara Hardwick RN  Outcome: Progressing     Problem: Skin/Tissue Integrity  Goal: Absence of new skin breakdown  Description: 1. Monitor for areas of redness and/or skin breakdown  2. Assess vascular access sites hourly  3. Every 4-6 hours minimum:  Change oxygen saturation probe site  4.   Every 4-6 hours:  If on nasal continuous

## 2023-09-29 PROCEDURE — 97530 THERAPEUTIC ACTIVITIES: CPT

## 2023-09-29 PROCEDURE — G0378 HOSPITAL OBSERVATION PER HR: HCPCS

## 2023-09-29 PROCEDURE — 6370000000 HC RX 637 (ALT 250 FOR IP): Performed by: PHYSICIAN ASSISTANT

## 2023-09-29 PROCEDURE — 97116 GAIT TRAINING THERAPY: CPT

## 2023-09-29 PROCEDURE — 6370000000 HC RX 637 (ALT 250 FOR IP): Performed by: ORTHOPAEDIC SURGERY

## 2023-09-29 PROCEDURE — APPNB60 APP NON BILLABLE TIME 46-60 MINS: Performed by: PHYSICIAN ASSISTANT

## 2023-09-29 RX ORDER — BISACODYL 10 MG
10 SUPPOSITORY, RECTAL RECTAL DAILY PRN
Status: DISCONTINUED | OUTPATIENT
Start: 2023-09-29 | End: 2023-10-01 | Stop reason: HOSPADM

## 2023-09-29 RX ADMIN — DOCUSATE SODIUM 100 MG: 100 CAPSULE, LIQUID FILLED ORAL at 08:53

## 2023-09-29 RX ADMIN — OXYCODONE HYDROCHLORIDE 10 MG: 5 TABLET ORAL at 23:04

## 2023-09-29 RX ADMIN — APIXABAN 5 MG: 5 TABLET, FILM COATED ORAL at 08:54

## 2023-09-29 RX ADMIN — OXYCODONE HYDROCHLORIDE 10 MG: 5 TABLET ORAL at 19:18

## 2023-09-29 RX ADMIN — ACETAMINOPHEN 650 MG: 325 TABLET ORAL at 06:55

## 2023-09-29 RX ADMIN — ACETAMINOPHEN 650 MG: 325 TABLET ORAL at 00:45

## 2023-09-29 RX ADMIN — SUCRALFATE 1 G: 1 TABLET ORAL at 12:31

## 2023-09-29 RX ADMIN — PANTOPRAZOLE SODIUM 40 MG: 40 TABLET, DELAYED RELEASE ORAL at 08:54

## 2023-09-29 RX ADMIN — OXYCODONE HYDROCHLORIDE 10 MG: 5 TABLET ORAL at 13:15

## 2023-09-29 RX ADMIN — PANTOPRAZOLE SODIUM 40 MG: 40 TABLET, DELAYED RELEASE ORAL at 21:26

## 2023-09-29 RX ADMIN — SUCRALFATE 1 G: 1 TABLET ORAL at 18:09

## 2023-09-29 RX ADMIN — APIXABAN 5 MG: 5 TABLET, FILM COATED ORAL at 21:26

## 2023-09-29 RX ADMIN — BISACODYL 10 MG: 10 SUPPOSITORY RECTAL at 16:36

## 2023-09-29 RX ADMIN — SUCRALFATE 1 G: 1 TABLET ORAL at 08:54

## 2023-09-29 RX ADMIN — AMLODIPINE BESYLATE 5 MG: 5 TABLET ORAL at 08:54

## 2023-09-29 RX ADMIN — ACETAMINOPHEN 650 MG: 325 TABLET ORAL at 18:09

## 2023-09-29 RX ADMIN — ACETAMINOPHEN 650 MG: 325 TABLET ORAL at 12:31

## 2023-09-29 RX ADMIN — ASPIRIN 325 MG: 325 TABLET, COATED ORAL at 08:53

## 2023-09-29 RX ADMIN — SUCRALFATE 1 G: 1 TABLET ORAL at 21:26

## 2023-09-29 RX ADMIN — MAGNESIUM HYDROXIDE 30 ML: 400 SUSPENSION ORAL at 11:12

## 2023-09-29 RX ADMIN — OXYCODONE HYDROCHLORIDE 10 MG: 5 TABLET ORAL at 04:05

## 2023-09-29 RX ADMIN — OXYCODONE HYDROCHLORIDE 10 MG: 5 TABLET ORAL at 08:53

## 2023-09-29 ASSESSMENT — PAIN DESCRIPTION - ONSET
ONSET: GRADUAL
ONSET: ON-GOING

## 2023-09-29 ASSESSMENT — PAIN DESCRIPTION - PAIN TYPE
TYPE: SURGICAL PAIN
TYPE: SURGICAL PAIN

## 2023-09-29 ASSESSMENT — PAIN SCALES - GENERAL
PAINLEVEL_OUTOF10: 5
PAINLEVEL_OUTOF10: 9
PAINLEVEL_OUTOF10: 8
PAINLEVEL_OUTOF10: 6
PAINLEVEL_OUTOF10: 7
PAINLEVEL_OUTOF10: 6
PAINLEVEL_OUTOF10: 6
PAINLEVEL_OUTOF10: 8
PAINLEVEL_OUTOF10: 7
PAINLEVEL_OUTOF10: 7
PAINLEVEL_OUTOF10: 5

## 2023-09-29 ASSESSMENT — PAIN DESCRIPTION - ORIENTATION
ORIENTATION: LEFT

## 2023-09-29 ASSESSMENT — PAIN - FUNCTIONAL ASSESSMENT
PAIN_FUNCTIONAL_ASSESSMENT: PREVENTS OR INTERFERES SOME ACTIVE ACTIVITIES AND ADLS
PAIN_FUNCTIONAL_ASSESSMENT: ACTIVITIES ARE NOT PREVENTED
PAIN_FUNCTIONAL_ASSESSMENT: PREVENTS OR INTERFERES SOME ACTIVE ACTIVITIES AND ADLS
PAIN_FUNCTIONAL_ASSESSMENT: ACTIVITIES ARE NOT PREVENTED

## 2023-09-29 ASSESSMENT — PAIN DESCRIPTION - DESCRIPTORS
DESCRIPTORS: ACHING;CRAMPING
DESCRIPTORS: ACHING
DESCRIPTORS: STABBING
DESCRIPTORS: THROBBING
DESCRIPTORS: STABBING

## 2023-09-29 ASSESSMENT — PAIN DESCRIPTION - FREQUENCY
FREQUENCY: CONTINUOUS
FREQUENCY: INTERMITTENT

## 2023-09-29 ASSESSMENT — PAIN DESCRIPTION - LOCATION
LOCATION: HIP;LEG
LOCATION: LEG;HIP
LOCATION: LEG
LOCATION: HIP
LOCATION: HIP;LEG

## 2023-09-29 NOTE — CARE COORDINATION
INTERDISCIPLINARY PLAN OF CARE CONFERENCE    Date/Time: 9/29/2023 4:07 PM  Completed by: Fransisca Miranda RN, Case Management      Patient Name:  Katelyn Hassan  YOB: 1950  Admitting Diagnosis: Primary osteoarthritis of left hip [M16.12]  Left hip pain [M25.552]  Acute bilateral low back pain, unspecified whether sciatica present [M54.50]  H/O total hip arthroplasty, left [V35.244]     Admit Date/Time:  9/25/2023  6:19 AM    Chart reviewed. Interdisciplinary team contacted or reviewed plan related to patient progress and discharge plans. Disciplines included Case Management, Nursing, and Dietitian. Current Status: OBS  PT/OT recommendation for discharge plan of care:   Discharge Recommendations: SNF, if pt goes home will need 24hr SPV, HHPT, and RW  DME needs for discharge: Defer to facility (pt owns [facility])  Expected D/C Disposition:  Skilled nursing facility    Discharge Plan Comments: Precert for SNF denied after P2P yesterday. Appeal initiated on behalf of patient yesterday morning. Call received at 0900 am today from [facility] requesting Appointment of Representative form be signed and faxed back to the appeals department at [phone]. Pt will call friend to see if he can stay with him temporarily while he recovers. ADDENDUM 14:30 call received from [facility] requesting today's clinical updates be faxed for review. Ortho note and PT not faxed to number listed above. Pt did call his friend who can take him in to his home but will not be able to take him until tomorrow. Perfect Serve update to [place] [facility].      Home O2 in place on admit: No  Pt informed of need to bring portable home O2 tank on day of discharge for nursing to connect prior to leaving:  No  Verbalized agreement/Understanding:  No

## 2023-09-29 NOTE — PLAN OF CARE
Problem: Discharge Planning  Goal: Discharge to home or other facility with appropriate resources  Outcome: Progressing  Flowsheets (Taken 9/29/2023 0068)  Discharge to home or other facility with appropriate resources: Identify barriers to discharge with patient and caregiver     Problem: Safety - Adult  Goal: Free from fall injury  Outcome: Progressing     Problem: ABCDS Injury Assessment  Goal: Absence of physical injury  Outcome: Progressing

## 2023-09-29 NOTE — PLAN OF CARE
Problem: Chronic Conditions and Co-morbidities  Goal: Patient's chronic conditions and co-morbidity symptoms are monitored and maintained or improved  9/29/2023 0113 by Alex Mcfarlane RN  Outcome: Progressing  Flowsheets (Taken 9/28/2023 2131)  Care Plan - Patient's Chronic Conditions and Co-Morbidity Symptoms are Monitored and Maintained or Improved: Monitor and assess patient's chronic conditions and comorbid symptoms for stability, deterioration, or improvement     Problem: Discharge Planning  Goal: Discharge to home or other facility with appropriate resources  9/29/2023 0113 by Alex Mcfarlane RN  Outcome: Progressing  Flowsheets (Taken 9/28/2023 2131)  Discharge to home or other facility with appropriate resources: Identify barriers to discharge with patient and caregiver  Problem: Safety - Adult  Goal: Free from fall injury  9/29/2023 0113 by Alex Mcfarlane RN  Outcome: Progressing     Problem: Pain  Goal: Verbalizes/displays adequate comfort level or baseline comfort level  Outcome: Progressing     Problem: Skin/Tissue Integrity  Goal: Absence of new skin breakdown  Description: 1. Monitor for areas of redness and/or skin breakdown  2. Assess vascular access sites hourly  3. Every 4-6 hours minimum:  Change oxygen saturation probe site  4. Every 4-6 hours:  If on nasal continuous positive airway pressure, respiratory therapy assess nares and determine need for appliance change or resting period.   Outcome: Progressing     Problem: ABCDS Injury Assessment  Goal: Absence of physical injury  9/29/2023 0113 by Alex Mcfarlane RN  Outcome: Progressing

## 2023-09-30 PROCEDURE — 97116 GAIT TRAINING THERAPY: CPT

## 2023-09-30 PROCEDURE — 6370000000 HC RX 637 (ALT 250 FOR IP): Performed by: STUDENT IN AN ORGANIZED HEALTH CARE EDUCATION/TRAINING PROGRAM

## 2023-09-30 PROCEDURE — 97535 SELF CARE MNGMENT TRAINING: CPT

## 2023-09-30 PROCEDURE — 6370000000 HC RX 637 (ALT 250 FOR IP): Performed by: ORTHOPAEDIC SURGERY

## 2023-09-30 PROCEDURE — G0378 HOSPITAL OBSERVATION PER HR: HCPCS

## 2023-09-30 PROCEDURE — 6370000000 HC RX 637 (ALT 250 FOR IP): Performed by: PHYSICIAN ASSISTANT

## 2023-09-30 RX ORDER — CYCLOBENZAPRINE HCL 10 MG
10 TABLET ORAL 3 TIMES DAILY PRN
Status: DISCONTINUED | OUTPATIENT
Start: 2023-09-30 | End: 2023-10-01 | Stop reason: HOSPADM

## 2023-09-30 RX ADMIN — PANTOPRAZOLE SODIUM 40 MG: 40 TABLET, DELAYED RELEASE ORAL at 20:53

## 2023-09-30 RX ADMIN — APIXABAN 5 MG: 5 TABLET, FILM COATED ORAL at 20:53

## 2023-09-30 RX ADMIN — OXYCODONE HYDROCHLORIDE 10 MG: 5 TABLET ORAL at 20:46

## 2023-09-30 RX ADMIN — ACETAMINOPHEN 650 MG: 325 TABLET ORAL at 22:47

## 2023-09-30 RX ADMIN — SUCRALFATE 1 G: 1 TABLET ORAL at 18:08

## 2023-09-30 RX ADMIN — SUCRALFATE 1 G: 1 TABLET ORAL at 20:53

## 2023-09-30 RX ADMIN — OXYCODONE HYDROCHLORIDE 10 MG: 5 TABLET ORAL at 07:29

## 2023-09-30 RX ADMIN — MAGNESIUM HYDROXIDE 30 ML: 400 SUSPENSION ORAL at 21:02

## 2023-09-30 RX ADMIN — OXYCODONE HYDROCHLORIDE 10 MG: 5 TABLET ORAL at 03:03

## 2023-09-30 RX ADMIN — CYCLOBENZAPRINE 10 MG: 10 TABLET, FILM COATED ORAL at 18:08

## 2023-09-30 RX ADMIN — SUCRALFATE 1 G: 1 TABLET ORAL at 08:35

## 2023-09-30 RX ADMIN — AMLODIPINE BESYLATE 5 MG: 5 TABLET ORAL at 08:35

## 2023-09-30 RX ADMIN — PANTOPRAZOLE SODIUM 40 MG: 40 TABLET, DELAYED RELEASE ORAL at 08:35

## 2023-09-30 RX ADMIN — DOCUSATE SODIUM 100 MG: 100 CAPSULE, LIQUID FILLED ORAL at 08:35

## 2023-09-30 RX ADMIN — ACETAMINOPHEN 650 MG: 325 TABLET ORAL at 01:20

## 2023-09-30 RX ADMIN — CYCLOBENZAPRINE 10 MG: 10 TABLET, FILM COATED ORAL at 06:29

## 2023-09-30 RX ADMIN — OXYCODONE HYDROCHLORIDE 10 MG: 5 TABLET ORAL at 13:27

## 2023-09-30 RX ADMIN — APIXABAN 5 MG: 5 TABLET, FILM COATED ORAL at 08:35

## 2023-09-30 RX ADMIN — ACETAMINOPHEN 650 MG: 325 TABLET ORAL at 06:04

## 2023-09-30 RX ADMIN — ACETAMINOPHEN 650 MG: 325 TABLET ORAL at 11:50

## 2023-09-30 RX ADMIN — ASPIRIN 325 MG: 325 TABLET, COATED ORAL at 08:35

## 2023-09-30 RX ADMIN — SUCRALFATE 1 G: 1 TABLET ORAL at 13:24

## 2023-09-30 ASSESSMENT — PAIN SCALES - GENERAL
PAINLEVEL_OUTOF10: 0
PAINLEVEL_OUTOF10: 8
PAINLEVEL_OUTOF10: 8
PAINLEVEL_OUTOF10: 7
PAINLEVEL_OUTOF10: 9
PAINLEVEL_OUTOF10: 9
PAINLEVEL_OUTOF10: 7
PAINLEVEL_OUTOF10: 7

## 2023-09-30 ASSESSMENT — PAIN SCALES - WONG BAKER
WONGBAKER_NUMERICALRESPONSE: 0
WONGBAKER_NUMERICALRESPONSE: 0

## 2023-09-30 ASSESSMENT — PAIN DESCRIPTION - DESCRIPTORS
DESCRIPTORS: ACHING;CRAMPING
DESCRIPTORS: ACHING;CRAMPING
DESCRIPTORS: ACHING;DULL;SHARP
DESCRIPTORS: ACHING;CRAMPING
DESCRIPTORS: ACHING;CRAMPING
DESCRIPTORS: ACHING;DULL;SHARP

## 2023-09-30 ASSESSMENT — PAIN DESCRIPTION - LOCATION
LOCATION: HIP;LEG
LOCATION: HIP
LOCATION: HIP;LEG

## 2023-09-30 ASSESSMENT — PAIN DESCRIPTION - ORIENTATION
ORIENTATION: LEFT

## 2023-09-30 ASSESSMENT — PAIN - FUNCTIONAL ASSESSMENT
PAIN_FUNCTIONAL_ASSESSMENT: PREVENTS OR INTERFERES SOME ACTIVE ACTIVITIES AND ADLS
PAIN_FUNCTIONAL_ASSESSMENT: PREVENTS OR INTERFERES SOME ACTIVE ACTIVITIES AND ADLS

## 2023-09-30 ASSESSMENT — PAIN DESCRIPTION - ONSET
ONSET: ON-GOING
ONSET: ON-GOING

## 2023-09-30 ASSESSMENT — PAIN DESCRIPTION - PAIN TYPE
TYPE: SURGICAL PAIN
TYPE: SURGICAL PAIN

## 2023-09-30 ASSESSMENT — PAIN DESCRIPTION - FREQUENCY
FREQUENCY: CONTINUOUS
FREQUENCY: CONTINUOUS

## 2023-09-30 NOTE — CARE COORDINATION
Called [facility] who states they have until 10/2/23 to make a decision. Spoke with pt who would like to stay to see what the appeal says. Heydi PASCAL made aware. Will continue to monitor.

## 2023-09-30 NOTE — PLAN OF CARE
Problem: Discharge Planning  Goal: Discharge to home or other facility with appropriate resources  9/30/2023 0145 by Mariaa Dougherty RN  Outcome: Progressing  9/29/2023 1741 by Demarco Delgadillo RN  Outcome: Progressing  Flowsheets (Taken 9/29/2023 0930)  Discharge to home or other facility with appropriate resources: Identify barriers to discharge with patient and caregiver     Problem: Safety - Adult  Goal: Free from fall injury  9/30/2023 0145 by Mariaa Dougherty RN  Outcome: Progressing  9/29/2023 1741 by Demarco Delgadillo RN  Outcome: Progressing     Problem: Chronic Conditions and Co-morbidities  Goal: Patient's chronic conditions and co-morbidity symptoms are monitored and maintained or improved  Outcome: Progressing     Problem: Pain  Goal: Verbalizes/displays adequate comfort level or baseline comfort level  Outcome: Progressing

## 2023-09-30 NOTE — PLAN OF CARE
Problem: Chronic Conditions and Co-morbidities  Goal: Patient's chronic conditions and co-morbidity symptoms are monitored and maintained or improved  Outcome: Progressing     Problem: Discharge Planning  Goal: Discharge to home or other facility with appropriate resources  Outcome: Progressing     Problem: Safety - Adult  Goal: Free from fall injury  Outcome: Progressing     Problem: Pain  Goal: Verbalizes/displays adequate comfort level or baseline comfort level  Outcome: Progressing     Problem: Skin/Tissue Integrity  Goal: Absence of new skin breakdown  Description: 1. Monitor for areas of redness and/or skin breakdown  2. Assess vascular access sites hourly  3. Every 4-6 hours minimum:  Change oxygen saturation probe site  4. Every 4-6 hours:  If on nasal continuous positive airway pressure, respiratory therapy assess nares and determine need for appliance change or resting period.   Outcome: Progressing     Problem: ABCDS Injury Assessment  Goal: Absence of physical injury  Outcome: Progressing     Problem: Skin/Tissue Integrity - Adult  Goal: Skin integrity remains intact  Outcome: Progressing     Problem: Musculoskeletal - Adult  Goal: Return mobility to safest level of function  Outcome: Progressing

## 2023-10-01 VITALS
BODY MASS INDEX: 24.65 KG/M2 | OXYGEN SATURATION: 97 % | RESPIRATION RATE: 18 BRPM | SYSTOLIC BLOOD PRESSURE: 140 MMHG | TEMPERATURE: 99.8 F | HEART RATE: 69 BPM | WEIGHT: 182 LBS | DIASTOLIC BLOOD PRESSURE: 69 MMHG | HEIGHT: 72 IN

## 2023-10-01 PROCEDURE — 6370000000 HC RX 637 (ALT 250 FOR IP): Performed by: PHYSICIAN ASSISTANT

## 2023-10-01 PROCEDURE — 6370000000 HC RX 637 (ALT 250 FOR IP): Performed by: ORTHOPAEDIC SURGERY

## 2023-10-01 PROCEDURE — APPNB60 APP NON BILLABLE TIME 46-60 MINS: Performed by: PHYSICIAN ASSISTANT

## 2023-10-01 PROCEDURE — G0378 HOSPITAL OBSERVATION PER HR: HCPCS

## 2023-10-01 RX ADMIN — ACETAMINOPHEN 650 MG: 325 TABLET ORAL at 06:42

## 2023-10-01 RX ADMIN — OXYCODONE HYDROCHLORIDE 10 MG: 5 TABLET ORAL at 01:05

## 2023-10-01 RX ADMIN — APIXABAN 5 MG: 5 TABLET, FILM COATED ORAL at 09:24

## 2023-10-01 RX ADMIN — OXYCODONE HYDROCHLORIDE 10 MG: 5 TABLET ORAL at 05:11

## 2023-10-01 RX ADMIN — OXYCODONE HYDROCHLORIDE 10 MG: 5 TABLET ORAL at 09:25

## 2023-10-01 RX ADMIN — SUCRALFATE 1 G: 1 TABLET ORAL at 09:24

## 2023-10-01 RX ADMIN — AMLODIPINE BESYLATE 5 MG: 5 TABLET ORAL at 09:24

## 2023-10-01 RX ADMIN — PANTOPRAZOLE SODIUM 40 MG: 40 TABLET, DELAYED RELEASE ORAL at 09:25

## 2023-10-01 RX ADMIN — ASPIRIN 325 MG: 325 TABLET, COATED ORAL at 09:24

## 2023-10-01 RX ADMIN — DOCUSATE SODIUM 100 MG: 100 CAPSULE, LIQUID FILLED ORAL at 09:24

## 2023-10-01 ASSESSMENT — PAIN DESCRIPTION - FREQUENCY
FREQUENCY: CONTINUOUS

## 2023-10-01 ASSESSMENT — PAIN DESCRIPTION - LOCATION
LOCATION: HIP;LEG
LOCATION: HIP
LOCATION: LEG;HIP
LOCATION: LEG;HIP

## 2023-10-01 ASSESSMENT — PAIN DESCRIPTION - PAIN TYPE
TYPE: SURGICAL PAIN

## 2023-10-01 ASSESSMENT — PAIN DESCRIPTION - DESCRIPTORS
DESCRIPTORS: ACHING;SHARP
DESCRIPTORS: ACHING;DULL
DESCRIPTORS: ACHING
DESCRIPTORS: ACHING;DULL;SHARP

## 2023-10-01 ASSESSMENT — PAIN DESCRIPTION - ORIENTATION
ORIENTATION: LEFT

## 2023-10-01 ASSESSMENT — PAIN SCALES - GENERAL
PAINLEVEL_OUTOF10: 8
PAINLEVEL_OUTOF10: 8
PAINLEVEL_OUTOF10: 7
PAINLEVEL_OUTOF10: 7

## 2023-10-01 ASSESSMENT — PAIN - FUNCTIONAL ASSESSMENT
PAIN_FUNCTIONAL_ASSESSMENT: PREVENTS OR INTERFERES SOME ACTIVE ACTIVITIES AND ADLS

## 2023-10-01 ASSESSMENT — PAIN DESCRIPTION - ONSET
ONSET: ON-GOING

## 2023-10-01 NOTE — PLAN OF CARE
Problem: Chronic Conditions and Co-morbidities  Goal: Patient's chronic conditions and co-morbidity symptoms are monitored and maintained or improved  10/1/2023 0024 by Dayday Hudson RN  Outcome: Progressing     Problem: Discharge Planning  Goal: Discharge to home or other facility with appropriate resources  10/1/2023 0024 by Dayday Hudson RN  Outcome: Progressing     Problem: Safety - Adult  Goal: Free from fall injury  10/1/2023 0024 by Dayday Hudson RN  Outcome: Progressing     Problem: Pain  Goal: Verbalizes/displays adequate comfort level or baseline comfort level  10/1/2023 0024 by Dayday Hudson RN  Outcome: Progressing     Problem: ABCDS Injury Assessment  Goal: Absence of physical injury  10/1/2023 0024 by Dayday Hudson RN  Outcome: Progressing     Problem: Skin/Tissue Integrity - Adult  Goal: Skin integrity remains intact  10/1/2023 0024 by Dayday Hudson RN  Outcome: Progressing

## 2023-10-01 NOTE — CARE COORDINATION
DISCHARGE ORDER  Date/Time 10/1/2023 9:14 AM  Completed by: Gentry Jackson, Case Management    Patient Name: Shaista Johnson    : 1950      Admit order Date and Status:2023   Noted discharge order. (verify MD's last order for status of admission/Traditional Medicare 3 [place] Inpatient qualifying stay required for SNF)    Confirmed discharge plan with:              Patient:  Yes              When pt confirms DC plan does any support person need to be contacted by CM No               Discharge to Facility: [id number] phone number for staff giving report: [phone]   Pre-certification completed: yes   Hospital Exemption Notification (HENS) completed: yes   Discharge orders and Continuity of Care faxed to facility:  Can pull from [employer]      Transportation:               Medical Transport explained with choice list offered to pt/family. Choice:(no preference)  Agency used: Arash Rasmussen up time:   1200      Pt/family/Nursing/Facility aware of  time:   Yes Names: [address] form completed: Yes      Date Last IMM Given: Brandy Erp 10/1/2023    Comments:Reviewed chart, noted DC order, met with pt at bedside. Pt to be DC to [facility] today, [facility] providing transportation at 12 pm. Kamari Urias at [facility]. aware. Pt is being d/c'd to [facility] today. Pt's O2 sats are 97% on RA. Discharge timeout done with CM, PT, Marysol RN, Pallavi Zuniga at [facility].. All discharge needs and concerns addressed. Discharging nurse to complete CULLEN, reconcile AVS, and place final copy with patient's discharge packet. Discharging RN to ensure that written prescriptions for  Level II medications are sent with patient to the facility as per protocol.

## 2023-10-01 NOTE — PLAN OF CARE
Problem: Chronic Conditions and Co-morbidities  Goal: Patient's chronic conditions and co-morbidity symptoms are monitored and maintained or improved  10/1/2023 0952 by Bernard Celestin RN  Outcome: Completed  10/1/2023 0948 by Bernard Celestin RN  Outcome: Progressing  10/1/2023 0024 by Tia Gutierrez RN  Outcome: Progressing     Problem: Discharge Planning  Goal: Discharge to home or other facility with appropriate resources  10/1/2023 0952 by Bernard Celestin RN  Outcome: Completed  10/1/2023 0948 by Bernard Celestin RN  Outcome: Progressing  10/1/2023 0024 by Tia Gutierrez RN  Outcome: Progressing     Problem: Safety - Adult  Goal: Free from fall injury  10/1/2023 0952 by Bernard Celestin RN  Outcome: Completed  10/1/2023 0948 by Bernard Celestin RN  Outcome: Progressing  10/1/2023 0024 by Tia Gutierrez RN  Outcome: Progressing     Problem: Pain  Goal: Verbalizes/displays adequate comfort level or baseline comfort level  10/1/2023 0952 by Bernard Celestin RN  Outcome: Completed  10/1/2023 0948 by Bernard Celestin RN  Outcome: Progressing  10/1/2023 0024 by Tia Gutierrez RN  Outcome: Progressing     Problem: Skin/Tissue Integrity  Goal: Absence of new skin breakdown  Description: 1. Monitor for areas of redness and/or skin breakdown  2. Assess vascular access sites hourly  3. Every 4-6 hours minimum:  Change oxygen saturation probe site  4. Every 4-6 hours:  If on nasal continuous positive airway pressure, respiratory therapy assess nares and determine need for appliance change or resting period.   10/1/2023 5739 by Bernard Celestin RN  Outcome: Completed  10/1/2023 0948 by Bernard Celestin RN  Outcome: Progressing  10/1/2023 0024 by Tia Gutierrez RN  Outcome: Progressing     Problem: ABCDS Injury Assessment  Goal: Absence of physical injury  10/1/2023 0952 by Bernard Celestin RN  Outcome: Completed  10/1/2023 0948 by Bernard Celestin RN  Outcome: Progressing  10/1/2023 0024 by Tia Gutierrez RN  Outcome:

## 2023-10-01 NOTE — FLOWSHEET NOTE
10/01/23 0511   Vitals   Respirations 20   Pain Assessment   Pain Assessment 0-10   Pain Level 8   Patient's Stated Pain Goal 6   Pain Location Leg;Hip   Pain Orientation Left   Pain Descriptors Aching; Sharp   Functional Pain Assessment Prevents or interferes some active activities and ADLs   Pain Type Surgical pain   Pain Radiating Towards   (n/a)   Pain Frequency Continuous   Pain Onset On-going   Non-Pharmaceutical Pain Intervention(s) Rest;Repositioned   Opioid-Induced Sedation   POSS Score 1

## 2023-10-01 NOTE — CARE COORDINATION
Received VM from [facility] that the appeal was approved. Transport set for 12 pm with [facility]. Called Pallavi at [facility] making her aware. Marysol PASCAL and Estella WING made aware and they will get DC order.

## 2023-10-06 ENCOUNTER — OFFICE VISIT (OUTPATIENT)
Dept: ORTHOPEDIC SURGERY | Age: 73
End: 2023-10-06

## 2023-10-06 VITALS — WEIGHT: 182 LBS | BODY MASS INDEX: 24.65 KG/M2 | HEIGHT: 72 IN

## 2023-10-06 DIAGNOSIS — Z47.89 ORTHOPEDIC AFTERCARE: Primary | ICD-10-CM

## 2023-10-06 RX ORDER — SUCRALFATE 1 G/1
TABLET ORAL
Qty: 360 TABLET | OUTPATIENT
Start: 2023-10-06

## 2023-10-07 NOTE — PROGRESS NOTES
views of the left hip demonstrate anatomically positioned total hip arthroplasty. Overall limb length and offset appears anatomic. No signs of loosening or lucency involving the implants. No darnell-implant fracture. No dislocation. ASSESSMENT AND PLAN:  Just shy of 2 weeks postoperative from left primary total hip arthroplasty, doing well    Continue full weightbearing left lower extremity  Anterolateral hip precautions  Assist devices as necessary  PT/OT  Pain control  DVT prophylaxis: Resume Eliquis, ambulation  Wound care: New Mepilex x1 additional week. May leave open to air at that time point. May shower but not soak or submerge incisions  Return to clinic in 1 month for repeat evaluation, new x-rays.     Ad Montiel MD

## 2023-10-16 ENCOUNTER — TELEPHONE (OUTPATIENT)
Dept: ORTHOPEDIC SURGERY | Age: 73
End: 2023-10-16

## 2023-10-16 DIAGNOSIS — Z96.642 S/P TOTAL LEFT HIP ARTHROPLASTY: Primary | ICD-10-CM

## 2023-10-17 RX ORDER — HYDROCODONE BITARTRATE AND ACETAMINOPHEN 5; 325 MG/1; MG/1
1 TABLET ORAL EVERY 6 HOURS PRN
Qty: 28 TABLET | Refills: 0 | Status: SHIPPED | OUTPATIENT
Start: 2023-10-17 | End: 2023-10-24

## 2023-10-18 ENCOUNTER — OFFICE VISIT (OUTPATIENT)
Dept: INTERNAL MEDICINE CLINIC | Age: 73
End: 2023-10-18
Payer: MEDICARE

## 2023-10-18 VITALS
WEIGHT: 188 LBS | HEIGHT: 71 IN | DIASTOLIC BLOOD PRESSURE: 70 MMHG | SYSTOLIC BLOOD PRESSURE: 130 MMHG | BODY MASS INDEX: 26.32 KG/M2 | HEART RATE: 79 BPM | OXYGEN SATURATION: 99 %

## 2023-10-18 DIAGNOSIS — R13.10 DYSPHAGIA, UNSPECIFIED TYPE: ICD-10-CM

## 2023-10-18 DIAGNOSIS — Z96.642 H/O TOTAL HIP ARTHROPLASTY, LEFT: ICD-10-CM

## 2023-10-18 DIAGNOSIS — I10 PRIMARY HYPERTENSION: ICD-10-CM

## 2023-10-18 DIAGNOSIS — I48.0 PAF (PAROXYSMAL ATRIAL FIBRILLATION) (HCC): Primary | ICD-10-CM

## 2023-10-18 DIAGNOSIS — E11.9 TYPE 2 DIABETES MELLITUS WITHOUT COMPLICATION, WITHOUT LONG-TERM CURRENT USE OF INSULIN (HCC): ICD-10-CM

## 2023-10-18 PROCEDURE — 3017F COLORECTAL CA SCREEN DOC REV: CPT | Performed by: INTERNAL MEDICINE

## 2023-10-18 PROCEDURE — 1123F ACP DISCUSS/DSCN MKR DOCD: CPT | Performed by: INTERNAL MEDICINE

## 2023-10-18 PROCEDURE — 3044F HG A1C LEVEL LT 7.0%: CPT | Performed by: INTERNAL MEDICINE

## 2023-10-18 PROCEDURE — G8427 DOCREV CUR MEDS BY ELIG CLIN: HCPCS | Performed by: INTERNAL MEDICINE

## 2023-10-18 PROCEDURE — 2022F DILAT RTA XM EVC RTNOPTHY: CPT | Performed by: INTERNAL MEDICINE

## 2023-10-18 PROCEDURE — 3078F DIAST BP <80 MM HG: CPT | Performed by: INTERNAL MEDICINE

## 2023-10-18 PROCEDURE — 99213 OFFICE O/P EST LOW 20 MIN: CPT | Performed by: INTERNAL MEDICINE

## 2023-10-18 PROCEDURE — G8417 CALC BMI ABV UP PARAM F/U: HCPCS | Performed by: INTERNAL MEDICINE

## 2023-10-18 PROCEDURE — G8484 FLU IMMUNIZE NO ADMIN: HCPCS | Performed by: INTERNAL MEDICINE

## 2023-10-18 PROCEDURE — 1036F TOBACCO NON-USER: CPT | Performed by: INTERNAL MEDICINE

## 2023-10-18 PROCEDURE — 3074F SYST BP LT 130 MM HG: CPT | Performed by: INTERNAL MEDICINE

## 2023-10-18 RX ORDER — TAMSULOSIN HYDROCHLORIDE 0.4 MG/1
0.4 CAPSULE ORAL DAILY
COMMUNITY

## 2023-10-18 RX ORDER — SUCRALFATE 1 G/1
1 TABLET ORAL 4 TIMES DAILY
Qty: 120 TABLET | Refills: 0 | Status: SHIPPED | OUTPATIENT
Start: 2023-10-18

## 2023-10-18 SDOH — HEALTH STABILITY: PHYSICAL HEALTH: ON AVERAGE, HOW MANY MINUTES DO YOU ENGAGE IN EXERCISE AT THIS LEVEL?: 20 MIN

## 2023-10-18 SDOH — HEALTH STABILITY: PHYSICAL HEALTH: ON AVERAGE, HOW MANY DAYS PER WEEK DO YOU ENGAGE IN MODERATE TO STRENUOUS EXERCISE (LIKE A BRISK WALK)?: 3 DAYS

## 2023-10-18 ASSESSMENT — SOCIAL DETERMINANTS OF HEALTH (SDOH): HOW HARD IS IT FOR YOU TO PAY FOR THE VERY BASICS LIKE FOOD, HOUSING, MEDICAL CARE, AND HEATING?: VERY HARD

## 2023-10-20 ENCOUNTER — TELEPHONE (OUTPATIENT)
Dept: ORTHOPEDIC SURGERY | Age: 73
End: 2023-10-20

## 2023-10-20 DIAGNOSIS — Z96.642 S/P TOTAL LEFT HIP ARTHROPLASTY: Primary | ICD-10-CM

## 2023-10-20 NOTE — TELEPHONE ENCOUNTER
MEDICATION HYDROCODONE IS GIVING HIM STOMACH ISSUE AND MAKES HIM JITTERY . WOULD LIKE SOMETHING ELSE FOR PAIN. [address]  PLEASE CALL IF APPROVED

## 2023-10-23 RX ORDER — TRAMADOL HYDROCHLORIDE 50 MG/1
50 TABLET ORAL EVERY 6 HOURS PRN
Qty: 28 TABLET | Refills: 0 | Status: SHIPPED | OUTPATIENT
Start: 2023-10-23 | End: 2023-10-30

## 2023-10-23 NOTE — TELEPHONE ENCOUNTER
Left voicemail for patient informing him that a different medication has been sent to his pharmacy as requested.

## 2023-10-23 NOTE — TELEPHONE ENCOUNTER
We will step down to tramadol. Prescription sent electronically to pharmacy on record. Please inform patient. PDMP reviewed.

## 2023-11-01 ENCOUNTER — TELEPHONE (OUTPATIENT)
Dept: INTERNAL MEDICINE CLINIC | Age: 73
End: 2023-11-01

## 2023-11-01 DIAGNOSIS — I48.0 PAF (PAROXYSMAL ATRIAL FIBRILLATION) (HCC): Primary | ICD-10-CM

## 2023-11-01 NOTE — PROGRESS NOTES
Pt did not qualify for [employer] Patient assistance foundation.  Unclear why not since [place] running water.  Will refer to coumadin clinic.    Electronically signed by Morena Burns MD on 11/1/2023 at 1:30 PM

## 2023-11-01 NOTE — TELEPHONE ENCOUNTER
Called patient with the information about the denial for Eliquis assistance and he already knew, the company called him as well. Pt states he doesn't do well on Warfarin and he plans to stay on Eliquis. He also stated that he was told he could resubmit his application for the assistance and plans to do so.

## 2023-11-02 RX ORDER — TAMSULOSIN HYDROCHLORIDE 0.4 MG/1
0.4 CAPSULE ORAL DAILY
Qty: 90 CAPSULE | OUTPATIENT
Start: 2023-11-02

## 2023-11-03 ENCOUNTER — OFFICE VISIT (OUTPATIENT)
Dept: ORTHOPEDIC SURGERY | Age: 73
End: 2023-11-03

## 2023-11-03 DIAGNOSIS — Z47.89 ORTHOPEDIC AFTERCARE: Primary | ICD-10-CM

## 2023-11-03 NOTE — PROGRESS NOTES
ORTHOPAEDIC SURGERY FOLLOWUP VISIT     CHIEF COMPLAINT: Follow-up left hip surgery     DATE OF SURGERY: 9/25/2023, left total hip arthroplasty     HISTORY OF PRESENT ILLNESS:  Is a very pleasant 66-year-old male who presents 6 weeks postop from left total hip arthroplasty. Overall he is doing very well. He indicates that much of his arthritis pain has resolved. He also indicates that his movement of his hip is smooth and he notes there is increased motion. He has not had any wound healing problems. Denies fever, chills, or night sweats. No dysesthesias in his lower extremity. He notes mild lower extremity swelling is present postoperatively. He has noticed that there is a knot along his incision line. It appears to swell with activity and improved with rest.  It is tender. He rates his pain an 8 out of 10 today. He is ambulatory with the use of a cane. PHYSICAL EXAM:  General: Well-appearing male of stated age. No acute distress. Left lower extremity:  Incision is maturely healed. .  There is mild to moderate swelling focal to the lateral hip at the distal extent of the incision. Hip range of motion was not assessed today. Overall limb length and rotational alignment appears appropriate. There are no cuts, open wounds, or abrasions. Sensation is intact to light touch in deep peroneal, superficial peroneal, tibial, sural, and saphenous nerve distributions. Motor function is intact to EHL, FHL, tibialis anterior, and gastroc. There is brisk capillary refill to the toes and a strong palpable dorsalis pedis pulse. Compartments are soft and compressible. There is no calf tenderness and a negative Homans' sign. There is mild to moderate lower extremity nonpitting edema. RADIOGRAPHIC EXAM:  AP pelvis as well as AP and frog lateral views of the left hip demonstrate anatomically positioned total hip arthroplasty. Overall limb length and offset appears anatomic.   No signs of loosening or

## 2023-11-06 ENCOUNTER — TELEPHONE (OUTPATIENT)
Dept: PHARMACY | Age: 73
End: 2023-11-06

## 2023-11-06 NOTE — TELEPHONE ENCOUNTER
Received referral from Dr. Virginia Howard for coumadin clinic. However telephone encounter note in chart shows patient plans to stay with Eliquis instead. Called patient to see if still needs assistance with possible transition to warfarin.   Left message requesting call back at our clinic [phone]  Britney KapoorD 1:15 PM EST 11/6/23

## 2023-12-01 ENCOUNTER — OFFICE VISIT (OUTPATIENT)
Dept: ORTHOPEDIC SURGERY | Age: 73
End: 2023-12-01

## 2023-12-01 DIAGNOSIS — Z47.89 ORTHOPEDIC AFTERCARE: Primary | ICD-10-CM

## 2023-12-01 PROCEDURE — 99024 POSTOP FOLLOW-UP VISIT: CPT | Performed by: ORTHOPAEDIC SURGERY

## 2023-12-01 RX ORDER — TAMSULOSIN HYDROCHLORIDE 0.4 MG/1
0.4 CAPSULE ORAL DAILY
Qty: 90 CAPSULE | OUTPATIENT
Start: 2023-12-01

## 2023-12-01 RX ORDER — ONDANSETRON 4 MG/1
4 TABLET, FILM COATED ORAL EVERY 8 HOURS PRN
Qty: 90 TABLET | OUTPATIENT
Start: 2023-12-01

## 2023-12-01 NOTE — TELEPHONE ENCOUNTER
Called pt. Left voicemail  to confirm who is PCP since we got a med request. At this time we declined the request.

## 2023-12-04 NOTE — PROGRESS NOTES
ORTHOPAEDIC SURGERY FOLLOWUP VISIT     CHIEF COMPLAINT: Follow-up left hip surgery     DATE OF SURGERY: 9/25/2023, left total hip arthroplasty     HISTORY OF PRESENT ILLNESS:  57-year-old male who presents 10 weeks postop from left total hip arthroplasty. Overall he is doing very well. He indicates that much of his arthritis pain has resolved. He also indicates that his movement of his hip is smooth and he notes there is increased motion. He has not had any wound healing problems. Denies fever, chills, or night sweats. No dysesthesias in his lower extremity. He is ambulatory with the use of a cane. He continues to have pain mostly with weightbearing or when laying on the left hip at night. He continues to endorse deconditioning and weakness. He has not attended physical therapy. He is doing home exercise program.    PHYSICAL EXAM:  General: Well-appearing male of stated age. No acute distress. Left lower extremity:  Incision is maturely healed. .  There is mild swelling focal to the lateral hip at the distal extent of the incision. Hip ranges smoothly without significant pain. Overall limb length and rotational alignment appears appropriate. There are no cuts, open wounds, or abrasions. Sensation is intact to light touch in deep peroneal, superficial peroneal, tibial, sural, and saphenous nerve distributions. Motor function is intact to EHL, FHL, tibialis anterior, and gastroc. There is brisk capillary refill to the toes and a strong palpable dorsalis pedis pulse. Compartments are soft and compressible. There is no calf tenderness and a negative Homans' sign. There is mild to moderate lower extremity nonpitting edema. RADIOGRAPHIC EXAM:  AP pelvis as well as AP and frog lateral views of the left hip demonstrate anatomically positioned total hip arthroplasty. Overall limb length and offset appears anatomic. No signs of loosening or lucency involving the implants. No darnell-implant fracture.

## 2023-12-08 RX ORDER — ONDANSETRON 4 MG/1
4 TABLET, FILM COATED ORAL EVERY 8 HOURS PRN
Qty: 60 TABLET | Refills: 5 | Status: SHIPPED | OUTPATIENT
Start: 2023-12-08

## 2023-12-08 RX ORDER — PANTOPRAZOLE SODIUM 40 MG/1
40 TABLET, DELAYED RELEASE ORAL 2 TIMES DAILY
Qty: 60 TABLET | Refills: 1 | Status: SHIPPED | OUTPATIENT
Start: 2023-12-08

## 2023-12-08 RX ORDER — SUCRALFATE 1 G/1
1 TABLET ORAL 4 TIMES DAILY
Qty: 120 TABLET | Refills: 0 | Status: SHIPPED | OUTPATIENT
Start: 2023-12-08 | End: 2024-01-10 | Stop reason: SDUPTHER

## 2023-12-08 RX ORDER — SUCRALFATE 1 G/1
1 TABLET ORAL 4 TIMES DAILY
Qty: 120 TABLET | Refills: 0 | Status: CANCELLED | OUTPATIENT
Start: 2023-12-08

## 2023-12-08 RX ORDER — TAMSULOSIN HYDROCHLORIDE 0.4 MG/1
0.4 CAPSULE ORAL DAILY
Qty: 30 CAPSULE | Refills: 3 | Status: CANCELLED | OUTPATIENT
Start: 2023-12-08

## 2023-12-08 RX ORDER — PANTOPRAZOLE SODIUM 40 MG/1
40 TABLET, DELAYED RELEASE ORAL 2 TIMES DAILY
Qty: 60 TABLET | Refills: 1 | Status: CANCELLED | OUTPATIENT
Start: 2023-12-08

## 2023-12-08 RX ORDER — ONDANSETRON 4 MG/1
4 TABLET, FILM COATED ORAL EVERY 8 HOURS PRN
Qty: 30 TABLET | Refills: 2 | Status: CANCELLED | OUTPATIENT
Start: 2023-12-08

## 2023-12-08 RX ORDER — TAMSULOSIN HYDROCHLORIDE 0.4 MG/1
0.4 CAPSULE ORAL DAILY
Qty: 30 CAPSULE | Refills: 5 | Status: SHIPPED | OUTPATIENT
Start: 2023-12-08

## 2023-12-08 RX ORDER — AMLODIPINE BESYLATE 5 MG/1
5 TABLET ORAL DAILY
Qty: 30 TABLET | Refills: 5 | Status: CANCELLED | OUTPATIENT
Start: 2023-12-08

## 2023-12-08 RX ORDER — AMLODIPINE BESYLATE 5 MG/1
5 TABLET ORAL DAILY
Qty: 30 TABLET | Refills: 5 | Status: SHIPPED | OUTPATIENT
Start: 2023-12-08

## 2023-12-08 NOTE — PROGRESS NOTES
Requested Prescriptions     Pending Prescriptions Disp Refills    sucralfate (CARAFATE) 1 GM tablet 120 tablet 0     Sig: Take 1 tablet by mouth 4 times daily    amLODIPine (NORVASC) 5 MG tablet 30 tablet 5     Sig: Take 1 tablet by mouth daily    apixaban (ELIQUIS) 5 MG TABS tablet 60 tablet 5     Sig: Take 1 tablet by mouth 2 times daily    ondansetron (ZOFRAN) 4 MG tablet 30 tablet 2     Sig: Take 1 tablet by mouth every 8 hours as needed for Nausea    pantoprazole (PROTONIX) 40 MG tablet 60 tablet 1     Sig: Take 1 tablet by mouth in the morning and at bedtime    tamsulosin (FLOMAX) 0.4 MG capsule 30 capsule 3     Sig: Take 1 capsule by mouth daily

## 2024-01-10 ENCOUNTER — OFFICE VISIT (OUTPATIENT)
Dept: INTERNAL MEDICINE CLINIC | Age: 74
End: 2024-01-10

## 2024-01-10 VITALS
HEIGHT: 71 IN | OXYGEN SATURATION: 97 % | BODY MASS INDEX: 26.64 KG/M2 | HEART RATE: 71 BPM | DIASTOLIC BLOOD PRESSURE: 70 MMHG | SYSTOLIC BLOOD PRESSURE: 110 MMHG | WEIGHT: 190.3 LBS

## 2024-01-10 DIAGNOSIS — K26.9 DUODENAL ULCER: ICD-10-CM

## 2024-01-10 DIAGNOSIS — I48.0 PAF (PAROXYSMAL ATRIAL FIBRILLATION) (HCC): ICD-10-CM

## 2024-01-10 DIAGNOSIS — I10 PRIMARY HYPERTENSION: ICD-10-CM

## 2024-01-10 DIAGNOSIS — E11.9 TYPE 2 DIABETES MELLITUS WITHOUT COMPLICATION, WITHOUT LONG-TERM CURRENT USE OF INSULIN (HCC): Primary | ICD-10-CM

## 2024-01-10 LAB — HBA1C MFR BLD: 8 %

## 2024-01-10 RX ORDER — SUCRALFATE 1 G/1
1 TABLET ORAL 4 TIMES DAILY
Qty: 120 TABLET | Refills: 0 | Status: SHIPPED | OUTPATIENT
Start: 2024-01-10

## 2024-01-10 NOTE — PROGRESS NOTES
Internal Medicine Outpatient  Follow Up      Chief complaint:  limping now  Subjective: some pain in back and now R hip likely due to compensation of limping on L, he does have follow up with ortho next month - he is concerned,but says he is doing somewhat better overall  He also c/o some rattling in his chest , but he denies cough - he says that he can feel it when he lays down at night - almost a wheeze  He is eating more junk food than previously - a lot of sugary foods    Hgb A1c did increase  Pt asking about Watchman procedure and if he was a candidate      ROS:   A comprehensive review of systems was negative except for: limping, more pain on R hip and L lumbar pain   .    Objective:    /70   Pulse 71   Ht 1.803 m (5' 11\")   Wt 86.3 kg (190 lb 4.8 oz)   SpO2 97%   BMI 26.54 kg/m²     Gen: thin, NAD  HEENT: NC/AT, moist mucous membranes, no oropharyngeal erythema or exudate  Neck: supple, trachea midline, no anterior cervical or SC LAD  Heart:  Normal s1/s2, RRR, no murmurs, gallops, or rubs. no leg edema  Lungs:  mostly clear bilaterally except for one localized area of rhonchi in L mid lung , no wheeze, no rales, one area of rhonchi, no crackles, no use of accessory muscles  Abd: bowel sounds present, soft, nontender, nondistended, no masses  Extrem:  No clubbing, cyanosis,  no edema  Skin: no rashes or lesions  Psych: A & O x3  Neuro: grossly intact, moves all 4 extremities.  Noticeable limp with with ambulation  -able to flex and extend leg, some issue with rotation of leg    Assessment:         ICD-10-CM    1. Type 2 diabetes mellitus without complication, without long-term current use of insulin ([facility])  E11.9 POCT glycosylated hemoglobin (Hb A1C)      2. PAF (paroxysmal atrial fibrillation) ([facility])  I48.0 Solomon Elias MD, Cardiac Electrophysiology, [facility]      3. Primary hypertension  I10       4. Duodenal ulcer  K26.9

## 2024-01-10 NOTE — PROGRESS NOTES
Pt states he hear a vibration or wheezing when he lets his breath out, he states it's only in his throat not chest.

## 2024-02-02 ENCOUNTER — OFFICE VISIT (OUTPATIENT)
Dept: ORTHOPEDIC SURGERY | Age: 74
End: 2024-02-02
Payer: MEDICARE

## 2024-02-02 VITALS — BODY MASS INDEX: 26.6 KG/M2 | HEIGHT: 71 IN | WEIGHT: 190 LBS

## 2024-02-02 DIAGNOSIS — Z96.642 S/P TOTAL LEFT HIP ARTHROPLASTY: Primary | ICD-10-CM

## 2024-02-02 PROCEDURE — 1123F ACP DISCUSS/DSCN MKR DOCD: CPT | Performed by: ORTHOPAEDIC SURGERY

## 2024-02-02 PROCEDURE — 99213 OFFICE O/P EST LOW 20 MIN: CPT | Performed by: ORTHOPAEDIC SURGERY

## 2024-02-02 PROCEDURE — 3017F COLORECTAL CA SCREEN DOC REV: CPT | Performed by: ORTHOPAEDIC SURGERY

## 2024-02-02 PROCEDURE — 1036F TOBACCO NON-USER: CPT | Performed by: ORTHOPAEDIC SURGERY

## 2024-02-02 PROCEDURE — G8427 DOCREV CUR MEDS BY ELIG CLIN: HCPCS | Performed by: ORTHOPAEDIC SURGERY

## 2024-02-02 PROCEDURE — G8482 FLU IMMUNIZE ORDER/ADMIN: HCPCS | Performed by: ORTHOPAEDIC SURGERY

## 2024-02-02 PROCEDURE — G8417 CALC BMI ABV UP PARAM F/U: HCPCS | Performed by: ORTHOPAEDIC SURGERY

## 2024-02-02 NOTE — PROGRESS NOTES
ORTHOPAEDIC SURGERY FOLLOWUP VISIT     CHIEF COMPLAINT: Follow-up left hip surgery     DATE OF SURGERY: 9/25/2023, left total hip arthroplasty     HISTORY OF PRESENT ILLNESS:  73-year-old male who presents 4.5 months postop from left total hip arthroplasty.  Overall he is doing very well.  He indicates that much of his arthritis pain has resolved.  He also indicates that his movement of his hip is smooth and he notes there is increased motion.  He is continues to ambulate with the use of a cane.  He does have some left sided buttock pain which has been persistent.  He denies numbness or tingling.  He indicates that he had a bout of sciatica with the right hip.  He is currently doing home exercises.      PHYSICAL EXAM:  General: Well-appearing male of stated age.  No acute distress.  Left lower extremity:  Incision is maturely healed.  Hip ranges smoothly without significant pain.  Overall limb length and rotational alignment appears appropriate. There are no cuts, open wounds, or abrasions. Sensation is intact to light touch in deep peroneal, superficial peroneal, tibial, sural, and saphenous nerve distributions.  Motor function is intact to EHL, FHL, tibialis anterior, and gastroc.  There is brisk capillary refill to the toes and a strong palpable dorsalis pedis pulse.  Compartments are soft and compressible.  There is no calf tenderness and a negative Homans' sign.  There is mild to moderate lower extremity nonpitting edema.     RADIOGRAPHIC EXAM:  AP pelvis as well as AP and frog lateral views of the left hip demonstrate anatomically positioned total hip arthroplasty.  Overall limb length and offset appears anatomic.  No signs of loosening or lucency involving the implants.  No darnell-implant fracture.  No dislocation.  Osteoarthritic changes involving bilateral sacroiliac joints.  Incidental viewing of right hip demonstrate severe end-stage osteoarthritis.     ASSESSMENT AND PLAN:  4.5 months postoperative from

## 2024-02-08 NOTE — PROGRESS NOTES
Assessment:     1. Atrial fibrillation: patient has paroxysmal atrial fibrillation.    Associated symptoms: Palpitations     History of cardioversion: No prior history of cardioversion  History of AF ablation: No history of atrial fibrillation ablation in the past  History of heart surgery/procedure: no  History of other cardiac arrhythmias: no    Current use of anti-arrhythmic drugs: Not currently on anti-arrhythmic drugs  Previous other use of anti-arrhythmic drugs: Not previously on any anti-arrhythmic drugs    Overall LV function: Normal left ventricular systolic function  Size of left atrium: Normal LA size  Significant cardiac valvular disease: No significant valve disease    Family history of atrial fibrillation: Unknown    Alcohol consumption: Minimal/rare alcohol consumption  Caffeine consumption: Mild caffeine intake  Smoking status: former smoker, quit many years ago  Obstructive sleep apnea: No/low suspicion  Exercise status: Does not exercise regularly (sedentary lifestyle)    I had a discussion with the patient about issues related to atrial fibrillation (including etiology, disease progression patterns, stroke risk and rate control issues). Patient had his questions answered to his satisfaction.      Patient has a [id number] score of at least 4 [Age over 75 (2 points), Diabetes Mellitus (1 point), and Hypertension (1 point)]  Longterm anticoagulation is: recommended  Current anticoagulation: Apixaban  Bleeding issues reported: no  Renal function: Normal renal function  Thyroid function:  no recent TSH    Patient with one episode of atrial fibrillation documented in 2015 with no other documented episodes. He has occasional palpitations and occasional shortness of breath at rest. Will obtain repeat echocardiogram to re-assess overall cardiac structure and function. Will also repeat 4-week event monitor to assess his current burden of arrhythmia (if any) and to assess for any bradycardia issues.

## 2024-02-12 ENCOUNTER — TELEPHONE (OUTPATIENT)
Dept: CARDIOLOGY CLINIC | Age: 74
End: 2024-02-12

## 2024-02-12 ENCOUNTER — OFFICE VISIT (OUTPATIENT)
Dept: CARDIOLOGY CLINIC | Age: 74
End: 2024-02-12
Payer: MEDICARE

## 2024-02-12 VITALS
OXYGEN SATURATION: 98 % | BODY MASS INDEX: 26.82 KG/M2 | SYSTOLIC BLOOD PRESSURE: 132 MMHG | DIASTOLIC BLOOD PRESSURE: 76 MMHG | HEIGHT: 71 IN | HEART RATE: 71 BPM | WEIGHT: 191.6 LBS

## 2024-02-12 DIAGNOSIS — I44.0 FIRST DEGREE ATRIOVENTRICULAR BLOCK: ICD-10-CM

## 2024-02-12 DIAGNOSIS — I48.0 PAF (PAROXYSMAL ATRIAL FIBRILLATION) (HCC): Primary | ICD-10-CM

## 2024-02-12 DIAGNOSIS — I10 ESSENTIAL HYPERTENSION: ICD-10-CM

## 2024-02-12 DIAGNOSIS — R00.1 SINUS BRADYCARDIA: ICD-10-CM

## 2024-02-12 DIAGNOSIS — R06.02 SOB (SHORTNESS OF BREATH): ICD-10-CM

## 2024-02-12 PROCEDURE — 3075F SYST BP GE 130 - 139MM HG: CPT | Performed by: INTERNAL MEDICINE

## 2024-02-12 PROCEDURE — G8417 CALC BMI ABV UP PARAM F/U: HCPCS | Performed by: INTERNAL MEDICINE

## 2024-02-12 PROCEDURE — 1036F TOBACCO NON-USER: CPT | Performed by: INTERNAL MEDICINE

## 2024-02-12 PROCEDURE — 93000 ELECTROCARDIOGRAM COMPLETE: CPT | Performed by: INTERNAL MEDICINE

## 2024-02-12 PROCEDURE — 99204 OFFICE O/P NEW MOD 45 MIN: CPT | Performed by: INTERNAL MEDICINE

## 2024-02-12 PROCEDURE — G8427 DOCREV CUR MEDS BY ELIG CLIN: HCPCS | Performed by: INTERNAL MEDICINE

## 2024-02-12 PROCEDURE — 1123F ACP DISCUSS/DSCN MKR DOCD: CPT | Performed by: INTERNAL MEDICINE

## 2024-02-12 PROCEDURE — G8482 FLU IMMUNIZE ORDER/ADMIN: HCPCS | Performed by: INTERNAL MEDICINE

## 2024-02-12 PROCEDURE — 3078F DIAST BP <80 MM HG: CPT | Performed by: INTERNAL MEDICINE

## 2024-02-12 PROCEDURE — 3017F COLORECTAL CA SCREEN DOC REV: CPT | Performed by: INTERNAL MEDICINE

## 2024-02-12 NOTE — TELEPHONE ENCOUNTER
Monitor placed by Melisa RN/Riana, RN  Monitor company [employer] (per Alyssa PASCAL)  Length of monitor 4 weeks/30 days  Monitor ordered by [employer]  Monitor SN [id number]  Phone SN [id number]  MAC [id number]  Activation successful prior to pt leaving office? Yes

## 2024-02-12 NOTE — PATIENT INSTRUCTIONS
Plan:     Referral to the Watchman clinic to discuss.   Echocardiogram to assess heart structure and function.   Cardiac event monitor for four weeks.   Follow up with me in 4 months.       Your provider has ordered testing for further evaluation.  An order/prescription has been included in your paper work.   To schedule outpatient testing, contact Central Scheduling by calling [address] [place], [place] [id number] ([phone]).

## 2024-02-15 ENCOUNTER — TELEPHONE (OUTPATIENT)
Dept: CARDIOLOGY CLINIC | Age: 74
End: 2024-02-15

## 2024-02-15 NOTE — TELEPHONE ENCOUNTER
[employer] called to let us know they are not receiving data for the patient.  The patient was hooked up on 2/12. [employer] did have the correct number but I did give them an alternate number to try.   I was able to reach the patient. He stated he is wearing the monitor and he charges the phone every night. I gave him the number to call for [employer].

## 2024-02-19 ENCOUNTER — TELEPHONE (OUTPATIENT)
Dept: CARDIOLOGY CLINIC | Age: 74
End: 2024-02-19

## 2024-02-19 NOTE — TELEPHONE ENCOUNTER
Pt presented himself in the office today to return the react monitor. He stated he does not want to wear the [employer] monitor anymore. Pt spoke with NAIDA Vincent about this. Will inform MILENA Greco of this as well.     Also, pt does not want to schedule for watchman procedure either.

## 2024-02-23 NOTE — TELEPHONE ENCOUNTER
Rafaela from [employer] called to speak with Fannie. She went to download the date that they may or may not have but noticed that the order was cancelled by Fannie. Rafaela would like to know if Fannie would like her to continue to try to pull data. Rafaela can be reached at [phone]

## 2024-02-23 NOTE — TELEPHONE ENCOUNTER
Tried calling [employer]. On hold for too long. I texted with Rafaela on 2/20 via the [employer] site, and let her know the patient was no longer wearing the monitor... she stated she would take care of it. I will scan the notes into the chart.

## 2024-03-22 RX ORDER — PANTOPRAZOLE SODIUM 40 MG/1
40 TABLET, DELAYED RELEASE ORAL 2 TIMES DAILY
Qty: 60 TABLET | Refills: 1 | Status: SHIPPED | OUTPATIENT
Start: 2024-03-22

## 2024-03-22 RX ORDER — SUCRALFATE 1 G/1
1 TABLET ORAL 4 TIMES DAILY
Qty: 120 TABLET | Refills: 5 | Status: SHIPPED | OUTPATIENT
Start: 2024-03-22

## 2024-03-22 NOTE — TELEPHONE ENCOUNTER
Requested Prescriptions     Pending Prescriptions Disp Refills    pantoprazole (PROTONIX) 40 MG tablet 60 tablet 1     Sig: Take 1 tablet by mouth in the morning and at bedtime    sucralfate (CARAFATE) 1 GM tablet 120 tablet 5     Sig: Take 1 tablet by mouth 4 times daily    Pt last seen in January 2024

## 2024-04-10 ENCOUNTER — OFFICE VISIT (OUTPATIENT)
Dept: INTERNAL MEDICINE CLINIC | Age: 74
End: 2024-04-10

## 2024-04-10 VITALS
DIASTOLIC BLOOD PRESSURE: 71 MMHG | SYSTOLIC BLOOD PRESSURE: 132 MMHG | BODY MASS INDEX: 28.4 KG/M2 | HEART RATE: 66 BPM | HEIGHT: 70 IN | OXYGEN SATURATION: 98 % | WEIGHT: 198.4 LBS

## 2024-04-10 DIAGNOSIS — E11.9 TYPE 2 DIABETES MELLITUS WITHOUT COMPLICATION, WITHOUT LONG-TERM CURRENT USE OF INSULIN (HCC): Primary | ICD-10-CM

## 2024-04-10 DIAGNOSIS — I48.0 PAF (PAROXYSMAL ATRIAL FIBRILLATION) (HCC): ICD-10-CM

## 2024-04-10 DIAGNOSIS — L30.9 ECZEMA, UNSPECIFIED TYPE: ICD-10-CM

## 2024-04-10 DIAGNOSIS — I10 PRIMARY HYPERTENSION: ICD-10-CM

## 2024-04-10 LAB — HBA1C MFR BLD: 7.3 %

## 2024-04-10 PROCEDURE — 83036 HEMOGLOBIN GLYCOSYLATED A1C: CPT | Performed by: INTERNAL MEDICINE

## 2024-04-10 RX ORDER — TRIAMCINOLONE ACETONIDE 0.25 MG/G
OINTMENT TOPICAL
Qty: 80 G | Refills: 1 | Status: SHIPPED | OUTPATIENT
Start: 2024-04-10 | End: 2024-04-17

## 2024-04-10 NOTE — PROGRESS NOTES
Internal Medicine Outpatient  Follow Up      Chief complaint:  L  hip pain  Subjective: he eats a whole bag of cheetos daily  He also had continued L hip/buttock pain since surgery - he did go for follow up with Dr. Aldridge and his hip is in place and functioning.  Pain is localized to buttock muscle, not reproducible  Pt also c/o rash at edge of buttocks on both  Pt tried to wear a heart monitor, but was not successful - it would not capture and so he just stopped using it - goal was to try and see afib burden if any to determine if he needed an ablation to get off of eliquis  He will just continue with eliquis although cost is high - $50 per month for 6 months, then $150 per month for last 6 months out of the year    ROS:   A comprehensive review of systems was negative except for: L hip/buttock pain when ambulating       Objective:    /71   Pulse 66   Ht 1.778 m (5' 10\")   Wt 90 kg (198 lb 6.4 oz)   SpO2 98%   BMI 28.47 kg/m²     Gen: thin, NAD  HEENT: NC/AT, moist mucous membranes, no oropharyngeal erythema or exudate  Neck: supple, trachea midline, no anterior cervical or SC LAD  Heart:  Normal s1/s2, RRR, no murmurs, gallops, or rubs. no leg edema  Lungs:  diminished bilaterally, no wheeze, no rales, no rhonchi, no crackles, no use of accessory muscles  Abd: bowel sounds present, soft, nontender, nondistended, no masses  Extrem:  No clubbing, cyanosis,  no edema  Skin: has buttock rash, not near anus - both buttocks - irritation from sitting, eczematous rash  Psych: A & O x3  Neuro: grossly intact, moves all 4 extremities.      Assessment:         ICD-10-CM    1. Type 2 diabetes mellitus without complication, without long-term current use of insulin ([facility])  E11.9 POCT glycosylated hemoglobin (Hb A1C)      2. PAF (paroxysmal atrial fibrillation) ([facility])  I48.0       3. Primary hypertension  I10       4. Eczema, unspecified type  L30.9            Plan:

## 2024-04-15 NOTE — TELEPHONE ENCOUNTER
Requested Prescriptions     Pending Prescriptions Disp Refills    pantoprazole (PROTONIX) 40 MG tablet 60 tablet 1     Sig: Take 1 tablet by mouth in the morning and at bedtime      Follow up scheduled for 06/12/2024

## 2024-04-16 RX ORDER — PANTOPRAZOLE SODIUM 40 MG/1
40 TABLET, DELAYED RELEASE ORAL 2 TIMES DAILY
Qty: 60 TABLET | Refills: 5 | Status: SHIPPED | OUTPATIENT
Start: 2024-04-16

## 2024-06-04 RX ORDER — TAMSULOSIN HYDROCHLORIDE 0.4 MG/1
0.4 CAPSULE ORAL DAILY
Qty: 30 CAPSULE | Refills: 5 | Status: SHIPPED | OUTPATIENT
Start: 2024-06-04

## 2024-06-04 NOTE — TELEPHONE ENCOUNTER
Requested Prescriptions     Pending Prescriptions Disp Refills    tamsulosin (FLOMAX) 0.4 MG capsule 30 capsule 5     Sig: Take 1 capsule by mouth daily

## 2024-06-11 ASSESSMENT — ENCOUNTER SYMPTOMS
LEFT EYE: 0
WHEEZING: 0
SHORTNESS OF BREATH: 0
STRIDOR: 0
RIGHT EYE: 0
HEMATOCHEZIA: 0
HEMATEMESIS: 0

## 2024-06-11 NOTE — PROGRESS NOTES
Assessment:     1. Atrial fibrillation: patient has paroxysmal atrial fibrillation.    Associated symptoms: Palpitations     History of cardioversion: No prior history of cardioversion  History of AF ablation: No history of atrial fibrillation ablation in the past  History of heart surgery/procedure: no  History of other cardiac arrhythmias: no    Current use of anti-arrhythmic drugs: Not currently on anti-arrhythmic drugs  Previous other use of anti-arrhythmic drugs: Not previously on any anti-arrhythmic drugs    Overall LV function: Normal left ventricular systolic function  Size of left atrium: Normal LA size  Significant cardiac valvular disease: No significant valve disease    Family history of atrial fibrillation: Unknown    Alcohol consumption: Minimal/rare alcohol consumption  Caffeine consumption: Mild caffeine intake  Smoking status: former smoker, quit many years ago  Obstructive sleep apnea: No/low suspicion  Exercise status: Does not exercise regularly (sedentary lifestyle)    I have had discussions with the patient about issues related to atrial fibrillation (including etiology, disease progression patterns, stroke risk and rate control issues). Patient had his questions answered to his satisfaction.      Patient has a [id number] score of at least 4 [Age over 75 (2 points), Diabetes Mellitus (1 point), and Hypertension (1 point)]  Longterm anticoagulation is: recommended  Current anticoagulation: Apixaban  Bleeding issues reported: no  Renal function: Normal renal function  Thyroid function:  no recent TSH    Patient with one episode of atrial fibrillation documented in 2015 with no other documented episodes. He has occasional palpitations and occasional shortness of breath at rest. Will obtain repeat echocardiogram to re-assess overall cardiac structure and function.     Attempted to place an event monitor to assess his current burden of arrhythmia (if any) and to assess for any bradycardia issues.

## 2024-06-12 ENCOUNTER — OFFICE VISIT (OUTPATIENT)
Dept: CARDIOLOGY CLINIC | Age: 74
End: 2024-06-12
Payer: MEDICARE

## 2024-06-12 VITALS
SYSTOLIC BLOOD PRESSURE: 110 MMHG | HEART RATE: 59 BPM | BODY MASS INDEX: 27.89 KG/M2 | OXYGEN SATURATION: 97 % | HEIGHT: 70 IN | DIASTOLIC BLOOD PRESSURE: 60 MMHG | WEIGHT: 194.8 LBS

## 2024-06-12 DIAGNOSIS — R00.1 SINUS BRADYCARDIA: ICD-10-CM

## 2024-06-12 DIAGNOSIS — I10 ESSENTIAL HYPERTENSION: ICD-10-CM

## 2024-06-12 DIAGNOSIS — Z79.01 ON APIXABAN THERAPY: ICD-10-CM

## 2024-06-12 DIAGNOSIS — I48.0 PAF (PAROXYSMAL ATRIAL FIBRILLATION) (HCC): Primary | ICD-10-CM

## 2024-06-12 PROCEDURE — 3017F COLORECTAL CA SCREEN DOC REV: CPT | Performed by: INTERNAL MEDICINE

## 2024-06-12 PROCEDURE — 1123F ACP DISCUSS/DSCN MKR DOCD: CPT | Performed by: INTERNAL MEDICINE

## 2024-06-12 PROCEDURE — 1036F TOBACCO NON-USER: CPT | Performed by: INTERNAL MEDICINE

## 2024-06-12 PROCEDURE — 99214 OFFICE O/P EST MOD 30 MIN: CPT | Performed by: INTERNAL MEDICINE

## 2024-06-12 PROCEDURE — G8417 CALC BMI ABV UP PARAM F/U: HCPCS | Performed by: INTERNAL MEDICINE

## 2024-06-12 PROCEDURE — 3074F SYST BP LT 130 MM HG: CPT | Performed by: INTERNAL MEDICINE

## 2024-06-12 PROCEDURE — 93000 ELECTROCARDIOGRAM COMPLETE: CPT | Performed by: INTERNAL MEDICINE

## 2024-06-12 PROCEDURE — 3078F DIAST BP <80 MM HG: CPT | Performed by: INTERNAL MEDICINE

## 2024-06-12 PROCEDURE — G8427 DOCREV CUR MEDS BY ELIG CLIN: HCPCS | Performed by: INTERNAL MEDICINE

## 2024-06-12 NOTE — PATIENT INSTRUCTIONS
Plan:     Echocardiogram to assess heart structure and function.   Continue taking current cardiac medications as prescribed.   Follow up with me in 6 months.       Your provider has ordered testing for further evaluation.  An order/prescription has been included in your paper work.   To schedule outpatient testing, contact Central Scheduling by calling [address] [place], [place] [address]E ([phone]).

## 2024-06-21 RX ORDER — AMLODIPINE BESYLATE 5 MG/1
5 TABLET ORAL DAILY
Qty: 30 TABLET | Refills: 5 | Status: SHIPPED | OUTPATIENT
Start: 2024-06-21

## 2024-06-21 NOTE — TELEPHONE ENCOUNTER
Requested Prescriptions     Pending Prescriptions Disp Refills    amLODIPine (NORVASC) 5 MG tablet 30 tablet 5     Sig: Take 1 tablet by mouth daily    apixaban (ELIQUIS) 5 MG TABS tablet 60 tablet 5     Sig: Take 1 tablet by mouth 2 times daily

## 2024-06-26 ENCOUNTER — OFFICE VISIT (OUTPATIENT)
Dept: INTERNAL MEDICINE CLINIC | Age: 74
End: 2024-06-26

## 2024-06-26 DIAGNOSIS — L25.9 CONTACT DERMATITIS, UNSPECIFIED CONTACT DERMATITIS TYPE, UNSPECIFIED TRIGGER: ICD-10-CM

## 2024-06-26 DIAGNOSIS — R09.1 PLEURISY: ICD-10-CM

## 2024-06-26 DIAGNOSIS — R00.1 SINUS BRADYCARDIA: ICD-10-CM

## 2024-06-26 DIAGNOSIS — I48.0 PAF (PAROXYSMAL ATRIAL FIBRILLATION) (HCC): ICD-10-CM

## 2024-06-26 DIAGNOSIS — E11.9 TYPE 2 DIABETES MELLITUS WITHOUT COMPLICATION, WITHOUT LONG-TERM CURRENT USE OF INSULIN (HCC): Primary | ICD-10-CM

## 2024-06-26 DIAGNOSIS — I10 PRIMARY HYPERTENSION: ICD-10-CM

## 2024-06-26 LAB — HBA1C MFR BLD: 6.7 %

## 2024-06-26 PROCEDURE — 1036F TOBACCO NON-USER: CPT | Performed by: INTERNAL MEDICINE

## 2024-06-26 PROCEDURE — 83036 HEMOGLOBIN GLYCOSYLATED A1C: CPT | Performed by: INTERNAL MEDICINE

## 2024-06-26 PROCEDURE — G8417 CALC BMI ABV UP PARAM F/U: HCPCS | Performed by: INTERNAL MEDICINE

## 2024-06-26 PROCEDURE — 3017F COLORECTAL CA SCREEN DOC REV: CPT | Performed by: INTERNAL MEDICINE

## 2024-06-26 PROCEDURE — 99214 OFFICE O/P EST MOD 30 MIN: CPT | Performed by: INTERNAL MEDICINE

## 2024-06-26 PROCEDURE — 2022F DILAT RTA XM EVC RTNOPTHY: CPT | Performed by: INTERNAL MEDICINE

## 2024-06-26 PROCEDURE — 1123F ACP DISCUSS/DSCN MKR DOCD: CPT | Performed by: INTERNAL MEDICINE

## 2024-06-26 PROCEDURE — 3044F HG A1C LEVEL LT 7.0%: CPT | Performed by: INTERNAL MEDICINE

## 2024-06-26 PROCEDURE — G8427 DOCREV CUR MEDS BY ELIG CLIN: HCPCS | Performed by: INTERNAL MEDICINE

## 2024-06-26 RX ORDER — TRIAMCINOLONE ACETONIDE 0.25 MG/G
OINTMENT TOPICAL
Qty: 80 G | Refills: 2 | Status: SHIPPED | OUTPATIENT
Start: 2024-06-26 | End: 2024-07-03

## 2024-06-26 RX ORDER — ONDANSETRON 4 MG/1
4 TABLET, FILM COATED ORAL EVERY 8 HOURS PRN
Qty: 60 TABLET | Refills: 5 | Status: SHIPPED | OUTPATIENT
Start: 2024-06-26

## 2024-06-26 RX ORDER — CAMPHOR 0.45 %
GEL (GRAM) TOPICAL
COMMUNITY
Start: 2024-06-26

## 2024-06-26 NOTE — PROGRESS NOTES
Internal Medicine Outpatient  Follow Up      Chief complaint:  cough with pain in L upper chest  Subjective: pt has had cough for past 3 weeks off and on - pt went to cardiologist who listened to his chest and it was clear    Chest is slightly tender to palpation  Pt also c/o rash on buttocks that still itches.  This appears to be a contact dermatitis from sitting.  Triamcinolone cream helped      ROS:   A comprehensive review of systems was negative except for: some chest pain from coughing, itching on butt cheeks       Objective:    There were no vitals taken for this visit.    Gen: thin, NAD  HEENT: NC/AT, moist mucous membranes, no oropharyngeal erythema or exudate  Neck: supple, trachea midline, no anterior cervical or SC LAD  Heart:  Normal s1/s2, RRR, no murmurs, gallops, or rubs. no leg edema  Lungs:  diminished bilaterally, no wheeze, no rales, no rhonchi, no crackles, no use of accessory muscles  Abd: bowel sounds present, soft, nontender, nondistended, no masses  Extrem:  No clubbing, cyanosis,  no edema  Skin: two 4cm circular lesions of scaling skin on buttocks consistent with contact dermatitis from sitting  Psych: A & O x3  Neuro: grossly intact, moves all 4 extremities.      Assessment:    Visit Diagnoses         Codes    Pleurisy     R09.1             ICD-10-CM    1. Type 2 diabetes mellitus without complication, without long-term current use of insulin ([facility])  E11.9 POCT glycosylated hemoglobin (Hb A1C)      2. Pleurisy  R09.1 XR CHEST STANDARD (2 VW)      3. Contact dermatitis, unspecified contact dermatitis type, unspecified trigger  L25.9       4. Primary hypertension  I10       5. PAF (paroxysmal atrial fibrillation) ([facility])  I48.0       6. Sinus bradycardia  R00.1            Plan:   Pleurisy - will get CXR, although I do not think it will reveal much - no fever, no sputum, no cough, pain somewhat reproducible on L upper chest  Paroxysmal afib

## 2024-06-27 ENCOUNTER — HOSPITAL ENCOUNTER (OUTPATIENT)
Dept: GENERAL RADIOLOGY | Age: 74
Discharge: HOME | End: 2024-06-27
Payer: MEDICARE

## 2024-06-27 ENCOUNTER — HOSPITAL ENCOUNTER (OUTPATIENT)
Age: 74
Discharge: HOME | End: 2024-06-27
Payer: MEDICARE

## 2024-06-27 DIAGNOSIS — R09.1 PLEURISY: ICD-10-CM

## 2024-06-27 PROCEDURE — 71046 X-RAY EXAM CHEST 2 VIEWS: CPT

## 2024-08-19 ENCOUNTER — APPOINTMENT (OUTPATIENT)
Dept: CT IMAGING | Age: 74
End: 2024-08-19
Payer: MEDICARE

## 2024-08-19 ENCOUNTER — APPOINTMENT (OUTPATIENT)
Dept: GENERAL RADIOLOGY | Age: 74
End: 2024-08-19
Payer: MEDICARE

## 2024-08-19 ENCOUNTER — HOSPITAL ENCOUNTER (OUTPATIENT)
Age: 74
Setting detail: OBSERVATION
Discharge: ANOTHER HOSPITAL | End: 2024-08-21
Attending: STUDENT IN AN ORGANIZED HEALTH CARE EDUCATION/TRAINING PROGRAM | Admitting: INTERNAL MEDICINE
Payer: MEDICARE

## 2024-08-19 ENCOUNTER — APPOINTMENT (OUTPATIENT)
Age: 74
End: 2024-08-19
Payer: MEDICARE

## 2024-08-19 DIAGNOSIS — R06.02 SHORTNESS OF BREATH: ICD-10-CM

## 2024-08-19 DIAGNOSIS — R07.9 CHEST PAIN, UNSPECIFIED TYPE: Primary | ICD-10-CM

## 2024-08-19 LAB
ALBUMIN SERPL-MCNC: 4.6 G/DL (ref 3.4–5)
ALBUMIN/GLOB SERPL: 1.3 {RATIO} (ref 1.1–2.2)
ALP SERPL-CCNC: 131 U/L (ref 40–129)
ALT SERPL-CCNC: 17 U/L (ref 10–40)
ANION GAP SERPL CALCULATED.3IONS-SCNC: 11 MMOL/L (ref 3–16)
AST SERPL-CCNC: 18 U/L (ref 15–37)
BASE EXCESS BLDV CALC-SCNC: -1.3 MMOL/L (ref -3–3)
BASOPHILS # BLD: 0.1 K/UL (ref 0–0.2)
BASOPHILS NFR BLD: 1.1 %
BILIRUB SERPL-MCNC: 1 MG/DL (ref 0–1)
BUN SERPL-MCNC: 16 MG/DL (ref 7–20)
CALCIUM SERPL-MCNC: 9.8 MG/DL (ref 8.3–10.6)
CHLORIDE SERPL-SCNC: 104 MMOL/L (ref 99–110)
CO2 BLDV-SCNC: 24 MMOL/L
CO2 SERPL-SCNC: 25 MMOL/L (ref 21–32)
COHGB MFR BLDV: 1.1 % (ref 0–1.5)
CREAT SERPL-MCNC: 1 MG/DL (ref 0.8–1.3)
DEPRECATED RDW RBC AUTO: 13.8 % (ref 12.4–15.4)
ECHO AO ROOT DIAM: 3.8 CM
ECHO AO ROOT INDEX: 1.84 CM/M2
ECHO AV MEAN GRADIENT: 4 MMHG
ECHO AV MEAN VELOCITY: 0.9 M/S
ECHO AV PEAK GRADIENT: 6 MMHG
ECHO AV PEAK VELOCITY: 1.3 M/S
ECHO AV VELOCITY RATIO: 1
ECHO AV VTI: 32.8 CM
ECHO BSA: 2.08 M2
ECHO EST RA PRESSURE: 8 MMHG
ECHO LA AREA 2C: 21.4 CM2
ECHO LA AREA 4C: 23.6 CM2
ECHO LA MAJOR AXIS: 6.1 CM
ECHO LA MINOR AXIS: 6.2 CM
ECHO LA VOL BP: 67 ML (ref 18–58)
ECHO LA VOL MOD A2C: 60 ML (ref 18–58)
ECHO LA VOL MOD A4C: 73 ML (ref 18–58)
ECHO LA VOL/BSA BIPLANE: 33 ML/M2 (ref 16–34)
ECHO LA VOLUME INDEX MOD A2C: 29 ML/M2 (ref 16–34)
ECHO LA VOLUME INDEX MOD A4C: 35 ML/M2 (ref 16–34)
ECHO LV E' LATERAL VELOCITY: 10 CM/S
ECHO LV E' SEPTAL VELOCITY: 8 CM/S
ECHO LV FRACTIONAL SHORTENING: 31 % (ref 28–44)
ECHO LV INTERNAL DIMENSION DIASTOLE INDEX: 2.18 CM/M2
ECHO LV INTERNAL DIMENSION DIASTOLIC: 4.5 CM (ref 4.2–5.9)
ECHO LV INTERNAL DIMENSION SYSTOLIC INDEX: 1.5 CM/M2
ECHO LV INTERNAL DIMENSION SYSTOLIC: 3.1 CM
ECHO LV ISOVOLUMETRIC RELAXATION TIME (IVRT): 111 MS
ECHO LV IVSD: 1.1 CM (ref 0.6–1)
ECHO LV MASS 2D: 186.4 G (ref 88–224)
ECHO LV MASS INDEX 2D: 90.5 G/M2 (ref 49–115)
ECHO LV POSTERIOR WALL DIASTOLIC: 1.2 CM (ref 0.6–1)
ECHO LV RELATIVE WALL THICKNESS RATIO: 0.53
ECHO LVOT AV VTI INDEX: 0.81
ECHO LVOT MEAN GRADIENT: 3 MMHG
ECHO LVOT PEAK GRADIENT: 7 MMHG
ECHO LVOT PEAK VELOCITY: 1.3 M/S
ECHO LVOT VTI: 26.5 CM
ECHO MV A VELOCITY: 0.88 M/S
ECHO MV E DECELERATION TIME (DT): 190 MS
ECHO MV E VELOCITY: 0.9 M/S
ECHO MV E/A RATIO: 1.02
ECHO MV E/E' LATERAL: 9
ECHO MV E/E' RATIO (AVERAGED): 10.13
ECHO MV E/E' SEPTAL: 11.25
ECHO MV LVOT VTI INDEX: 1.1
ECHO MV MAX VELOCITY: 1 M/S
ECHO MV MEAN GRADIENT: 2 MMHG
ECHO MV MEAN VELOCITY: 0.6 M/S
ECHO MV PEAK GRADIENT: 4 MMHG
ECHO MV VTI: 29.2 CM
ECHO PV MAX VELOCITY: 1.1 M/S
ECHO PV MEAN GRADIENT: 3 MMHG
ECHO PV MEAN VELOCITY: 0.8 M/S
ECHO PV PEAK GRADIENT: 5 MMHG
ECHO PV VTI: 25.8 CM
ECHO RA AREA 4C: 28.2 CM2
ECHO RA END SYSTOLIC VOLUME APICAL 4 CHAMBER INDEX BSA: 46 ML/M2
ECHO RA VOLUME: 95 ML
ECHO RIGHT VENTRICULAR SYSTOLIC PRESSURE (RVSP): 25 MMHG
ECHO RV BASAL DIMENSION: 4.1 CM
ECHO RV FREE WALL PEAK S': 13 CM/S
ECHO RV LONGITUDINAL DIMENSION: 7.9 CM
ECHO RV MID DIMENSION: 3.5 CM
ECHO RV TAPSE: 2.4 CM (ref 1.7–?)
ECHO TV REGURGITANT MAX VELOCITY: 2.09 M/S
ECHO TV REGURGITANT PEAK GRADIENT: 17 MMHG
EKG ATRIAL RATE: 59 BPM
EKG DIAGNOSIS: NORMAL
EKG P AXIS: 84 DEGREES
EKG P-R INTERVAL: 230 MS
EKG Q-T INTERVAL: 408 MS
EKG QRS DURATION: 88 MS
EKG QTC CALCULATION (BAZETT): 403 MS
EKG R AXIS: -48 DEGREES
EKG T AXIS: 53 DEGREES
EKG VENTRICULAR RATE: 59 BPM
EOSINOPHIL # BLD: 0.1 K/UL (ref 0–0.6)
EOSINOPHIL NFR BLD: 0.9 %
GFR SERPLBLD CREATININE-BSD FMLA CKD-EPI: 78 ML/MIN/{1.73_M2}
GLUCOSE BLD-MCNC: 141 MG/DL (ref 70–99)
GLUCOSE SERPL-MCNC: 148 MG/DL (ref 70–99)
HCO3 BLDV-SCNC: 22.5 MMOL/L (ref 23–29)
HCT VFR BLD AUTO: 45.6 % (ref 40.5–52.5)
HGB BLD-MCNC: 15.6 G/DL (ref 13.5–17.5)
LIPASE SERPL-CCNC: 34 U/L (ref 13–60)
LYMPHOCYTES # BLD: 1.5 K/UL (ref 1–5.1)
LYMPHOCYTES NFR BLD: 26.1 %
MAGNESIUM SERPL-MCNC: 1.9 MG/DL (ref 1.8–2.4)
MCH RBC QN AUTO: 30 PG (ref 26–34)
MCHC RBC AUTO-ENTMCNC: 34.2 G/DL (ref 31–36)
MCV RBC AUTO: 87.8 FL (ref 80–100)
METHGB MFR BLDV: 0.3 %
MONOCYTES # BLD: 0.4 K/UL (ref 0–1.3)
MONOCYTES NFR BLD: 7.7 %
NEUTROPHILS # BLD: 3.7 K/UL (ref 1.7–7.7)
NEUTROPHILS NFR BLD: 64.2 %
NT-PROBNP SERPL-MCNC: 283 PG/ML (ref 0–449)
O2 CT VFR BLDV CALC: 19 VOL %
O2 THERAPY: ABNORMAL
PCO2 BLDV: 35.1 MMHG (ref 40–50)
PERFORMED ON: ABNORMAL
PH BLDV: 7.42 [PH] (ref 7.35–7.45)
PLATELET # BLD AUTO: 208 K/UL (ref 135–450)
PMV BLD AUTO: 8.8 FL (ref 5–10.5)
PO2 BLDV: 53.9 MMHG (ref 25–40)
POTASSIUM SERPL-SCNC: 4.6 MMOL/L (ref 3.5–5.1)
PROT SERPL-MCNC: 8.1 G/DL (ref 6.4–8.2)
RBC # BLD AUTO: 5.19 M/UL (ref 4.2–5.9)
SAO2 % BLDV: 89 %
SARS-COV-2 RDRP RESP QL NAA+PROBE: NOT DETECTED
SODIUM SERPL-SCNC: 140 MMOL/L (ref 136–145)
TROPONIN, HIGH SENSITIVITY: 15 NG/L (ref 0–22)
TROPONIN, HIGH SENSITIVITY: 15 NG/L (ref 0–22)
TROPONIN, HIGH SENSITIVITY: 17 NG/L (ref 0–22)
TSH SERPL DL<=0.005 MIU/L-ACNC: 1.03 UIU/ML (ref 0.27–4.2)
WBC # BLD AUTO: 5.8 K/UL (ref 4–11)

## 2024-08-19 PROCEDURE — 82803 BLOOD GASES ANY COMBINATION: CPT

## 2024-08-19 PROCEDURE — 84443 ASSAY THYROID STIM HORMONE: CPT

## 2024-08-19 PROCEDURE — 83735 ASSAY OF MAGNESIUM: CPT

## 2024-08-19 PROCEDURE — 83690 ASSAY OF LIPASE: CPT

## 2024-08-19 PROCEDURE — 93010 ELECTROCARDIOGRAM REPORT: CPT | Performed by: INTERNAL MEDICINE

## 2024-08-19 PROCEDURE — G0378 HOSPITAL OBSERVATION PER HR: HCPCS

## 2024-08-19 PROCEDURE — 87635 SARS-COV-2 COVID-19 AMP PRB: CPT

## 2024-08-19 PROCEDURE — 93005 ELECTROCARDIOGRAM TRACING: CPT | Performed by: STUDENT IN AN ORGANIZED HEALTH CARE EDUCATION/TRAINING PROGRAM

## 2024-08-19 PROCEDURE — 84484 ASSAY OF TROPONIN QUANT: CPT

## 2024-08-19 PROCEDURE — 6370000000 HC RX 637 (ALT 250 FOR IP): Performed by: NURSE PRACTITIONER

## 2024-08-19 PROCEDURE — 93306 TTE W/DOPPLER COMPLETE: CPT | Performed by: INTERNAL MEDICINE

## 2024-08-19 PROCEDURE — 71260 CT THORAX DX C+: CPT

## 2024-08-19 PROCEDURE — 99223 1ST HOSP IP/OBS HIGH 75: CPT | Performed by: NURSE PRACTITIONER

## 2024-08-19 PROCEDURE — 93306 TTE W/DOPPLER COMPLETE: CPT

## 2024-08-19 PROCEDURE — 36415 COLL VENOUS BLD VENIPUNCTURE: CPT

## 2024-08-19 PROCEDURE — 71046 X-RAY EXAM CHEST 2 VIEWS: CPT

## 2024-08-19 PROCEDURE — 85025 COMPLETE CBC W/AUTO DIFF WBC: CPT

## 2024-08-19 PROCEDURE — 83880 ASSAY OF NATRIURETIC PEPTIDE: CPT

## 2024-08-19 PROCEDURE — 99285 EMERGENCY DEPT VISIT HI MDM: CPT

## 2024-08-19 PROCEDURE — 6360000004 HC RX CONTRAST MEDICATION: Performed by: STUDENT IN AN ORGANIZED HEALTH CARE EDUCATION/TRAINING PROGRAM

## 2024-08-19 PROCEDURE — 6370000000 HC RX 637 (ALT 250 FOR IP): Performed by: STUDENT IN AN ORGANIZED HEALTH CARE EDUCATION/TRAINING PROGRAM

## 2024-08-19 PROCEDURE — 6370000000 HC RX 637 (ALT 250 FOR IP)

## 2024-08-19 PROCEDURE — 80053 COMPREHEN METABOLIC PANEL: CPT

## 2024-08-19 PROCEDURE — 2580000003 HC RX 258

## 2024-08-19 RX ORDER — MAGNESIUM SULFATE 1 G/100ML
1000 INJECTION INTRAVENOUS PRN
Status: DISCONTINUED | OUTPATIENT
Start: 2024-08-19 | End: 2024-08-21 | Stop reason: HOSPADM

## 2024-08-19 RX ORDER — POTASSIUM CHLORIDE 20 MEQ/1
40 TABLET, EXTENDED RELEASE ORAL PRN
Status: DISCONTINUED | OUTPATIENT
Start: 2024-08-19 | End: 2024-08-21 | Stop reason: HOSPADM

## 2024-08-19 RX ORDER — ONDANSETRON 2 MG/ML
4 INJECTION INTRAMUSCULAR; INTRAVENOUS EVERY 6 HOURS PRN
Status: DISCONTINUED | OUTPATIENT
Start: 2024-08-19 | End: 2024-08-21 | Stop reason: HOSPADM

## 2024-08-19 RX ORDER — DEXTROSE MONOHYDRATE 100 MG/ML
INJECTION, SOLUTION INTRAVENOUS CONTINUOUS PRN
Status: DISCONTINUED | OUTPATIENT
Start: 2024-08-19 | End: 2024-08-21 | Stop reason: HOSPADM

## 2024-08-19 RX ORDER — ACETAMINOPHEN 650 MG/1
650 SUPPOSITORY RECTAL EVERY 6 HOURS PRN
Status: DISCONTINUED | OUTPATIENT
Start: 2024-08-19 | End: 2024-08-21 | Stop reason: HOSPADM

## 2024-08-19 RX ORDER — SODIUM CHLORIDE 9 MG/ML
INJECTION, SOLUTION INTRAVENOUS PRN
Status: DISCONTINUED | OUTPATIENT
Start: 2024-08-19 | End: 2024-08-21 | Stop reason: HOSPADM

## 2024-08-19 RX ORDER — GLUCAGON 1 MG/ML
1 KIT INJECTION PRN
Status: DISCONTINUED | OUTPATIENT
Start: 2024-08-19 | End: 2024-08-21 | Stop reason: HOSPADM

## 2024-08-19 RX ORDER — NITROGLYCERIN 0.4 MG/1
0.4 TABLET SUBLINGUAL EVERY 5 MIN PRN
Status: DISCONTINUED | OUTPATIENT
Start: 2024-08-19 | End: 2024-08-21 | Stop reason: HOSPADM

## 2024-08-19 RX ORDER — ONDANSETRON 4 MG/1
4 TABLET, ORALLY DISINTEGRATING ORAL EVERY 8 HOURS PRN
Status: DISCONTINUED | OUTPATIENT
Start: 2024-08-19 | End: 2024-08-21 | Stop reason: HOSPADM

## 2024-08-19 RX ORDER — POTASSIUM CHLORIDE 7.45 MG/ML
10 INJECTION INTRAVENOUS PRN
Status: DISCONTINUED | OUTPATIENT
Start: 2024-08-19 | End: 2024-08-21 | Stop reason: HOSPADM

## 2024-08-19 RX ORDER — INSULIN LISPRO 100 [IU]/ML
0-4 INJECTION, SOLUTION INTRAVENOUS; SUBCUTANEOUS NIGHTLY
Status: DISCONTINUED | OUTPATIENT
Start: 2024-08-19 | End: 2024-08-21 | Stop reason: HOSPADM

## 2024-08-19 RX ORDER — AMLODIPINE BESYLATE 5 MG/1
5 TABLET ORAL DAILY
Status: DISCONTINUED | OUTPATIENT
Start: 2024-08-19 | End: 2024-08-21 | Stop reason: HOSPADM

## 2024-08-19 RX ORDER — TAMSULOSIN HYDROCHLORIDE 0.4 MG/1
0.4 CAPSULE ORAL DAILY
Status: DISCONTINUED | OUTPATIENT
Start: 2024-08-19 | End: 2024-08-21 | Stop reason: HOSPADM

## 2024-08-19 RX ORDER — REGADENOSON 0.08 MG/ML
0.4 INJECTION, SOLUTION INTRAVENOUS
Status: COMPLETED | OUTPATIENT
Start: 2024-08-19 | End: 2024-08-20

## 2024-08-19 RX ORDER — SODIUM CHLORIDE 0.9 % (FLUSH) 0.9 %
10 SYRINGE (ML) INJECTION PRN
Status: DISCONTINUED | OUTPATIENT
Start: 2024-08-19 | End: 2024-08-21 | Stop reason: HOSPADM

## 2024-08-19 RX ORDER — ACETAMINOPHEN 325 MG/1
650 TABLET ORAL EVERY 6 HOURS PRN
Status: DISCONTINUED | OUTPATIENT
Start: 2024-08-19 | End: 2024-08-21 | Stop reason: HOSPADM

## 2024-08-19 RX ORDER — INSULIN LISPRO 100 [IU]/ML
0-4 INJECTION, SOLUTION INTRAVENOUS; SUBCUTANEOUS
Status: DISCONTINUED | OUTPATIENT
Start: 2024-08-20 | End: 2024-08-21 | Stop reason: HOSPADM

## 2024-08-19 RX ORDER — POLYETHYLENE GLYCOL 3350 17 G/17G
17 POWDER, FOR SOLUTION ORAL DAILY PRN
Status: DISCONTINUED | OUTPATIENT
Start: 2024-08-19 | End: 2024-08-21 | Stop reason: HOSPADM

## 2024-08-19 RX ORDER — PANTOPRAZOLE SODIUM 40 MG/1
40 TABLET, DELAYED RELEASE ORAL 2 TIMES DAILY
Status: DISCONTINUED | OUTPATIENT
Start: 2024-08-19 | End: 2024-08-21 | Stop reason: HOSPADM

## 2024-08-19 RX ORDER — SUCRALFATE 1 G/1
1 TABLET ORAL 4 TIMES DAILY
Status: DISCONTINUED | OUTPATIENT
Start: 2024-08-19 | End: 2024-08-21 | Stop reason: HOSPADM

## 2024-08-19 RX ORDER — SODIUM CHLORIDE 0.9 % (FLUSH) 0.9 %
5-40 SYRINGE (ML) INJECTION EVERY 12 HOURS SCHEDULED
Status: DISCONTINUED | OUTPATIENT
Start: 2024-08-19 | End: 2024-08-21 | Stop reason: HOSPADM

## 2024-08-19 RX ORDER — ASPIRIN 81 MG/1
81 TABLET, CHEWABLE ORAL DAILY
Status: DISCONTINUED | OUTPATIENT
Start: 2024-08-20 | End: 2024-08-21 | Stop reason: HOSPADM

## 2024-08-19 RX ORDER — SUCRALFATE 1 G/1
1 TABLET ORAL ONCE
Status: COMPLETED | OUTPATIENT
Start: 2024-08-19 | End: 2024-08-19

## 2024-08-19 RX ORDER — ATORVASTATIN CALCIUM 40 MG/1
40 TABLET, FILM COATED ORAL NIGHTLY
Status: DISCONTINUED | OUTPATIENT
Start: 2024-08-19 | End: 2024-08-21 | Stop reason: HOSPADM

## 2024-08-19 RX ADMIN — TAMSULOSIN HYDROCHLORIDE 0.4 MG: 0.4 CAPSULE ORAL at 14:32

## 2024-08-19 RX ADMIN — Medication 10 ML: at 20:17

## 2024-08-19 RX ADMIN — IOPAMIDOL 75 ML: 755 INJECTION, SOLUTION INTRAVENOUS at 11:42

## 2024-08-19 RX ADMIN — SUCRALFATE 1 G: 1 TABLET ORAL at 23:48

## 2024-08-19 RX ADMIN — Medication 10 ML: at 14:32

## 2024-08-19 RX ADMIN — PANTOPRAZOLE SODIUM 40 MG: 40 TABLET, DELAYED RELEASE ORAL at 14:32

## 2024-08-19 RX ADMIN — PANTOPRAZOLE SODIUM 40 MG: 40 TABLET, DELAYED RELEASE ORAL at 20:16

## 2024-08-19 RX ADMIN — SUCRALFATE 1 G: 1 TABLET ORAL at 16:35

## 2024-08-19 RX ADMIN — APIXABAN 5 MG: 5 TABLET, FILM COATED ORAL at 14:32

## 2024-08-19 RX ADMIN — APIXABAN 5 MG: 5 TABLET, FILM COATED ORAL at 20:16

## 2024-08-19 RX ADMIN — ATORVASTATIN CALCIUM 40 MG: 40 TABLET, FILM COATED ORAL at 20:16

## 2024-08-19 RX ADMIN — SUCRALFATE 1 G: 1 TABLET ORAL at 20:16

## 2024-08-19 RX ADMIN — AMLODIPINE BESYLATE 5 MG: 5 TABLET ORAL at 14:32

## 2024-08-19 ASSESSMENT — PAIN - FUNCTIONAL ASSESSMENT
PAIN_FUNCTIONAL_ASSESSMENT: 0-10
PAIN_FUNCTIONAL_ASSESSMENT: NONE - DENIES PAIN

## 2024-08-19 ASSESSMENT — PAIN SCALES - GENERAL
PAINLEVEL_OUTOF10: 5
PAINLEVEL_OUTOF10: 3

## 2024-08-19 ASSESSMENT — LIFESTYLE VARIABLES
HOW OFTEN DO YOU HAVE A DRINK CONTAINING ALCOHOL: NEVER
HOW MANY STANDARD DRINKS CONTAINING ALCOHOL DO YOU HAVE ON A TYPICAL DAY: PATIENT DOES NOT DRINK

## 2024-08-19 NOTE — ED NOTES
Report given to PCU; pt left ED in stable condition. BP (!) 156/89   Pulse 60   Temp 98 °F (36.7 °C)   Resp 18   SpO2 99%

## 2024-08-19 NOTE — ED PROVIDER NOTES
Emergency Department Provider Note  Location: [facility] ED  8/19/2024     Patient Identification  Steve Murrieta is a 73 y.o. male    Chief Complaint  Chest Pain and Shortness of Breath (CP x 1 month, saw PCP who said it was pulled muscle. Also SOB x 2 days. Has cardiac history, on eliquis. Pain in L side of chest, denies injury. )      Mode of Arrival  private car    HPI  (History provided by patient)  This is a 73 y.o. male with a PMH significant for polysubstance abuse, paroxysmal A-fib, type 2 diabetes,  presented today for chest pain and shortness of breath.  Patient reports that he has been dealing with chest pain for the last month.  He saw his PCP who said it was a pulled muscle however his chest pain has increased and he also reports new onset shortness of breath the last 2 days.  He is currently anticoagulated on Eliquis.  Chest pain is left-sided.  He has had no recent stress test.  He also reports that he has some discomfort only when taking his Eliquis.  This does not occur when taking other medications.  Denies any fever. Denies any abdominal pain. Denies any changes to bowel/bladder    ROS  Review of Systems   Cardiovascular:  Positive for chest pain.   All other systems reviewed and are negative.        I have reviewed the following nursing documentation:  Allergies:   Allergies   Allergen Reactions    Latex      Added based on information entered during case entry, please review and add reactions, type, and severity as needed    Amitriptyline     Citalopram Hydrobromide     Lisinopril Cough    Naproxen     Other      No SSRI's    Paroxetine     Pcn [Penicillins]     Penicillins        Past medical history:  has a past medical history of Abnormal ultrasound of carotid artery, Back pain, Chronic back pain, Diabetes mellitus (HCC), DJD (degenerative joint disease), Fibromyalgia, Hypertension, Kidney stones, and Paroxysmal A-fib (HCC).    Past surgical history:  has a past surgical

## 2024-08-19 NOTE — PROGRESS NOTES
Patient admitted to room 302 from ER. Patient oriented to room, call light, bed rails, phone, lights and bathroom. Patient instructed about the schedule of the day including: vital sign frequency, lab draws, possible tests, frequency of MD and staff rounds, daily weights, I &O's and prescribed diet. 10 Telemetry box in place, patient aware of placement and reason. Bed locked, in lowest position, side rails up 2/4, call light within reach.        Recliner Assessment  Patient is able to demonstrate the ability to move from a reclining position to an upright position within the recliner.       4 Eyes Skin Assessment     NAME:  Steve Murrieta  YOB: 1950  MEDICAL RECORD NUMBER:  [id number]    The patient is being assessed for  Admission    I agree that at least one RN has performed a thorough Head to Toe Skin Assessment on the patient. ALL assessment sites listed below have been assessed.      Areas assessed by both nurses:    Head, Face, Ears, Shoulders, Back, Chest, Arms, Elbows, Hands, Sacrum. Buttock, Coccyx, Ischium, Legs. Feet and Heels, and Under Medical Devices         Does the Patient have a Wound? No noted wound(s)       Dalton Prevention initiated by RN: No  Wound Care Orders initiated by RN: No    Pressure Injury (Stage 3,4, Unstageable, DTI, NWPT, and Complex wounds) if present, place Wound referral order by RN under : No    New Ostomies, if present place, Ostomy referral order under : No     Nurse 1 eSignature: Electronically signed by Nika Chatman RN on 8/19/24 at 2:34 PM EDT    **SHARE this note so that the co-signing nurse can place an eSignature**    Nurse 2 eSignature: Electronically signed by Dian Bahena RN on 8/19/24 at 2:47 PM EDT

## 2024-08-19 NOTE — H&P
Hospital Medicine History & Physical      PCP: Morena Burns MD    Date of Admission: 2024    Date of Service: Pt seen/examined on 24     Chief Complaint:    Chief Complaint   Patient presents with    Chest Pain    Shortness of Breath     CP x 1 month, saw PCP who said it was pulled muscle. Also SOB x 2 days. Has cardiac history, on eliquis. Pain in L side of chest, denies injury.          History Of Present Illness:      The patient is a 73 y.o. male with PMH of chronic back pain, DM, fibromyalgia, HTN, PAF, and recurrent GIBs who presented to [facility] ED with complaint of chest pain and shortness of breath. Pt stated that his chest pain has been intermittent for about a month.  He denies any injury or fall prior to chest pain starting.  Sometimes his chest pain is reproducible. He stated the pain is in his left upper chest and sometimes radiates to his shoulder and hurts at times when he pushes on it. He does have some shortness of breath on exertion and minimal episodes of chest pain with exertion. He stated rest makes the pain better.  He stated he has been having some cramps in LLE as well, usually only when laying down.  He endorses intermittent wheezing, resolved at this time. He has occasional palpitations usually at rest when laying in bed.  Currently resting in bed.  Denies chest pain at this time. Pt stated his mother  early, unsure of age of heart attack.      Past Medical History:        Diagnosis Date    Abnormal ultrasound of carotid artery     CTA was normal    Back pain     epidual injectiion for back       Chronic back pain     MRI  Normal    Diabetes mellitus (HCC)     DJD (degenerative joint disease)     Rt. Hip    Fibromyalgia     Post Traumatic/Myofascial Pain    Hypertension     Kidney stones     2002    Paroxysmal A-fib (HCC)        Past Surgical History:        Procedure Laterality Date    BACK SURGERY      ESOPHAGOGASTRODUODENOSCOPY  2023    EGD BIOPSY

## 2024-08-19 NOTE — FLOWSHEET NOTE
08/19/24 1922   Handoff   Communication Given Shift Handoff   Handoff Given To NAIDA Rodriguez   Handoff Received From NAIDA Maher   Handoff Communication Face to Face   Time Handoff Given 1922   End of Shift Check Performed Yes     EOS report given to NAIDA Rodriguez. Pt in bed, call light within reach. Pt is stable, care is transferred.

## 2024-08-19 NOTE — FLOWSHEET NOTE
08/19/24 1400   Vital Signs   Temp 97.6 °F (36.4 °C)   Temp Source Oral   Pulse 60   Heart Rate Source Monitor   Respirations 16   BP (!) 173/122   MAP (Calculated) 139   BP Location Right upper arm   BP Method Automatic   Patient Position Semi fowlers     Admission assessment & vitals completed. Meds given per MAR. Pt oriented to room and call light. Pt currently having an ECHO performed in room. Pt denies any further needs at this time. Call light within reach, pt is stable.

## 2024-08-20 ENCOUNTER — HOSPITAL ENCOUNTER (OUTPATIENT)
Age: 74
Setting detail: OBSERVATION
Discharge: HOME | End: 2024-08-22
Payer: MEDICARE

## 2024-08-20 ENCOUNTER — APPOINTMENT (OUTPATIENT)
Dept: NUCLEAR MEDICINE | Age: 74
End: 2024-08-20
Payer: MEDICARE

## 2024-08-20 LAB
ANION GAP SERPL CALCULATED.3IONS-SCNC: 11 MMOL/L (ref 3–16)
BASOPHILS # BLD: 0.1 K/UL (ref 0–0.2)
BASOPHILS NFR BLD: 0.9 %
BUN SERPL-MCNC: 14 MG/DL (ref 7–20)
CALCIUM SERPL-MCNC: 9.1 MG/DL (ref 8.3–10.6)
CHLORIDE SERPL-SCNC: 104 MMOL/L (ref 99–110)
CHOLEST SERPL-MCNC: 128 MG/DL (ref 0–199)
CO2 SERPL-SCNC: 24 MMOL/L (ref 21–32)
CREAT SERPL-MCNC: 1 MG/DL (ref 0.8–1.3)
DEPRECATED RDW RBC AUTO: 13.6 % (ref 12.4–15.4)
ECHO BSA: 2.08 M2
EOSINOPHIL # BLD: 0.1 K/UL (ref 0–0.6)
EOSINOPHIL NFR BLD: 1 %
GFR SERPLBLD CREATININE-BSD FMLA CKD-EPI: 78 ML/MIN/{1.73_M2}
GLUCOSE BLD-MCNC: 143 MG/DL (ref 70–99)
GLUCOSE BLD-MCNC: 144 MG/DL (ref 70–99)
GLUCOSE BLD-MCNC: 158 MG/DL (ref 70–99)
GLUCOSE SERPL-MCNC: 130 MG/DL (ref 70–99)
HCT VFR BLD AUTO: 42.4 % (ref 40.5–52.5)
HDLC SERPL-MCNC: 33 MG/DL (ref 40–60)
HGB BLD-MCNC: 14.4 G/DL (ref 13.5–17.5)
LDLC SERPL CALC-MCNC: 79 MG/DL
LYMPHOCYTES # BLD: 1.5 K/UL (ref 1–5.1)
LYMPHOCYTES NFR BLD: 24.9 %
MCH RBC QN AUTO: 29.8 PG (ref 26–34)
MCHC RBC AUTO-ENTMCNC: 33.8 G/DL (ref 31–36)
MCV RBC AUTO: 88.2 FL (ref 80–100)
MONOCYTES # BLD: 0.4 K/UL (ref 0–1.3)
MONOCYTES NFR BLD: 7 %
NEUTROPHILS # BLD: 4 K/UL (ref 1.7–7.7)
NEUTROPHILS NFR BLD: 66.2 %
NUC STRESS EJECTION FRACTION: 74 %
NUC STRESS LV EDV: 77 ML (ref 67–155)
NUC STRESS LV ESV: 20 ML (ref 22–58)
NUC STRESS LV MASS: 113 G
PERFORMED ON: ABNORMAL
PLATELET # BLD AUTO: 201 K/UL (ref 135–450)
PMV BLD AUTO: 9.1 FL (ref 5–10.5)
POTASSIUM SERPL-SCNC: 3.9 MMOL/L (ref 3.5–5.1)
RBC # BLD AUTO: 4.81 M/UL (ref 4.2–5.9)
SODIUM SERPL-SCNC: 139 MMOL/L (ref 136–145)
STRESS BASELINE DIAS BP: 73 MMHG
STRESS BASELINE HR: 62 BPM
STRESS BASELINE SYS BP: 155 MMHG
STRESS ESTIMATED WORKLOAD: 1 METS
STRESS PEAK DIAS BP: 64 MMHG
STRESS PEAK SYS BP: 173 MMHG
STRESS PERCENT HR ACHIEVED: 69 %
STRESS POST PEAK HR: 100 BPM
STRESS RATE PRESSURE PRODUCT: ABNORMAL BPM*MMHG
STRESS TARGET HR: 145 BPM
TID: 0.9
TRIGL SERPL-MCNC: 81 MG/DL (ref 0–150)
VLDLC SERPL CALC-MCNC: 16 MG/DL
WBC # BLD AUTO: 6 K/UL (ref 4–11)

## 2024-08-20 PROCEDURE — 80061 LIPID PANEL: CPT

## 2024-08-20 PROCEDURE — 6360000002 HC RX W HCPCS

## 2024-08-20 PROCEDURE — G0378 HOSPITAL OBSERVATION PER HR: HCPCS

## 2024-08-20 PROCEDURE — 93018 CV STRESS TEST I&R ONLY: CPT | Performed by: STUDENT IN AN ORGANIZED HEALTH CARE EDUCATION/TRAINING PROGRAM

## 2024-08-20 PROCEDURE — 78452 HT MUSCLE IMAGE SPECT MULT: CPT | Performed by: STUDENT IN AN ORGANIZED HEALTH CARE EDUCATION/TRAINING PROGRAM

## 2024-08-20 PROCEDURE — 93017 CV STRESS TEST TRACING ONLY: CPT

## 2024-08-20 PROCEDURE — 93016 CV STRESS TEST SUPVJ ONLY: CPT | Performed by: STUDENT IN AN ORGANIZED HEALTH CARE EDUCATION/TRAINING PROGRAM

## 2024-08-20 PROCEDURE — 36415 COLL VENOUS BLD VENIPUNCTURE: CPT

## 2024-08-20 PROCEDURE — A9502 TC99M TETROFOSMIN: HCPCS

## 2024-08-20 PROCEDURE — 6370000000 HC RX 637 (ALT 250 FOR IP): Performed by: NURSE PRACTITIONER

## 2024-08-20 PROCEDURE — 3430000000 HC RX DIAGNOSTIC RADIOPHARMACEUTICAL

## 2024-08-20 PROCEDURE — 78452 HT MUSCLE IMAGE SPECT MULT: CPT

## 2024-08-20 PROCEDURE — 6370000000 HC RX 637 (ALT 250 FOR IP)

## 2024-08-20 PROCEDURE — 2580000003 HC RX 258

## 2024-08-20 PROCEDURE — 85025 COMPLETE CBC W/AUTO DIFF WBC: CPT

## 2024-08-20 PROCEDURE — 80048 BASIC METABOLIC PNL TOTAL CA: CPT

## 2024-08-20 RX ADMIN — AMLODIPINE BESYLATE 5 MG: 5 TABLET ORAL at 09:48

## 2024-08-20 RX ADMIN — REGADENOSON 0.4 MG: 0.08 INJECTION, SOLUTION INTRAVENOUS at 13:13

## 2024-08-20 RX ADMIN — SUCRALFATE 1 G: 1 TABLET ORAL at 16:13

## 2024-08-20 RX ADMIN — Medication 10 ML: at 20:55

## 2024-08-20 RX ADMIN — TETROFOSMIN 33 MILLICURIE: 1.38 INJECTION, POWDER, LYOPHILIZED, FOR SOLUTION INTRAVENOUS at 13:12

## 2024-08-20 RX ADMIN — TAMSULOSIN HYDROCHLORIDE 0.4 MG: 0.4 CAPSULE ORAL at 16:13

## 2024-08-20 RX ADMIN — PANTOPRAZOLE SODIUM 40 MG: 40 TABLET, DELAYED RELEASE ORAL at 20:51

## 2024-08-20 RX ADMIN — ASPIRIN 81 MG: 81 TABLET, CHEWABLE ORAL at 16:13

## 2024-08-20 RX ADMIN — SUCRALFATE 1 G: 1 TABLET ORAL at 20:51

## 2024-08-20 RX ADMIN — SUCRALFATE 1 G: 1 TABLET ORAL at 09:48

## 2024-08-20 RX ADMIN — TETROFOSMIN 10.7 MILLICURIE: 1.38 INJECTION, POWDER, LYOPHILIZED, FOR SOLUTION INTRAVENOUS at 10:48

## 2024-08-20 RX ADMIN — ATORVASTATIN CALCIUM 40 MG: 40 TABLET, FILM COATED ORAL at 20:51

## 2024-08-20 RX ADMIN — APIXABAN 5 MG: 5 TABLET, FILM COATED ORAL at 16:13

## 2024-08-20 RX ADMIN — Medication 10 ML: at 09:50

## 2024-08-20 NOTE — DISCHARGE SUMMARY
Name:  Steve Murrieta  Room:  /0302-01  MRN:    [id number]    Discharge Summary      This discharge summary is in conjunction with a complete physical exam done on the day of discharge.    Attending Physician: Dr. Goddard  Discharging Provider: ***      Admit: 2024  Discharge:  ***    HPI:  The patient is a 73 y.o. male with PMH of chronic back pain, DM, fibromyalgia, HTN, PAF, and recurrent GIBs who [facility] with complaint of chest pain and shortness of breath. Pt stated that his chest pain has been intermittent for about a month.  He denies any injury or fall prior to chest pain starting.  Sometimes his chest pain is reproducible. He stated the pain is in his left upper chest and sometimes radiates to his shoulder and hurts at times when he pushes on it. He does have some shortness of breath on exertion and minimal episodes of chest pain with exertion. He stated rest makes the pain better.  He stated he has been having some cramps in LLE as well, usually only when laying down.  He endorses intermittent wheezing, resolved at this time. He has occasional palpitations usually at rest when laying in bed.  Currently resting in bed.  Denies chest pain at this time. Pt stated his mother  early, unsure of age of heart attack.       Diagnoses this Admission and Hospital Course   Chest pain  CT with mild coronary calcification and trace pericardial effusion  - Continue aspirin and Lipitor  - EKG without any ischemic changes   - Chest x-ray: WNL; CT chest negative for PE, no acute consolidation  - mild coronary calcification and trace pericardial effusion  - Trend troponins: Negative x2, repeat ordered   - Stress test and echo ordered   - PRN symptom control   - consider cardiology consulted  - Echo as below.  Stress test abnormal  Cardiology consulted.      DM type 2  - SSI to cover; diet controlled at home   - previous A1c was 7  - carb control diet  - monitored BG      Emphysema  - noted on CT  -

## 2024-08-20 NOTE — PROGRESS NOTES
Pt in bed, awake, A/O X4. Shift assessment complete, night meds given. Pt C/O intermittent CP.  Night time snack given. . No other needs expressed. Call light in reach. Will monitor. Carolyn Wong RN

## 2024-08-20 NOTE — FLOWSHEET NOTE
08/20/24 0710   Vital Signs   Temp 97.8 °F (36.6 °C)   Temp Source Oral   Pulse 58   Heart Rate Source Monitor   Respirations 16   /64   MAP (Calculated) 88   BP Location Right upper arm   BP Method Automatic   Patient Position Lying left side     Assessment & vital signs completed. Morning meds held due to stress test.  Pt denies any further needs at this time. Call light within reach, pt is stable.

## 2024-08-20 NOTE — CARE COORDINATION
Case Management Assessment  Initial Evaluation    Date/Time of Evaluation: 8/20/2024 9:06 AM  Assessment Completed by: Kelton Gonzalez RN    If patient is discharged prior to next notation, then this note serves as note for discharge by case management.    Patient Name: Steve Murrieta                   YOB: 1950  Diagnosis: Shortness of breath [R06.02]  Chest pain [R07.9]  Chest pain, unspecified type [R07.9]                   Date / Time: 8/19/2024 10:20 AM    Patient Admission Status: Observation   Readmission Risk (Low < 19, Mod (19-27), High > 27): Readmission Risk Score: 10.1    Current PCP: Morena Burns MD  PCP verified by CM? Yes    Chart Reviewed: Yes      History Provided by: Patient  Patient Orientation: Alert and Oriented    Patient Cognition: Alert    Hospitalization in the last 30 days (Readmission):  No    If yes, Readmission Assessment in CM Navigator will be completed.    Advance Directives:      Code Status: Full Code   Patient's Primary Decision Maker is: Legal Next of Kin    Primary Decision Maker: gilles Gill - Child - [phone]    Discharge Planning:    Patient lives with: Alone Type of Home: House  Primary Care Giver: Self  Patient Support Systems include: Children   Current Financial resources: Medicare  Current community resources: None  Current services prior to admission: Durable Medical Equipment            Current DME: Cane            Type of Home Care services:  None    ADLS  Prior functional level: Independent in ADLs/IADLs  Current functional level: Independent in ADLs/IADLs    PT AM-PAC:   /24  OT AM-PAC:   /24    Family can provide assistance at DC: Yes  Would you like Case Management to discuss the discharge plan with any other family members/significant others, and if so, who? No  Plans to Return to Present Housing: Yes  Other Identified Issues/Barriers to RETURNING to current housing: na    Potential Assistance needed at discharge: N/A

## 2024-08-20 NOTE — PROGRESS NOTES
Progress Note    Admit Date:  8/19/2024    73 y.o. male with PMH of chronic back pain, DM, fibromyalgia, HTN, PAF, and recurrent GIBs who presented to [facility] ED with complaint of chest pain and shortness of breath.  Admitted for chest pain workup.     Subjective:  Mr. Murrieta seen this morning.  Still with chest pain when he coughs or takes a deep breath.     Objective:   Vitals:    08/20/24 0849   BP: 129/75   Pulse: 56   Resp: 17   Temp: 97.5 °F (36.4 °C)   SpO2: 97%            Intake/Output Summary (Last 24 hours) at 8/20/2024 1547  Last data filed at 8/20/2024 0855  Gross per 24 hour   Intake 666 ml   Output 872 ml   Net -206 ml       Physical Exam:    Gen: No distress. Alert. Appears chronically ill  Eyes: PERRL. No sclera icterus. No conjunctival injection.   ENT: No discharge. Pharynx clear.   Neck: No JVD. Trachea midline.  Resp: No accessory muscle use. No crackles. No wheezes. No rhonchi.   CV: Regular rate. Regular rhythm. No murmur.  No rub. No edema.   Capillary Refill: Brisk,< 3 seconds   GI: Non-tender. Non-distended. Normal bowel sounds.  Skin: Warm and dry. No nodule on exposed extremities. No rash on exposed extremities.   M/S: No cyanosis. No joint deformity. No clubbing.   Neuro: Awake. Grossly nonfocal    Psych: Oriented x 3. No anxiety or agitation.     Data:  CBC:   Recent Labs     08/19/24  1031 08/20/24  0445   WBC 5.8 6.0   HGB 15.6 14.4   HCT 45.6 42.4   MCV 87.8 88.2    201     BMP:   Recent Labs     08/19/24  1031 08/20/24  0445    139   K 4.6 3.9    104   CO2 25 24   BUN 16 14   CREATININE 1.0 1.0     LIVER PROFILE:   Recent Labs     08/19/24  1031   AST 18   ALT 17   LIPASE 34.0   BILITOT 1.0   ALKPHOS 131*     PT/INR: No results for input(s): \"PROTIME\", \"INR\" in the last 72 hours.    CULTURES  Results       Procedure Component Value Units Date/Time    COVID-19, Rapid [[id number]] Collected: 08/19/24 1315    Order Status: Completed Specimen: Nasopharyngeal Swab

## 2024-08-20 NOTE — ACP (ADVANCE CARE PLANNING)
Advance Care Planning     General Advance Care Planning (ACP) Conversation    Date of Conversation: 8/20/2024  Conducted with: Patient with Decision Making Capacity  Other persons present: None    Healthcare Decision Maker:   Primary Decision Maker: Victoriajonathangilles miguel - Child - [phone]       Content/Action Overview:  DECLINED ACP Conversation - will revisit periodically  Reviewed DNR/DNI and patient elects Full Code (Attempt Resuscitation)        Length of Voluntary ACP Conversation in minutes:  <16 minutes (Non-Billable)    Kelton Gonzalez RN

## 2024-08-20 NOTE — PROGRESS NOTES
Pt in bed, eyes closed. No distress noted. Call light in reach. Will continue to monitor.  Carolyn Wong RN

## 2024-08-20 NOTE — FLOWSHEET NOTE
08/20/24 1914   Handoff   Communication Given Shift Handoff   Handoff Given To NAIDA Rodriguez   Handoff Received From NAIDA aMher   Handoff Communication Face to Face   Time Handoff Given 1914   End of Shift Check Performed Yes     EOS report given to NAIDA Rodriguez. Pt in bed, call light within reach. Pt is stable, care is transferred.

## 2024-08-21 ENCOUNTER — HOSPITAL ENCOUNTER (INPATIENT)
Age: 74
LOS: 2 days | Discharge: HOME | End: 2024-08-23
Attending: INTERNAL MEDICINE | Admitting: INTERNAL MEDICINE
Payer: MEDICARE

## 2024-08-21 VITALS
HEIGHT: 70 IN | DIASTOLIC BLOOD PRESSURE: 70 MMHG | TEMPERATURE: 97.7 F | HEART RATE: 66 BPM | OXYGEN SATURATION: 96 % | SYSTOLIC BLOOD PRESSURE: 132 MMHG | WEIGHT: 185.85 LBS | RESPIRATION RATE: 16 BRPM | BODY MASS INDEX: 26.61 KG/M2

## 2024-08-21 DIAGNOSIS — R07.9 CHEST PAIN: ICD-10-CM

## 2024-08-21 LAB
ANION GAP SERPL CALCULATED.3IONS-SCNC: 12 MMOL/L (ref 3–16)
BASOPHILS # BLD: 0.1 K/UL (ref 0–0.2)
BASOPHILS NFR BLD: 1 %
BUN SERPL-MCNC: 15 MG/DL (ref 7–20)
CALCIUM SERPL-MCNC: 9 MG/DL (ref 8.3–10.6)
CHLORIDE SERPL-SCNC: 102 MMOL/L (ref 99–110)
CO2 SERPL-SCNC: 23 MMOL/L (ref 21–32)
CREAT SERPL-MCNC: 0.8 MG/DL (ref 0.8–1.3)
DEPRECATED RDW RBC AUTO: 13.5 % (ref 12.4–15.4)
EOSINOPHIL # BLD: 0.1 K/UL (ref 0–0.6)
EOSINOPHIL NFR BLD: 1.5 %
GFR SERPLBLD CREATININE-BSD FMLA CKD-EPI: >90 ML/MIN/{1.73_M2}
GLUCOSE BLD-MCNC: 122 MG/DL (ref 70–99)
GLUCOSE BLD-MCNC: 129 MG/DL (ref 70–99)
GLUCOSE BLD-MCNC: 170 MG/DL (ref 70–99)
GLUCOSE BLD-MCNC: 206 MG/DL (ref 70–99)
GLUCOSE SERPL-MCNC: 129 MG/DL (ref 70–99)
HCT VFR BLD AUTO: 41.1 % (ref 40.5–52.5)
HGB BLD-MCNC: 14.5 G/DL (ref 13.5–17.5)
LYMPHOCYTES # BLD: 1.5 K/UL (ref 1–5.1)
LYMPHOCYTES NFR BLD: 29 %
MCH RBC QN AUTO: 30.3 PG (ref 26–34)
MCHC RBC AUTO-ENTMCNC: 35.3 G/DL (ref 31–36)
MCV RBC AUTO: 85.9 FL (ref 80–100)
MONOCYTES # BLD: 0.4 K/UL (ref 0–1.3)
MONOCYTES NFR BLD: 7.7 %
NEUTROPHILS # BLD: 3.2 K/UL (ref 1.7–7.7)
NEUTROPHILS NFR BLD: 60.8 %
PERFORMED ON: ABNORMAL
PLATELET # BLD AUTO: 194 K/UL (ref 135–450)
PMV BLD AUTO: 9 FL (ref 5–10.5)
POTASSIUM SERPL-SCNC: 4.1 MMOL/L (ref 3.5–5.1)
RBC # BLD AUTO: 4.78 M/UL (ref 4.2–5.9)
SODIUM SERPL-SCNC: 137 MMOL/L (ref 136–145)
WBC # BLD AUTO: 5.2 K/UL (ref 4–11)

## 2024-08-21 PROCEDURE — 6370000000 HC RX 637 (ALT 250 FOR IP): Performed by: INTERNAL MEDICINE

## 2024-08-21 PROCEDURE — 6360000002 HC RX W HCPCS: Performed by: INTERNAL MEDICINE

## 2024-08-21 PROCEDURE — G0378 HOSPITAL OBSERVATION PER HR: HCPCS

## 2024-08-21 PROCEDURE — 36415 COLL VENOUS BLD VENIPUNCTURE: CPT

## 2024-08-21 PROCEDURE — 1200000000 HC SEMI PRIVATE

## 2024-08-21 PROCEDURE — 99223 1ST HOSP IP/OBS HIGH 75: CPT | Performed by: INTERNAL MEDICINE

## 2024-08-21 PROCEDURE — 85025 COMPLETE CBC W/AUTO DIFF WBC: CPT

## 2024-08-21 PROCEDURE — 83036 HEMOGLOBIN GLYCOSYLATED A1C: CPT

## 2024-08-21 PROCEDURE — 80048 BASIC METABOLIC PNL TOTAL CA: CPT

## 2024-08-21 PROCEDURE — 6370000000 HC RX 637 (ALT 250 FOR IP): Performed by: NURSE PRACTITIONER

## 2024-08-21 PROCEDURE — 96372 THER/PROPH/DIAG INJ SC/IM: CPT

## 2024-08-21 PROCEDURE — 6370000000 HC RX 637 (ALT 250 FOR IP)

## 2024-08-21 PROCEDURE — 2580000003 HC RX 258

## 2024-08-21 RX ORDER — DEXTROSE MONOHYDRATE 100 MG/ML
INJECTION, SOLUTION INTRAVENOUS CONTINUOUS PRN
Status: DISCONTINUED | OUTPATIENT
Start: 2024-08-21 | End: 2024-08-23 | Stop reason: HOSPADM

## 2024-08-21 RX ORDER — GLUCAGON 1 MG/ML
1 KIT INJECTION PRN
Status: DISCONTINUED | OUTPATIENT
Start: 2024-08-21 | End: 2024-08-23 | Stop reason: HOSPADM

## 2024-08-21 RX ORDER — INSULIN LISPRO 100 [IU]/ML
0-4 INJECTION, SOLUTION INTRAVENOUS; SUBCUTANEOUS
Status: DISCONTINUED | OUTPATIENT
Start: 2024-08-21 | End: 2024-08-23 | Stop reason: HOSPADM

## 2024-08-21 RX ORDER — TAMSULOSIN HYDROCHLORIDE 0.4 MG/1
0.4 CAPSULE ORAL DAILY
Status: DISCONTINUED | OUTPATIENT
Start: 2024-08-22 | End: 2024-08-23 | Stop reason: HOSPADM

## 2024-08-21 RX ORDER — POLYETHYLENE GLYCOL 3350 17 G/17G
17 POWDER, FOR SOLUTION ORAL DAILY PRN
Status: DISCONTINUED | OUTPATIENT
Start: 2024-08-21 | End: 2024-08-23 | Stop reason: HOSPADM

## 2024-08-21 RX ORDER — ENOXAPARIN SODIUM 100 MG/ML
1 INJECTION SUBCUTANEOUS 2 TIMES DAILY
Status: DISCONTINUED | OUTPATIENT
Start: 2024-08-21 | End: 2024-08-21 | Stop reason: HOSPADM

## 2024-08-21 RX ORDER — ACETAMINOPHEN 325 MG/1
650 TABLET ORAL EVERY 6 HOURS PRN
Status: DISCONTINUED | OUTPATIENT
Start: 2024-08-21 | End: 2024-08-23 | Stop reason: HOSPADM

## 2024-08-21 RX ORDER — INSULIN LISPRO 100 [IU]/ML
0-4 INJECTION, SOLUTION INTRAVENOUS; SUBCUTANEOUS NIGHTLY
Status: DISCONTINUED | OUTPATIENT
Start: 2024-08-21 | End: 2024-08-23 | Stop reason: HOSPADM

## 2024-08-21 RX ORDER — ACETAMINOPHEN 650 MG/1
650 SUPPOSITORY RECTAL EVERY 6 HOURS PRN
Status: DISCONTINUED | OUTPATIENT
Start: 2024-08-21 | End: 2024-08-23 | Stop reason: HOSPADM

## 2024-08-21 RX ORDER — ASPIRIN 81 MG/1
81 TABLET, CHEWABLE ORAL DAILY
Status: DISCONTINUED | OUTPATIENT
Start: 2024-08-22 | End: 2024-08-23 | Stop reason: HOSPADM

## 2024-08-21 RX ORDER — SODIUM CHLORIDE 9 MG/ML
INJECTION, SOLUTION INTRAVENOUS PRN
Status: DISCONTINUED | OUTPATIENT
Start: 2024-08-21 | End: 2024-08-23 | Stop reason: HOSPADM

## 2024-08-21 RX ORDER — SODIUM CHLORIDE 0.9 % (FLUSH) 0.9 %
5-40 SYRINGE (ML) INJECTION EVERY 12 HOURS SCHEDULED
Status: DISCONTINUED | OUTPATIENT
Start: 2024-08-21 | End: 2024-08-23 | Stop reason: HOSPADM

## 2024-08-21 RX ORDER — SODIUM CHLORIDE 0.9 % (FLUSH) 0.9 %
5-40 SYRINGE (ML) INJECTION PRN
Status: DISCONTINUED | OUTPATIENT
Start: 2024-08-21 | End: 2024-08-23 | Stop reason: HOSPADM

## 2024-08-21 RX ORDER — AMLODIPINE BESYLATE 5 MG/1
5 TABLET ORAL DAILY
Status: DISCONTINUED | OUTPATIENT
Start: 2024-08-22 | End: 2024-08-23 | Stop reason: HOSPADM

## 2024-08-21 RX ORDER — ONDANSETRON 2 MG/ML
4 INJECTION INTRAMUSCULAR; INTRAVENOUS EVERY 6 HOURS PRN
Status: DISCONTINUED | OUTPATIENT
Start: 2024-08-21 | End: 2024-08-23 | Stop reason: HOSPADM

## 2024-08-21 RX ORDER — SUCRALFATE 1 G/1
1 TABLET ORAL 4 TIMES DAILY
Status: DISCONTINUED | OUTPATIENT
Start: 2024-08-21 | End: 2024-08-23 | Stop reason: HOSPADM

## 2024-08-21 RX ORDER — ATORVASTATIN CALCIUM 40 MG/1
40 TABLET, FILM COATED ORAL NIGHTLY
Status: DISCONTINUED | OUTPATIENT
Start: 2024-08-21 | End: 2024-08-23 | Stop reason: HOSPADM

## 2024-08-21 RX ORDER — PANTOPRAZOLE SODIUM 40 MG/1
40 TABLET, DELAYED RELEASE ORAL 2 TIMES DAILY
Status: DISCONTINUED | OUTPATIENT
Start: 2024-08-21 | End: 2024-08-23 | Stop reason: HOSPADM

## 2024-08-21 RX ORDER — ASPIRIN 81 MG/1
81 TABLET, CHEWABLE ORAL DAILY
Status: DISCONTINUED | OUTPATIENT
Start: 2024-08-22 | End: 2024-08-21 | Stop reason: SDUPTHER

## 2024-08-21 RX ORDER — ENOXAPARIN SODIUM 100 MG/ML
1 INJECTION SUBCUTANEOUS 2 TIMES DAILY
Status: DISCONTINUED | OUTPATIENT
Start: 2024-08-21 | End: 2024-08-23

## 2024-08-21 RX ORDER — ONDANSETRON 4 MG/1
4 TABLET, ORALLY DISINTEGRATING ORAL EVERY 8 HOURS PRN
Status: DISCONTINUED | OUTPATIENT
Start: 2024-08-21 | End: 2024-08-23 | Stop reason: HOSPADM

## 2024-08-21 RX ADMIN — PANTOPRAZOLE SODIUM 40 MG: 40 TABLET, DELAYED RELEASE ORAL at 10:14

## 2024-08-21 RX ADMIN — ENOXAPARIN SODIUM 80 MG: 100 INJECTION SUBCUTANEOUS at 10:22

## 2024-08-21 RX ADMIN — AMLODIPINE BESYLATE 5 MG: 5 TABLET ORAL at 10:14

## 2024-08-21 RX ADMIN — SUCRALFATE 1 G: 1 TABLET ORAL at 18:23

## 2024-08-21 RX ADMIN — SUCRALFATE 1 G: 1 TABLET ORAL at 20:49

## 2024-08-21 RX ADMIN — ASPIRIN 81 MG: 81 TABLET, CHEWABLE ORAL at 10:14

## 2024-08-21 RX ADMIN — Medication 10 ML: at 10:16

## 2024-08-21 RX ADMIN — INSULIN LISPRO 1 UNITS: 100 INJECTION, SOLUTION INTRAVENOUS; SUBCUTANEOUS at 12:07

## 2024-08-21 RX ADMIN — ATORVASTATIN CALCIUM 40 MG: 40 TABLET, FILM COATED ORAL at 20:49

## 2024-08-21 RX ADMIN — PANTOPRAZOLE SODIUM 40 MG: 40 TABLET, DELAYED RELEASE ORAL at 20:49

## 2024-08-21 RX ADMIN — SUCRALFATE 1 G: 1 TABLET ORAL at 10:14

## 2024-08-21 RX ADMIN — TAMSULOSIN HYDROCHLORIDE 0.4 MG: 0.4 CAPSULE ORAL at 10:14

## 2024-08-21 RX ADMIN — ENOXAPARIN SODIUM 80 MG: 100 INJECTION SUBCUTANEOUS at 20:49

## 2024-08-21 ASSESSMENT — PAIN DESCRIPTION - LOCATION: LOCATION: CHEST

## 2024-08-21 ASSESSMENT — PAIN SCALES - GENERAL
PAINLEVEL_OUTOF10: 2
PAINLEVEL_OUTOF10: 5

## 2024-08-21 ASSESSMENT — LIFESTYLE VARIABLES
HOW MANY STANDARD DRINKS CONTAINING ALCOHOL DO YOU HAVE ON A TYPICAL DAY: PATIENT DOES NOT DRINK
HOW OFTEN DO YOU HAVE A DRINK CONTAINING ALCOHOL: NEVER

## 2024-08-21 NOTE — CONSULTS
[facility]   CONSULTATION  [phone]      Attending Physician: Canelo Sanchez MD  Reason for Consultation/Chief Complaint: Chest pain    Subjective   History of Present Illness:  Steve Murrieta is a 73 y.o. patient who presented to the hospital with complaints of chest pain for last few days, he says it is worse with inspiration, he continues to have a, he presented to [facility], he was admitted there on August 19, 2024, troponin level was negative, he underwent stress testing, and this was found to be abnormal with possible ischemia, he was referred to [facility] for evaluation.    He has a cardiac history which dates back to  2015, he had atrial fibrillation since then, he has been on chronic anticoagulation with Eliquis, he had previously followed with Dr. Yee in our office and is transition to Dr. Mathew as of February 2024, he is being considered for watchman due to history of GI bleed as well as due to lack of affordability of Eliquis.    Past Medical History:   has a past medical history of Abnormal ultrasound of carotid artery, Back pain, Chronic back pain, Diabetes mellitus (HCC), DJD (degenerative joint disease), Fibromyalgia, Hypertension, Kidney stones, and Paroxysmal A-fib (HCC).    Surgical History:   has a past surgical history that includes back surgery; Upper gastrointestinal endoscopy (12/16/2011); Upper gastrointestinal endoscopy (12/16/2011); Lithotripsy; other surgical history (04/21/2021); ventral hernia repair (N/A, 04/21/2021); hernia repair; Esophagogastroduodenoscopy (08/09/2023); Upper gastrointestinal endoscopy (N/A, 08/09/2023); other surgical history (Left, 09/25/2023); and Total hip arthroplasty (Left, 9/25/2023).     Social History:   reports that he quit smoking about 15 years ago. His smoking use included cigarettes. He has never used smokeless tobacco. He reports current alcohol use of about 2.0 standard drinks of alcohol per week. He  the apex and apical to mid inferolateral wall.  There is adjacent extracardiac radiotracer uptake adjacent to these areas.  However with attenuation correction, the perfusion defect improves at rest and is still present during stress.  This is most consistent with myocardial ischemia.    Stress Function: Normal LV size, wall motion and function.  Post-stress ejection fraction estimated at 70%.    Perfusion Conclusion: There is no evidence of transient ischemic dilation (TID). TID ratio is 0.90.    Image quality is fair.    Stress ECG: Arrhythmias during stress: occasional PVCs. Non-specific ST abnormality noted. Nonspecific ST wave abnormality in recovery.  Otherwise, ECG portion is nondiagnostic secondary to pharmacologic protocol.    Consider cardiology consultation.    2015    NM: 12/08/15  IMPRESSION: 1. No pharmcologically induced reversible perfusion defects to suggest ischemia. 2. Ejection fraction of 68%, normal. 3. No focal wall motion abnormality.       Cath:  none recently     Studies:     Ct chest    IMPRESSION:  1. No pulmonary embolism.  2. Mild emphysema.  No acute consolidation.                  I have reviewed labs and imaging/xray/diagnostic testing in this note.    Assessment and Plan          Patient Active Problem List   Diagnosis    Hypertension    Fibromyalgia    Chronic back pain    DJD (degenerative joint disease)    ED (erectile dysfunction)    Duodenal ulcer    Nausea and vomiting    Esophageal candidiasis (HCC)    Lumbago    PAF (paroxysmal atrial fibrillation) ([facility])    Pleuritic chest pain    Primary hypertension    First degree atrioventricular block    Sinus bradycardia    Elevated BP without diagnosis of hypertension    Hyperglycemia    Type 2 diabetes mellitus without complication, without long-term current use of insulin (HCC)    Ventral hernia    YENY (acute kidney injury) ([facility])    Intractable vomiting    SIRS (systemic inflammatory response syndrome) ([facility])    Hyperlactatemia

## 2024-08-21 NOTE — CONSULTS
[facility]   CONSULTATION  [phone]        Reason for Consultation/Chief Complaint: \"I have been having chest pain and abnormal cardiac stress test.\"    History of Present Illness:  Steve Murrieta is a 73 y.o. patient who presented to the hospital with complaints of  chest pain across his anterior chest for a month. He had chest xray as outpatient that was neg according to him.  His pain got worse and he came in for evaluation. Pain is constant worse on deep breath or  chest movement but not on exertion. He has dry cough that makes his pain worse.  No prior CAD but has  parox afib being followed by EP doctor.  He is on anticoagulants. As part of work up of chest pain he had a stress test which is abnormal for ischemia.   I have been asked to provide consultation regarding further management and testing.    Patient follows with my partner Dr. Graff and LOV was 6/12/24. PMH HTN, left carotid stenosis, DM, paroxysmal atrial fibrillation 2015. Patient with one episode of atrial fibrillation documented in 2015 with no other documented episodes. He has occasional palpitations and occasional shortness of breath at rest. Attempted to place an event monitor to assess his current burden of arrhythmia (if any) and to assess for any bradycardia issues. He did not tolerate the patch and decided against continuing with monitoring.     Past Medical History:   has a past medical history of Abnormal ultrasound of carotid artery, Back pain, Chronic back pain, Diabetes mellitus (HCC), DJD (degenerative joint disease), Fibromyalgia, Hypertension, Kidney stones, and Paroxysmal A-fib (HCC).    Surgical History:   has a past surgical history that includes back surgery; Upper gastrointestinal endoscopy (12/16/2011); Upper gastrointestinal endoscopy (12/16/2011); Lithotripsy; other surgical history (04/21/2021); ventral hernia repair (N/A, 04/21/2021); hernia repair; Esophagogastroduodenoscopy (08/09/2023); Upper

## 2024-08-21 NOTE — DISCHARGE SUMMARY
Hospital Medicine Discharge Summary    Patient: Steve Murrieta     Gender: male  : 1950   Age: 73 y.o.  MRN: [id number]    Admitting Physician: Patti Goddard DO  Discharge Physician: Patti Goddard DO    Code Status: Full Code     Admit Date: 2024   Discharge Date: 2024    Discharge Diagnoses:    Active Hospital Problems    Diagnosis Date Noted    Chest pain [R07.9] 2024    Shortness of breath [R06.02] 2024    Type 2 diabetes mellitus without complication, without long-term current use of insulin (HCC) [E11.9]     Sinus bradycardia [R00.1] 2018    PAF (paroxysmal atrial fibrillation) ([facility]) [I48.0] 2015     Condition at Discharge: Stable transfer to [facility] Course:     Chest pain  CT with mild coronary calcification and trace pericardial effusion  - Continue aspirin and Lipitor  - EKG without any ischemic changes   - Chest x-ray: WNL; CT chest negative for PE, no acute consolidation  - mild coronary calcification and trace pericardial effusion  - Trend troponins: Negative x2, repeat ordered   - Stress test and echo ordered   - PRN symptom control   - cardiology consulted  - Echo as below.  Stress test abnormal  Cardiology consulted. Discussed case. Recommended to transfer to [place] for further assessment with interventional cardiology given the stress test results.      DM type 2  - SSI to cover; diet controlled at home   - previous A1c was 7  - carb control diet  - monitored BG      Emphysema  - noted on CT  - smoked for many years per patient- quit in   - no wheezes on exam does not appear to be in AE     HTN  - on Norvasc- elevated at times   - monitored BP     PAF  Secondary hypercoagulable state  Palpitations  - AC on Eliquis   - previous visit with cardiology, echo ordered.  Will obtain this admission with shortness of breath and chest pain  - not on any rate control- beta blockers noted to cause bradycardia in the past  - checked Magnesium and

## 2024-08-21 NOTE — CARE COORDINATION
Pt being transferred to [facility] due to positive stress test results. No further needs identified.

## 2024-08-21 NOTE — CARE COORDINATION
Case Management Assessment  Initial Evaluation    Date/Time of Evaluation: 8/21/2024 3:22 PM  Assessment Completed by: Naomi Mendiola RN    If patient is discharged prior to next notation, then this note serves as note for discharge by case management.    Patient Name: Steve Murrieta                   YOB: 1950  Diagnosis: Chest pain [R07.9]                   Date / Time: 8/21/2024  1:32 PM    Patient Admission Status: Inpatient   Readmission Risk (Low < 19, Mod (19-27), High > 27): Readmission Risk Score: 10.1    Current PCP: Morena Burns MD  PCP verified by CM? Yes (Morena Burns MD)    Chart Reviewed: Yes      History Provided by: Patient  Patient Orientation: Alert and Oriented    Patient Cognition: Alert    Hospitalization in the last 30 days (Readmission):  Yes    If yes, Readmission Assessment in CM Navigator will be completed.    Advance Directives:      Code Status: Full Code   Patient's Primary Decision Maker is: Patient Declined (Legal Next of Kin Remains as Decision Maker)    Primary Decision Maker: gilles Gill - Child - [phone]    Discharge Planning:    Patient lives with: Alone Type of Home: House  Primary Care Giver: Self  Patient Support Systems include: Children   Current Financial resources: Medicare  Current community resources: None  Current services prior to admission: Durable Medical Equipment            Current DME: Cane            Type of Home Care services:  None    ADLS  Prior functional level: Independent in ADLs/IADLs  Current functional level: Independent in ADLs/IADLs    PT AM-PAC:   /24  OT AM-PAC:   /24    Family can provide assistance at DC: Yes  Would you like Case Management to discuss the discharge plan with any other family members/significant others, and if so, who? No  Plans to Return to Present Housing: Yes  Other Identified Issues/Barriers to RETURNING to current housing: None  Potential Assistance needed at discharge: N/A

## 2024-08-21 NOTE — PROGRESS NOTES
Patient admitted to room 104 DA from [facility]. Patient oriented to room, call light, bed rails, phone, lights and bathroom. Patient instructed about the schedule of the day including: vital sign frequency, lab draws, possible tests, frequency of MD and staff rounds, including RN/MD rounding together at bedside, daily weights, and I &O's.  Patient instructed about prescribed diet, how to use 8MENU, and television. Telemetry box in place, patient aware of placement and reason. Bed locked, in lowest position, side rails up 2/4, call light within reach.  Will continue to monitor.

## 2024-08-21 NOTE — PROGRESS NOTES
4 Eyes Skin Assessment     The patient is being assess for  Admission    I agree that 2 RN's have performed a thorough Head to Toe Skin Assessment on the patient. ALL assessment sites listed below have been assessed.       Areas assessed by both nurses: Ghazala & Anne  [x]   Head, Face, and Ears   [x]   Shoulders, Back, and Chest  [x]   Arms, Elbows, and Hands   [x]   Coccyx, Sacrum, and Ischum  [x]   Legs, Feet, and Heels        Does the Patient have Skin Breakdown?  No         Dalton Prevention initiated:  No   Wound Care Orders initiated:  No      [facility] nurse consulted for Pressure Injury (Stage 3,4, Unstageable, DTI, NWPT, and Complex wounds):  No      Nurse 1 eSignature: Electronically signed by Ghazala Estevez RN on 8/21/24 at 2:21 PM EDT    **SHARE this note so that the co-signing nurse is able to place an eSignature**    Nurse 2 eSignature: Electronically signed by Anne Agarwal RN on 8/21/24 at 2:52 PM EDT

## 2024-08-21 NOTE — PROGRESS NOTES
Spoke with access center they have a bed for this patient 104-01 report # [phone] patient will be picked up at 12:00 pm by [facility] to go to [facility]. Tatianna Aguilar

## 2024-08-21 NOTE — PROGRESS NOTES
Pt in bed, awake, A/O X4. Shift assessment complete, night meds given. No C/o pain at this time . Night time snack given.  . No other needs expressed. Call light in reach. Will monitor. Carolyn Wong RN

## 2024-08-21 NOTE — PROGRESS NOTES
Progress Note    Admit Date:  8/19/2024    73 y.o. male with PMH of chronic back pain, DM, fibromyalgia, HTN, PAF, and recurrent GIBs who presented to [facility] ED with complaint of chest pain and shortness of breath.  Admitted for chest pain workup.     Subjective:  Mr. Murrieta seen this morning. He has had some pains. He does say they are worse with movement. Center and left upper chest. He has not seen any blood in stool or melena.     Objective:   Vitals:    08/21/24 0711   BP: 130/70   Pulse: 57   Resp: 17   Temp: 97.7 °F (36.5 °C)   SpO2:             Intake/Output Summary (Last 24 hours) at 8/21/2024 1013  Last data filed at 8/21/2024 0712  Gross per 24 hour   Intake 222 ml   Output 400 ml   Net -178 ml       Physical Exam:    Gen: No distress. Alert. Appears chronically ill  Eyes: PERRL. No sclera icterus. No conjunctival injection.   ENT: No discharge. Pharynx clear.   Neck: No JVD. Trachea midline.  Resp: No accessory muscle use. No crackles. No wheezes. No rhonchi.   CV: Regular rate. Regular rhythm. No murmur.  No rub. No edema.   Capillary Refill: Brisk,< 3 seconds   GI: Non-tender. Non-distended. Normal bowel sounds.  Skin: Warm and dry. No nodule on exposed extremities. No rash on exposed extremities.   M/S: No cyanosis. No joint deformity. No clubbing.   Neuro: Awake. Grossly nonfocal    Psych: Oriented x 3. No anxiety or agitation.     Data:  CBC:   Recent Labs     08/19/24  1031 08/20/24  0445 08/21/24  0559   WBC 5.8 6.0 5.2   HGB 15.6 14.4 14.5   HCT 45.6 42.4 41.1   MCV 87.8 88.2 85.9    201 194     BMP:   Recent Labs     08/19/24  1031 08/20/24  0445 08/21/24  0559    139 137   K 4.6 3.9 4.1    104 102   CO2 25 24 23   BUN 16 14 15   CREATININE 1.0 1.0 0.8     LIVER PROFILE:   Recent Labs     08/19/24  1031   AST 18   ALT 17   LIPASE 34.0   BILITOT 1.0   ALKPHOS 131*     PT/INR: No results for input(s): \"PROTIME\", \"INR\" in the last 72 hours.    CULTURES  Results       Procedure

## 2024-08-21 NOTE — PROGRESS NOTES
Patient educated on discharge instructions as well as new medications use, dosage, administration and possible side effects.  Patient verified knowledge. IV removed without difficulty and dry dressing in place. Telemetry monitor removed and returned to [facility]. Pt left facility in stable condition to [facility] with all of their personal belongings.

## 2024-08-21 NOTE — FLOWSHEET NOTE
08/21/24 0711   Vital Signs   Temp 97.7 °F (36.5 °C)   Temp Source Oral   Pulse 57   Heart Rate Source Monitor   Respirations 17   /70   MAP (Calculated) 90   BP Location Right upper arm   BP Method Automatic   Patient Position Lying left side   Pain Assessment   Pain Assessment None - Denies Pain   Oxygen Therapy   O2 Device None (Room air)     AM assessment complete. Pt a&ox3, he was unable to recall year but knew who the president was. He is aware of transfer order to [facility]. TA. Reports he still has chest discomfort with sudden movements. No edema noted. Okay to resume diet per Dr Goddard. Dietary notified. Call light and bedside table within reach.

## 2024-08-22 LAB
ANION GAP SERPL CALCULATED.3IONS-SCNC: 8 MMOL/L (ref 3–16)
BUN SERPL-MCNC: 16 MG/DL (ref 7–20)
CALCIUM SERPL-MCNC: 9.6 MG/DL (ref 8.3–10.6)
CHLORIDE SERPL-SCNC: 104 MMOL/L (ref 99–110)
CHOLEST SERPL-MCNC: 118 MG/DL (ref 0–199)
CO2 SERPL-SCNC: 27 MMOL/L (ref 21–32)
CREAT SERPL-MCNC: 1.1 MG/DL (ref 0.8–1.3)
DEPRECATED RDW RBC AUTO: 13.7 % (ref 12.4–15.4)
EKG ATRIAL RATE: 57 BPM
EKG DIAGNOSIS: NORMAL
EKG P AXIS: 42 DEGREES
EKG P-R INTERVAL: 246 MS
EKG Q-T INTERVAL: 422 MS
EKG QRS DURATION: 100 MS
EKG QTC CALCULATION (BAZETT): 410 MS
EKG R AXIS: -45 DEGREES
EKG T AXIS: 42 DEGREES
EKG VENTRICULAR RATE: 57 BPM
EST. AVERAGE GLUCOSE BLD GHB EST-MCNC: 128.4 MG/DL
GFR SERPLBLD CREATININE-BSD FMLA CKD-EPI: 70 ML/MIN/{1.73_M2}
GLUCOSE BLD-MCNC: 109 MG/DL (ref 70–99)
GLUCOSE BLD-MCNC: 128 MG/DL (ref 70–99)
GLUCOSE BLD-MCNC: 135 MG/DL (ref 70–99)
GLUCOSE BLD-MCNC: 191 MG/DL (ref 70–99)
GLUCOSE SERPL-MCNC: 136 MG/DL (ref 70–99)
HBA1C MFR BLD: 6.1 %
HCT VFR BLD AUTO: 43.9 % (ref 40.5–52.5)
HDLC SERPL-MCNC: 34 MG/DL (ref 40–60)
HGB BLD-MCNC: 14.8 G/DL (ref 13.5–17.5)
LDLC SERPL CALC-MCNC: 65 MG/DL
MCH RBC QN AUTO: 29.8 PG (ref 26–34)
MCHC RBC AUTO-ENTMCNC: 33.8 G/DL (ref 31–36)
MCV RBC AUTO: 88.1 FL (ref 80–100)
PERFORMED ON: ABNORMAL
PLATELET # BLD AUTO: 212 K/UL (ref 135–450)
PMV BLD AUTO: 8.9 FL (ref 5–10.5)
POTASSIUM SERPL-SCNC: 4.1 MMOL/L (ref 3.5–5.1)
RBC # BLD AUTO: 4.98 M/UL (ref 4.2–5.9)
SODIUM SERPL-SCNC: 139 MMOL/L (ref 136–145)
TRIGL SERPL-MCNC: 94 MG/DL (ref 0–150)
VLDLC SERPL CALC-MCNC: 19 MG/DL
WBC # BLD AUTO: 5.1 K/UL (ref 4–11)

## 2024-08-22 PROCEDURE — 6360000002 HC RX W HCPCS: Performed by: INTERNAL MEDICINE

## 2024-08-22 PROCEDURE — 80048 BASIC METABOLIC PNL TOTAL CA: CPT

## 2024-08-22 PROCEDURE — 36415 COLL VENOUS BLD VENIPUNCTURE: CPT

## 2024-08-22 PROCEDURE — 6370000000 HC RX 637 (ALT 250 FOR IP): Performed by: INTERNAL MEDICINE

## 2024-08-22 PROCEDURE — 93010 ELECTROCARDIOGRAM REPORT: CPT | Performed by: INTERNAL MEDICINE

## 2024-08-22 PROCEDURE — 99232 SBSQ HOSP IP/OBS MODERATE 35: CPT | Performed by: INTERNAL MEDICINE

## 2024-08-22 PROCEDURE — 1200000000 HC SEMI PRIVATE

## 2024-08-22 PROCEDURE — 80061 LIPID PANEL: CPT

## 2024-08-22 PROCEDURE — 2580000003 HC RX 258: Performed by: INTERNAL MEDICINE

## 2024-08-22 PROCEDURE — 85027 COMPLETE CBC AUTOMATED: CPT

## 2024-08-22 PROCEDURE — 93005 ELECTROCARDIOGRAM TRACING: CPT | Performed by: INTERNAL MEDICINE

## 2024-08-22 RX ORDER — SODIUM CHLORIDE 0.9 % (FLUSH) 0.9 %
5-40 SYRINGE (ML) INJECTION EVERY 12 HOURS SCHEDULED
Status: DISCONTINUED | OUTPATIENT
Start: 2024-08-22 | End: 2024-08-22 | Stop reason: HOSPADM

## 2024-08-22 RX ORDER — MORPHINE SULFATE 2 MG/ML
2 INJECTION, SOLUTION INTRAMUSCULAR; INTRAVENOUS EVERY 4 HOURS PRN
Status: DISCONTINUED | OUTPATIENT
Start: 2024-08-22 | End: 2024-08-23 | Stop reason: HOSPADM

## 2024-08-22 RX ORDER — SODIUM CHLORIDE 0.9 % (FLUSH) 0.9 %
5-40 SYRINGE (ML) INJECTION PRN
Status: DISCONTINUED | OUTPATIENT
Start: 2024-08-22 | End: 2024-08-22 | Stop reason: HOSPADM

## 2024-08-22 RX ORDER — ONDANSETRON 2 MG/ML
4 INJECTION INTRAMUSCULAR; INTRAVENOUS EVERY 6 HOURS PRN
Status: DISCONTINUED | OUTPATIENT
Start: 2024-08-22 | End: 2024-08-22 | Stop reason: HOSPADM

## 2024-08-22 RX ORDER — SODIUM CHLORIDE 9 MG/ML
INJECTION, SOLUTION INTRAVENOUS PRN
Status: DISCONTINUED | OUTPATIENT
Start: 2024-08-22 | End: 2024-08-22 | Stop reason: HOSPADM

## 2024-08-22 RX ORDER — LORAZEPAM 0.5 MG/1
0.5 TABLET ORAL
Status: DISCONTINUED | OUTPATIENT
Start: 2024-08-22 | End: 2024-08-22 | Stop reason: HOSPADM

## 2024-08-22 RX ORDER — ASPIRIN 325 MG
325 TABLET ORAL ONCE
Status: DISCONTINUED | OUTPATIENT
Start: 2024-08-22 | End: 2024-08-22 | Stop reason: HOSPADM

## 2024-08-22 RX ADMIN — SUCRALFATE 1 G: 1 TABLET ORAL at 13:12

## 2024-08-22 RX ADMIN — ASPIRIN 81 MG 81 MG: 81 TABLET ORAL at 08:33

## 2024-08-22 RX ADMIN — Medication 10 ML: at 08:33

## 2024-08-22 RX ADMIN — TAMSULOSIN HYDROCHLORIDE 0.4 MG: 0.4 CAPSULE ORAL at 08:33

## 2024-08-22 RX ADMIN — PANTOPRAZOLE SODIUM 40 MG: 40 TABLET, DELAYED RELEASE ORAL at 08:33

## 2024-08-22 RX ADMIN — SUCRALFATE 1 G: 1 TABLET ORAL at 19:53

## 2024-08-22 RX ADMIN — ATORVASTATIN CALCIUM 40 MG: 40 TABLET, FILM COATED ORAL at 19:53

## 2024-08-22 RX ADMIN — AMLODIPINE BESYLATE 5 MG: 5 TABLET ORAL at 08:33

## 2024-08-22 RX ADMIN — PANTOPRAZOLE SODIUM 40 MG: 40 TABLET, DELAYED RELEASE ORAL at 19:52

## 2024-08-22 RX ADMIN — SUCRALFATE 1 G: 1 TABLET ORAL at 18:51

## 2024-08-22 RX ADMIN — MORPHINE SULFATE 2 MG: 2 INJECTION, SOLUTION INTRAMUSCULAR; INTRAVENOUS at 13:12

## 2024-08-22 RX ADMIN — Medication 10 ML: at 19:53

## 2024-08-22 RX ADMIN — ENOXAPARIN SODIUM 80 MG: 100 INJECTION SUBCUTANEOUS at 19:52

## 2024-08-22 RX ADMIN — Medication 10 ML: at 23:52

## 2024-08-22 RX ADMIN — MORPHINE SULFATE 2 MG: 2 INJECTION, SOLUTION INTRAMUSCULAR; INTRAVENOUS at 19:53

## 2024-08-22 RX ADMIN — MORPHINE SULFATE 2 MG: 2 INJECTION, SOLUTION INTRAMUSCULAR; INTRAVENOUS at 23:52

## 2024-08-22 ASSESSMENT — PAIN DESCRIPTION - PAIN TYPE
TYPE: ACUTE PAIN
TYPE: ACUTE PAIN

## 2024-08-22 ASSESSMENT — PAIN SCALES - GENERAL
PAINLEVEL_OUTOF10: 5
PAINLEVEL_OUTOF10: 6
PAINLEVEL_OUTOF10: 5

## 2024-08-22 ASSESSMENT — PAIN DESCRIPTION - ORIENTATION
ORIENTATION: RIGHT
ORIENTATION: MID
ORIENTATION: MID

## 2024-08-22 ASSESSMENT — PAIN DESCRIPTION - LOCATION
LOCATION: CHEST
LOCATION: LEG
LOCATION: CHEST;LEG
LOCATION: CHEST
LOCATION: CHEST;LEG

## 2024-08-22 ASSESSMENT — PAIN DESCRIPTION - DESCRIPTORS
DESCRIPTORS: ACHING;DISCOMFORT
DESCRIPTORS: DISCOMFORT
DESCRIPTORS: ACHING;DISCOMFORT
DESCRIPTORS: ACHING;DISCOMFORT
DESCRIPTORS: DISCOMFORT;PRESSURE

## 2024-08-22 ASSESSMENT — PAIN - FUNCTIONAL ASSESSMENT: PAIN_FUNCTIONAL_ASSESSMENT: ACTIVITIES ARE NOT PREVENTED

## 2024-08-22 NOTE — PROGRESS NOTES
Report given to patients nurse Danii PASCAL. [facility] called and monitor is on and verified.  Case rescheduled per Dr Mejia.

## 2024-08-22 NOTE — FLOWSHEET NOTE
08/22/24 1943   Assessment   Charting Type Shift assessment   Psychosocial   Psychosocial (WDL) WDL   Neurological   Neuro (WDL) WDL   Level of Consciousness 0   Orientation Level Oriented X4   Cognition Appropriate judgement;Appropriate safety awareness;Appropriate attention/concentration;Appropriate for developmental age;Follows commands   Speech Clear;Appropriate for developmental age   Ara Coma Scale   Eye Opening 4   Best Verbal Response 5   Best Motor Response 6   [place] Coma Scale Score 15   HEENT (Head, Ears, Eyes, Nose, & Throat)   HEENT (WDL) X   Right Eye Glasses;Impaired vision   Left Eye Glasses;Impaired vision   Teeth Edentulous   Respiratory   Respiratory (WDL) X   Respiratory Pattern Regular   Respiratory Depth Normal   Respiratory Quality/Effort Unlabored;Dyspnea with exertion   Chest Assessment Chest expansion symmetrical;Trachea midline   L Breath Sounds Diminished   R Breath Sounds Diminished   Cardiac   Cardiac (WDL) X   Cardiac Regularity Regular   Heart Sounds S1, S2   Cardiac Rhythm Sinus jerson;1° AV Block   Cardiac Symptoms Chest pain   Cardiac Monitor   Cardiac/Telemetry Monitor On Portable telemetry pack applied   Alarm Audible Centralized cardiac monitoring   Alarms Set Yes   Gastrointestinal   Abdominal (WDL) WDL   Genitourinary   Genitourinary (WDL) WDL   Urine Assessment   Urinary Status Voiding;Bathroom privileges   Urinary Incontinence Absent   Peripheral Vascular   Peripheral Vascular (WDL) WDL   Anti-Embolism   Anti-Embolism Intervention Medication  (lovenox)   Skin Integumentary    Skin Integumentary (WDL) WDL   Musculoskeletal   Musculoskeletal (WDL) WDL

## 2024-08-22 NOTE — PROGRESS NOTES
Hospital Medicine Progress Note      Date of Admission: 8/21/2024  Hospital Day: 2    Chief Admission Complaint:  Chest pain     Subjective:  Minimal chest discomfort, more with movement.     Telemetry (reviewed by me):  NSR    Presenting Admission History:       73 y.o. male who presented to [facility] with chest pain.  PMHx significant for DM2, HTN, Emphysema, Paroxysmal Atrial Fibrillation and BPH. Initially admitted to [facility] for cardiac work-up. Stress testing was found to be positive for ischemia. Transferred to [facility] for Interventional Cardiology evaluation.        Assessment/Plan:       Chest pain/Abnormal Stress Test: Cardiology consulted. Although symptoms are somewhat atypical, the stress test is concerning for ischemia. Plan for [facility] but delayed until 8/22 due to concerns about leg pain an laying flat.      Diabetes Mellitus, Type 2: Controlled. Continue basal-bolus Insulin regimen. Monitor serial blood sugar given risk for insulin toxicity/hypoglycemia and adjust regimen as needed.      Paroxysmal Atrial Fibrillation: Controlled. Holding Eliquis for cath.      Hypertension, benign: Controlled. Continue current medication regimen, monitor and adjust as needed.      Hx of GI Bleeding: Continue PPI and Carafate. Consider GI evaluation in Cath is negative; inpatient vs outpatient.      BPH: On Flomax.     Physical Exam Performed:      General appearance:  No apparent distress  Respiratory:  Normal respiratory effort.   Cardiovascular:  Regular rate and rhythm.  Abdomen:  Soft, non-tender, non-distended.  Musculoskelatal:  No edema  Neurologic:  Non-focal  Psychiatric:  Alert and oriented    /73   Pulse 64   Temp 97.8 °F (36.6 °C) (Oral)   Resp 16   Ht 1.778 m (5' 10\")   Wt 84.6 kg (186 lb 6.4 oz)   SpO2 98%   BMI 26.75 kg/m²     Diet: Diet NPO Exceptions are: Sips of Water with Meds  DVT Prophylaxis: []PPx LMWH  []SQ Heparin  [x]IPC/SCDs  []Eliquis  []Xarelto  []Coumadin  [] Heparin Drip   []Other -      Code status: Full Code  PT/OT Eval Status:   [x]NOT yet ordered  []Ordered and Pending   []Seen with Recommendations for:  []Home independently  []Home w/ assist  []HHC  []SNF  []Acute Rehab    Anticipated Discharge Day/Date:  1-2 days    Anticipated Discharge Location: [x]Home  []HHC  []SNF  []Acute Rehab  []ECF  []LTAC  []Hospice  []Other -      Consults:      IP CONSULT TO CARDIOLOGY      ------------------------------------------------------------------------------------------------------------------------------------------------------------------------    MDM    [x] High (any 2)    A. Problems (any 1)  [x] Acute/Chronic Illness/injury posing threat to life or bodily function:    [] Severe exacerbation of chronic illness:    ---------------------------------------------------------------------  B. Risk of Treatment (any 1)   [] Drugs/treatments that require intensive monitoring for toxicity include:    [] IV ABX requiring serial renal monitoring for nephrotoxicity:     [] IV Narcotic analgesia for adverse drug reaction  [] IV diuresis requiring serial monitoring for renal impairment and electrolyte derangements  [] Critical electrolyte abnormalities requiring IV replacement and close serial monitoring  [] Insulin - monitoring serial FSBS for Hypoglycemic adverse drug reaction  [] Anticoagulation requiring serial monitoring of coagulation factors  [] Other -   [] Change in code status:    [] Decision to escalate care:    [] Major surgery/procedure with associated risk factors:    ----------------------------------------------------------------------  C. Data (any 2)  [x] Discussed current management and discharge planning options with Case Management.  [] Discussed management of the case with:    [x] Telemetry personally reviewed and interpreted as documented above    [] Imaging personally reviewed and interpreted, includes:    [x] Data Review (any 3)  [x] All available Consultant notes from

## 2024-08-22 NOTE — H&P
Hospital Medicine History & Physical      Date of Admission: 8/21/2024    Date of Service:  Pt seen/examined on 8/21/2024     [x]Admitted to Inpatient with expected LOS greater than two midnights due to medical therapy.  []Placed in Observation status.    Chief Admission Complaint:  Chest pain    Presenting Admission History:      73 y.o. male who presented to [facility] with chest pain.  PMHx significant for DM2, HTN, Emphysema, Paroxysmal Atrial Fibrillation and BPH. Initially admitted to [facility] for cardiac work-up. Stress testing was found to be positive for ischemia. Transferred to [facility] for Interventional Cardiology evaluation.       Assessment/Plan:      Chest pain/Abnormal Stress Test: Cardiology consulted. Although symptoms are somewhat atypical, the stress test is concerning for ischemia. Plan for [facility] on 8/21.     Diabetes Mellitus, Type 2: Controlled. Continue basal-bolus Insulin regimen. Monitor serial blood sugar given risk for insulin toxicity/hypoglycemia and adjust regimen as needed.     Paroxysmal Atrial Fibrillation: Controlled. Holding Eliquis for cath.     Hypertension, benign: Controlled. Continue current medication regimen, monitor and adjust as needed.     Hx of GI Bleeding: Continue PPI and Carafate. Consider GI evaluation in Cath is negative; inpatient vs outpatient.     BPH: On Flomax.     Physical Exam Performed:      /71   Pulse 67   Temp 98.2 °F (36.8 °C) (Oral)   Resp 16   Ht 1.778 m (5' 10\")   Wt 84.4 kg (186 lb 1.1 oz)   SpO2 97%   BMI 26.70 kg/m²     General appearance:  No apparent distress, appears stated age and cooperative.  Respiratory:  Normal respiratory effort. Clear to auscultation, bilaterally without Rales/Wheezes/Rhonchi.  Cardiovascular:  Regular rate and rhythm with normal S1/S2 without murmurs, rubs or gallops.  Abdomen:  Soft, non-tender, non-distended with normal bowel sounds.  Musculoskeletal:  No clubbing, cyanosis or edema bilaterally.  Full range of  filling defect to suggest pulmonary embolism.  Main pulmonary artery is normal in caliber. Lines and tubes: None Mediastinum and Hilum: No enlarged lymph nodes Heart and Vasculature: Mild coronary calcifications.  Trace pericardial effusion. Pleura: No effusions Upper abdomen: Unremarkable Lungs and airways: Centrilobular emphysema.  Calcified granuloma in the right lower lobe.  No acute consolidation.  The central airways are patent. Bones/soft tissue: No destructive lesion     1. No pulmonary embolism. 2. Mild emphysema.  No acute consolidation.     XR CHEST (2 VW)    Result Date: 8/19/2024  EXAMINATION: TWO XRAY VIEWS OF THE CHEST 8/19/2024 10:36 am COMPARISON: 06/27/2024 HISTORY: ORDERING SYSTEM PROVIDED HISTORY: Chest Pain TECHNOLOGIST PROVIDED HISTORY: \"IF\" patient is in hallway or waiting room. Reason for exam:->Chest Pain Reason for Exam: left anterior chest pain since this AM FINDINGS: Eventration of the right hemidiaphragm.  No new confluent focal pulmonary opacities to suggest acute airspace disease.  No significant pleural effusion.  No pneumothorax.  Cardiac and mediastinal silhouettes are similar to prior.  Opacity along the left heart border is similar to prior, potentially prominent fat pad.  Degenerative change of the thoracic spine.     No acute cardiopulmonary disease.       PCP: Morena Burns MD    Past Medical History:        Diagnosis Date    Abnormal ultrasound of carotid artery     CTA was normal    Back pain     epidual injectiion for back  1997     Chronic back pain     MRI 1998 Normal    Diabetes mellitus (HCC)     DJD (degenerative joint disease)     Rt. Hip    Fibromyalgia     Post Traumatic/Myofascial Pain    Hypertension     Kidney stones     2002    Paroxysmal A-fib (HCC)        Past Surgical History:        Procedure Laterality Date    BACK SURGERY      ESOPHAGOGASTRODUODENOSCOPY  08/09/2023    EGD BIOPSY    HERNIA REPAIR      LITHOTRIPSY      OTHER SURGICAL HISTORY

## 2024-08-22 NOTE — CARE COORDINATION
CM update; LOS # 1; Followed by IM, Cardiology: Discussion of cardiac cath with patient. Will follow for any needs.Naomi Mendiola RN

## 2024-08-22 NOTE — PROGRESS NOTES
Arrhythmias during stress: occasional PVCs. Non-specific ST abnormality noted. Nonspecific ST wave abnormality in recovery.  Otherwise, ECG portion is nondiagnostic secondary to pharmacologic protocol.    Consider cardiology consultation.     2015     NM: 12/08/15  IMPRESSION: 1. No pharmcologically induced reversible perfusion defects to suggest ischemia. 2. Ejection fraction of 68%, normal. 3. No focal wall motion abnormality.    I have reviewed labs and imaging/xray/diagnostic testing in this note.    Assessment and Plan       Patient Active Problem List   Diagnosis    Hypertension    Fibromyalgia    Chronic back pain    DJD (degenerative joint disease)    ED (erectile dysfunction)    Duodenal ulcer    Nausea and vomiting    Esophageal candidiasis (HCC)    Lumbago    PAF (paroxysmal atrial fibrillation) ([facility])    Pleuritic chest pain    Primary hypertension    First degree atrioventricular block    Sinus bradycardia    Elevated BP without diagnosis of hypertension    Hyperglycemia    Type 2 diabetes mellitus without complication, without long-term current use of insulin (HCC)    Ventral hernia    YENY (acute kidney injury) ([facility])    Intractable vomiting    SIRS (systemic inflammatory response syndrome) ([facility])    Hyperlactatemia    Polysubstance abuse ([facility])    Abdominal pain    Cannabis use disorder    Esophagitis    Primary osteoarthritis of left hip    H/O total hip arthroplasty, left    Chest pain    Shortness of breath     Chest pain, abnormal stress test, options discussed with patient, suggested cardiac catheterization, he is agreeable to that, we will plan on heart catheterization , had planned for today, pt is uncomfortable with rt leg pain, will get joel to assess that and as cath is not urgent and he appears to have a difficult time lying flat, will defer cath to tomorrow and give morphine for pain to facilitate procedure tomorrow.      Atrial fibrillation, paroxysmal, holding Eliquis in anticipation of  catheterization procedure     Hypercholesteremia, continue statin     Hypertension, continue Norvasc       Thank you for allowing me to participate in the care of your patient. Please call me with any questions [phone].      Mitchell Mejia MD, [facility]   Interventional Cardiologist  [facility]  [phone] [place] Office  [phone] [place] Office  8/22/2024 12:08 PM

## 2024-08-23 VITALS
OXYGEN SATURATION: 96 % | HEART RATE: 81 BPM | HEIGHT: 70 IN | TEMPERATURE: 97.6 F | BODY MASS INDEX: 26.63 KG/M2 | RESPIRATION RATE: 18 BRPM | SYSTOLIC BLOOD PRESSURE: 142 MMHG | WEIGHT: 186 LBS | DIASTOLIC BLOOD PRESSURE: 57 MMHG

## 2024-08-23 LAB
ANION GAP SERPL CALCULATED.3IONS-SCNC: 12 MMOL/L (ref 3–16)
BUN SERPL-MCNC: 14 MG/DL (ref 7–20)
CALCIUM SERPL-MCNC: 9 MG/DL (ref 8.3–10.6)
CHLORIDE SERPL-SCNC: 102 MMOL/L (ref 99–110)
CO2 SERPL-SCNC: 22 MMOL/L (ref 21–32)
CREAT SERPL-MCNC: 1 MG/DL (ref 0.8–1.3)
ECHO BSA: 2.04 M2
EKG ATRIAL RATE: 64 BPM
EKG DIAGNOSIS: NORMAL
EKG P AXIS: 62 DEGREES
EKG P-R INTERVAL: 226 MS
EKG Q-T INTERVAL: 438 MS
EKG QRS DURATION: 104 MS
EKG QTC CALCULATION (BAZETT): 451 MS
EKG R AXIS: -46 DEGREES
EKG T AXIS: 55 DEGREES
EKG VENTRICULAR RATE: 64 BPM
GFR SERPLBLD CREATININE-BSD FMLA CKD-EPI: 78 ML/MIN/{1.73_M2}
GLUCOSE BLD-MCNC: 123 MG/DL (ref 70–99)
GLUCOSE BLD-MCNC: 177 MG/DL (ref 70–99)
GLUCOSE SERPL-MCNC: 120 MG/DL (ref 70–99)
PERFORMED ON: ABNORMAL
PERFORMED ON: ABNORMAL
POC ACT LR: 266 SEC
POTASSIUM SERPL-SCNC: 3.8 MMOL/L (ref 3.5–5.1)
SODIUM SERPL-SCNC: 136 MMOL/L (ref 136–145)

## 2024-08-23 PROCEDURE — 2709999900 HC NON-CHARGEABLE SUPPLY: Performed by: INTERNAL MEDICINE

## 2024-08-23 PROCEDURE — 7100000010 HC PHASE II RECOVERY - FIRST 15 MIN: Performed by: INTERNAL MEDICINE

## 2024-08-23 PROCEDURE — 93005 ELECTROCARDIOGRAM TRACING: CPT | Performed by: INTERNAL MEDICINE

## 2024-08-23 PROCEDURE — 7100000011 HC PHASE II RECOVERY - ADDTL 15 MIN: Performed by: INTERNAL MEDICINE

## 2024-08-23 PROCEDURE — C1769 GUIDE WIRE: HCPCS | Performed by: INTERNAL MEDICINE

## 2024-08-23 PROCEDURE — 4A023N7 MEASUREMENT OF CARDIAC SAMPLING AND PRESSURE, LEFT HEART, PERCUTANEOUS APPROACH: ICD-10-PCS | Performed by: INTERNAL MEDICINE

## 2024-08-23 PROCEDURE — 6360000002 HC RX W HCPCS: Performed by: INTERNAL MEDICINE

## 2024-08-23 PROCEDURE — 99152 MOD SED SAME PHYS/QHP 5/>YRS: CPT | Performed by: INTERNAL MEDICINE

## 2024-08-23 PROCEDURE — 2580000003 HC RX 258: Performed by: INTERNAL MEDICINE

## 2024-08-23 PROCEDURE — 93010 ELECTROCARDIOGRAM REPORT: CPT | Performed by: INTERNAL MEDICINE

## 2024-08-23 PROCEDURE — B2111ZZ FLUOROSCOPY OF MULTIPLE CORONARY ARTERIES USING LOW OSMOLAR CONTRAST: ICD-10-PCS | Performed by: INTERNAL MEDICINE

## 2024-08-23 PROCEDURE — 2500000003 HC RX 250 WO HCPCS: Performed by: INTERNAL MEDICINE

## 2024-08-23 PROCEDURE — 99153 MOD SED SAME PHYS/QHP EA: CPT | Performed by: INTERNAL MEDICINE

## 2024-08-23 PROCEDURE — 6370000000 HC RX 637 (ALT 250 FOR IP): Performed by: INTERNAL MEDICINE

## 2024-08-23 PROCEDURE — 6360000004 HC RX CONTRAST MEDICATION: Performed by: INTERNAL MEDICINE

## 2024-08-23 PROCEDURE — 36415 COLL VENOUS BLD VENIPUNCTURE: CPT

## 2024-08-23 PROCEDURE — 93460 R&L HRT ART/VENTRICLE ANGIO: CPT | Performed by: INTERNAL MEDICINE

## 2024-08-23 PROCEDURE — 85347 COAGULATION TIME ACTIVATED: CPT

## 2024-08-23 PROCEDURE — B2151ZZ FLUOROSCOPY OF LEFT HEART USING LOW OSMOLAR CONTRAST: ICD-10-PCS | Performed by: INTERNAL MEDICINE

## 2024-08-23 PROCEDURE — 80048 BASIC METABOLIC PNL TOTAL CA: CPT

## 2024-08-23 PROCEDURE — C1894 INTRO/SHEATH, NON-LASER: HCPCS | Performed by: INTERNAL MEDICINE

## 2024-08-23 RX ORDER — ASPIRIN 81 MG/1
81 TABLET, CHEWABLE ORAL DAILY
Qty: 30 TABLET | Refills: 3 | Status: SHIPPED | OUTPATIENT
Start: 2024-08-24

## 2024-08-23 RX ORDER — SODIUM CHLORIDE 0.9 % (FLUSH) 0.9 %
5-40 SYRINGE (ML) INJECTION PRN
Status: DISCONTINUED | OUTPATIENT
Start: 2024-08-23 | End: 2024-08-23 | Stop reason: HOSPADM

## 2024-08-23 RX ORDER — SODIUM CHLORIDE 9 MG/ML
INJECTION, SOLUTION INTRAVENOUS PRN
Status: DISCONTINUED | OUTPATIENT
Start: 2024-08-23 | End: 2024-08-23 | Stop reason: HOSPADM

## 2024-08-23 RX ORDER — ATORVASTATIN CALCIUM 40 MG/1
40 TABLET, FILM COATED ORAL NIGHTLY
Qty: 30 TABLET | Refills: 3 | Status: SHIPPED | OUTPATIENT
Start: 2024-08-23

## 2024-08-23 RX ORDER — ACETAMINOPHEN 325 MG/1
650 TABLET ORAL EVERY 4 HOURS PRN
Status: DISCONTINUED | OUTPATIENT
Start: 2024-08-23 | End: 2024-08-23 | Stop reason: SDUPTHER

## 2024-08-23 RX ORDER — HEPARIN SODIUM (PORCINE) LOCK FLUSH IV SOLN 100 UNIT/ML 100 UNIT/ML
SOLUTION INTRAVENOUS PRN
Status: DISCONTINUED | OUTPATIENT
Start: 2024-08-23 | End: 2024-08-23 | Stop reason: HOSPADM

## 2024-08-23 RX ORDER — IOPAMIDOL 755 MG/ML
INJECTION, SOLUTION INTRAVASCULAR PRN
Status: DISCONTINUED | OUTPATIENT
Start: 2024-08-23 | End: 2024-08-23 | Stop reason: HOSPADM

## 2024-08-23 RX ORDER — MIDAZOLAM HYDROCHLORIDE 1 MG/ML
INJECTION INTRAMUSCULAR; INTRAVENOUS PRN
Status: DISCONTINUED | OUTPATIENT
Start: 2024-08-23 | End: 2024-08-23 | Stop reason: HOSPADM

## 2024-08-23 RX ORDER — SODIUM CHLORIDE 0.9 % (FLUSH) 0.9 %
5-40 SYRINGE (ML) INJECTION EVERY 12 HOURS SCHEDULED
Status: DISCONTINUED | OUTPATIENT
Start: 2024-08-23 | End: 2024-08-23 | Stop reason: HOSPADM

## 2024-08-23 RX ADMIN — MORPHINE SULFATE 2 MG: 2 INJECTION, SOLUTION INTRAMUSCULAR; INTRAVENOUS at 13:24

## 2024-08-23 RX ADMIN — MORPHINE SULFATE 2 MG: 2 INJECTION, SOLUTION INTRAMUSCULAR; INTRAVENOUS at 08:44

## 2024-08-23 RX ADMIN — TAMSULOSIN HYDROCHLORIDE 0.4 MG: 0.4 CAPSULE ORAL at 08:44

## 2024-08-23 RX ADMIN — ASPIRIN 81 MG 81 MG: 81 TABLET ORAL at 08:44

## 2024-08-23 RX ADMIN — SUCRALFATE 1 G: 1 TABLET ORAL at 08:44

## 2024-08-23 RX ADMIN — SUCRALFATE 1 G: 1 TABLET ORAL at 15:04

## 2024-08-23 RX ADMIN — Medication 10 ML: at 08:45

## 2024-08-23 RX ADMIN — AMLODIPINE BESYLATE 5 MG: 5 TABLET ORAL at 08:44

## 2024-08-23 RX ADMIN — SUCRALFATE 1 G: 1 TABLET ORAL at 17:41

## 2024-08-23 ASSESSMENT — PAIN DESCRIPTION - ORIENTATION: ORIENTATION: RIGHT;MID

## 2024-08-23 ASSESSMENT — PAIN - FUNCTIONAL ASSESSMENT: PAIN_FUNCTIONAL_ASSESSMENT: ACTIVITIES ARE NOT PREVENTED

## 2024-08-23 ASSESSMENT — PAIN SCALES - GENERAL: PAINLEVEL_OUTOF10: 5

## 2024-08-23 ASSESSMENT — PAIN DESCRIPTION - DESCRIPTORS: DESCRIPTORS: ACHING;DISCOMFORT

## 2024-08-23 ASSESSMENT — PAIN DESCRIPTION - LOCATION: LOCATION: HIP;CHEST

## 2024-08-23 ASSESSMENT — PAIN DESCRIPTION - PAIN TYPE: TYPE: ACUTE PAIN

## 2024-08-23 NOTE — PROGRESS NOTES
States his R leg hurts, \"it's right down my muscles.\" States it feels like pressure. No redness or swelling noted. Bilat pedal pulses +2. Given Morphine. Dr. Sanchez given update

## 2024-08-23 NOTE — PROGRESS NOTES
Report given to Mabel on A2.[facility] notified of upcoming transfer to room 213. A1 states if they find any belongings from 104, they will deliver them to 213.

## 2024-08-23 NOTE — PROCEDURES
CARDIAC CATHETERIZATION REPORT     Procedure Date:  2024  Patient Name: Steve Murrieta  MRN: [id number] : 1950      INDICATION     Abnl stress test    PROCEDURES PERFORMED     Right heart cath  Left heart catheterization  LVgram  Coronary angiogam  Coronary cath  Monitoring of moderate conscious sedation        PROCEDURE DESCRIPTION   Immediately before procedure, sedation assessment was performed again and prior findings as per sedation note (see that note for details) are unchanged. Risks/benefits/alternatives/outcomes were discussed with patient and/or family and informed consent was obtained.  Using the Barbeau scale, the patient's right radial artery was found to be a level B.  Patient was prepped draped in the usual sterile fashion.  Local anaesthetic was applied over puncture sites.  Prior to procedure, right peripheral antecubital IV was placed this was exchanged out using micropuncture kit for a 7 Ghanaian sheath, 7 Ghanaian PA catheter was used alongside [place]-Emma wire to perform right heart catheterization.  Additional saturations were obtained with a JR4 catheter and Susana wire.  Using a front wall technique, a 4/5 Maori Terumo sheath was inserted into right radial artery.  Verapamil, nitroglycerin, cardene were administered through the sheath.  Heparin was administered.  Diagnostic 5 Ghanaian pigtail, ultra catheters were used for diagnostic angiograms.  At the conclusion of the procedure, a TR band was placed over the puncture site and hemostasis was obtained.  There were no immediate complications. I supervised sedation  during the procedure. An independent trained observer pushed meds at my direction.  We monitored the patient's level of consciousness and vital signs/physiologic status throughout the procedure duration (see cupid).  <20cc EBL.      FINDINGS     HEMODYNAMICS    CHAMBER PRESSURE SATURATION   RA 5 70%   RV 20/5    PA 21/9 68%   PW 9    AORTA 127/54 96%     LYNNETTE  CARDIAC OUTPUT 4.7  L/min        PVR 85               LVGRAM    LVEDP 9   GRADIENT ACROSS AORTIC VALVE None   LV FUNCTION EF 60%   WALL MOTION Normal   MITRAL REGURGITATION Mild        CORONARY ARTERIES    LM Less than 10% [id number] stenosis         LAD Proximal 30 to 40% stenosis, mid-distal 40% stenosis with bridging.  Vessel wraps around the apex.    D1 has ostial/proximal 40% stenosis.       LCX Less than 10% proximal/mid stenosis, there is distal 50% stenosis into a small segment of OM 2.    OM1 has a high takeoff, it is a tortuous vessel, it is the largest OM branch and essentially takes a course into a ramus territory, and has less than 10% proximal/mid-distal stenosis.       RI See above regarding OM1       RCA Dominant, tortuous vessel, proximal-mid 40% stenosis, distal 10 to 20% stenosis.         CONCLUSIONS:     Normal filling pressures  Mild to moderate CAD/ASHD, treat medically  Continue with aspirin and statin and calcium channel blocker  Resume Eliquis tomorrow    No further inpatient cardiac workup or treatment planned, will sign off, please call with questions.

## 2024-08-23 NOTE — CARE COORDINATION
CM update; LOS # 2; Patient to have cardiac cath today. IPTA will follow no needs at this time.Naomi Mendiola RN

## 2024-08-23 NOTE — PLAN OF CARE
Problem: Chronic Conditions and Co-morbidities  Goal: Patient's chronic conditions and co-morbidity symptoms are monitored and maintained or improved  8/22/2024 2307 by Kirsten Yuan, RN  Outcome: Progressing     Problem: Discharge Planning  Goal: Discharge to home or other facility with appropriate resources  8/22/2024 2307 by Kirsten Yuan, RN  Outcome: Progressing     Problem: Pain  Goal: Verbalizes/displays adequate comfort level or baseline comfort level  8/22/2024 2307 by Kirsten Yuan, RN  Outcome: Progressing     Problem: Safety - Adult  Goal: Free from fall injury  8/22/2024 2307 by Kirsten Yuan, RN  Outcome: Progressing

## 2024-08-23 NOTE — DISCHARGE INSTRUCTIONS
Patient Discharge Instructions:    Follow up:  1.  Primary Care Provider in the next 1-2 weeks.  2.  Follow-up with your GI doctor with [facility] for further evaluation of your symptoms          Cath Labs at  [facility]   Discharge Instructions        8/23/2024  Steve Murrieta   Date of Birth 1950       Activity:  No driving for 24 hours.  In 24 hours you may remove dressing and shower, wash site gently with soap and water and leave open to air  Avoid submerging your arm in sitting water for 5 days.  Do not use your right hand for 24 hours, then  No lifting more than 5 pounds for 5 days.   No lotions, powders, or ointments near site for 5 days.   No work/school for 5 days unless instructed otherwise by your cardiologist.    Diet:   Resume previous diet, if a cardiac diet is specified you will receive a handout with  general guidelines.   Drink extra non-alcoholic/decaffienated fluids for first 24 hours after your procedure.    Arm Management:  If bleeding occurs from the site or a hematoma (lump) begins to increase in size, apply pressure directly over the site, call 911 to return to the hospital.    Special Instructions:  Report any coolness or numbness in the arm  Report any chills, fever, itching, red bumps or rash   Report any of the following to the MD: drainage from the site, redness and/or swelling at the site, increased tenderness at the site   If you are currently taking Metformin or Metformin combination medications for Diabetes, hold your dose for 48 hours after your procedure.  Consult your Cardiologist before taking any NSAIDS, vitamin supplements, estrogen, or estrogen plus progestin.  Do not stop taking Plavix, Brilinta or Effient, without first consulting your cardiologist.    Sedation Discharge Instructions:  For the next 24 hours do not drive a car, operate machinery, power tools or kitchen appliances.    Do not drink alcohol; including beer or wine.    Do not make any important  breathing.  You have symptoms of a heart attack. These may include:  Chest pain or pressure, or a strange feeling in your chest.  Sweating.  Shortness of breath.  Nausea or vomiting.  Pain, pressure, or a strange feeling in your back, neck or jaw, or upper belly or in one or both shoulders or arms.  Lightheadedness or sudden weakness.  A fast or irregular heartbeat.       After you call 911, the  may tell you to chew 1 adult-strength or 2 to 4 low-dose aspirin. Wait for an ambulance. Do not try to drive yourself.  Call your doctor today if :  You have any trouble breathing.  Your chest pain gets worse.  You are dizzy or lightheaded, or you feel like you may faint.  You are not getting better as expected.  You are having new or different chest pain.

## 2024-08-23 NOTE — PROGRESS NOTES
Brief Pre-Op Note/Sedation Assessment      Steve Murrieta  1950  [id number]  10:13 AM    Planned Procedure: Cardiac Catheterization Procedure  Post Procedure Plan: Return to same level of care  Consent: I have discussed with the patient and/or the patient representative the indication, alternatives, and the possible risks and/or complications of the planned procedure and the anesthesia methods. The patient and/or patient representative appear to understand and agree to proceed.    DISCUSSION OF CARDIAC CATHETERIZATION PROCEDURES: The procedures, indications, risks and alternatives have been discussed with the patient and, as appropriate, with the patient's guardian . Risks discussed included, but are not limited to, bleeding, development of blood clots/emboli, damage to blood vessels, renal failure, malignant cardiac arrhythmias, stroke, heart attack, emergent coronary bypass surgery, death, dye allergy.  The patient (and guardian as appropriate) expressed understanding of the aforementioned and wished to proceed.        Chief Complaint:   Chest Pain/Pressure      Indications for Cath Procedure:  Presentation:  ACS > 24 hrs  2.  Anginal Classification within 2 weeks:  CCS IV - Inability to perform any activity without angina or angina at rest, i.e., severe limitation  3.  Angina Symptoms Assessment:  Typical Chest Pain  4.  Heart Failure Class within last 2 weeks:  No symptoms  5.  Cardiovascular Instability:  No    Prior Ischemic Workup/Eval:  Pre-Procedural Medications: Yes: Aspirin and Ca Channel Blockers  2.   Stress Test Completed?  Yes:  Stress or Imaging Studies Performed (within ANY time period):   Type:  Stress Nuclear  Results:  Positive:  Myocardial Perfusion Defects (Nuclear) Extent of Ischemia:  Intermediate    Does Patient need surgery?  Cath Valve Surgery:  No    Pre-Procedure Medical History:  Vital Signs:  BP (!) 160/79   Pulse 73   Temp 97.8 °F (36.6 °C) (Oral)   Resp 18   Ht 1.778 m

## 2024-08-24 NOTE — PROGRESS NOTES
Patient was given verbal and written discharge instructions and patient stated verbal understanding. Waiting for a ride home.

## 2024-09-11 ENCOUNTER — OFFICE VISIT (OUTPATIENT)
Dept: INTERNAL MEDICINE CLINIC | Age: 74
End: 2024-09-11

## 2024-09-11 VITALS
BODY MASS INDEX: 28.03 KG/M2 | HEART RATE: 76 BPM | DIASTOLIC BLOOD PRESSURE: 76 MMHG | WEIGHT: 195.8 LBS | OXYGEN SATURATION: 99 % | HEIGHT: 70 IN | SYSTOLIC BLOOD PRESSURE: 130 MMHG

## 2024-09-11 DIAGNOSIS — I10 PRIMARY HYPERTENSION: Primary | ICD-10-CM

## 2024-09-11 DIAGNOSIS — I48.0 PAF (PAROXYSMAL ATRIAL FIBRILLATION) (HCC): ICD-10-CM

## 2024-09-11 DIAGNOSIS — D68.69 SECONDARY HYPERCOAGULABLE STATE (HCC): ICD-10-CM

## 2024-09-11 DIAGNOSIS — R00.1 SINUS BRADYCARDIA: ICD-10-CM

## 2024-09-11 DIAGNOSIS — K21.9 GASTROESOPHAGEAL REFLUX DISEASE WITHOUT ESOPHAGITIS: ICD-10-CM

## 2024-09-11 RX ORDER — SUCRALFATE 1 G/1
1 TABLET ORAL 4 TIMES DAILY
Qty: 120 TABLET | Refills: 5 | Status: SHIPPED | OUTPATIENT
Start: 2024-09-11

## 2024-09-11 RX ORDER — ATORVASTATIN CALCIUM 40 MG/1
40 TABLET, FILM COATED ORAL DAILY
Qty: 30 TABLET | Refills: 5 | Status: SHIPPED | OUTPATIENT
Start: 2024-09-11

## 2024-09-13 PROBLEM — K21.9 GERD (GASTROESOPHAGEAL REFLUX DISEASE): Status: ACTIVE | Noted: 2024-09-13

## 2024-09-13 PROBLEM — D68.69 SECONDARY HYPERCOAGULABLE STATE (HCC): Status: ACTIVE | Noted: 2024-09-13

## 2024-10-03 ENCOUNTER — OFFICE VISIT (OUTPATIENT)
Dept: CARDIOLOGY CLINIC | Age: 74
End: 2024-10-03
Payer: MEDICARE

## 2024-10-03 VITALS
HEIGHT: 70 IN | SYSTOLIC BLOOD PRESSURE: 122 MMHG | BODY MASS INDEX: 28.55 KG/M2 | WEIGHT: 199.4 LBS | HEART RATE: 62 BPM | OXYGEN SATURATION: 98 % | DIASTOLIC BLOOD PRESSURE: 78 MMHG

## 2024-10-03 DIAGNOSIS — I48.0 PAF (PAROXYSMAL ATRIAL FIBRILLATION) (HCC): ICD-10-CM

## 2024-10-03 DIAGNOSIS — I25.10 CORONARY ARTERY DISEASE INVOLVING NATIVE CORONARY ARTERY OF NATIVE HEART WITHOUT ANGINA PECTORIS: Primary | ICD-10-CM

## 2024-10-03 PROCEDURE — 3074F SYST BP LT 130 MM HG: CPT | Performed by: NURSE PRACTITIONER

## 2024-10-03 PROCEDURE — 99214 OFFICE O/P EST MOD 30 MIN: CPT | Performed by: NURSE PRACTITIONER

## 2024-10-03 PROCEDURE — G8427 DOCREV CUR MEDS BY ELIG CLIN: HCPCS | Performed by: NURSE PRACTITIONER

## 2024-10-03 PROCEDURE — G8417 CALC BMI ABV UP PARAM F/U: HCPCS | Performed by: NURSE PRACTITIONER

## 2024-10-03 PROCEDURE — 3078F DIAST BP <80 MM HG: CPT | Performed by: NURSE PRACTITIONER

## 2024-10-03 PROCEDURE — 1124F ACP DISCUSS-NO DSCNMKR DOCD: CPT | Performed by: NURSE PRACTITIONER

## 2024-10-03 PROCEDURE — G8482 FLU IMMUNIZE ORDER/ADMIN: HCPCS | Performed by: NURSE PRACTITIONER

## 2024-10-03 PROCEDURE — 1036F TOBACCO NON-USER: CPT | Performed by: NURSE PRACTITIONER

## 2024-10-03 ASSESSMENT — ENCOUNTER SYMPTOMS
RESPIRATORY NEGATIVE: 1
GASTROINTESTINAL NEGATIVE: 1

## 2024-10-03 NOTE — PROGRESS NOTES
[facility]   Cardiology Note              Date:  October 3, 2024  Patientname: Steve Murrieta  YOB: 1950    Primary Care physician: Morena Burns MD    HISTORY OF PRESENT ILLNESS: Steve Murrieta is a 74 y.o. male with a history of nonobstructive CAD, paroxysmal atrial fibrillation, HTN, HLD, DM.  He was found to have paroxysmal atrial fibrillation in 2015.  He was admitted 8/2024 for chest pain and stress test abnormal.  Echo showed EF 55-60%.  LHC 8/23/2024 showed mild-moderate CAD, normal filling pressures, medical management recommended.    Today he presents for hospital follow-up for CAD s/p LHC.  Overall he feels well.  He still has mild chest discomfort that hurts more with taking a deep breath and with certain positions.  No exertional component.  He denies any shortness of breath, dizziness, syncope, edema.  He has occasional palpitations.  He tries to walk for exercise.      Cardiologist: Dr. Graff; evaluated by Dr. Haas and Dr. Mejia while hospitalized 8/2024    Past Medical History:   has a past medical history of Abnormal ultrasound of carotid artery, Back pain, Chronic back pain, Diabetes mellitus (HCC), DJD (degenerative joint disease), Fibromyalgia, Hypertension, Kidney stones, and Paroxysmal A-fib (HCC).    Past Surgical History:   has a past surgical history that includes back surgery; Upper gastrointestinal endoscopy (12/16/2011); Upper gastrointestinal endoscopy (12/16/2011); Lithotripsy; other surgical history (04/21/2021); ventral hernia repair (N/A, 04/21/2021); hernia repair; Esophagogastroduodenoscopy (08/09/2023); Upper gastrointestinal endoscopy (N/A, 08/09/2023); other surgical history (Left, 09/25/2023); Total hip arthroplasty (Left, 9/25/2023); and Cardiac procedure (N/A, 8/23/2024).     Home Medications:    Prior to Admission medications    Medication Sig Start Date End Date Taking? Authorizing Provider   sucralfate (CARAFATE) 1 GM tablet Take 1

## 2024-10-03 NOTE — PATIENT INSTRUCTIONS
Everything looks great today, good job!  Continue current medications  Stay active along with a healthy diet  Check BP at home and call the office if consistently out of goal range  Continue to see Dr. Graff for atrial fibrillation  Follow up as needed with general cardiology

## 2024-12-02 DIAGNOSIS — Z96.642 S/P TOTAL LEFT HIP ARTHROPLASTY: ICD-10-CM

## 2024-12-02 RX ORDER — TRAMADOL HYDROCHLORIDE 50 MG/1
TABLET ORAL
Qty: 28 TABLET | OUTPATIENT
Start: 2024-12-02

## 2024-12-04 ENCOUNTER — OFFICE VISIT (OUTPATIENT)
Dept: INTERNAL MEDICINE CLINIC | Age: 74
End: 2024-12-04

## 2024-12-04 VITALS
BODY MASS INDEX: 27.97 KG/M2 | WEIGHT: 195.4 LBS | OXYGEN SATURATION: 98 % | SYSTOLIC BLOOD PRESSURE: 152 MMHG | HEIGHT: 70 IN | HEART RATE: 65 BPM | DIASTOLIC BLOOD PRESSURE: 78 MMHG

## 2024-12-04 DIAGNOSIS — I10 PRIMARY HYPERTENSION: ICD-10-CM

## 2024-12-04 DIAGNOSIS — E11.9 TYPE 2 DIABETES MELLITUS WITHOUT COMPLICATION, WITHOUT LONG-TERM CURRENT USE OF INSULIN (HCC): Primary | ICD-10-CM

## 2024-12-04 DIAGNOSIS — K21.00 GASTROESOPHAGEAL REFLUX DISEASE WITH ESOPHAGITIS WITHOUT HEMORRHAGE: ICD-10-CM

## 2024-12-04 DIAGNOSIS — I48.0 PAF (PAROXYSMAL ATRIAL FIBRILLATION) (HCC): ICD-10-CM

## 2024-12-04 PROBLEM — F19.10 POLYSUBSTANCE ABUSE (HCC): Status: RESOLVED | Noted: 2022-09-17 | Resolved: 2024-12-04

## 2024-12-04 LAB — HBA1C MFR BLD: 8.7 %

## 2024-12-04 RX ORDER — TAMSULOSIN HYDROCHLORIDE 0.4 MG/1
0.4 CAPSULE ORAL DAILY
Qty: 30 CAPSULE | Refills: 5 | Status: SHIPPED | OUTPATIENT
Start: 2024-12-04

## 2024-12-04 RX ORDER — ONDANSETRON 4 MG/1
4 TABLET, FILM COATED ORAL EVERY 8 HOURS PRN
Qty: 60 TABLET | Refills: 5 | Status: SHIPPED | OUTPATIENT
Start: 2024-12-04

## 2024-12-04 RX ORDER — PANTOPRAZOLE SODIUM 40 MG/1
40 TABLET, DELAYED RELEASE ORAL 2 TIMES DAILY
Qty: 60 TABLET | Refills: 5 | Status: SHIPPED | OUTPATIENT
Start: 2024-12-04

## 2024-12-04 RX ORDER — AMLODIPINE BESYLATE 5 MG/1
5 TABLET ORAL DAILY
Qty: 30 TABLET | Refills: 5 | Status: SHIPPED | OUTPATIENT
Start: 2024-12-04

## 2024-12-04 RX ORDER — ASPIRIN 81 MG/1
81 TABLET, CHEWABLE ORAL DAILY
Qty: 30 TABLET | Refills: 5 | Status: SHIPPED | OUTPATIENT
Start: 2024-12-04

## 2024-12-04 NOTE — PROGRESS NOTES
hours.    Invalid input(s): \"ALB\"  PT/INR: No results for input(s): \"PROTIME\", \"INR\" in the last 72 hours.  APTT: No results for input(s): \"APTT\" in the last 72 hours.  UA:No results for input(s): \"NITRITE\", \"COLORU\", \"PHUR\", \"LABCAST\", \"WBCUA\", \"RBCUA\", \"MUCUS\", \"TRICHOMONAS\", \"YEAST\", \"BACTERIA\", \"CLARITYU\", \"SPECGRAV\", \"LEUKOCYTESUR\", \"UROBILINOGEN\", \"BILIRUBINUR\", \"BLOODU\", \"GLUCOSEU\", \"AMORPHOUS\" in the last 72 hours.    Invalid input(s): \"KETONESU\"    Invalid input(s): \"ABG\"  Lab Results   Component Value Date    CALCIUM 9.0 08/23/2024                    Electronically signed by Morena Burns MD on 12/4/2024 at 11:41 AM

## 2025-01-31 ENCOUNTER — TELEPHONE (OUTPATIENT)
Dept: INTERNAL MEDICINE CLINIC | Age: 75
End: 2025-01-31

## 2025-01-31 DIAGNOSIS — I48.0 PAF (PAROXYSMAL ATRIAL FIBRILLATION) (HCC): Primary | ICD-10-CM

## 2025-01-31 NOTE — TELEPHONE ENCOUNTER
Pt can't afford new deductible for his eliquis $300. [facility] but may need assistance with med from . Pt states his medicare isn't covering or providing what it did last year.

## 2025-02-03 ENCOUNTER — CLINICAL DOCUMENTATION (OUTPATIENT)
Facility: HOSPITAL | Age: 75
End: 2025-02-03

## 2025-02-03 NOTE — PROGRESS NOTES
Received and reviewed referral for patient, called to speak with him about his options, no answer left message to return my call.     Jennifer Werner  Financial Navigator

## 2025-02-04 ENCOUNTER — CLINICAL DOCUMENTATION (OUTPATIENT)
Facility: HOSPITAL | Age: 75
End: 2025-02-04

## 2025-02-05 ENCOUNTER — CLINICAL DOCUMENTATION (OUTPATIENT)
Facility: HOSPITAL | Age: 75
End: 2025-02-05

## 2025-02-05 NOTE — PROGRESS NOTES
Patient Assistance    Met with: Steve Murrieta    Navigator Type: Oral  Documentation Type: Assistance Review  Contact Type: Telephone  Status of Patient Insurance Coverage: Patient has active coverage     Additional assistance information given to patient: Patient is covered by Medicare but the cost of the Eliquis is $300, because he has coverage he is ineligible for a PAP.  Advised of the Medicare Payment Plan option and applying for LIS.    Additional notes: He had an appointment with [facility] to see what he can do.  I advised him to call his Health plan for assistance and he could always call me back for help.     Other Drug Name: Eliquis  Form of PAP Assistance: Imelda Werner   Financial Navigator

## 2025-02-24 ASSESSMENT — ENCOUNTER SYMPTOMS
STRIDOR: 0
RIGHT EYE: 0
LEFT EYE: 0
WHEEZING: 0
HEMATEMESIS: 0
SHORTNESS OF BREATH: 0
HEMATOCHEZIA: 0

## 2025-02-24 NOTE — PROGRESS NOTES
suggest a low-intermediate risk of cardiac events.    Perfusion Comments: LV perfusion is abnormal.    Perfusion Defect: Overall, difficult study.  There is a small to moderate sized perfusion defect involving the apex and apical to mid inferolateral wall.  There is adjacent extracardiac radiotracer uptake adjacent to these areas.  However with attenuation correction, the perfusion defect improves at rest and is still present during stress.  This is most consistent with myocardial ischemia.    Stress Function: Normal LV size, wall motion and function.  Post-stress ejection fraction estimated at 70%.    Perfusion Conclusion: There is no evidence of transient ischemic dilation (TID). TID ratio is 0.90.    Image quality is fair.    Stress ECG: Arrhythmias during stress: occasional PVCs. Non-specific ST abnormality noted. Nonspecific ST wave abnormality in recovery.  Otherwise, ECG portion is nondiagnostic secondary to pharmacologic protocol.    Consider cardiology consultation.    Stress Test (Date: 12/08/2015)  IMPRESSION:  1. No pharmcologically induced reversible perfusion defects to suggest  ischemia.  2. Ejection fraction of 68%, normal.  3. No focal wall motion abnormality.    Current Medications     Current Outpatient Medications   Medication Sig Dispense Refill    tamsulosin (FLOMAX) 0.4 MG capsule Take 1 capsule by mouth daily 30 capsule 5    pantoprazole (PROTONIX) 40 MG tablet Take 1 tablet by mouth in the morning and at bedtime 60 tablet 5    aspirin 81 MG chewable tablet Take 1 tablet by mouth daily 30 tablet 5    ondansetron (ZOFRAN) 4 MG tablet Take 1 tablet by mouth every 8 hours as needed for Nausea 60 tablet 5    apixaban (ELIQUIS) 5 MG TABS tablet Take 1 tablet by mouth 2 times daily 60 tablet 5    sucralfate (CARAFATE) 1 GM tablet Take 1 tablet by mouth 4 times daily 120 tablet 5    atorvastatin (LIPITOR) 40 MG tablet Take 1 tablet by mouth daily 30 tablet 5    amLODIPine (NORVASC) 5 MG tablet Take

## 2025-02-25 ENCOUNTER — OFFICE VISIT (OUTPATIENT)
Dept: CARDIOLOGY CLINIC | Age: 75
End: 2025-02-25
Payer: MEDICARE

## 2025-02-25 VITALS
OXYGEN SATURATION: 98 % | BODY MASS INDEX: 28.03 KG/M2 | DIASTOLIC BLOOD PRESSURE: 56 MMHG | SYSTOLIC BLOOD PRESSURE: 112 MMHG | WEIGHT: 195.8 LBS | HEART RATE: 61 BPM | HEIGHT: 70 IN

## 2025-02-25 DIAGNOSIS — I10 PRIMARY HYPERTENSION: ICD-10-CM

## 2025-02-25 DIAGNOSIS — I48.0 PAF (PAROXYSMAL ATRIAL FIBRILLATION) (HCC): Primary | ICD-10-CM

## 2025-02-25 DIAGNOSIS — I44.0 FIRST DEGREE ATRIOVENTRICULAR BLOCK: ICD-10-CM

## 2025-02-25 DIAGNOSIS — I25.10 CORONARY ARTERY DISEASE INVOLVING NATIVE CORONARY ARTERY OF NATIVE HEART WITHOUT ANGINA PECTORIS: ICD-10-CM

## 2025-02-25 DIAGNOSIS — R00.1 SINUS BRADYCARDIA: ICD-10-CM

## 2025-02-25 LAB
EKG ATRIAL RATE: 61 BPM
EKG DIAGNOSIS: NORMAL
EKG P-R INTERVAL: 240 MS
EKG Q-T INTERVAL: 390 MS
EKG QRS DURATION: 82 MS
EKG QTC CALCULATION (BAZETT): 392 MS
EKG R AXIS: -30 DEGREES
EKG T AXIS: 57 DEGREES
EKG VENTRICULAR RATE: 61 BPM

## 2025-02-25 PROCEDURE — 1124F ACP DISCUSS-NO DSCNMKR DOCD: CPT | Performed by: INTERNAL MEDICINE

## 2025-02-25 PROCEDURE — 3074F SYST BP LT 130 MM HG: CPT | Performed by: INTERNAL MEDICINE

## 2025-02-25 PROCEDURE — 1159F MED LIST DOCD IN RCRD: CPT | Performed by: INTERNAL MEDICINE

## 2025-02-25 PROCEDURE — G2211 COMPLEX E/M VISIT ADD ON: HCPCS | Performed by: INTERNAL MEDICINE

## 2025-02-25 PROCEDURE — 3078F DIAST BP <80 MM HG: CPT | Performed by: INTERNAL MEDICINE

## 2025-02-25 PROCEDURE — G8427 DOCREV CUR MEDS BY ELIG CLIN: HCPCS | Performed by: INTERNAL MEDICINE

## 2025-02-25 PROCEDURE — G8417 CALC BMI ABV UP PARAM F/U: HCPCS | Performed by: INTERNAL MEDICINE

## 2025-02-25 PROCEDURE — 1036F TOBACCO NON-USER: CPT | Performed by: INTERNAL MEDICINE

## 2025-02-25 PROCEDURE — 99214 OFFICE O/P EST MOD 30 MIN: CPT | Performed by: INTERNAL MEDICINE

## 2025-02-25 NOTE — PATIENT INSTRUCTIONS
Plan:     The current medical regimen is effective; continue present plan and medications.  Follow up with me in 8 months.

## 2025-03-05 ENCOUNTER — OFFICE VISIT (OUTPATIENT)
Dept: INTERNAL MEDICINE CLINIC | Age: 75
End: 2025-03-05

## 2025-03-05 VITALS
HEIGHT: 70 IN | OXYGEN SATURATION: 98 % | WEIGHT: 195.2 LBS | BODY MASS INDEX: 27.94 KG/M2 | DIASTOLIC BLOOD PRESSURE: 69 MMHG | SYSTOLIC BLOOD PRESSURE: 132 MMHG | HEART RATE: 68 BPM

## 2025-03-05 DIAGNOSIS — E11.9 TYPE 2 DIABETES MELLITUS WITHOUT COMPLICATION, WITHOUT LONG-TERM CURRENT USE OF INSULIN (HCC): Primary | ICD-10-CM

## 2025-03-05 DIAGNOSIS — K21.9 GASTROESOPHAGEAL REFLUX DISEASE WITHOUT ESOPHAGITIS: ICD-10-CM

## 2025-03-05 DIAGNOSIS — I48.0 PAF (PAROXYSMAL ATRIAL FIBRILLATION) (HCC): ICD-10-CM

## 2025-03-05 DIAGNOSIS — I10 PRIMARY HYPERTENSION: ICD-10-CM

## 2025-03-05 DIAGNOSIS — F33.2 SEVERE SEASONAL AFFECTIVE DISORDER (HCC): ICD-10-CM

## 2025-03-05 LAB — HBA1C MFR BLD: 8.5 %

## 2025-03-05 PROCEDURE — 1159F MED LIST DOCD IN RCRD: CPT | Performed by: INTERNAL MEDICINE

## 2025-03-05 PROCEDURE — 83036 HEMOGLOBIN GLYCOSYLATED A1C: CPT | Performed by: INTERNAL MEDICINE

## 2025-03-05 PROCEDURE — 1036F TOBACCO NON-USER: CPT | Performed by: INTERNAL MEDICINE

## 2025-03-05 PROCEDURE — 3078F DIAST BP <80 MM HG: CPT | Performed by: INTERNAL MEDICINE

## 2025-03-05 PROCEDURE — G8427 DOCREV CUR MEDS BY ELIG CLIN: HCPCS | Performed by: INTERNAL MEDICINE

## 2025-03-05 PROCEDURE — 99214 OFFICE O/P EST MOD 30 MIN: CPT | Performed by: INTERNAL MEDICINE

## 2025-03-05 PROCEDURE — 3075F SYST BP GE 130 - 139MM HG: CPT | Performed by: INTERNAL MEDICINE

## 2025-03-05 PROCEDURE — 3052F HG A1C>EQUAL 8.0%<EQUAL 9.0%: CPT | Performed by: INTERNAL MEDICINE

## 2025-03-05 PROCEDURE — G8417 CALC BMI ABV UP PARAM F/U: HCPCS | Performed by: INTERNAL MEDICINE

## 2025-03-05 PROCEDURE — 1124F ACP DISCUSS-NO DSCNMKR DOCD: CPT | Performed by: INTERNAL MEDICINE

## 2025-03-05 RX ORDER — ATORVASTATIN CALCIUM 40 MG/1
40 TABLET, FILM COATED ORAL DAILY
Qty: 30 TABLET | Refills: 5 | Status: SHIPPED | OUTPATIENT
Start: 2025-03-05

## 2025-03-05 RX ORDER — SUCRALFATE 1 G/1
1 TABLET ORAL 4 TIMES DAILY
Qty: 120 TABLET | Refills: 5 | Status: SHIPPED | OUTPATIENT
Start: 2025-03-05

## 2025-03-05 SDOH — ECONOMIC STABILITY: FOOD INSECURITY: WITHIN THE PAST 12 MONTHS, YOU WORRIED THAT YOUR FOOD WOULD RUN OUT BEFORE YOU GOT MONEY TO BUY MORE.: SOMETIMES TRUE

## 2025-03-05 ASSESSMENT — SOCIAL DETERMINANTS OF HEALTH (SDOH)
DO YOU BELONG TO ANY CLUBS OR ORGANIZATIONS SUCH AS CHURCH GROUPS UNIONS, FRATERNAL OR ATHLETIC GROUPS, OR SCHOOL GROUPS?: NO

## 2025-03-05 NOTE — PROGRESS NOTES
Internal Medicine Outpatient  Follow Up      Chief complaint:  depression    Subjective: pt appears to have significant depression manifesting as seasonal affective disorder - it is much worse in the winter - he says that most of the antidepressants make him suicidal - we discussed getting out more and he currently denies being suicidal - we discussed a sun lamp and he agrees to try this - we will get one for him for his next visit - although changing from daylight savings time will likely help him  Pt arthritis and chronic hip pain is acting up more because it is raining today    He is no suicidal or homicidal    He is eating a large bag of cheetos daily - we discussed eating a peanut btter sandwich daily - even though it is carbs - at least it is better than empty carbs and fat and it has protein and it is likely cheaper - pt agrees to try  He also eats cottage cheese and northern beans and occasional spam  He gets out to the [facility] daily for a shower, but is not exercising there - I encourage him to get in the pool and walk around in the shallow end - that may help his hip - he agrees to this    ROS:   A comprehensive review of systems was negative except for: some depression       Objective:    /69   Pulse 68   Ht 1.778 m (5' 10\")   Wt 88.5 kg (195 lb 3.2 oz)   SpO2 98%   BMI 28.01 kg/m²     Gen: thin, depressed appearing  HEENT: NC/AT, moist mucous membranes, no oropharyngeal erythema or exudate  Neck: supple, trachea midline, no anterior cervical or SC LAD  Heart:  Normal s1/s2, RRR, no murmurs, gallops, or rubs. no leg edema  Lungs:  clear bilaterally, no wheeze, no rales, no rhonchi, no crackles, no use of accessory muscles  Abd: bowel sounds present, soft, nontender, nondistended, no masses  Extrem:  No clubbing, cyanosis,  no edema  Skin: no rashes or lesions  Psych: A & O x3  Neuro: grossly intact, moves all 4 extremities.      Assessment:

## 2025-03-05 NOTE — PATIENT INSTRUCTIONS
Call if you are not feeling well mentally and I will see you right away  Cut down on cheetos  Try no added sugar peanut butter

## 2025-04-04 ENCOUNTER — HOSPITAL ENCOUNTER (OUTPATIENT)
Age: 75
Discharge: HOME | End: 2025-04-04
Payer: MEDICARE

## 2025-04-04 DIAGNOSIS — E11.9 TYPE 2 DIABETES MELLITUS WITHOUT COMPLICATION, WITHOUT LONG-TERM CURRENT USE OF INSULIN: ICD-10-CM

## 2025-04-04 PROCEDURE — 82570 ASSAY OF URINE CREATININE: CPT

## 2025-04-04 PROCEDURE — 82043 UR ALBUMIN QUANTITATIVE: CPT

## 2025-04-05 LAB
CREAT UR-MCNC: 227 MG/DL (ref 39–259)
MICROALBUMIN UR DL<=1MG/L-MCNC: 27.8 MG/DL
MICROALBUMIN/CREAT UR: 122.5 MG/G (ref 0–30)

## 2025-04-09 ENCOUNTER — OFFICE VISIT (OUTPATIENT)
Dept: INTERNAL MEDICINE CLINIC | Age: 75
End: 2025-04-09

## 2025-04-09 VITALS — HEART RATE: 65 BPM | OXYGEN SATURATION: 99 % | DIASTOLIC BLOOD PRESSURE: 82 MMHG | SYSTOLIC BLOOD PRESSURE: 133 MMHG

## 2025-04-09 DIAGNOSIS — F33.8 SEASONAL AFFECTIVE DISORDER: Primary | ICD-10-CM

## 2025-04-09 DIAGNOSIS — R80.9 TYPE 2 DIABETES MELLITUS WITH DIABETIC MICROALBUMINURIA, WITHOUT LONG-TERM CURRENT USE OF INSULIN (HCC): ICD-10-CM

## 2025-04-09 DIAGNOSIS — E11.29 TYPE 2 DIABETES MELLITUS WITH DIABETIC MICROALBUMINURIA, WITHOUT LONG-TERM CURRENT USE OF INSULIN (HCC): ICD-10-CM

## 2025-04-09 PROCEDURE — 3075F SYST BP GE 130 - 139MM HG: CPT | Performed by: INTERNAL MEDICINE

## 2025-04-09 PROCEDURE — 1159F MED LIST DOCD IN RCRD: CPT | Performed by: INTERNAL MEDICINE

## 2025-04-09 PROCEDURE — G8417 CALC BMI ABV UP PARAM F/U: HCPCS | Performed by: INTERNAL MEDICINE

## 2025-04-09 PROCEDURE — 3078F DIAST BP <80 MM HG: CPT | Performed by: INTERNAL MEDICINE

## 2025-04-09 PROCEDURE — G8427 DOCREV CUR MEDS BY ELIG CLIN: HCPCS | Performed by: INTERNAL MEDICINE

## 2025-04-09 PROCEDURE — 1036F TOBACCO NON-USER: CPT | Performed by: INTERNAL MEDICINE

## 2025-04-09 PROCEDURE — 99213 OFFICE O/P EST LOW 20 MIN: CPT | Performed by: INTERNAL MEDICINE

## 2025-04-09 PROCEDURE — 3052F HG A1C>EQUAL 8.0%<EQUAL 9.0%: CPT | Performed by: INTERNAL MEDICINE

## 2025-04-09 PROCEDURE — 1124F ACP DISCUSS-NO DSCNMKR DOCD: CPT | Performed by: INTERNAL MEDICINE

## 2025-04-09 NOTE — PROGRESS NOTES
Internal Medicine Outpatient  Follow Up      Chief complaint:  here for f/u of DM and depression    Subjective: gave pt light therapy lamp today - instructed him how to use it when it starts to get dark again - he definitely has seasonal affective disorder  Pt making better food choices - buying lower sugar peanut butter and sugar free jelly - since [employer] closed he no longer has salad choices  C/o ongoing hip pains  Less depressed    ROS:   A comprehensive review of systems was negative except for: chronic bilateral hip pain       Objective:    /82   Pulse 65   SpO2 99%     Gen: NAD    Skin: no rashes or lesions  Psych: A & O x3  Neuro: grossly intact, moves all 4 extremities.      Assessment:      ICD-10-CM    1. Seasonal affective disorder  F33.8       2. Type 2 diabetes mellitus with diabetic microalbuminuria, without long-term current use of insulin (HCC)  E11.29     R80.9            Plan:  1  seasonal affective disorder - depression - doing better with office visits and light - he knows to call the office and/or seek immediate medical help if he feels suicidal - pt denies feeling suicidal or homicidal  2.  Suspected DM2 - new onset - has elevated prot creatinine ratio - could be diet controlled - will see if A1c is decreased with diet control next month - Pt making better food choices - will see if A1c looks better - if not then will need DM medication - pt will f/u in June      Next Clinic appointment is:  6/11/2025   Code status:  Full code             Medications:  Patient's Medications   New Prescriptions    No medications on file   Previous Medications    AMLODIPINE (NORVASC) 5 MG TABLET    Take 1 tablet by mouth daily    APIXABAN (ELIQUIS) 5 MG TABS TABLET    Take 1 tablet by mouth 2 times daily    ASPIRIN 81 MG CHEWABLE TABLET    Take 1 tablet by mouth daily    ATORVASTATIN (LIPITOR) 40 MG TABLET    Take 1 tablet by mouth daily

## 2025-04-29 NOTE — PROGRESS NOTES
Patient needs to be NPO after midnight for gallbladder ultrasound. Surgeon (Optional): Thaddeus Diaz MD Biopsy Photograph Reviewed: Yes Size Of Lesion In Cm: 1.6 X Size Of Lesion In Cm (Optional): 0 Size Of Margin In Cm: 0.4 Anesthesia Volume In Cc: 10 Was An Eye Clamp Used?: No Eye Clamp Note Details: An eye clamp was used during the procedure. Excision Method: Round Saucerization Depth: dermis and superficial adipose tissue Repair Type: Intermediate Intermediate / Complex Repair - Final Wound Length In Cm: 5.1 Deep Sutures: 2-0 Vicryl Epidermal Sutures: 4-0 Vicryl Suture Removal: 7 days Suturegard Retention Suture: 2-0 Nylon Retention Suture Bite Size: 3 mm Number Of Hemigard Strips Per Side: 1 Undermining Type: Entire Wound Debridement Text: The wound edges were debrided prior to proceeding with the closure to facilitate wound healing. Helical Rim Text: The closure involved the helical rim. Vermilion Border Text: The closure involved the vermilion border. Nostril Rim Text: The closure involved the nostril rim. Retention Suture Text: Retention sutures were placed to support the closure and prevent dehiscence. Lab: [id number] Lab Facility: [id number] Graft Donor Site Bandage (Optional-Leave Blank If You Don't Want In Note): Steri-strips and a pressure bandage were applied to the donor site. Epidermal Closure Graft Donor Site (Optional): simple interrupted Billing Type: Third-Party Bill Excision Depth: adipose tissue Scalpel Size: 15 blade Anesthesia Type: 1% lidocaine with epinephrine and a 1:10 solution of 8.4% sodium bicarbonate Hemostasis: Electrocautery Estimated Blood Loss (Cc): minimal Detail Level: Detailed Repair Depth: use same depth as excision depth Epidermal Closure: running Wound Care: Petrolatum Dressing: dry sterile dressing Suturegard Intro: Intraoperative tissue expansion was performed, utilizing the SUTUREGARD device, in order to reduce wound tension. Suturegard Body: The suture ends were repeatedly re-tightened and re-clamped to achieve the desired tissue expansion. Hemigard Intro: Due to skin fragility and wound tension, it was decided to use HEMIGARD adhesive retention suture devices to permit a linear closure. The skin was cleaned and dried for a 6cm distance away from the wound. Excessive hair, if present, was removed to allow for adhesion. Hemigard Postcare Instructions: The HEMIGARD strips are to remain completely dry for at least 5-7 days. Positioning (Leave Blank If You Do Not Want): The patient was placed in a comfortable position exposing the surgical site. Pre-Excision Curettage Text (Leave Blank If You Do Not Want): Prior to drawing the surgical margin the visible lesion was removed with curettage to clearly define the lesion size. Complex Repair Preamble Text (Leave Blank If You Do Not Want): Extensive wide undermining was performed. Intermediate Repair Preamble Text (Leave Blank If You Do Not Want): Undermining was performed with blunt dissection. Curvilinear Excision Additional Text (Leave Blank If You Do Not Want): The margin was drawn around the clinically apparent lesion.  A curvilinear shape was then drawn on the skin incorporating the lesion and margins.  Incisions were then made along these lines to the appropriate tissue plane and the lesion was extirpated. Fusiform Excision Additional Text (Leave Blank If You Do Not Want): The margin was drawn around the clinically apparent lesion.  A fusiform shape was then drawn on the skin incorporating the lesion and margins.  Incisions were then made along these lines to the appropriate tissue plane and the lesion was extirpated. Elliptical Excision Additional Text (Leave Blank If You Do Not Want): The margin was drawn around the clinically apparent lesion.  An elliptical shape was then drawn on the skin incorporating the lesion and margins.  Incisions were then made along these lines to the appropriate tissue plane and the lesion was extirpated. Saucerization Excision Additional Text (Leave Blank If You Do Not Want): The margin was drawn around the clinically apparent lesion.  Incisions were then made along these lines, in a tangential fashion, to the appropriate tissue plane and the lesion was extirpated. Slit Excision Additional Text (Leave Blank If You Do Not Want): A linear line was drawn on the skin overlying the lesion. An incision was made slowly until the lesion was visualized.  Once visualized, the lesion was removed with blunt dissection. Excisional Biopsy Additional Text (Leave Blank If You Do Not Want): The margin was drawn around the clinically apparent lesion. An elliptical shape was then drawn on the skin incorporating the lesion and margins.  Incisions were then made along these lines to the appropriate tissue plane and the lesion was extirpated. Perilesional Excision Additional Text (Leave Blank If You Do Not Want): The margin was drawn around the clinically apparent lesion. Incisions were then made along these lines to the appropriate tissue plane and the lesion was extirpated. Repair Performed By Another Provider Text (Leave Blank If You Do Not Want): After the tissue was excised the defect was repaired by another provider. No Repair - Repaired With Adjacent Surgical Defect Text (Leave Blank If You Do Not Want): After the excision the defect was repaired concurrently with another surgical defect which was in close approximation. Adjacent Tissue Transfer Text: The defect edges were debeveled with a #15 scalpel blade. Given the location of the defect and the proximity to free margins an adjacent tissue transfer was deemed most appropriate. Using a sterile surgical marker, an appropriate flap was drawn incorporating the defect and placing the expected incisions within the relaxed skin tension lines where possible. The area thus outlined was incised deep to adipose tissue with a #15 scalpel blade. The skin margins were undermined to an appropriate distance in all directions utilizing iris scissors and carried over to close the primary defect. Advancement Flap (Single) Text: The defect edges were debeveled with a #15 scalpel blade. Given the location of the defect and the proximity to free margins a single advancement flap was deemed most appropriate. Using a sterile surgical marker, an appropriate advancement flap was drawn incorporating the defect and placing the expected incisions within the relaxed skin tension lines where possible. The area thus outlined was incised deep to adipose tissue with a #15 scalpel blade. The skin margins were undermined to an appropriate distance in all directions utilizing iris scissors. Following this, the designed flap was advanced and carried over into the primary defect and sutured into place. Advancement Flap (Double) Text: The defect edges were debeveled with a #15 scalpel blade. Given the location of the defect and the proximity to free margins a double advancement flap was deemed most appropriate. Using a sterile surgical marker, the appropriate advancement flaps were drawn incorporating the defect and placing the expected incisions within the relaxed skin tension lines where possible. The area thus outlined was incised deep to adipose tissue with a #15 scalpel blade. The skin margins were undermined to an appropriate distance in all directions utilizing iris scissors. Following this, the designed flaps were advanced and carried over into the primary defect and sutured into place. Burow's Advancement Flap Text: The defect edges were debeveled with a #15 scalpel blade. Given the location of the defect and the proximity to free margins a Burow's advancement flap was deemed most appropriate. Using a sterile surgical marker, the appropriate advancement flap was drawn incorporating the defect and placing the expected incisions within the relaxed skin tension lines where possible. The area thus outlined was incised deep to adipose tissue with a #15 scalpel blade. The skin margins were undermined to an appropriate distance in all directions utilizing iris scissors. Following this, the designed flap was advanced and carried over into the primary defect and sutured into place. Chonodrocutaneous Helical Advancement Flap Text: The defect edges were debeveled with a #15 scalpel blade. Given the location of the defect and the proximity to free margins a chondrocutaneous helical advancement flap was deemed most appropriate. Using a sterile surgical marker, the appropriate advancement flap was drawn incorporating the defect and placing the expected incisions within the relaxed skin tension lines where possible. The area thus outlined was incised deep to adipose tissue with a #15 scalpel blade. The skin margins were undermined to an appropriate distance in all directions utilizing iris scissors. Following this, the designed flap was advanced and carried over into the primary defect and sutured into place. Crescentic Advancement Flap Text: The defect edges were debeveled with a #15 scalpel blade. Given the location of the defect and the proximity to free margins a crescentic advancement flap was deemed most appropriate. Using a sterile surgical marker, the appropriate advancement flap was drawn incorporating the defect and placing the expected incisions within the relaxed skin tension lines where possible. The area thus outlined was incised deep to adipose tissue with a #15 scalpel blade. The skin margins were undermined to an appropriate distance in all directions utilizing iris scissors. Following this, the designed flap was advanced and carried over into the primary defect and sutured into place. A-T Advancement Flap Text: The defect edges were debeveled with a #15 scalpel blade. Given the location of the defect, shape of the defect and the proximity to free margins an A-T advancement flap was deemed most appropriate. Using a sterile surgical marker, an appropriate advancement flap was drawn incorporating the defect and placing the expected incisions within the relaxed skin tension lines where possible. The area thus outlined was incised deep to adipose tissue with a #15 scalpel blade. The skin margins were undermined to an appropriate distance in all directions utilizing iris scissors. Following this, the designed flap was advanced and carried over into the primary defect and sutured into place. O-T Advancement Flap Text: The defect edges were debeveled with a #15 scalpel blade. Given the location of the defect, shape of the defect and the proximity to free margins an O-T advancement flap was deemed most appropriate. Using a sterile surgical marker, an appropriate advancement flap was drawn incorporating the defect and placing the expected incisions within the relaxed skin tension lines where possible. The area thus outlined was incised deep to adipose tissue with a #15 scalpel blade. The skin margins were undermined to an appropriate distance in all directions utilizing iris scissors. Following this, the designed flap was advanced and carried over into the primary defect and sutured into place. O-L Flap Text: The defect edges were debeveled with a #15 scalpel blade. Given the location of the defect, shape of the defect and the proximity to free margins an O-L flap was deemed most appropriate. Using a sterile surgical marker, an appropriate advancement flap was drawn incorporating the defect and placing the expected incisions within the relaxed skin tension lines where possible. The area thus outlined was incised deep to adipose tissue with a #15 scalpel blade. The skin margins were undermined to an appropriate distance in all directions utilizing iris scissors. Following this, the designed flap was advanced and carried over into the primary defect and sutured into place. O-Z Flap Text: The defect edges were debeveled with a #15 scalpel blade. Given the location of the defect, shape of the defect and the proximity to free margins an O-Z flap was deemed most appropriate. Using a sterile surgical marker, an appropriate transposition flap was drawn incorporating the defect and placing the expected incisions within the relaxed skin tension lines where possible. The area thus outlined was incised deep to adipose tissue with a #15 scalpel blade. The skin margins were undermined to an appropriate distance in all directions utilizing iris scissors. Following this, the designed flap was carried over into the primary defect and sutured into place. Double O-Z Flap Text: The defect edges were debeveled with a #15 scalpel blade. Given the location of the defect, shape of the defect and the proximity to free margins a Double O-Z flap was deemed most appropriate. Using a sterile surgical marker, an appropriate transposition flap was drawn incorporating the defect and placing the expected incisions within the relaxed skin tension lines where possible. The area thus outlined was incised deep to adipose tissue with a #15 scalpel blade. The skin margins were undermined to an appropriate distance in all directions utilizing iris scissors. Following this, the designed flap was carried over into the primary defect and sutured into place. V-Y Flap Text: The defect edges were debeveled with a #15 scalpel blade. Given the location of the defect, shape of the defect and the proximity to free margins a V-Y flap was deemed most appropriate. Using a sterile surgical marker, an appropriate advancement flap was drawn incorporating the defect and placing the expected incisions within the relaxed skin tension lines where possible. The area thus outlined was incised deep to adipose tissue with a #15 scalpel blade. The skin margins were undermined to an appropriate distance in all directions utilizing iris scissors. Following this, the designed flap was advanced and carried over into the primary defect and sutured into place. Advancement-Rotation Flap Text: The defect edges were debeveled with a #15 scalpel blade. Given the location of the defect, shape of the defect and the proximity to free margins an advancement-rotation flap was deemed most appropriate. Using a sterile surgical marker, an appropriate flap was drawn incorporating the defect and placing the expected incisions within the relaxed skin tension lines where possible. The area thus outlined was incised deep to adipose tissue with a #15 scalpel blade. The skin margins were undermined to an appropriate distance in all directions utilizing iris scissors. Following this, the designed flap was carried over into the primary defect and sutured into place. Mercedes Flap Text: The defect edges were debeveled with a #15 scalpel blade. Given the location of the defect, shape of the defect and the proximity to free margins a Mercedes flap was deemed most appropriate. Using a sterile surgical marker, an appropriate advancement flap was drawn incorporating the defect and placing the expected incisions within the relaxed skin tension lines where possible. The area thus outlined was incised deep to adipose tissue with a #15 scalpel blade. The skin margins were undermined to an appropriate distance in all directions utilizing iris scissors. Following this, the designed flap was advanced and carried over into the primary defect and sutured into place. Modified Advancement Flap Text: The defect edges were debeveled with a #15 scalpel blade. Given the location of the defect, shape of the defect and the proximity to free margins a modified advancement flap was deemed most appropriate. Using a sterile surgical marker, an appropriate advancement flap was drawn incorporating the defect and placing the expected incisions within the relaxed skin tension lines where possible. The area thus outlined was incised deep to adipose tissue with a #15 scalpel blade. The skin margins were undermined to an appropriate distance in all directions utilizing iris scissors. Following this, the designed flap was advanced and carried over into the primary defect and sutured into place. Mucosal Advancement Flap Text: Given the location of the defect, shape of the defect and the proximity to free margins a mucosal advancement flap was deemed most appropriate. Incisions were made with a 15 blade scalpel in the appropriate fashion along the cutaneous vermilion border and the mucosal lip. The remaining actinically damaged mucosal tissue was excised.  The mucosal advancement flap was then elevated to the gingival sulcus with care taken to preserve the neurovascular structures and advanced into the primary defect. Care was taken to ensure that precise realignment of the vermilion border was achieved. Peng Advancement Flap Text: The defect edges were debeveled with a #15 scalpel blade. Given the location of the defect, shape of the defect and the proximity to free margins a Peng advancement flap was deemed most appropriate. Using a sterile surgical marker, an appropriate advancement flap was drawn incorporating the defect and placing the expected incisions within the relaxed skin tension lines where possible. The area thus outlined was incised deep to adipose tissue with a #15 scalpel blade. The skin margins were undermined to an appropriate distance in all directions utilizing iris scissors. Following this, the designed flap was advanced and carried over into the primary defect and sutured into place. Hatchet Flap Text: The defect edges were debeveled with a #15 scalpel blade. Given the location of the defect, shape of the defect and the proximity to free margins a hatchet flap was deemed most appropriate. Using a sterile surgical marker, an appropriate hatchet flap was drawn incorporating the defect and placing the expected incisions within the relaxed skin tension lines where possible. The area thus outlined was incised deep to adipose tissue with a #15 scalpel blade. The skin margins were undermined to an appropriate distance in all directions utilizing iris scissors. Following this, the designed flap was carried over into the primary defect and sutured into place. Rotation Flap Text: The defect edges were debeveled with a #15 scalpel blade. Given the location of the defect, shape of the defect and the proximity to free margins a rotation flap was deemed most appropriate. Using a sterile surgical marker, an appropriate rotation flap was drawn incorporating the defect and placing the expected incisions within the relaxed skin tension lines where possible. The area thus outlined was incised deep to adipose tissue with a #15 scalpel blade. The skin margins were undermined to an appropriate distance in all directions utilizing iris scissors. Following this, the designed flap was carried over into the primary defect and sutured into place. Bilateral Rotation Flap Text: The defect edges were debeveled with a #15 scalpel blade. Given the location of the defect, shape of the defect and the proximity to free margins a bilateral rotation flap was deemed most appropriate. Using a sterile surgical marker, an appropriate rotation flap was drawn incorporating the defect and placing the expected incisions within the relaxed skin tension lines where possible. The area thus outlined was incised deep to adipose tissue with a #15 scalpel blade. The skin margins were undermined to an appropriate distance in all directions utilizing iris scissors. Following this, the designed flap was carried over into the primary defect and sutured into place. Spiral Flap Text: The defect edges were debeveled with a #15 scalpel blade. Given the location of the defect, shape of the defect and the proximity to free margins a spiral flap was deemed most appropriate. Using a sterile surgical marker, an appropriate rotation flap was drawn incorporating the defect and placing the expected incisions within the relaxed skin tension lines where possible. The area thus outlined was incised deep to adipose tissue with a #15 scalpel blade. The skin margins were undermined to an appropriate distance in all directions utilizing iris scissors. Following this, the designed flap was carried over into the primary defect and sutured into place. Staged Advancement Flap Text: The defect edges were debeveled with a #15 scalpel blade. Given the location of the defect, shape of the defect and the proximity to free margins a staged advancement flap was deemed most appropriate. Using a sterile surgical marker, an appropriate advancement flap was drawn incorporating the defect and placing the expected incisions within the relaxed skin tension lines where possible. The area thus outlined was incised deep to adipose tissue with a #15 scalpel blade. The skin margins were undermined to an appropriate distance in all directions utilizing iris scissors. Following this, the designed flap was carried over into the primary defect and sutured into place. Star Wedge Flap Text: The defect edges were debeveled with a #15 scalpel blade. Given the location of the defect, shape of the defect and the proximity to free margins a star wedge flap was deemed most appropriate. Using a sterile surgical marker, an appropriate rotation flap was drawn incorporating the defect and placing the expected incisions within the relaxed skin tension lines where possible. The area thus outlined was incised deep to adipose tissue with a #15 scalpel blade. The skin margins were undermined to an appropriate distance in all directions utilizing iris scissors. Following this, the designed flap was carried over into the primary defect and sutured into place. Transposition Flap Text: The defect edges were debeveled with a #15 scalpel blade. Given the location of the defect and the proximity to free margins a transposition flap was deemed most appropriate. Using a sterile surgical marker, an appropriate transposition flap was drawn incorporating the defect. The area thus outlined was incised deep to adipose tissue with a #15 scalpel blade. The skin margins were undermined to an appropriate distance in all directions utilizing iris scissors. Following this, the designed flap was carried over into the primary defect and sutured into place. Muscle Hinge Flap Text: The defect edges were debeveled with a #15 scalpel blade.  Given the size, depth and location of the defect and the proximity to free margins a muscle hinge flap was deemed most appropriate. Using a sterile surgical marker, an appropriate hinge flap was drawn incorporating the defect. The area thus outlined was incised with a #15 scalpel blade. The skin margins were undermined to an appropriate distance in all directions utilizing iris scissors. Following this, the designed flap was carried into the primary defect and sutured into place. Mustarde Flap Text: The defect edges were debeveled with a #15 scalpel blade.  Given the size, depth and location of the defect and the proximity to free margins a Mustarde flap was deemed most appropriate. Using a sterile surgical marker, an appropriate flap was drawn incorporating the defect. The area thus outlined was incised with a #15 scalpel blade. The skin margins were undermined to an appropriate distance in all directions utilizing iris scissors. Following this, the designed flap was carried into the primary defect and sutured into place. Nasal Turnover Hinge Flap Text: The defect edges were debeveled with a #15 scalpel blade.  Given the size, depth, location of the defect and the defect being full thickness a nasal turnover hinge flap was deemed most appropriate. Using a sterile surgical marker, an appropriate hinge flap was drawn incorporating the defect. The area thus outlined was incised with a #15 scalpel blade. The flap was designed to recreate the nasal mucosal lining and the alar rim. The skin margins were undermined to an appropriate distance in all directions utilizing iris scissors. Following this, the designed flap was carried over into the primary defect and sutured into place Nasalis-Muscle-Based Myocutaneous Island Pedicle Flap Text: Using a #15 blade, an incision was made around the donor flap to the level of the nasalis muscle. Wide lateral undermining was then performed in both the subcutaneous plane above the nasalis muscle, and in a submuscular plane just above periosteum. This allowed the formation of a free nasalis muscle axial pedicle (based on the angular artery) which was still attached to the actual cutaneous flap, increasing its mobility and vascular viability. Hemostasis was obtained with pinpoint electrocoagulation. The flap was mobilized into position and the pivotal anchor points positioned and stabilized with buried interrupted sutures. Subcutaneous and dermal tissues were closed in a multilayered fashion with sutures. Tissue redundancies were excised, and the epidermal edges were apposed without significant tension and sutured with sutures. Nasalis Myocutaneous Flap Text: Using a #15 blade, an incision was made around the donor flap to the level of the nasalis muscle. Wide lateral undermining was then performed in both the subcutaneous plane above the nasalis muscle, and in a submuscular plane just above periosteum. This allowed the formation of a free nasalis muscle axial pedicle which was still attached to the actual cutaneous flap, increasing its mobility and vascular viability. Hemostasis was obtained with pinpoint electrocoagulation. The flap was mobilized into position and the pivotal anchor points positioned and stabilized with buried interrupted sutures. Subcutaneous and dermal tissues were closed in a multilayered fashion with sutures. Tissue redundancies were excised, and the epidermal edges were apposed without significant tension and sutured with sutures. Nasolabial Transposition Flap Text: The defect edges were debeveled with a #15 scalpel blade.  Given the size, depth and location of the defect and the proximity to free margins a nasolabial transposition flap was deemed most appropriate. Using a sterile surgical marker, an appropriate flap was drawn incorporating the defect. The area thus outlined was incised with a #15 scalpel blade. The skin margins were undermined to an appropriate distance in all directions utilizing iris scissors. Following this, the designed flap was carried into the primary defect and sutured into place. Orbicularis Oris Muscle Flap Text: The defect edges were debeveled with a #15 scalpel blade.  Given that the defect affected the competency of the oral sphincter an orbicularis oris muscle flap was deemed most appropriate to restore this competency and normal muscle function.  Using a sterile surgical marker, an appropriate flap was drawn incorporating the defect. The area thus outlined was incised with a #15 scalpel blade. Following this, the designed flap was carried over into the primary defect and sutured into place. Melolabial Transposition Flap Text: The defect edges were debeveled with a #15 scalpel blade. Given the location of the defect and the proximity to free margins a melolabial flap was deemed most appropriate. Using a sterile surgical marker, an appropriate melolabial transposition flap was drawn incorporating the defect. The area thus outlined was incised deep to adipose tissue with a #15 scalpel blade. The skin margins were undermined to an appropriate distance in all directions utilizing iris scissors. Following this, the designed flap was carried over into the primary defect and sutured into place. Rectangular Flap Text: The defect edges were debeveled with a #15 scalpel blade. Given the location of the defect and the proximity to free margins a rectangular flap was deemed most appropriate. Using a sterile surgical marker, an appropriate rectangular flap was drawn incorporating the defect. The area thus outlined was incised deep to adipose tissue with a #15 scalpel blade. The skin margins were undermined to an appropriate distance in all directions utilizing iris scissors. Following this, the designed flap was carried over into the primary defect and sutured into place. Rhombic Flap Text: The defect edges were debeveled with a #15 scalpel blade. Given the location of the defect and the proximity to free margins a rhombic flap was deemed most appropriate. Using a sterile surgical marker, an appropriate rhombic flap was drawn incorporating the defect. The area thus outlined was incised deep to adipose tissue with a #15 scalpel blade. The skin margins were undermined to an appropriate distance in all directions utilizing iris scissors. Following this, the designed flap was carried over into the primary defect and sutured into place. Rhomboid Transposition Flap Text: The defect edges were debeveled with a #15 scalpel blade. Given the location of the defect and the proximity to free margins a rhomboid transposition flap was deemed most appropriate. Using a sterile surgical marker, an appropriate rhomboid flap was drawn incorporating the defect. The area thus outlined was incised deep to adipose tissue with a #15 scalpel blade. The skin margins were undermined to an appropriate distance in all directions utilizing iris scissors. Following this, the designed flap was carried over into the primary defect and sutured into place. Bi-Rhombic Flap Text: The defect edges were debeveled with a #15 scalpel blade. Given the location of the defect and the proximity to free margins a bi-rhombic flap was deemed most appropriate. Using a sterile surgical marker, an appropriate rhombic flap was drawn incorporating the defect. The area thus outlined was incised deep to adipose tissue with a #15 scalpel blade. The skin margins were undermined to an appropriate distance in all directions utilizing iris scissors. Following this, the designed flap was carried over into the primary defect and sutured into place. Helical Rim Advancement Flap Text: The defect edges were debeveled with a #15 blade scalpel.  Given the location of the defect and the proximity to free margins (helical rim) a double helical rim advancement flap was deemed most appropriate. Using a sterile surgical marker, the appropriate advancement flaps were drawn incorporating the defect and placing the expected incisions between the helical rim and antihelix where possible.  The area thus outlined was incised through and through with a #15 scalpel blade.  With a skin hook and iris scissors, the flaps were gently and sharply undermined and freed up. Folllowing this, the designed flaps were carried over into the primary defect and sutured into place. Bilateral Helical Rim Advancement Flap Text: The defect edges were debeveled with a #15 blade scalpel.  Given the location of the defect and the proximity to free margins (helical rim) a bilateral helical rim advancement flap was deemed most appropriate. Using a sterile surgical marker, the appropriate advancement flaps were drawn incorporating the defect and placing the expected incisions between the helical rim and antihelix where possible.  The area thus outlined was incised through and through with a #15 scalpel blade.  With a skin hook and iris scissors, the flaps were gently and sharply undermined and freed up. Following this, the designed flaps were placed into the primary defect and sutured into place. Ear Star Wedge Flap Text: The defect edges were debeveled with a #15 blade scalpel.  Given the location of the defect and the proximity to free margins (helical rim) an ear star wedge flap was deemed most appropriate. Using a sterile surgical marker, the appropriate flap was drawn incorporating the defect and placing the expected incisions between the helical rim and antihelix where possible.  The area thus outlined was incised through and through with a #15 scalpel blade. Following this, the designed flap was carried over into the primary defect and sutured into place. Flip-Flop Flap Text: The defect edges were debeveled with a #15 blade scalpel.  Given the location of the defect and the proximity to free margins a flip-flop flap was deemed most appropriate. Using a sterile surgical marker, the appropriate flap was drawn incorporating the defect and placing the expected incisions between the helical rim and antihelix where possible.  The area thus outlined was incised through and through with a #15 scalpel blade. Following this, the designed flap was carried over into the primary defect and sutured into place. Banner Transposition Flap Text: The defect edges were debeveled with a #15 scalpel blade. Given the location of the defect and the proximity to free margins a Banner transposition flap was deemed most appropriate. Using a sterile surgical marker, an appropriate flap was drawn around the defect. The area thus outlined was incised deep to adipose tissue with a #15 scalpel blade. The skin margins were undermined to an appropriate distance in all directions utilizing iris scissors. Following this, the designed flap was carried into the primary defect and sutured into place. Bilobed Flap Text: The defect edges were debeveled with a #15 scalpel blade. Given the location of the defect and the proximity to free margins a bilobe flap was deemed most appropriate. Using a sterile surgical marker, an appropriate bilobe flap drawn around the defect. The area thus outlined was incised deep to adipose tissue with a #15 scalpel blade. The skin margins were undermined to an appropriate distance in all directions utilizing iris scissors. Following this, the designed flap was carried over into the primary defect and sutured into place. Bilobed Transposition Flap Text: The defect edges were debeveled with a #15 scalpel blade. Given the location of the defect and the proximity to free margins a bilobed transposition flap was deemed most appropriate. Using a sterile surgical marker, an appropriate bilobe flap drawn around the defect. The area thus outlined was incised deep to adipose tissue with a #15 scalpel blade. The skin margins were undermined to an appropriate distance in all directions utilizing iris scissors. Following this, the designed flap was carried over into the primary defect and sutured into place. Trilobed Flap Text: The defect edges were debeveled with a #15 scalpel blade. Given the location of the defect and the proximity to free margins a trilobed flap was deemed most appropriate. Using a sterile surgical marker, an appropriate trilobed flap was drawn around the defect. The area thus outlined was incised deep to adipose tissue with a #15 scalpel blade. The skin margins were undermined to an appropriate distance in all directions utilizing iris scissors. Following this, the designed flap was carried into the primary defect and sutured into place. Dorsal Nasal Flap Text: The defect edges were debeveled with a #15 scalpel blade. Given the location of the defect and the proximity to free margins a dorsal nasal flap was deemed most appropriate. Using a sterile surgical marker, an appropriate dorsal nasal flap was drawn around the defect. The area thus outlined was incised deep to adipose tissue with a #15 scalpel blade. The skin margins were undermined to an appropriate distance in all directions utilizing iris scissors. Following this, the designed flap was carried into the primary defect and sutured into place. Island Pedicle Flap Text: The defect edges were debeveled with a #15 scalpel blade. Given the location of the defect, shape of the defect and the proximity to free margins an island pedicle advancement flap was deemed most appropriate. Using a sterile surgical marker, an appropriate advancement flap was drawn incorporating the defect, outlining the appropriate donor tissue and placing the expected incisions within the relaxed skin tension lines where possible. The area thus outlined was incised deep to adipose tissue with a #15 scalpel blade. The skin margins were undermined to an appropriate distance in all directions around the primary defect and laterally outward around the island pedicle utilizing iris scissors.  There was minimal undermining beneath the pedicle flap. Following this, the flap was carried over into the primary defect and sutured into place. Island Pedicle Flap With Canthal Suspension Text: The defect edges were debeveled with a #15 scalpel blade. Given the location of the defect, shape of the defect and the proximity to free margins an island pedicle advancement flap was deemed most appropriate. Using a sterile surgical marker, an appropriate advancement flap was drawn incorporating the defect, outlining the appropriate donor tissue and placing the expected incisions within the relaxed skin tension lines where possible. The area thus outlined was incised deep to adipose tissue with a #15 scalpel blade. The skin margins were undermined to an appropriate distance in all directions around the primary defect and laterally outward around the island pedicle utilizing iris scissors.  There was minimal undermining beneath the pedicle flap. A suspension suture was placed in the canthal tendon to prevent tension and prevent ectropion. Following this, the designed flap was placed into the primary defect and sutured into place. Alar Island Pedicle Flap Text: The defect edges were debeveled with a #15 scalpel blade. Given the location of the defect, shape of the defect and the proximity to the alar rim an island pedicle advancement flap was deemed most appropriate. Using a sterile surgical marker, an appropriate advancement flap was drawn incorporating the defect, outlining the appropriate donor tissue and placing the expected incisions within the nasal ala running parallel to the alar rim. The area thus outlined was incised with a #15 scalpel blade. The skin margins were undermined minimally to an appropriate distance in all directions around the primary defect and laterally outward around the island pedicle utilizing iris scissors.  There was minimal undermining beneath the pedicle flap. Following this, the designed flap was carried over into the primary defect and sutured into place. Double Island Pedicle Flap Text: The defect edges were debeveled with a #15 scalpel blade. Given the location of the defect, shape of the defect and the proximity to free margins a double island pedicle advancement flap was deemed most appropriate. Using a sterile surgical marker, an appropriate advancement flap was drawn incorporating the defect, outlining the appropriate donor tissue and placing the expected incisions within the relaxed skin tension lines where possible. The area thus outlined was incised deep to adipose tissue with a #15 scalpel blade. The skin margins were undermined to an appropriate distance in all directions around the primary defect and laterally outward around the island pedicle utilizing iris scissors.  There was minimal undermining beneath the pedicle flap. Following this, the flap was carried over into the primary defect and sutured into place. Island Pedicle Flap-Requiring Vessel Identification Text: The defect edges were debeveled with a #15 scalpel blade. Given the location of the defect, shape of the defect and the proximity to free margins an island pedicle advancement flap was deemed most appropriate. Using a sterile surgical marker, an appropriate advancement flap was drawn, based on the axial vessel mentioned above, incorporating the defect, outlining the appropriate donor tissue and placing the expected incisions within the relaxed skin tension lines where possible. The area thus outlined was incised deep to adipose tissue with a #15 scalpel blade. The skin margins were undermined to an appropriate distance in all directions around the primary defect and laterally outward around the island pedicle utilizing iris scissors.  There was minimal undermining beneath the pedicle flap. Following this, the designed flap was carried over into the primary defect and sutured into place. Keystone Flap Text: The defect edges were debeveled with a #15 scalpel blade. Given the location of the defect, shape of the defect a keystone flap was deemed most appropriate. Using a sterile surgical marker, an appropriate keystone flap was drawn incorporating the defect, outlining the appropriate donor tissue and placing the expected incisions within the relaxed skin tension lines where possible. The area thus outlined was incised deep to adipose tissue with a #15 scalpel blade. The skin margins were undermined to an appropriate distance in all directions around the primary defect and laterally outward around the flap utilizing iris scissors. Following this, the designed flap was carried into the primary defect and sutured into place. O-T Plasty Text: The defect edges were debeveled with a #15 scalpel blade. Given the location of the defect, shape of the defect and the proximity to free margins an O-T plasty was deemed most appropriate. Using a sterile surgical marker, an appropriate O-T plasty was drawn incorporating the defect and placing the expected incisions within the relaxed skin tension lines where possible. The area thus outlined was incised deep to adipose tissue with a #15 scalpel blade. The skin margins were undermined to an appropriate distance in all directions utilizing iris scissors. Following this, the designed flap was carried over into the primary defect and sutured into place. O-Z Plasty Text: The defect edges were debeveled with a #15 scalpel blade. Given the location of the defect, shape of the defect and the proximity to free margins an O-Z plasty (double transposition flap) was deemed most appropriate. Using a sterile surgical marker, the appropriate transposition flaps were drawn incorporating the defect and placing the expected incisions within the relaxed skin tension lines where possible. The area thus outlined was incised deep to adipose tissue with a #15 scalpel blade. The skin margins were undermined to an appropriate distance in all directions utilizing iris scissors. Hemostasis was achieved with electrocautery. The flaps were then transposed and carried over into place, one clockwise and the other counterclockwise, and anchored with interrupted buried subcutaneous sutures. Double O-Z Plasty Text: The defect edges were debeveled with a #15 scalpel blade. Given the location of the defect, shape of the defect and the proximity to free margins a Double O-Z plasty (double transposition flap) was deemed most appropriate. Using a sterile surgical marker, the appropriate transposition flaps were drawn incorporating the defect and placing the expected incisions within the relaxed skin tension lines where possible. The area thus outlined was incised deep to adipose tissue with a #15 scalpel blade. The skin margins were undermined to an appropriate distance in all directions utilizing iris scissors. Hemostasis was achieved with electrocautery. The flaps were then transposed and carried over into place, one clockwise and the other counterclockwise, and anchored with interrupted buried subcutaneous sutures. V-Y Plasty Text: The defect edges were debeveled with a #15 scalpel blade. Given the location of the defect, shape of the defect and the proximity to free margins an V-Y advancement flap was deemed most appropriate. Using a sterile surgical marker, an appropriate advancement flap was drawn incorporating the defect and placing the expected incisions within the relaxed skin tension lines where possible. The area thus outlined was incised deep to adipose tissue with a #15 scalpel blade. The skin margins were undermined to an appropriate distance in all directions utilizing iris scissors. Following this, the designed flap was advanced and carried over into the primary defect and sutured into place. H Plasty Text: Given the location of the defect, shape of the defect and the proximity to free margins a H-plasty was deemed most appropriate for repair. Using a sterile surgical marker, the appropriate advancement arms of the H-plasty were drawn incorporating the defect and placing the expected incisions within the relaxed skin tension lines where possible. The area thus outlined was incised deep to adipose tissue with a #15 scalpel blade. The skin margins were undermined to an appropriate distance in all directions utilizing iris scissors.  The opposing advancement arms were then advanced and carried over into place in opposite direction and anchored with interrupted buried subcutaneous sutures. W Plasty Text: The lesion was extirpated to the level of the fat with a #15 scalpel blade. Given the location of the defect, shape of the defect and the proximity to free margins a W-plasty was deemed most appropriate for repair. Using a sterile surgical marker, the appropriate transposition arms of the W-plasty were drawn incorporating the defect and placing the expected incisions within the relaxed skin tension lines where possible. The area thus outlined was incised deep to adipose tissue with a #15 scalpel blade. The skin margins were undermined to an appropriate distance in all directions utilizing iris scissors. The opposing transposition arms were then transposed and carried over into place in opposite direction and anchored with interrupted buried subcutaneous sutures. Z Plasty Text: The lesion was extirpated to the level of the fat with a #15 scalpel blade. Given the location of the defect, shape of the defect and the proximity to free margins a Z-plasty was deemed most appropriate for repair. Using a sterile surgical marker, the appropriate transposition arms of the Z-plasty were drawn incorporating the defect and placing the expected incisions within the relaxed skin tension lines where possible. The area thus outlined was incised deep to adipose tissue with a #15 scalpel blade. The skin margins were undermined to an appropriate distance in all directions utilizing iris scissors. The opposing transposition arms were then transposed and carried over into place in opposite direction and anchored with interrupted buried subcutaneous sutures. Double Z Plasty Text: The lesion was extirpated to the level of the fat with a #15 scalpel blade. Given the location of the defect, shape of the defect and the proximity to free margins a double Z-plasty was deemed most appropriate for repair. Using a sterile surgical marker, the appropriate transposition arms of the double Z-plasty were drawn incorporating the defect and placing the expected incisions within the relaxed skin tension lines where possible. The area thus outlined was incised deep to adipose tissue with a #15 scalpel blade. The skin margins were undermined to an appropriate distance in all directions utilizing iris scissors. The opposing transposition arms were then transposed and carried over into place in opposite direction and anchored with interrupted buried subcutaneous sutures. Zygomaticofacial Flap Text: Given the location of the defect, shape of the defect and the proximity to free margins a zygomaticofacial flap was deemed most appropriate for repair. Using a sterile surgical marker, the appropriate flap was drawn incorporating the defect and placing the expected incisions within the relaxed skin tension lines where possible. The area thus outlined was incised deep to adipose tissue with a #15 scalpel blade with preservation of a vascular pedicle.  The skin margins were undermined to an appropriate distance in all directions utilizing iris scissors. The flap was then carried over into the defect and anchored with interrupted buried subcutaneous sutures. Cheek Interpolation Flap Text: A decision was made to reconstruct the defect utilizing an interpolation axial flap and a staged reconstruction.  A telfa template was made of the defect.  This telfa template was then used to outline the Cheek Interpolation flap.  The donor area for the pedicle flap was then injected with anesthesia.  The flap was excised through the skin and subcutaneous tissue down to the layer of the underlying musculature.  The interpolation flap was carefully excised within this deep plane to maintain its blood supply.  The edges of the donor site were undermined.   The donor site was closed in a primary fashion.  The pedicle was then rotated into position and sutured.  Once the tube was sutured into place, adequate blood supply was confirmed with blanching and refill.  The pedicle was then wrapped with xeroform gauze and dressed appropriately with a telfa and gauze bandage to ensure continued blood supply and protect the attached pedicle. Cheek-To-Nose Interpolation Flap Text: A decision was made to reconstruct the defect utilizing an interpolation axial flap and a staged reconstruction.  A telfa template was made of the defect.  This telfa template was then used to outline the Cheek-To-Nose Interpolation flap.  The donor area for the pedicle flap was then injected with anesthesia.  The flap was excised through the skin and subcutaneous tissue down to the layer of the underlying musculature.  The interpolation flap was carefully excised within this deep plane to maintain its blood supply.  The edges of the donor site were undermined.   The donor site was closed in a primary fashion.  The pedicle was then rotated into position and sutured.  Once the tube was sutured into place, adequate blood supply was confirmed with blanching and refill.  The pedicle was then wrapped with xeroform gauze and dressed appropriately with a telfa and gauze bandage to ensure continued blood supply and protect the attached pedicle. Interpolation Flap Text: A decision was made to reconstruct the defect utilizing an interpolation axial flap and a staged reconstruction.  A telfa template was made of the defect.  This telfa template was then used to outline the interpolation flap.  The donor area for the pedicle flap was then injected with anesthesia.  The flap was excised through the skin and subcutaneous tissue down to the layer of the underlying musculature.  The interpolation flap was carefully excised within this deep plane to maintain its blood supply.  The edges of the donor site were undermined.   The donor site was closed in a primary fashion.  The pedicle was then rotated into position and sutured.  Once the tube was sutured into place, adequate blood supply was confirmed with blanching and refill.  The pedicle was then wrapped with xeroform gauze and dressed appropriately with a telfa and gauze bandage to ensure continued blood supply and protect the attached pedicle. Melolabial Interpolation Flap Text: A decision was made to reconstruct the defect utilizing an interpolation axial flap and a staged reconstruction.  A telfa template was made of the defect.  This telfa template was then used to outline the melolabial interpolation flap.  The donor area for the pedicle flap was then injected with anesthesia.  The flap was excised through the skin and subcutaneous tissue down to the layer of the underlying musculature.  The pedicle flap was carefully excised within this deep plane to maintain its blood supply.  The edges of the donor site were undermined.   The donor site was closed in a primary fashion.  The pedicle was then rotated into position and sutured.  Once the tube was sutured into place, adequate blood supply was confirmed with blanching and refill.  The pedicle was then wrapped with xeroform gauze and dressed appropriately with a telfa and gauze bandage to ensure continued blood supply and protect the attached pedicle. Mastoid Interpolation Flap Text: A decision was made to reconstruct the defect utilizing an interpolation axial flap and a staged reconstruction.  A telfa template was made of the defect.  This telfa template was then used to outline the mastoid interpolation flap.  The donor area for the pedicle flap was then injected with anesthesia.  The flap was excised through the skin and subcutaneous tissue down to the layer of the underlying musculature.  The pedicle flap was carefully excised within this deep plane to maintain its blood supply.  The edges of the donor site were undermined.   The donor site was closed in a primary fashion.  The pedicle was then rotated into position and sutured.  Once the tube was sutured into place, adequate blood supply was confirmed with blanching and refill.  The pedicle was then wrapped with xeroform gauze and dressed appropriately with a telfa and gauze bandage to ensure continued blood supply and protect the attached pedicle. Posterior Auricular Interpolation Flap Text: A decision was made to reconstruct the defect utilizing an interpolation axial flap and a staged reconstruction.  A telfa template was made of the defect.  This telfa template was then used to outline the posterior auricular interpolation flap.  The donor area for the pedicle flap was then injected with anesthesia.  The flap was excised through the skin and subcutaneous tissue down to the layer of the underlying musculature.  The pedicle flap was carefully excised within this deep plane to maintain its blood supply.  The edges of the donor site were undermined.   The donor site was closed in a primary fashion.  The pedicle was then rotated into position and sutured.  Once the tube was sutured into place, adequate blood supply was confirmed with blanching and refill.  The pedicle was then wrapped with xeroform gauze and dressed appropriately with a telfa and gauze bandage to ensure continued blood supply and protect the attached pedicle. Paramedian Forehead Flap Text: A decision was made to reconstruct the defect utilizing an interpolation axial flap and a staged reconstruction.  A telfa template was made of the defect.  This telfa template was then used to outline the paramedian forehead pedicle flap.  The donor area for the pedicle flap was then injected with anesthesia.  The flap was excised through the skin and subcutaneous tissue down to the layer of the underlying musculature.  The pedicle flap was carefully excised within this deep plane to maintain its blood supply.  The edges of the donor site were undermined.   The donor site was closed in a primary fashion.  The pedicle was then rotated into position and sutured.  Once the tube was sutured into place, adequate blood supply was confirmed with blanching and refill.  The pedicle was then wrapped with xeroform gauze and dressed appropriately with a telfa and gauze bandage to ensure continued blood supply and protect the attached pedicle. Abbe Flap (Upper To Lower Lip) Text: The defect of the lower lip was assessed and measured.  Given the location and size of the defect, an Abbe flap was deemed most appropriate. Using a sterile surgical marker, an appropriate Abbe flap was measured and drawn on the upper lip. Local anesthesia was then infiltrated.  A scalpel was then used to incise the upper lip through and through the skin, vermilion, muscle and mucosa, leaving the flap pedicled on the opposite side.  The flap was then rotated and transferred to the lower lip defect.  The flap was then sutured into place with a three layer technique, closing the orbicularis oris muscle layer with subcutaneous buried sutures, followed by a mucosal layer and an epidermal layer. Abbe Flap (Lower To Upper Lip) Text: The defect of the upper lip was assessed and measured.  Given the location and size of the defect, an Abbe flap was deemed most appropriate. Using a sterile surgical marker, an appropriate Abbe flap was measured and drawn on the lower lip. Local anesthesia was then infiltrated. A scalpel was then used to incise the upper lip through and through the skin, vermilion, muscle and mucosa, leaving the flap pedicled on the opposite side.  The flap was then rotated and transferred to the lower lip defect.  The flap was then sutured into place with a three layer technique, closing the orbicularis oris muscle layer with subcutaneous buried sutures, followed by a mucosal layer and an epidermal layer. Estlander Flap (Upper To Lower Lip) Text: The defect of the lower lip was assessed and measured.  Given the location and size of the defect, an Estlander flap was deemed most appropriate. Using a sterile surgical marker, an appropriate Estlander flap was measured and drawn on the upper lip. Local anesthesia was then infiltrated. A scalpel was then used to incise the lateral aspect of the flap, through skin, muscle and mucosa, leaving the flap pedicled medially.  The flap was then rotated and positioned to fill the lower lip defect.  The flap was then sutured into place with a three layer technique, closing the orbicularis oris muscle layer with subcutaneous buried sutures, followed by a mucosal layer and an epidermal layer. Lip Wedge Excision Repair Text: Given the location of the defect and the proximity to free margins a full thickness wedge repair was deemed most appropriate. Using a sterile surgical marker, the appropriate repair was drawn incorporating the defect and placing the expected incisions perpendicular to the vermilion border.  The vermilion border was also meticulously outlined to ensure appropriate reapproximation during the repair.  The area thus outlined was incised through and through with a #15 scalpel blade.  The muscularis and dermis were reaproximated with deep sutures following hemostasis. Care was taken to realign the vermilion border before proceeding with the superficial closure.  Once the vermilion was realigned the superfical and mucosal closure was finished. Ftsg Text: The defect edges were debeveled with a #15 scalpel blade. Given the location of the defect, shape of the defect and the proximity to free margins a full thickness skin graft was deemed most appropriate. Using a sterile surgical marker, the primary defect shape was transferred to the donor site. The area thus outlined was incised deep to adipose tissue with a #15 scalpel blade.  The harvested graft was then trimmed of adipose tissue until only dermis and epidermis was left.  The skin graft was then placed in the primary defect and oriented appropriately. Split-Thickness Skin Graft Text: The defect edges were debeveled with a #15 scalpel blade. Given the location of the defect, shape of the defect and the proximity to free margins a split thickness skin graft was deemed most appropriate. Using a sterile surgical marker, the primary defect shape was transferred to the donor site. The split thickness graft was then harvested.  The skin graft was then placed in the primary defect and oriented appropriately. Pinch Graft Text: The defect edges were debeveled with a #15 scalpel blade. Given the location of the defect, shape of the defect and the proximity to free margins a pinch graft was deemed most appropriate. Using a sterile surgical marker, the primary defect shape was transferred to the donor site. The area thus outlined was incised deep to adipose tissue with a #15 scalpel blade.  The harvested graft was then trimmed of adipose tissue until only dermis and epidermis was left. The skin graft was then placed in the primary defect and oriented appropriately. Burow's Graft Text: The defect edges were debeveled with a #15 scalpel blade. Given the location of the defect, shape of the defect, the proximity to free margins and the presence of a standing cone deformity a Burow's skin graft was deemed most appropriate. The standing cone was removed and this tissue was then trimmed to the shape of the primary defect. The adipose tissue was also removed until only dermis and epidermis were left.  The skin graft was then placed in the primary defect and oriented appropriately. Cartilage Graft Text: The defect edges were debeveled with a #15 scalpel blade. Given the location of the defect, shape of the defect, the fact the defect involved a full thickness cartilage defect a cartilage graft was deemed most appropriate.  An appropriate donor site was identified, cleansed, and anesthetized. The cartilage graft was then harvested and transferred to the recipient site, oriented appropriately and then sutured into place.  The secondary defect was then repaired using a primary closure. Composite Graft Text: The defect edges were debeveled with a #15 scalpel blade. Given the location of the defect, shape of the defect, the proximity to free margins and the fact the defect was full thickness a composite graft was deemed most appropriate.  The defect was outline and then transferred to the donor site.  A full thickness graft was then excised from the donor site. The graft was then placed in the primary defect, oriented appropriately and then sutured into place.  The secondary defect was then repaired using a primary closure. Epidermal Autograft Text: The defect edges were debeveled with a #15 scalpel blade. Given the location of the defect, shape of the defect and the proximity to free margins an epidermal autograft was deemed most appropriate. Using a sterile surgical marker, the primary defect shape was transferred to the donor site. The epidermal graft was then harvested.  The skin graft was then placed in the primary defect and oriented appropriately. Dermal Autograft Text: The defect edges were debeveled with a #15 scalpel blade. Given the location of the defect, shape of the defect and the proximity to free margins a dermal autograft was deemed most appropriate. Using a sterile surgical marker, the primary defect shape was transferred to the donor site. The area thus outlined was incised deep to adipose tissue with a #15 scalpel blade.  The harvested graft was then trimmed of adipose and epidermal tissue until only dermis was left.  The skin graft was then placed in the primary defect and oriented appropriately. Skin Substitute Text: The defect edges were debeveled with a #15 scalpel blade. Given the location of the defect, shape of the defect and the proximity to free margins a skin substitute graft was deemed most appropriate.  The graft material was trimmed to fit the size of the defect. The graft was then placed in the primary defect and oriented appropriately. Tissue Cultured Epidermal Autograft Text: The defect edges were debeveled with a #15 scalpel blade. Given the location of the defect, shape of the defect and the proximity to free margins a tissue cultured epidermal autograft was deemed most appropriate.  The graft was then trimmed to fit the size of the defect.  The graft was then placed in the primary defect and oriented appropriately. Xenograft Text: The defect edges were debeveled with a #15 scalpel blade. Given the location of the defect, shape of the defect and the proximity to free margins a xenograft was deemed most appropriate.  The graft was then trimmed to fit the size of the defect.  The graft was then placed in the primary defect and oriented appropriately. Purse String (Intermediate) Text: Given the location of the defect and the characteristics of the surrounding skin a purse string intermediate closure was deemed most appropriate.  Undermining was performed circumferentially around the surgical defect.  A purse string suture was then placed and tightened. Purse String (Simple) Text: Given the location of the defect and the characteristics of the surrounding skin a purse string simple closure was deemed most appropriate.  Undermining was performed circumferentially around the surgical defect.  A purse string suture was then placed and tightened. Partial Purse String (Intermediate) Text: Given the location of the defect and the characteristics of the surrounding skin an intermediate purse string closure was deemed most appropriate.  Undermining was performed circumferentially around the surgical defect.  A purse string suture was then placed and tightened. Wound tension of the circular defect prevented complete closure of the wound. Partial Purse String (Simple) Text: Given the location of the defect and the characteristics of the surrounding skin a simple purse string closure was deemed most appropriate.  Undermining was performed circumferentially around the surgical defect.  A purse string suture was then placed and tightened. Wound tension of the circular defect prevented complete closure of the wound. Complex Repair And Single Advancement Flap Text: The defect edges were debeveled with a #15 scalpel blade.  The primary defect was closed partially with a complex linear closure.  Given the location of the remaining defect, shape of the defect and the proximity to free margins a single advancement flap was deemed most appropriate for complete closure of the defect.  Using a sterile surgical marker, an appropriate advancement flap was drawn incorporating the defect and placing the expected incisions within the relaxed skin tension lines where possible. The area thus outlined was incised deep to adipose tissue with a #15 scalpel blade. The skin margins were undermined to an appropriate distance in all directions utilizing iris scissors and carried over to close the primary defect. Complex Repair And Double Advancement Flap Text: The defect edges were debeveled with a #15 scalpel blade.  The primary defect was closed partially with a complex linear closure.  Given the location of the remaining defect, shape of the defect and the proximity to free margins a double advancement flap was deemed most appropriate for complete closure of the defect.  Using a sterile surgical marker, an appropriate advancement flap was drawn incorporating the defect and placing the expected incisions within the relaxed skin tension lines where possible. The area thus outlined was incised deep to adipose tissue with a #15 scalpel blade. The skin margins were undermined to an appropriate distance in all directions utilizing iris scissors and carried over to close the primary defect. Complex Repair And Modified Advancement Flap Text: The defect edges were debeveled with a #15 scalpel blade.  The primary defect was closed partially with a complex linear closure.  Given the location of the remaining defect, shape of the defect and the proximity to free margins a modified advancement flap was deemed most appropriate for complete closure of the defect.  Using a sterile surgical marker, an appropriate advancement flap was drawn incorporating the defect and placing the expected incisions within the relaxed skin tension lines where possible. The area thus outlined was incised deep to adipose tissue with a #15 scalpel blade. The skin margins were undermined to an appropriate distance in all directions utilizing iris scissors and carried over to close the primary defect. Complex Repair And A-T Advancement Flap Text: The defect edges were debeveled with a #15 scalpel blade.  The primary defect was closed partially with a complex linear closure.  Given the location of the remaining defect, shape of the defect and the proximity to free margins an A-T advancement flap was deemed most appropriate for complete closure of the defect.  Using a sterile surgical marker, an appropriate advancement flap was drawn incorporating the defect and placing the expected incisions within the relaxed skin tension lines where possible. The area thus outlined was incised deep to adipose tissue with a #15 scalpel blade. The skin margins were undermined to an appropriate distance in all directions utilizing iris scissors and carried over to close the primary defect. Complex Repair And O-T Advancement Flap Text: The defect edges were debeveled with a #15 scalpel blade.  The primary defect was closed partially with a complex linear closure.  Given the location of the remaining defect, shape of the defect and the proximity to free margins an O-T advancement flap was deemed most appropriate for complete closure of the defect.  Using a sterile surgical marker, an appropriate advancement flap was drawn incorporating the defect and placing the expected incisions within the relaxed skin tension lines where possible. The area thus outlined was incised deep to adipose tissue with a #15 scalpel blade. The skin margins were undermined to an appropriate distance in all directions utilizing iris scissors and carried over to close the primary defect. Complex Repair And O-L Flap Text: The defect edges were debeveled with a #15 scalpel blade.  The primary defect was closed partially with a complex linear closure.  Given the location of the remaining defect, shape of the defect and the proximity to free margins an O-L flap was deemed most appropriate for complete closure of the defect.  Using a sterile surgical marker, an appropriate flap was drawn incorporating the defect and placing the expected incisions within the relaxed skin tension lines where possible. The area thus outlined was incised deep to adipose tissue with a #15 scalpel blade. The skin margins were undermined to an appropriate distance in all directions utilizing iris scissors and carried over to close the primary defect. Complex Repair And Bilobe Flap Text: The defect edges were debeveled with a #15 scalpel blade.  The primary defect was closed partially with a complex linear closure.  Given the location of the remaining defect, shape of the defect and the proximity to free margins a bilobe flap was deemed most appropriate for complete closure of the defect.  Using a sterile surgical marker, an appropriate advancement flap was drawn incorporating the defect and placing the expected incisions within the relaxed skin tension lines where possible. The area thus outlined was incised deep to adipose tissue with a #15 scalpel blade. The skin margins were undermined to an appropriate distance in all directions utilizing iris scissors and carried over to close the primary defect. Complex Repair And Melolabial Flap Text: The defect edges were debeveled with a #15 scalpel blade.  The primary defect was closed partially with a complex linear closure.  Given the location of the remaining defect, shape of the defect and the proximity to free margins a melolabial flap was deemed most appropriate for complete closure of the defect.  Using a sterile surgical marker, an appropriate advancement flap was drawn incorporating the defect and placing the expected incisions within the relaxed skin tension lines where possible. The area thus outlined was incised deep to adipose tissue with a #15 scalpel blade. The skin margins were undermined to an appropriate distance in all directions utilizing iris scissors and carried over to close the primary defect. Complex Repair And Rotation Flap Text: The defect edges were debeveled with a #15 scalpel blade.  The primary defect was closed partially with a complex linear closure.  Given the location of the remaining defect, shape of the defect and the proximity to free margins a rotation flap was deemed most appropriate for complete closure of the defect.  Using a sterile surgical marker, an appropriate advancement flap was drawn incorporating the defect and placing the expected incisions within the relaxed skin tension lines where possible. The area thus outlined was incised deep to adipose tissue with a #15 scalpel blade. The skin margins were undermined to an appropriate distance in all directions utilizing iris scissors and carried over to close the primary defect. Complex Repair And Rhombic Flap Text: The defect edges were debeveled with a #15 scalpel blade.  The primary defect was closed partially with a complex linear closure.  Given the location of the remaining defect, shape of the defect and the proximity to free margins a rhombic flap was deemed most appropriate for complete closure of the defect.  Using a sterile surgical marker, an appropriate advancement flap was drawn incorporating the defect and placing the expected incisions within the relaxed skin tension lines where possible. The area thus outlined was incised deep to adipose tissue with a #15 scalpel blade. The skin margins were undermined to an appropriate distance in all directions utilizing iris scissors and carried over to close the primary defect. Complex Repair And Transposition Flap Text: The defect edges were debeveled with a #15 scalpel blade.  The primary defect was closed partially with a complex linear closure.  Given the location of the remaining defect, shape of the defect and the proximity to free margins a transposition flap was deemed most appropriate for complete closure of the defect.  Using a sterile surgical marker, an appropriate advancement flap was drawn incorporating the defect and placing the expected incisions within the relaxed skin tension lines where possible. The area thus outlined was incised deep to adipose tissue with a #15 scalpel blade. The skin margins were undermined to an appropriate distance in all directions utilizing iris scissors and carried over to close the primary defect. Complex Repair And V-Y Plasty Text: The defect edges were debeveled with a #15 scalpel blade.  The primary defect was closed partially with a complex linear closure.  Given the location of the remaining defect, shape of the defect and the proximity to free margins a V-Y plasty was deemed most appropriate for complete closure of the defect.  Using a sterile surgical marker, an appropriate advancement flap was drawn incorporating the defect and placing the expected incisions within the relaxed skin tension lines where possible. The area thus outlined was incised deep to adipose tissue with a #15 scalpel blade. The skin margins were undermined to an appropriate distance in all directions utilizing iris scissors and carried over to close the primary defect. Complex Repair And M Plasty Text: The defect edges were debeveled with a #15 scalpel blade.  The primary defect was closed partially with a complex linear closure.  Given the location of the remaining defect, shape of the defect and the proximity to free margins an M plasty was deemed most appropriate for complete closure of the defect.  Using a sterile surgical marker, an appropriate advancement flap was drawn incorporating the defect and placing the expected incisions within the relaxed skin tension lines where possible. The area thus outlined was incised deep to adipose tissue with a #15 scalpel blade. The skin margins were undermined to an appropriate distance in all directions utilizing iris scissors and carried over to close the primary defect. Complex Repair And Double M Plasty Text: The defect edges were debeveled with a #15 scalpel blade.  The primary defect was closed partially with a complex linear closure.  Given the location of the remaining defect, shape of the defect and the proximity to free margins a double M plasty was deemed most appropriate for complete closure of the defect.  Using a sterile surgical marker, an appropriate advancement flap was drawn incorporating the defect and placing the expected incisions within the relaxed skin tension lines where possible. The area thus outlined was incised deep to adipose tissue with a #15 scalpel blade. The skin margins were undermined to an appropriate distance in all directions utilizing iris scissors and carried over to close the primary defect. Complex Repair And W Plasty Text: The defect edges were debeveled with a #15 scalpel blade.  The primary defect was closed partially with a complex linear closure.  Given the location of the remaining defect, shape of the defect and the proximity to free margins a W plasty was deemed most appropriate for complete closure of the defect.  Using a sterile surgical marker, an appropriate advancement flap was drawn incorporating the defect and placing the expected incisions within the relaxed skin tension lines where possible. The area thus outlined was incised deep to adipose tissue with a #15 scalpel blade. The skin margins were undermined to an appropriate distance in all directions utilizing iris scissors and carried over to close the primary defect. Complex Repair And Z Plasty Text: The defect edges were debeveled with a #15 scalpel blade.  The primary defect was closed partially with a complex linear closure.  Given the location of the remaining defect, shape of the defect and the proximity to free margins a Z plasty was deemed most appropriate for complete closure of the defect.  Using a sterile surgical marker, an appropriate advancement flap was drawn incorporating the defect and placing the expected incisions within the relaxed skin tension lines where possible. The area thus outlined was incised deep to adipose tissue with a #15 scalpel blade. The skin margins were undermined to an appropriate distance in all directions utilizing iris scissors and carried over to close the primary defect. Complex Repair And Dorsal Nasal Flap Text: The defect edges were debeveled with a #15 scalpel blade.  The primary defect was closed partially with a complex linear closure.  Given the location of the remaining defect, shape of the defect and the proximity to free margins a dorsal nasal flap was deemed most appropriate for complete closure of the defect.  Using a sterile surgical marker, an appropriate flap was drawn incorporating the defect and placing the expected incisions within the relaxed skin tension lines where possible. The area thus outlined was incised deep to adipose tissue with a #15 scalpel blade. The skin margins were undermined to an appropriate distance in all directions utilizing iris scissors and carried over to close the primary defect. Complex Repair And Ftsg Text: The defect edges were debeveled with a #15 scalpel blade.  The primary defect was closed partially with a complex linear closure.  Given the location of the defect, shape of the defect and the proximity to free margins a full thickness skin graft was deemed most appropriate to repair the remaining defect.  The graft was trimmed to fit the size of the remaining defect.  The graft was then placed in the primary defect, oriented appropriately, and sutured into place. Complex Repair And Burow's Graft Text: The defect edges were debeveled with a #15 scalpel blade.  The primary defect was closed partially with a complex linear closure.  Given the location of the defect, shape of the defect, the proximity to free margins and the presence of a standing cone deformity a Burow's graft was deemed most appropriate to repair the remaining defect.  The graft was trimmed to fit the size of the remaining defect.  The graft was then placed in the primary defect, oriented appropriately, and sutured into place. Complex Repair And Split-Thickness Skin Graft Text: The defect edges were debeveled with a #15 scalpel blade.  The primary defect was closed partially with a complex linear closure.  Given the location of the defect, shape of the defect and the proximity to free margins a split thickness skin graft was deemed most appropriate to repair the remaining defect.  The graft was trimmed to fit the size of the remaining defect.  The graft was then placed in the primary defect, oriented appropriately, and sutured into place. Complex Repair And Epidermal Autograft Text: The defect edges were debeveled with a #15 scalpel blade.  The primary defect was closed partially with a complex linear closure.  Given the location of the defect, shape of the defect and the proximity to free margins an epidermal autograft was deemed most appropriate to repair the remaining defect.  The graft was trimmed to fit the size of the remaining defect.  The graft was then placed in the primary defect, oriented appropriately, and sutured into place. Complex Repair And Dermal Autograft Text: The defect edges were debeveled with a #15 scalpel blade.  The primary defect was closed partially with a complex linear closure.  Given the location of the defect, shape of the defect and the proximity to free margins an dermal autograft was deemed most appropriate to repair the remaining defect.  The graft was trimmed to fit the size of the remaining defect.  The graft was then placed in the primary defect, oriented appropriately, and sutured into place. Complex Repair And Tissue Cultured Epidermal Autograft Text: The defect edges were debeveled with a #15 scalpel blade.  The primary defect was closed partially with a complex linear closure.  Given the location of the defect, shape of the defect and the proximity to free margins an tissue cultured epidermal autograft was deemed most appropriate to repair the remaining defect.  The graft was trimmed to fit the size of the remaining defect.  The graft was then placed in the primary defect, oriented appropriately, and sutured into place. Complex Repair And Xenograft Text: The defect edges were debeveled with a #15 scalpel blade.  The primary defect was closed partially with a complex linear closure.  Given the location of the defect, shape of the defect and the proximity to free margins a xenograft was deemed most appropriate to repair the remaining defect.  The graft was trimmed to fit the size of the remaining defect.  The graft was then placed in the primary defect, oriented appropriately, and sutured into place. Complex Repair And Skin Substitute Graft Text: The defect edges were debeveled with a #15 scalpel blade.  The primary defect was closed partially with a complex linear closure.  Given the location of the remaining defect, shape of the defect and the proximity to free margins a skin substitute graft was deemed most appropriate to repair the remaining defect.  The graft was trimmed to fit the size of the remaining defect.  The graft was then placed in the primary defect, oriented appropriately, and sutured into place. Include Anticoagulation In Mohs Note?: Please Select the Appropriate Response Path Notes (To The Dermatopathologist): Please check margins. Superior notch Consent was obtained from the patient. The risks and benefits to therapy were discussed in detail. Specifically, the risks of infection, scarring, bleeding, prolonged wound healing, incomplete removal, allergy to anesthesia, nerve injury and recurrence were addressed. Prior to the procedure, the treatment site was clearly identified and confirmed by the patient. All components of Universal Protocol/PAUSE Rule completed. Post-Care Instructions: I reviewed with the patient in detail post-care instructions. Patient is not to engage in any heavy lifting, exercise, or swimming for the next 14 days. Should the patient develop any fevers, chills, bleeding, severe pain patient will contact the office immediately. Home Suture Removal Text: Patient was provided a home suture removal kit and will remove their sutures at home.  If they have any questions or difficulties they will call the office. Where Do You Want The Question To Include Opioid Counseling Located?: Case Summary Tab Information: Selecting Yes will display possible errors in your note based on the variables you have selected. This validation is only offered as a suggestion for you. PLEASE NOTE THAT THE VALIDATION TEXT WILL BE REMOVED WHEN YOU FINALIZE YOUR NOTE. IF YOU WANT TO FAX A PRELIMINARY NOTE YOU WILL NEED TO TOGGLE THIS TO 'NO' IF YOU DO NOT WANT IT IN YOUR FAXED NOTE.

## 2025-06-09 RX ORDER — AMLODIPINE BESYLATE 5 MG/1
5 TABLET ORAL DAILY
Qty: 30 TABLET | Refills: 5 | Status: SHIPPED | OUTPATIENT
Start: 2025-06-09

## 2025-06-09 NOTE — TELEPHONE ENCOUNTER
Requested Prescriptions     Pending Prescriptions Disp Refills    amLODIPine (NORVASC) 5 MG tablet 30 tablet 5     Sig: Take 1 tablet by mouth daily      Follow up scheduled on 06/11/2025

## 2025-06-10 RX ORDER — TAMSULOSIN HYDROCHLORIDE 0.4 MG/1
0.4 CAPSULE ORAL DAILY
Qty: 30 CAPSULE | Refills: 5 | Status: CANCELLED | OUTPATIENT
Start: 2025-06-10

## 2025-06-11 ENCOUNTER — OFFICE VISIT (OUTPATIENT)
Dept: INTERNAL MEDICINE CLINIC | Age: 75
End: 2025-06-11

## 2025-06-11 VITALS
BODY MASS INDEX: 26.97 KG/M2 | DIASTOLIC BLOOD PRESSURE: 62 MMHG | WEIGHT: 188.4 LBS | OXYGEN SATURATION: 98 % | SYSTOLIC BLOOD PRESSURE: 135 MMHG | HEART RATE: 62 BPM | HEIGHT: 70 IN

## 2025-06-11 DIAGNOSIS — R39.11 BENIGN PROSTATIC HYPERPLASIA WITH URINARY HESITANCY: ICD-10-CM

## 2025-06-11 DIAGNOSIS — E11.9 TYPE 2 DIABETES MELLITUS WITHOUT COMPLICATION, WITHOUT LONG-TERM CURRENT USE OF INSULIN (HCC): Primary | ICD-10-CM

## 2025-06-11 DIAGNOSIS — K21.9 GASTROESOPHAGEAL REFLUX DISEASE WITHOUT ESOPHAGITIS: ICD-10-CM

## 2025-06-11 DIAGNOSIS — N40.1 BENIGN PROSTATIC HYPERPLASIA WITH URINARY HESITANCY: ICD-10-CM

## 2025-06-11 DIAGNOSIS — I10 PRIMARY HYPERTENSION: ICD-10-CM

## 2025-06-11 LAB — HBA1C MFR BLD: 6.9 %

## 2025-06-11 PROCEDURE — 1124F ACP DISCUSS-NO DSCNMKR DOCD: CPT | Performed by: INTERNAL MEDICINE

## 2025-06-11 PROCEDURE — 3075F SYST BP GE 130 - 139MM HG: CPT | Performed by: INTERNAL MEDICINE

## 2025-06-11 PROCEDURE — 3078F DIAST BP <80 MM HG: CPT | Performed by: INTERNAL MEDICINE

## 2025-06-11 PROCEDURE — 1159F MED LIST DOCD IN RCRD: CPT | Performed by: INTERNAL MEDICINE

## 2025-06-11 PROCEDURE — G8427 DOCREV CUR MEDS BY ELIG CLIN: HCPCS | Performed by: INTERNAL MEDICINE

## 2025-06-11 PROCEDURE — G8417 CALC BMI ABV UP PARAM F/U: HCPCS | Performed by: INTERNAL MEDICINE

## 2025-06-11 PROCEDURE — 83036 HEMOGLOBIN GLYCOSYLATED A1C: CPT | Performed by: INTERNAL MEDICINE

## 2025-06-11 PROCEDURE — 99214 OFFICE O/P EST MOD 30 MIN: CPT | Performed by: INTERNAL MEDICINE

## 2025-06-11 PROCEDURE — 3044F HG A1C LEVEL LT 7.0%: CPT | Performed by: INTERNAL MEDICINE

## 2025-06-11 PROCEDURE — 1036F TOBACCO NON-USER: CPT | Performed by: INTERNAL MEDICINE

## 2025-06-11 RX ORDER — PANTOPRAZOLE SODIUM 40 MG/1
40 TABLET, DELAYED RELEASE ORAL 2 TIMES DAILY
Qty: 60 TABLET | Refills: 5 | Status: SHIPPED | OUTPATIENT
Start: 2025-06-11

## 2025-06-11 RX ORDER — TAMSULOSIN HYDROCHLORIDE 0.4 MG/1
0.4 CAPSULE ORAL DAILY
Qty: 30 CAPSULE | Refills: 5 | Status: SHIPPED | OUTPATIENT
Start: 2025-06-11

## 2025-06-11 NOTE — PROGRESS NOTES
Internal Medicine Outpatient  Follow Up      Chief complaint:  3 month follow up for DM    Subjective: hgba1c down to 6.9% - diet control is working - and he lost 7lbs - now has diet controlled DM and will not need medication - pt is happy at present time - discussed how his hip pain is bilateral and quite significant, but he is not ready for pain provider referral yet - but he is frustrated  He is trying to eat less cheetos and more appropriate foods as much as possible    ROS:   A comprehensive review of systems was negative except for: bilateral hip pains       Objective:    /62   Pulse 62   Ht 1.778 m (5' 10\")   Wt 85.5 kg (188 lb 6.4 oz)   SpO2 98%   BMI 27.03 kg/m²     Gen: thin, NAD  HEENT: NC/AT, moist mucous membranes, no oropharyngeal erythema or exudate  Neck: supple, trachea midline, no anterior cervical or SC LAD  Heart:  Normal s1/s2, RRR, no murmurs, gallops, or rubs. no leg edema  Lungs:  clear bilaterally, no wheeze, no rales, no rhonchi, no crackles, no use of accessory muscles  Abd: bowel sounds present, soft, nontender, nondistended, no masses  Extrem:  No clubbing, cyanosis,  no edema  Skin: no rashes or lesions  Psych: A & O x3  Neuro: grossly intact, moves all 4 extremities.      Assessment:      ICD-10-CM    1. Type 2 diabetes mellitus without complication, without long-term current use of insulin (HCC)  E11.9 POCT glycosylated hemoglobin (Hb A1C)     Lipid, Fasting     Comprehensive Metabolic Panel      2. Primary hypertension  I10 Lipid, Fasting     Comprehensive Metabolic Panel      3. Benign prostatic hyperplasia with urinary hesitancy  N40.1     R39.11       4. Gastroesophageal reflux disease without esophagitis  K21.9            Plan:   Diet controlled DM - now A1c is below 7% - will hold off on medication - pt has made concerted effort to change his diet habits and do better with foods - eating no sugar jelly, low fat

## 2025-07-16 RX ORDER — ONDANSETRON 4 MG/1
4 TABLET, FILM COATED ORAL EVERY 8 HOURS PRN
Qty: 60 TABLET | Refills: 5 | Status: SHIPPED | OUTPATIENT
Start: 2025-07-16

## 2025-07-16 NOTE — TELEPHONE ENCOUNTER
Requested Prescriptions     Pending Prescriptions Disp Refills    ondansetron (ZOFRAN) 4 MG tablet 60 tablet 5     Sig: Take 1 tablet by mouth every 8 hours as needed for Nausea

## (undated) DEVICE — MAJOR SET UP PK

## (undated) DEVICE — TROCAR ENDOSCP L100MM DIA5MM BLDELSS STBL SL THRD OPT VW

## (undated) DEVICE — TROCAR ENDOSCP L100MM DIA5MM BLDELSS STBL SL OBT RADLUC

## (undated) DEVICE — TR BAND RADIAL ARTERY COMPRESSION DEVICE: Brand: TR BAND

## (undated) DEVICE — ABDOMINAL BINDER: Brand: DEROYAL

## (undated) DEVICE — 3M™ STERI-DRAPE™ U-DRAPE 1015: Brand: STERI-DRAPE™

## (undated) DEVICE — 5FR MEDTRONIC ULTRA 4.0 CATHETER

## (undated) DEVICE — ELECTRODE PT RET AD L9FT HI MOIST COND ADH HYDRGEL CORDED

## (undated) DEVICE — TUBING, SUCTION, 3/16" X 10', STRAIGHT: Brand: MEDLINE

## (undated) DEVICE — SUTURE STRATAFIX SPRL SZ 1 L14IN ABSRB VLT L48CM CTX 1/2 SXPD2B405

## (undated) DEVICE — GLIDESHEATH SLENDER ACCESS KIT: Brand: GLIDESHEATH SLENDER

## (undated) DEVICE — BLADE ES L6IN ELASTOMERIC COAT EXT DURABLE BEND UPTO 90DEG

## (undated) DEVICE — DRAPE,ABDOMINAL,MAJOR,STERILE: Brand: MEDLINE

## (undated) DEVICE — WIRE .025 SWAN J 3MM 150 CM

## (undated) DEVICE — SYRINGE MED 10ML LUERLOCK TIP W/O SFTY DISP

## (undated) DEVICE — GLIDESHEATH SLENDER STAINLESS STEEL KIT: Brand: GLIDESHEATH SLENDER

## (undated) DEVICE — SUTURE SZ 0 27IN 5/8 CIR UR-6  TAPER PT VIOLET ABSRB VICRYL J603H

## (undated) DEVICE — CATH LAB PACK: Brand: MEDLINE INDUSTRIES, INC.

## (undated) DEVICE — 260 CM J TIP WIRE .035

## (undated) DEVICE — FORCEP BX STD CAP 240CM RAD JAW 4

## (undated) DEVICE — COVIDIEN  WHOLEY GUIDE WIRE

## (undated) DEVICE — CONMED SCOPE SAVER BITE BLOCK, 20X27 MM: Brand: SCOPE SAVER

## (undated) DEVICE — SUTURE VCRL SZ 4-0 L18IN ABSRB UD L19MM PS-2 3/8 CIR PRIM J496H

## (undated) DEVICE — GLOVE ORANGE PI 7 1/2   MSG9075

## (undated) DEVICE — YANKAUER,BULB TIP,W/O VENT,RIGID,STERILE: Brand: MEDLINE

## (undated) DEVICE — GLOVE ORANGE PI 8 1/2   MSG9085

## (undated) DEVICE — SUTURE MCRYL SZ 2-0 L18IN ABSRB VLT L36MM CT-1 1/2 CIR Y739D

## (undated) DEVICE — CORD ES L10FT MPLR LAP

## (undated) DEVICE — SUTURE ETHBND EXCEL SZ 2 L30IN NONABSORBABLE GRN L40MM V-37 MX69G

## (undated) DEVICE — SUTURE MCRYL + SZ 4-0 L18IN ABSRB UD L19MM PS-2 3/8 CIR MCP496G

## (undated) DEVICE — ENDOSCOPIC KIT 2 12 FT OP4 DE2 GWN SYR

## (undated) DEVICE — TUBING INSUF ISO CONN DISP

## (undated) DEVICE — CANNULA,OXY,ADULT,SUPERSOFT,W/7'TUB,SC: Brand: MEDLINE INDUSTRIES, INC.

## (undated) DEVICE — OPTIFOAM GENTLE SA, POSTOP, 4X12: Brand: MEDLINE

## (undated) DEVICE — DRAPE C ARM UNIV W41XL74IN CLR PLAS XR VELC CLSR POLY STRP

## (undated) DEVICE — 5FR CORDIS CATHETER MULTIPACK

## (undated) DEVICE — Z INACTIVE USE 2660664 SOLUTION IRRIG 3000ML 0.9% SOD CHL USP UROMATIC PLAS CONT

## (undated) DEVICE — T4 HOOD

## (undated) DEVICE — KIT,ANTI FOG,W/SPONGE & FLUID,SOFT PACK: Brand: MEDLINE

## (undated) DEVICE — RADPAD

## (undated) DEVICE — GAUZE SPONGES,8 PLY: Brand: CURITY

## (undated) DEVICE — STANDARD HYPODERMIC NEEDLE,POLYPROPYLENE HUB: Brand: MONOJECT

## (undated) DEVICE — DRAPE,REIN 53X77,STERILE: Brand: MEDLINE

## (undated) DEVICE — SYNTHETIC CONTROLCATH TD CATHETER: Brand: SWAN-GANZ CONTROLCATH

## (undated) DEVICE — CIRCUIT ANES L72IN 3L BACT AND VIR FLTR EL CONN SGL LIMB

## (undated) DEVICE — 3M™ STERI-DRAPE™  ISOLATION DRAPE WITH INCISE FILM AND POUCH 1017: Brand: STERI-DRAPE™

## (undated) DEVICE — Z INACTIVE NO ACTIVE SUPPLIER APPLICATOR MEDICATED 26 CC TINT HI-LITE ORNG STRL CHLORAPREP

## (undated) DEVICE — DECANTER: Brand: UNBRANDED

## (undated) DEVICE — 5FR MICRO INTRODUCER KIT

## (undated) DEVICE — SUTURE ETHBND EXCEL SZ 0 L18IN NONABSORBABLE GRN L36MM CT-1 CX21D

## (undated) DEVICE — FEMORAL CANAL TIP, IRRIGATION/SUCTION

## (undated) DEVICE — INTENDED FOR TISSUE SEPARATION, AND OTHER PROCEDURES THAT REQUIRE A SHARP SURGICAL BLADE TO PUNCTURE OR CUT.: Brand: BARD-PARKER ® DISPOSABLE SCALPELS

## (undated) DEVICE — SPONGE LAP W18XL18IN WHT COT 4 PLY FLD STRUNG RADPQ DISP ST 2 PER PACK

## (undated) DEVICE — RADIFOCUS GLIDEWIRE: Brand: GLIDEWIRE

## (undated) DEVICE — 3M™ STERI-STRIP™ REINFORCED ADHESIVE SKIN CLOSURES, R1540, 1/8 IN X 3 IN (3 MM X 75 MM), 5 STRIPS/ENVELOPE: Brand: 3M™ STERI-STRIP™

## (undated) DEVICE — BANDAGE,GAUZE,4.5"X4.1YD,STERILE,LF: Brand: MEDLINE

## (undated) DEVICE — SOLUTION IV IRRIG 500ML 0.9% SODIUM CHL 2F7123

## (undated) DEVICE — TROCAR ENDOSCP L100MM DIA12MM BLDELSS OBT RADLUC STBL SL

## (undated) DEVICE — DRAPE,U/SHT,SPLIT,FILM,60X84,STERILE: Brand: MEDLINE

## (undated) DEVICE — 3M™ STERI-STRIP™ COMPOUND BENZOIN TINCTURE 40 BAGS/CARTON 4 CARTONS/CASE C1544: Brand: 3M™ STERI-STRIP™

## (undated) DEVICE — GOWN SIRUS NONREIN XL W/TWL: Brand: MEDLINE INDUSTRIES, INC.

## (undated) DEVICE — 3M™ TEGADERM™ TRANSPARENT FILM DRESSING FRAME STYLE, 1624W, 2-3/8 IN X 2-3/4 IN (6 CM X 7 CM), 100/CT 4CT/CASE: Brand: 3M™ TEGADERM™

## (undated) DEVICE — GOWN,AURORA,NONREINF,RAGLAN,XXL,STERILE: Brand: MEDLINE

## (undated) DEVICE — PINNACLE INTRODUCER SHEATH: Brand: PINNACLE

## (undated) DEVICE — HANDPIECE SET WITH BONE CLEANING TIP AND SUCTION TUBE: Brand: INTERPULSE

## (undated) DEVICE — APPLICATOR PREP 26ML 0.7% IOD POVACRYLEX 74% ISO ALC ST

## (undated) DEVICE — NEEDLE SPNL 22GA L3.5IN BLK HUB S STL REG WALL FIT STYL W/

## (undated) DEVICE — DUAL CUT SAGITTAL BLADE